# Patient Record
Sex: FEMALE | Race: WHITE | Employment: OTHER | URBAN - NONMETROPOLITAN AREA
[De-identification: names, ages, dates, MRNs, and addresses within clinical notes are randomized per-mention and may not be internally consistent; named-entity substitution may affect disease eponyms.]

---

## 2017-05-13 LAB
ALBUMIN SERPL-MCNC: NORMAL G/DL
ALP BLD-CCNC: NORMAL U/L
ALT SERPL-CCNC: NORMAL U/L
AST SERPL-CCNC: NORMAL U/L
BILIRUB SERPL-MCNC: NORMAL MG/DL (ref 0.1–1.4)
BUN BLDV-MCNC: NORMAL MG/DL
CALCIUM SERPL-MCNC: NORMAL MG/DL
CHLORIDE BLD-SCNC: NORMAL MMOL/L
CHOLESTEROL, TOTAL: 148 MG/DL
CHOLESTEROL/HDL RATIO: 2.14
CO2: NORMAL MMOL/L
CREAT SERPL-MCNC: NORMAL MG/DL
GFR CALCULATED: NORMAL
GLUCOSE BLD-MCNC: 68 MG/DL
HDLC SERPL-MCNC: 69 MG/DL (ref 35–70)
LDL CHOLESTEROL CALCULATED: 61 MG/DL (ref 0–160)
POTASSIUM SERPL-SCNC: NORMAL MMOL/L
SODIUM BLD-SCNC: NORMAL MMOL/L
TOTAL PROTEIN: NORMAL
TRIGL SERPL-MCNC: 88 MG/DL
VLDLC SERPL CALC-MCNC: 18 MG/DL

## 2017-07-13 ENCOUNTER — TELEPHONE (OUTPATIENT)
Dept: FAMILY MEDICINE CLINIC | Age: 62
End: 2017-07-13

## 2017-07-13 DIAGNOSIS — E03.9 ACQUIRED HYPOTHYROIDISM: ICD-10-CM

## 2017-07-13 RX ORDER — LEVOTHYROXINE SODIUM 0.2 MG/1
TABLET ORAL
Qty: 100 TABLET | Refills: 3 | Status: SHIPPED | OUTPATIENT
Start: 2017-07-13 | End: 2018-07-14 | Stop reason: SDUPTHER

## 2017-07-21 DIAGNOSIS — F33.42 RECURRENT MAJOR DEPRESSIVE DISORDER, IN FULL REMISSION (HCC): ICD-10-CM

## 2017-07-21 DIAGNOSIS — E78.00 PURE HYPERCHOLESTEROLEMIA: ICD-10-CM

## 2017-07-21 RX ORDER — PAROXETINE HYDROCHLORIDE 40 MG/1
40 TABLET, FILM COATED ORAL DAILY
Qty: 90 TABLET | Refills: 3 | Status: SHIPPED | OUTPATIENT
Start: 2017-07-21 | End: 2017-10-11 | Stop reason: SDUPTHER

## 2017-07-21 RX ORDER — SIMVASTATIN 80 MG
80 TABLET ORAL NIGHTLY
Qty: 90 TABLET | Refills: 3 | Status: SHIPPED | OUTPATIENT
Start: 2017-07-21 | End: 2018-07-17 | Stop reason: SDUPTHER

## 2017-08-01 ENCOUNTER — OFFICE VISIT (OUTPATIENT)
Dept: FAMILY MEDICINE CLINIC | Age: 62
End: 2017-08-01
Payer: COMMERCIAL

## 2017-08-01 VITALS
DIASTOLIC BLOOD PRESSURE: 80 MMHG | SYSTOLIC BLOOD PRESSURE: 130 MMHG | TEMPERATURE: 98 F | BODY MASS INDEX: 44.09 KG/M2 | WEIGHT: 224.6 LBS | OXYGEN SATURATION: 98 % | RESPIRATION RATE: 15 BRPM | HEART RATE: 87 BPM

## 2017-08-01 DIAGNOSIS — E03.9 ACQUIRED HYPOTHYROIDISM: ICD-10-CM

## 2017-08-01 DIAGNOSIS — F33.42 RECURRENT MAJOR DEPRESSIVE DISORDER, IN FULL REMISSION (HCC): ICD-10-CM

## 2017-08-01 DIAGNOSIS — I10 ESSENTIAL HYPERTENSION: ICD-10-CM

## 2017-08-01 DIAGNOSIS — E78.00 PURE HYPERCHOLESTEROLEMIA: ICD-10-CM

## 2017-08-01 PROCEDURE — 99214 OFFICE O/P EST MOD 30 MIN: CPT | Performed by: FAMILY MEDICINE

## 2017-08-01 PROCEDURE — 3017F COLORECTAL CA SCREEN DOC REV: CPT | Performed by: FAMILY MEDICINE

## 2017-08-01 PROCEDURE — 1036F TOBACCO NON-USER: CPT | Performed by: FAMILY MEDICINE

## 2017-08-01 PROCEDURE — 3014F SCREEN MAMMO DOC REV: CPT | Performed by: FAMILY MEDICINE

## 2017-08-01 PROCEDURE — G8427 DOCREV CUR MEDS BY ELIG CLIN: HCPCS | Performed by: FAMILY MEDICINE

## 2017-08-01 PROCEDURE — G8417 CALC BMI ABV UP PARAM F/U: HCPCS | Performed by: FAMILY MEDICINE

## 2017-08-01 RX ORDER — LOSARTAN POTASSIUM AND HYDROCHLOROTHIAZIDE 25; 100 MG/1; MG/1
1 TABLET ORAL DAILY
Qty: 30 TABLET | Refills: 11 | Status: SHIPPED | OUTPATIENT
Start: 2017-08-01 | End: 2018-08-22 | Stop reason: SDUPTHER

## 2017-08-01 RX ORDER — PAROXETINE HYDROCHLORIDE 40 MG/1
40 TABLET, FILM COATED ORAL DAILY
Qty: 90 TABLET | Refills: 3 | Status: CANCELLED | OUTPATIENT
Start: 2017-08-01

## 2017-08-01 RX ORDER — LEVOTHYROXINE SODIUM 0.2 MG/1
TABLET ORAL
Qty: 100 TABLET | Refills: 3 | Status: CANCELLED | OUTPATIENT
Start: 2017-08-01

## 2017-08-01 RX ORDER — SIMVASTATIN 80 MG
80 TABLET ORAL NIGHTLY
Qty: 90 TABLET | Refills: 3 | Status: CANCELLED | OUTPATIENT
Start: 2017-08-01

## 2017-08-01 RX ORDER — INDAPAMIDE 2.5 MG/1
2.5 TABLET, FILM COATED ORAL EVERY MORNING
Qty: 30 TABLET | Refills: 11 | Status: SHIPPED | OUTPATIENT
Start: 2017-08-01 | End: 2018-07-23 | Stop reason: SDUPTHER

## 2017-08-01 ASSESSMENT — PATIENT HEALTH QUESTIONNAIRE - PHQ9
2. FEELING DOWN, DEPRESSED OR HOPELESS: 1
SUM OF ALL RESPONSES TO PHQ QUESTIONS 1-9: 2
1. LITTLE INTEREST OR PLEASURE IN DOING THINGS: 1
SUM OF ALL RESPONSES TO PHQ9 QUESTIONS 1 & 2: 2

## 2017-10-09 LAB
CHOLESTEROL, TOTAL: 164 MG/DL
CHOLESTEROL/HDL RATIO: 1
HDLC SERPL-MCNC: 72 MG/DL (ref 35–70)
LDL CHOLESTEROL CALCULATED: 73 MG/DL (ref 0–160)
TRIGL SERPL-MCNC: 97 MG/DL
VLDLC SERPL CALC-MCNC: 19 MG/DL

## 2017-10-11 DIAGNOSIS — F33.42 RECURRENT MAJOR DEPRESSIVE DISORDER, IN FULL REMISSION (HCC): ICD-10-CM

## 2017-10-11 RX ORDER — ALPRAZOLAM 0.25 MG/1
0.25 TABLET ORAL 3 TIMES DAILY PRN
Qty: 30 TABLET | Refills: 1 | Status: SHIPPED | OUTPATIENT
Start: 2017-10-11 | End: 2017-11-10

## 2017-10-11 RX ORDER — PAROXETINE HYDROCHLORIDE 40 MG/1
40 TABLET, FILM COATED ORAL DAILY
Qty: 90 TABLET | Refills: 3 | Status: SHIPPED | OUTPATIENT
Start: 2017-10-11 | End: 2018-10-12 | Stop reason: SDUPTHER

## 2017-10-11 NOTE — TELEPHONE ENCOUNTER
Patients mother is passing away- Is needing something to help with this. Daughter Jai Ramsay called.

## 2017-10-16 ENCOUNTER — NURSE ONLY (OUTPATIENT)
Dept: FAMILY MEDICINE CLINIC | Age: 62
End: 2017-10-16
Payer: COMMERCIAL

## 2017-10-16 VITALS
TEMPERATURE: 97.6 F | RESPIRATION RATE: 14 BRPM | HEART RATE: 82 BPM | DIASTOLIC BLOOD PRESSURE: 64 MMHG | SYSTOLIC BLOOD PRESSURE: 124 MMHG

## 2017-10-16 DIAGNOSIS — L01.00 IMPETIGO: Primary | ICD-10-CM

## 2017-10-16 PROCEDURE — 99213 OFFICE O/P EST LOW 20 MIN: CPT | Performed by: FAMILY MEDICINE

## 2017-10-30 ENCOUNTER — TELEPHONE (OUTPATIENT)
Dept: FAMILY MEDICINE CLINIC | Age: 62
End: 2017-10-30

## 2017-10-30 DIAGNOSIS — D69.6 THROMBOCYTOPENIA (HCC): Primary | ICD-10-CM

## 2017-10-30 NOTE — TELEPHONE ENCOUNTER
lAbs look good except the platelets are low  Recheck cbc in 1 month  No changes to med    Call patient

## 2018-01-05 DIAGNOSIS — L30.9 ECZEMA, UNSPECIFIED TYPE: Primary | ICD-10-CM

## 2018-01-05 RX ORDER — CLOTRIMAZOLE AND BETAMETHASONE DIPROPIONATE 10; .64 MG/G; MG/G
CREAM TOPICAL
Qty: 45 G | Refills: 1 | Status: SHIPPED | OUTPATIENT
Start: 2018-01-05 | End: 2019-01-07 | Stop reason: SDUPTHER

## 2018-01-05 RX ORDER — PREDNISONE 1 MG/1
TABLET ORAL
Qty: 45 TABLET | Refills: 0 | Status: SHIPPED | OUTPATIENT
Start: 2018-01-05 | End: 2018-01-15

## 2018-05-12 LAB
ALBUMIN SERPL-MCNC: 3.8 G/DL
ALP BLD-CCNC: 93 U/L
ALT SERPL-CCNC: 16 U/L
ANION GAP SERPL CALCULATED.3IONS-SCNC: 1.2 MMOL/L
AST SERPL-CCNC: 18 U/L
BASOPHILS ABSOLUTE: 0.1 /ΜL
BASOPHILS RELATIVE PERCENT: 0.7 %
BILIRUB SERPL-MCNC: 0.5 MG/DL (ref 0.1–1.4)
BILIRUBIN DIRECT: 0.2 MG/DL
BUN BLDV-MCNC: 22 MG/DL
CALCIUM SERPL-MCNC: 9.1 MG/DL
CHLORIDE BLD-SCNC: 102 MMOL/L
CHOLESTEROL, TOTAL: 146 MG/DL
CHOLESTEROL/HDL RATIO: NORMAL
CO2: 30 MMOL/L
CREAT SERPL-MCNC: 0.7 MG/DL
EOSINOPHILS ABSOLUTE: 0.2 /ΜL
EOSINOPHILS RELATIVE PERCENT: 2.2 %
GFR CALCULATED: 84
GLUCOSE BLD-MCNC: 62 MG/DL
HCT VFR BLD CALC: 42.9 % (ref 36–46)
HDLC SERPL-MCNC: 64 MG/DL (ref 35–70)
HEMOGLOBIN: 14.4 G/DL (ref 12–16)
LDL CHOLESTEROL CALCULATED: 62 MG/DL (ref 0–160)
LYMPHOCYTES ABSOLUTE: 2.5 /ΜL
LYMPHOCYTES RELATIVE PERCENT: 32.5 %
MCH RBC QN AUTO: 32.2 PG
MCHC RBC AUTO-ENTMCNC: 33.5 G/DL
MCV RBC AUTO: 96.2 FL
MONOCYTES ABSOLUTE: 0.5 /ΜL
MONOCYTES RELATIVE PERCENT: 7 %
NEUTROPHILS ABSOLUTE: 4.5 /ΜL
NEUTROPHILS RELATIVE PERCENT: 57.6 %
PDW BLD-RTO: NORMAL %
PHOSPHORUS: 3.5 MG/DL
PLATELET # BLD: 122 K/ΜL
PMV BLD AUTO: 10.4 FL
POTASSIUM SERPL-SCNC: 3.4 MMOL/L
RBC # BLD: 4.46 10^6/ΜL
SODIUM BLD-SCNC: 141 MMOL/L
TOTAL PROTEIN: 6.9
TRIGL SERPL-MCNC: 98 MG/DL
TSH SERPL DL<=0.05 MIU/L-ACNC: 0.33 UIU/ML
VLDLC SERPL CALC-MCNC: NORMAL MG/DL
WBC # BLD: 7.8 10^3/ML

## 2018-07-17 DIAGNOSIS — E78.00 PURE HYPERCHOLESTEROLEMIA: ICD-10-CM

## 2018-07-17 RX ORDER — SIMVASTATIN 80 MG
80 TABLET ORAL NIGHTLY
Qty: 90 TABLET | Refills: 3 | Status: SHIPPED | OUTPATIENT
Start: 2018-07-17 | End: 2019-07-22 | Stop reason: SDUPTHER

## 2018-07-23 ENCOUNTER — OFFICE VISIT (OUTPATIENT)
Dept: FAMILY MEDICINE CLINIC | Age: 63
End: 2018-07-23
Payer: MEDICARE

## 2018-07-23 VITALS
WEIGHT: 232 LBS | BODY MASS INDEX: 45.55 KG/M2 | HEIGHT: 60 IN | DIASTOLIC BLOOD PRESSURE: 80 MMHG | RESPIRATION RATE: 16 BRPM | SYSTOLIC BLOOD PRESSURE: 132 MMHG | HEART RATE: 88 BPM | TEMPERATURE: 97.2 F

## 2018-07-23 DIAGNOSIS — E78.00 PURE HYPERCHOLESTEROLEMIA: ICD-10-CM

## 2018-07-23 DIAGNOSIS — E03.9 ACQUIRED HYPOTHYROIDISM: ICD-10-CM

## 2018-07-23 DIAGNOSIS — Z00.00 ROUTINE GENERAL MEDICAL EXAMINATION AT A HEALTH CARE FACILITY: ICD-10-CM

## 2018-07-23 DIAGNOSIS — F33.42 RECURRENT MAJOR DEPRESSIVE DISORDER, IN FULL REMISSION (HCC): ICD-10-CM

## 2018-07-23 DIAGNOSIS — I10 ESSENTIAL HYPERTENSION: Primary | ICD-10-CM

## 2018-07-23 DIAGNOSIS — I10 ESSENTIAL HYPERTENSION: ICD-10-CM

## 2018-07-23 PROCEDURE — G8417 CALC BMI ABV UP PARAM F/U: HCPCS | Performed by: FAMILY MEDICINE

## 2018-07-23 PROCEDURE — G8427 DOCREV CUR MEDS BY ELIG CLIN: HCPCS | Performed by: FAMILY MEDICINE

## 2018-07-23 PROCEDURE — 1090F PRES/ABSN URINE INCON ASSESS: CPT | Performed by: FAMILY MEDICINE

## 2018-07-23 PROCEDURE — 4040F PNEUMOC VAC/ADMIN/RCVD: CPT | Performed by: FAMILY MEDICINE

## 2018-07-23 PROCEDURE — 1036F TOBACCO NON-USER: CPT | Performed by: FAMILY MEDICINE

## 2018-07-23 PROCEDURE — 1123F ACP DISCUSS/DSCN MKR DOCD: CPT | Performed by: FAMILY MEDICINE

## 2018-07-23 PROCEDURE — G8400 PT W/DXA NO RESULTS DOC: HCPCS | Performed by: FAMILY MEDICINE

## 2018-07-23 PROCEDURE — 99214 OFFICE O/P EST MOD 30 MIN: CPT | Performed by: FAMILY MEDICINE

## 2018-07-23 PROCEDURE — 1101F PT FALLS ASSESS-DOCD LE1/YR: CPT | Performed by: FAMILY MEDICINE

## 2018-07-23 PROCEDURE — 3017F COLORECTAL CA SCREEN DOC REV: CPT | Performed by: FAMILY MEDICINE

## 2018-07-23 RX ORDER — ALPRAZOLAM 0.25 MG/1
0.25 TABLET ORAL 3 TIMES DAILY PRN
Qty: 30 TABLET | Refills: 0 | Status: SHIPPED | OUTPATIENT
Start: 2018-07-23 | End: 2019-04-10 | Stop reason: SDUPTHER

## 2018-07-23 RX ORDER — INDAPAMIDE 2.5 MG/1
TABLET, FILM COATED ORAL
Qty: 30 TABLET | Refills: 0 | Status: SHIPPED | OUTPATIENT
Start: 2018-07-23 | End: 2019-09-21 | Stop reason: SDUPTHER

## 2018-07-23 RX ORDER — BUPROPION HYDROCHLORIDE 150 MG/1
150 TABLET ORAL EVERY MORNING
Qty: 30 TABLET | Refills: 3 | Status: SHIPPED | OUTPATIENT
Start: 2018-07-23 | End: 2018-11-13 | Stop reason: SDUPTHER

## 2018-07-24 NOTE — PROGRESS NOTES
[facility] PROFESSIONAL SERVS  [facility]  [address] [facility]. [facility]  [address] [place] [address]  Dept: [phone]  Dept Fax: [phone]  Loc: Ambreen Phan is a 70 y.o. female who presents today for:  Chief Complaint   Patient presents with    Check-Up    Discuss Labs           HPI:     HPI    Hypertension: Patient here for follow-up of elevated blood pressure. She is not exercising and is adherent to low salt diet. Blood pressure is well controlled at home. Cardiac symptoms none. Patient denies chest pain, dyspnea and palpitations. Cardiovascular risk factors: dyslipidemia, hypertension, obesity (BMI >= 30 kg/m2) and sedentary lifestyle. Use of agents associated with hypertension: none. History of target organ damage: none. Hyperlipidemia: Patient presents with hyperlipidemia. She was tested because hypertension. Her last labs show   Lab Results   Component Value Date    CHOL 146 05/12/2018    CHOL 164 10/09/2017    CHOL 148 05/13/2017     Lab Results   Component Value Date    TRIG 98 05/12/2018    TRIG 97 10/09/2017    TRIG 88 05/13/2017     Lab Results   Component Value Date    HDL 64 05/12/2018    HDL 72 (A) 10/09/2017    HDL 69 05/13/2017     Lab Results   Component Value Date    LDLCALC 62 05/12/2018    LDLCALC 73 10/09/2017    LDLCALC 61 05/13/2017     Lab Results   Component Value Date    VLDL 19 10/09/2017    VLDL 18 05/13/2017    VLDL 15 10/15/2016    VLDL 15 10/15/2016     Lab Results   Component Value Date    CHOLHDLRATIO 1 10/09/2017    CHOLHDLRATIO 2.14 05/13/2017    CHOLHDLRATIO 1.0 10/15/2016    CHOLHDLRATIO 1.0 10/15/2016     There is not a family history of hyperlipidemia. There is not a family history of early ischemia heart disease. Hypothyroidism: Patient presents for evaluation of thyroid function. Symptoms consist of fatigue, arthralgias. Symptoms have present for several years. The symptoms are fatigue. The problem has been gradually worsening.   Previous are equal.   Cardiovascular: Normal rate, regular rhythm, S1 normal, S2 normal and normal heart sounds. Exam reveals no gallop. No murmur heard. Pulmonary/Chest: Effort normal and breath sounds normal. No respiratory distress. She has no wheezes. She has no rhonchi. She has no rales. Abdominal: Soft. Normal appearance and bowel sounds are normal. She exhibits no distension and no mass. There is no hepatosplenomegaly. No tenderness. She has no rigidity, no rebound and no guarding. No hernia. Musculoskeletal:        Right lower leg: She exhibits no edema. Left lower leg: She exhibits no edema. Neurological: She is alert. Assessment/Plan   Yael Kay was seen today for check-up and discuss labs. Diagnoses and all orders for this visit:    Essential hypertension    Recurrent major depressive disorder, in full remission ([facility] [address]  -     ALPRAZolam (XANAX) 0.25 MG tablet; Take 1 tablet by mouth 3 times daily as needed for Anxiety for up to 180 days. .  -     buPROPion (WELLBUTRIN XL) 150 MG extended release tablet; Take 1 tablet by mouth every morning    Pure hypercholesterolemia  -     Comprehensive Metabolic Panel; Future  -     Lipid Panel; Future    Acquired hypothyroidism  -     TSH With Reflex Ft4; Future    Routine general medical examination at a health care facility  -     CBC; Future  -     Comprehensive Metabolic Panel; Future  -     Lipid Panel; Future  -     TSH With Reflex Ft4; Future      Continue healthy diet and exercise  Counseling through journaling or formal counseling    Discussed use, benefit, and side effects of prescribed medications. All patient questions answered. Pt voiced understanding. Reviewed health maintenance. Instructed to continue current medications, diet and exercise. Patient agreed with treatment plan. Follow up as directed.      Electronically signed by Samanta Lcokhart MD

## 2018-10-12 DIAGNOSIS — F33.42 RECURRENT MAJOR DEPRESSIVE DISORDER, IN FULL REMISSION (HCC): ICD-10-CM

## 2018-10-12 RX ORDER — PAROXETINE HYDROCHLORIDE 40 MG/1
TABLET, FILM COATED ORAL
Qty: 90 TABLET | Refills: 0 | Status: SHIPPED | OUTPATIENT
Start: 2018-10-12 | End: 2019-01-15 | Stop reason: SDUPTHER

## 2018-10-13 LAB
ALBUMIN SERPL-MCNC: 3.7 G/DL
ALP BLD-CCNC: 101 U/L
ALT SERPL-CCNC: 18 U/L
ANION GAP SERPL CALCULATED.3IONS-SCNC: 1.2 MMOL/L
AST SERPL-CCNC: 20 U/L
BASOPHILS ABSOLUTE: 0.1 /ΜL
BASOPHILS RELATIVE PERCENT: 0.8 %
BILIRUB SERPL-MCNC: 0.6 MG/DL (ref 0.1–1.4)
BILIRUBIN DIRECT: 0.1 MG/DL
BUN BLDV-MCNC: 17 MG/DL
CALCIUM SERPL-MCNC: 9.5 MG/DL
CHLORIDE BLD-SCNC: 103 MMOL/L
CHOLESTEROL, TOTAL: 156 MG/DL
CHOLESTEROL/HDL RATIO: 0.9
CO2: 27 MMOL/L
CREAT SERPL-MCNC: 0.9 MG/DL
EOSINOPHILS ABSOLUTE: 0.3 /ΜL
EOSINOPHILS RELATIVE PERCENT: 3.6 %
GFR CALCULATED: 63
GLUCOSE BLD-MCNC: 69 MG/DL
HCT VFR BLD CALC: 42.7 % (ref 36–46)
HDLC SERPL-MCNC: 74 MG/DL (ref 35–70)
HEMOGLOBIN: 14.1 G/DL (ref 12–16)
LDL CHOLESTEROL CALCULATED: 65 MG/DL (ref 0–160)
LYMPHOCYTES ABSOLUTE: 2.2 /ΜL
LYMPHOCYTES RELATIVE PERCENT: 29.4 %
MCH RBC QN AUTO: 32.3 PG
MCHC RBC AUTO-ENTMCNC: 32.9 G/DL
MCV RBC AUTO: 98.2 FL
MONOCYTES ABSOLUTE: 0.6 /ΜL
MONOCYTES RELATIVE PERCENT: 7.5 %
NEUTROPHILS ABSOLUTE: 4.4 /ΜL
NEUTROPHILS RELATIVE PERCENT: 58.7 %
PDW BLD-RTO: 13.1 %
PHOSPHORUS: 2.9 MG/DL
PLATELET # BLD: 102 K/ΜL
PMV BLD AUTO: 10.9 FL
POTASSIUM SERPL-SCNC: 3.5 MMOL/L
RBC # BLD: 4.35 10^6/ΜL
SODIUM BLD-SCNC: 141 MMOL/L
T3 FREE: 0.81
TOTAL PROTEIN: 6.9
TRIGL SERPL-MCNC: 83 MG/DL
VLDLC SERPL CALC-MCNC: 17 MG/DL
WBC # BLD: 7.5 10^3/ML

## 2019-01-07 RX ORDER — CLOTRIMAZOLE AND BETAMETHASONE DIPROPIONATE 10; .64 MG/G; MG/G
CREAM TOPICAL
Qty: 45 G | Refills: 0 | Status: ON HOLD | OUTPATIENT
Start: 2019-01-07 | End: 2019-07-01 | Stop reason: ALTCHOICE

## 2019-04-03 ENCOUNTER — OFFICE VISIT (OUTPATIENT)
Dept: FAMILY MEDICINE CLINIC | Age: 64
End: 2019-04-03
Payer: MEDICARE

## 2019-04-03 ENCOUNTER — TELEPHONE (OUTPATIENT)
Dept: FAMILY MEDICINE CLINIC | Age: 64
End: 2019-04-03

## 2019-04-03 VITALS
SYSTOLIC BLOOD PRESSURE: 126 MMHG | RESPIRATION RATE: 16 BRPM | HEART RATE: 76 BPM | BODY MASS INDEX: 44.45 KG/M2 | DIASTOLIC BLOOD PRESSURE: 86 MMHG | TEMPERATURE: 98.7 F | HEIGHT: 60 IN | WEIGHT: 226.4 LBS | OXYGEN SATURATION: 97 %

## 2019-04-03 DIAGNOSIS — R26.89 BALANCE PROBLEM: ICD-10-CM

## 2019-04-03 DIAGNOSIS — G44.52 NEW DAILY PERSISTENT HEADACHE: ICD-10-CM

## 2019-04-03 DIAGNOSIS — R94.31 EKG ABNORMALITIES: ICD-10-CM

## 2019-04-03 DIAGNOSIS — Z91.81 STATUS POST FALL: ICD-10-CM

## 2019-04-03 DIAGNOSIS — H53.451 DECREASED PERIPHERAL VISION OF RIGHT EYE: ICD-10-CM

## 2019-04-03 DIAGNOSIS — R42 DIZZINESS: Primary | ICD-10-CM

## 2019-04-03 LAB
ALBUMIN SERPL-MCNC: 3.8 G/DL (ref 3.5–5.1)
ALP BLD-CCNC: 111 U/L (ref 38–126)
ALT SERPL-CCNC: 18 U/L (ref 11–66)
ANION GAP SERPL CALCULATED.3IONS-SCNC: 11 MEQ/L (ref 8–16)
AST SERPL-CCNC: 23 U/L (ref 5–40)
BILIRUB SERPL-MCNC: 0.5 MG/DL (ref 0.3–1.2)
BUN BLDV-MCNC: 17 MG/DL (ref 7–22)
C-REACTIVE PROTEIN: 0.34 MG/DL (ref 0–1)
CALCIUM SERPL-MCNC: 9.6 MG/DL (ref 8.5–10.5)
CHLORIDE BLD-SCNC: 102 MEQ/L (ref 98–111)
CO2: 27 MEQ/L (ref 23–33)
CREAT SERPL-MCNC: 0.7 MG/DL (ref 0.4–1.2)
ERYTHROCYTE [DISTWIDTH] IN BLOOD BY AUTOMATED COUNT: 12.4 % (ref 11.5–14.5)
ERYTHROCYTE [DISTWIDTH] IN BLOOD BY AUTOMATED COUNT: 45.5 FL (ref 35–45)
GFR SERPL CREATININE-BSD FRML MDRD: 84 ML/MIN/1.73M2
GLUCOSE BLD-MCNC: 92 MG/DL (ref 70–108)
HCT VFR BLD CALC: 46.2 % (ref 37–47)
HEMOGLOBIN: 14.5 GM/DL (ref 12–16)
MCH RBC QN AUTO: 31.5 PG (ref 26–33)
MCHC RBC AUTO-ENTMCNC: 31.4 GM/DL (ref 32.2–35.5)
MCV RBC AUTO: 100.2 FL (ref 81–99)
PLATELET # BLD: 103 THOU/MM3 (ref 130–400)
PMV BLD AUTO: 12.3 FL (ref 9.4–12.4)
POTASSIUM SERPL-SCNC: 3.9 MEQ/L (ref 3.5–5.2)
PRO-BNP: 252.1 PG/ML (ref 0–900)
PROLACTIN: 20.4 NG/ML
RBC # BLD: 4.61 MILL/MM3 (ref 4.2–5.4)
SODIUM BLD-SCNC: 140 MEQ/L (ref 135–145)
T4 FREE: 1.74 NG/DL (ref 0.93–1.76)
TOTAL PROTEIN: 7.3 G/DL (ref 6.1–8)
TROPONIN T: < 0.01 NG/ML
TSH SERPL DL<=0.05 MIU/L-ACNC: 0.15 UIU/ML (ref 0.4–4.2)
WBC # BLD: 6.8 THOU/MM3 (ref 4.8–10.8)

## 2019-04-03 PROCEDURE — G8417 CALC BMI ABV UP PARAM F/U: HCPCS | Performed by: FAMILY MEDICINE

## 2019-04-03 PROCEDURE — 4040F PNEUMOC VAC/ADMIN/RCVD: CPT | Performed by: FAMILY MEDICINE

## 2019-04-03 PROCEDURE — 1123F ACP DISCUSS/DSCN MKR DOCD: CPT | Performed by: FAMILY MEDICINE

## 2019-04-03 PROCEDURE — G8427 DOCREV CUR MEDS BY ELIG CLIN: HCPCS | Performed by: FAMILY MEDICINE

## 2019-04-03 PROCEDURE — 3017F COLORECTAL CA SCREEN DOC REV: CPT | Performed by: FAMILY MEDICINE

## 2019-04-03 PROCEDURE — 93000 ELECTROCARDIOGRAM COMPLETE: CPT | Performed by: FAMILY MEDICINE

## 2019-04-03 PROCEDURE — 1036F TOBACCO NON-USER: CPT | Performed by: FAMILY MEDICINE

## 2019-04-03 PROCEDURE — 99214 OFFICE O/P EST MOD 30 MIN: CPT | Performed by: FAMILY MEDICINE

## 2019-04-03 PROCEDURE — 1090F PRES/ABSN URINE INCON ASSESS: CPT | Performed by: FAMILY MEDICINE

## 2019-04-03 PROCEDURE — G8400 PT W/DXA NO RESULTS DOC: HCPCS | Performed by: FAMILY MEDICINE

## 2019-04-03 PROCEDURE — 36415 COLL VENOUS BLD VENIPUNCTURE: CPT | Performed by: FAMILY MEDICINE

## 2019-04-05 ENCOUNTER — TELEPHONE (OUTPATIENT)
Dept: FAMILY MEDICINE CLINIC | Age: 64
End: 2019-04-05

## 2019-04-05 DIAGNOSIS — D69.6 THROMBOCYTOPENIA (HCC): Primary | ICD-10-CM

## 2019-04-05 DIAGNOSIS — E03.9 ACQUIRED HYPOTHYROIDISM: ICD-10-CM

## 2019-04-05 DIAGNOSIS — R22.1 NECK MASS: ICD-10-CM

## 2019-04-05 RX ORDER — LEVOTHYROXINE SODIUM 0.2 MG/1
TABLET ORAL
Qty: 32 TABLET | Refills: 5 | Status: SHIPPED | OUTPATIENT
Start: 2019-04-05 | End: 2019-10-17 | Stop reason: SDUPTHER

## 2019-04-05 NOTE — PROGRESS NOTES
Respiratory: Negative for cough, chest tightness, shortness of breath and wheezing. Cardiovascular: Negative for chest pain and leg swelling. Gastrointestinal: Negative for nausea, vomiting, abdominal pain, diarrhea and constipation. Genitourinary: Negative for dysuria, urgency and frequency. Neurological: Negative for weakness, light-headedness and headaches. Psychiatric/Behavioral: Negative for sleep disturbance. Objective:     Vitals:    04/03/19 1011   BP: 126/86   Site: Left Upper Arm   Position: Sitting   Cuff Size: Large Adult   Pulse: 76   Resp: 16   Temp: 98.7 °F (37.1 °C)   TempSrc: Temporal   SpO2: 97%   Weight: 226 lb 6.4 oz (102.7 kg)   Height: 5' (1.524 m)     Wt Readings from Last 3 Encounters:   04/03/19 226 lb 6.4 oz (102.7 kg)   07/23/18 232 lb (105.2 kg)   08/01/17 224 lb 9.6 oz (101.9 kg)       Physical Exam   Constitutional: She is oriented to person, place, and time. Neurological: She is alert and oriented to person, place, and time. She has normal strength. A cranial nerve deficit (she is unable to see out of the right field of vision. when testing the peripheral vision, she can not see my finger until it reaches the middle of the right eye and closer to the nose when the left eye picks it up) and sensory deficit is present. Coordination and gait abnormal. GCS eye subscore is 4. GCS verbal subscore is 5. GCS motor subscore is 6. Physical Exam   Constitutional: Vital signs are normal. She appears well-developed and well-nourished. She is active. HENT:   Head: Normocephalic and atraumatic. Right Ear: Tympanic membrane, external ear and ear canal normal. No drainage or tenderness. Left Ear: Tympanic membrane, external ear and ear canal normal. No drainage or tenderness. Nose: Nose normal. No mucosal edema or rhinorrhea. Mouth/Throat: Uvula is midline, oropharynx is clear and moist and mucous membranes are normal. Mucous membranes are not pale. Normal dentition. No posterior oropharyngeal edema or posterior oropharyngeal erythema. Eyes: Lids are normal. Right eye exhibits no chemosis and no discharge. Left eye exhibits no chemosis and no drainage. Right conjunctiva has no hemorrhage. Left conjunctiva has no hemorrhage. Right eye exhibits normal extraocular motion. Left eye exhibits normal extraocular motion. Right pupil is round and reactive. Left pupil is round and reactive. Pupils are equal.   Cardiovascular: Normal rate, regular rhythm, S1 normal, S2 normal and normal heart sounds. Exam reveals no gallop. No murmur heard. Pulmonary/Chest: Effort normal and breath sounds normal. No respiratory distress. She has no wheezes. She has no rhonchi. She has no rales. Abdominal: Soft. Normal appearance and bowel sounds are normal. She exhibits no distension and no mass. There is no hepatosplenomegaly. No tenderness. She has no rigidity, no rebound and no guarding. No hernia. Musculoskeletal:        Right lower leg: She exhibits no edema. Left lower leg: She exhibits no edema. Neurological: She is alert. EKG shows changes including showing atrial enlargement      Assessment/Plan   Bárbara Venkatesh was seen today for dizziness. Diagnoses and all orders for this visit:    Dizziness  -     EKG 12 Lead  -     MRI Brain W WO Contrast; Future  -     Echocardiogram complete; Future  -     CBC; Future  -     Comprehensive Metabolic Panel; Future  -     TSH With Reflex Ft4; Future  -     Troponin; Future  -     Brain Natriuretic Peptide; Future  -     C-Reactive Protein; Future  -     Prolactin; Future  -     VL DUP CAROTID BILATERAL; Future  -     Stress test, lexiscan; Future  -     CBC  -     Comprehensive Metabolic Panel  -     TSH With Reflex Ft4  -     Troponin  -     Brain Natriuretic Peptide  -     C-Reactive Protein  -     Prolactin    Decreased peripheral vision of right eye  -     MRI Brain W WO Contrast; Future  -     Echocardiogram complete;  Future  -     CBC; Future  -     Comprehensive Metabolic Panel; Future  -     TSH With Reflex Ft4; Future  -     Troponin; Future  -     Brain Natriuretic Peptide; Future  -     C-Reactive Protein; Future  -     Prolactin; Future  -     VL DUP CAROTID BILATERAL; Future  -     Stress test, lexiscan; Future  -     CBC  -     Comprehensive Metabolic Panel  -     TSH With Reflex Ft4  -     Troponin  -     Brain Natriuretic Peptide  -     C-Reactive Protein  -     Prolactin    Balance problem  -     MRI Brain W WO Contrast; Future  -     Echocardiogram complete; Future  -     CBC; Future  -     Comprehensive Metabolic Panel; Future  -     TSH With Reflex Ft4; Future  -     Troponin; Future  -     Brain Natriuretic Peptide; Future  -     C-Reactive Protein; Future  -     Prolactin; Future  -     VL DUP CAROTID BILATERAL; Future  -     Stress test, lexiscan; Future  -     CBC  -     Comprehensive Metabolic Panel  -     TSH With Reflex Ft4  -     Troponin  -     Brain Natriuretic Peptide  -     C-Reactive Protein  -     Prolactin    New daily persistent headache  -     MRI Brain W WO Contrast; Future  -     Echocardiogram complete; Future  -     CBC; Future  -     Comprehensive Metabolic Panel; Future  -     TSH With Reflex Ft4; Future  -     Troponin; Future  -     Brain Natriuretic Peptide; Future  -     C-Reactive Protein; Future  -     Prolactin; Future  -     VL DUP CAROTID BILATERAL; Future  -     Stress test, lexiscan; Future  -     CBC  -     Comprehensive Metabolic Panel  -     TSH With Reflex Ft4  -     Troponin  -     Brain Natriuretic Peptide  -     C-Reactive Protein  -     Prolactin    Status post fall  -     MRI Brain W WO Contrast; Future  -     Echocardiogram complete; Future  -     CBC; Future  -     Comprehensive Metabolic Panel; Future  -     TSH With Reflex Ft4; Future  -     Troponin; Future  -     Brain Natriuretic Peptide; Future  -     C-Reactive Protein; Future  -     Prolactin;  Future  -     VL DUP CAROTID

## 2019-04-05 NOTE — TELEPHONE ENCOUNTER
Thyroid high  Decrease the synthroid  To only taking the extra half dose every other Monday and recheck in 6 weeks  Platelets slightly low, recheck with thyroid in 6 weeks  Call patient

## 2019-04-08 ENCOUNTER — TELEPHONE (OUTPATIENT)
Dept: FAMILY MEDICINE CLINIC | Age: 64
End: 2019-04-08

## 2019-04-08 ENCOUNTER — HOSPITAL ENCOUNTER (OUTPATIENT)
Dept: MRI IMAGING | Age: 64
Discharge: HOME | End: 2019-04-08
Payer: MEDICARE

## 2019-04-08 DIAGNOSIS — R42 DIZZINESS: ICD-10-CM

## 2019-04-08 DIAGNOSIS — H53.451 DECREASED PERIPHERAL VISION OF RIGHT EYE: ICD-10-CM

## 2019-04-08 DIAGNOSIS — R26.89 BALANCE PROBLEM: ICD-10-CM

## 2019-04-08 DIAGNOSIS — I69.30 CEREBRAL MULTI-INFARCT STATE: Primary | ICD-10-CM

## 2019-04-08 DIAGNOSIS — Z91.81 STATUS POST FALL: ICD-10-CM

## 2019-04-08 DIAGNOSIS — G44.52 NEW DAILY PERSISTENT HEADACHE: ICD-10-CM

## 2019-04-08 PROCEDURE — 70553 MRI BRAIN STEM W/O & W/DYE: CPT

## 2019-04-08 PROCEDURE — A9579 GAD-BASE MR CONTRAST NOS,1ML: HCPCS | Performed by: FAMILY MEDICINE

## 2019-04-08 PROCEDURE — 6360000004 HC RX CONTRAST MEDICATION: Performed by: FAMILY MEDICINE

## 2019-04-08 RX ADMIN — GADOTERIDOL 20 ML: 279.3 INJECTION, SOLUTION INTRAVENOUS at 13:47

## 2019-04-08 NOTE — TELEPHONE ENCOUNTER
Many old strokes on the MRI  Some old and some are within the past 3 months  Refer to neurology asap  Take aspirin 81 mg daily until seen  Call patient

## 2019-04-09 ENCOUNTER — TELEPHONE (OUTPATIENT)
Dept: FAMILY MEDICINE CLINIC | Age: 64
End: 2019-04-09

## 2019-04-09 DIAGNOSIS — F33.42 RECURRENT MAJOR DEPRESSIVE DISORDER, IN FULL REMISSION (HCC): ICD-10-CM

## 2019-04-09 RX ORDER — ALPRAZOLAM 0.25 MG/1
0.25 TABLET ORAL 3 TIMES DAILY PRN
Qty: 30 TABLET | Refills: 0 | Status: CANCELLED | OUTPATIENT
Start: 2019-04-09 | End: 2019-10-06

## 2019-04-09 NOTE — TELEPHONE ENCOUNTER
Patients  left message asking for Dr. Alison Garduno to call him when ever she had a chance to discuss patient. No other info left on message, attempted to contact at [phone] with no answer. Please advise.

## 2019-04-10 ENCOUNTER — TELEPHONE (OUTPATIENT)
Dept: FAMILY MEDICINE CLINIC | Age: 64
End: 2019-04-10

## 2019-04-10 ENCOUNTER — HOSPITAL ENCOUNTER (OUTPATIENT)
Dept: CT IMAGING | Age: 64
Discharge: HOME | End: 2019-04-10
Payer: MEDICARE

## 2019-04-10 DIAGNOSIS — F33.42 RECURRENT MAJOR DEPRESSIVE DISORDER, IN FULL REMISSION (HCC): ICD-10-CM

## 2019-04-10 DIAGNOSIS — R22.1 NECK MASS: ICD-10-CM

## 2019-04-10 PROCEDURE — 70491 CT SOFT TISSUE NECK W/DYE: CPT

## 2019-04-10 PROCEDURE — 6360000004 HC RX CONTRAST MEDICATION: Performed by: FAMILY MEDICINE

## 2019-04-10 RX ORDER — ALPRAZOLAM 0.25 MG/1
0.25 TABLET ORAL 3 TIMES DAILY PRN
Qty: 30 TABLET | Refills: 0 | Status: SHIPPED | OUTPATIENT
Start: 2019-04-10 | End: 2019-05-24 | Stop reason: SDUPTHER

## 2019-04-10 RX ADMIN — IOPAMIDOL 75 ML: 755 INJECTION, SOLUTION INTRAVENOUS at 12:10

## 2019-04-22 ENCOUNTER — HOSPITAL ENCOUNTER (OUTPATIENT)
Dept: NON INVASIVE DIAGNOSTICS | Age: 64
Discharge: HOME | End: 2019-04-22
Payer: MEDICARE

## 2019-04-22 ENCOUNTER — TELEPHONE (OUTPATIENT)
Dept: CARDIOLOGY CLINIC | Age: 64
End: 2019-04-22

## 2019-04-22 ENCOUNTER — HOSPITAL ENCOUNTER (OUTPATIENT)
Dept: INTERVENTIONAL RADIOLOGY/VASCULAR | Age: 64
Discharge: HOME | End: 2019-04-22
Payer: MEDICARE

## 2019-04-22 DIAGNOSIS — R42 DIZZINESS: ICD-10-CM

## 2019-04-22 DIAGNOSIS — H53.451 DECREASED PERIPHERAL VISION OF RIGHT EYE: ICD-10-CM

## 2019-04-22 DIAGNOSIS — Z91.81 STATUS POST FALL: ICD-10-CM

## 2019-04-22 DIAGNOSIS — G44.52 NEW DAILY PERSISTENT HEADACHE: ICD-10-CM

## 2019-04-22 DIAGNOSIS — R26.89 BALANCE PROBLEM: ICD-10-CM

## 2019-04-22 DIAGNOSIS — R94.31 EKG ABNORMALITIES: ICD-10-CM

## 2019-04-22 LAB
LV EF: 48 %
LVEF MODALITY: NORMAL

## 2019-04-22 PROCEDURE — 2709999900 HC NON-CHARGEABLE SUPPLY

## 2019-04-22 PROCEDURE — 78452 HT MUSCLE IMAGE SPECT MULT: CPT

## 2019-04-22 PROCEDURE — 93017 CV STRESS TEST TRACING ONLY: CPT | Performed by: NUCLEAR MEDICINE

## 2019-04-22 PROCEDURE — A9500 TC99M SESTAMIBI: HCPCS | Performed by: NUCLEAR MEDICINE

## 2019-04-22 PROCEDURE — 6360000002 HC RX W HCPCS

## 2019-04-22 PROCEDURE — 93306 TTE W/DOPPLER COMPLETE: CPT

## 2019-04-22 PROCEDURE — 3430000000 HC RX DIAGNOSTIC RADIOPHARMACEUTICAL: Performed by: NUCLEAR MEDICINE

## 2019-04-22 PROCEDURE — 93880 EXTRACRANIAL BILAT STUDY: CPT

## 2019-04-22 RX ADMIN — Medication 10 MILLICURIE: at 08:53

## 2019-04-22 RX ADMIN — Medication 34.4 MILLICURIE: at 09:53

## 2019-04-23 ENCOUNTER — INITIAL CONSULT (OUTPATIENT)
Dept: NEUROLOGY | Age: 64
End: 2019-04-23
Payer: MEDICARE

## 2019-04-23 ENCOUNTER — NURSE ONLY (OUTPATIENT)
Dept: LAB | Age: 64
End: 2019-04-23

## 2019-04-23 VITALS
BODY MASS INDEX: 46.37 KG/M2 | SYSTOLIC BLOOD PRESSURE: 128 MMHG | HEIGHT: 59 IN | HEART RATE: 92 BPM | DIASTOLIC BLOOD PRESSURE: 82 MMHG | WEIGHT: 230 LBS

## 2019-04-23 DIAGNOSIS — R93.89 ABNORMAL FINDINGS ON DIAGNOSTIC IMAGING OF OTHER SPECIFIED BODY STRUCTURES: ICD-10-CM

## 2019-04-23 DIAGNOSIS — R42 DIZZINESS AND GIDDINESS: ICD-10-CM

## 2019-04-23 DIAGNOSIS — R26.89 BALANCE PROBLEM: ICD-10-CM

## 2019-04-23 DIAGNOSIS — I63.9 OCCIPITAL STROKE (HCC): Primary | ICD-10-CM

## 2019-04-23 DIAGNOSIS — I63.9 OCCIPITAL STROKE (HCC): ICD-10-CM

## 2019-04-23 DIAGNOSIS — H53.47 HEMIANOPSIA: ICD-10-CM

## 2019-04-23 LAB
CHOLESTEROL, TOTAL: 158 MG/DL (ref 100–199)
HDLC SERPL-MCNC: 76 MG/DL
LDL CHOLESTEROL CALCULATED: 68 MG/DL
TRIGL SERPL-MCNC: 72 MG/DL (ref 0–199)

## 2019-04-23 PROCEDURE — G8427 DOCREV CUR MEDS BY ELIG CLIN: HCPCS | Performed by: PSYCHIATRY & NEUROLOGY

## 2019-04-23 PROCEDURE — 99204 OFFICE O/P NEW MOD 45 MIN: CPT | Performed by: PSYCHIATRY & NEUROLOGY

## 2019-04-23 PROCEDURE — 1036F TOBACCO NON-USER: CPT | Performed by: PSYCHIATRY & NEUROLOGY

## 2019-04-23 PROCEDURE — 4040F PNEUMOC VAC/ADMIN/RCVD: CPT | Performed by: PSYCHIATRY & NEUROLOGY

## 2019-04-23 PROCEDURE — 3017F COLORECTAL CA SCREEN DOC REV: CPT | Performed by: PSYCHIATRY & NEUROLOGY

## 2019-04-23 PROCEDURE — 1123F ACP DISCUSS/DSCN MKR DOCD: CPT | Performed by: PSYCHIATRY & NEUROLOGY

## 2019-04-23 PROCEDURE — G8400 PT W/DXA NO RESULTS DOC: HCPCS | Performed by: PSYCHIATRY & NEUROLOGY

## 2019-04-23 PROCEDURE — G8417 CALC BMI ABV UP PARAM F/U: HCPCS | Performed by: PSYCHIATRY & NEUROLOGY

## 2019-04-23 PROCEDURE — G8598 ASA/ANTIPLAT THER USED: HCPCS | Performed by: PSYCHIATRY & NEUROLOGY

## 2019-04-23 PROCEDURE — 1090F PRES/ABSN URINE INCON ASSESS: CPT | Performed by: PSYCHIATRY & NEUROLOGY

## 2019-04-23 NOTE — LETTER
[address] [facility]  [address] [facility] [address]  Dept: [phone]  Dept Fax: [phone]  Loc: Pérez Bedolla MD        4/24/2019      Patient:  Hetal Rao  MRN:  [id number]  YOB: 1955  Date of Visit:  4/23/2019    Dear Dr. Bryan Laguerre,    Thank you for referring Aziza Carpio to me for consultation. Please see attached visit summary with my findings. If you have any questions, please do not hesitate to call me.       Sincerely,         Alyssa Mike MD

## 2019-04-23 NOTE — PATIENT INSTRUCTIONS
1. Continue with antiplatelets  2. CTA head and neck  3. Homocystine level  4. Lipid panel  5. YOEL  6. 30 day cardiac monitor  7. Modify risk factors for stroke (blood pressure, cholesterol, diabetes, smoking cessation etc.. Control)  8. Hold off on driving for now  9. Report any new symptoms  10. Call with any new symptoms or concerns. 11. Follow up in 1-2 weeks.

## 2019-04-24 ENCOUNTER — TELEPHONE (OUTPATIENT)
Dept: NEUROLOGY | Age: 64
End: 2019-04-24

## 2019-04-24 LAB — HOMOCYSTEINE, TOTAL: 15 UMOL/L

## 2019-04-24 RX ORDER — FOLIC ACID 1 MG/1
1 TABLET ORAL DAILY
Qty: 90 TABLET | Refills: 3 | Status: SHIPPED | OUTPATIENT
Start: 2019-04-24 | End: 2020-04-23 | Stop reason: SDUPTHER

## 2019-05-01 ENCOUNTER — OFFICE VISIT (OUTPATIENT)
Dept: CARDIOLOGY CLINIC | Age: 64
End: 2019-05-01
Payer: MEDICARE

## 2019-05-01 VITALS
HEART RATE: 68 BPM | SYSTOLIC BLOOD PRESSURE: 136 MMHG | WEIGHT: 226 LBS | DIASTOLIC BLOOD PRESSURE: 82 MMHG | BODY MASS INDEX: 44.37 KG/M2 | HEIGHT: 60 IN

## 2019-05-01 DIAGNOSIS — E78.01 FAMILIAL HYPERCHOLESTEROLEMIA: Primary | ICD-10-CM

## 2019-05-01 DIAGNOSIS — R94.39 ABNORMAL STRESS TEST: ICD-10-CM

## 2019-05-01 DIAGNOSIS — I10 ESSENTIAL HYPERTENSION: ICD-10-CM

## 2019-05-01 PROCEDURE — 1090F PRES/ABSN URINE INCON ASSESS: CPT | Performed by: NUCLEAR MEDICINE

## 2019-05-01 PROCEDURE — 1123F ACP DISCUSS/DSCN MKR DOCD: CPT | Performed by: NUCLEAR MEDICINE

## 2019-05-01 PROCEDURE — 3017F COLORECTAL CA SCREEN DOC REV: CPT | Performed by: NUCLEAR MEDICINE

## 2019-05-01 PROCEDURE — 4040F PNEUMOC VAC/ADMIN/RCVD: CPT | Performed by: NUCLEAR MEDICINE

## 2019-05-01 PROCEDURE — G8417 CALC BMI ABV UP PARAM F/U: HCPCS | Performed by: NUCLEAR MEDICINE

## 2019-05-01 PROCEDURE — 1036F TOBACCO NON-USER: CPT | Performed by: NUCLEAR MEDICINE

## 2019-05-01 PROCEDURE — G8598 ASA/ANTIPLAT THER USED: HCPCS | Performed by: NUCLEAR MEDICINE

## 2019-05-01 PROCEDURE — 99204 OFFICE O/P NEW MOD 45 MIN: CPT | Performed by: NUCLEAR MEDICINE

## 2019-05-01 PROCEDURE — G8427 DOCREV CUR MEDS BY ELIG CLIN: HCPCS | Performed by: NUCLEAR MEDICINE

## 2019-05-01 PROCEDURE — G8400 PT W/DXA NO RESULTS DOC: HCPCS | Performed by: NUCLEAR MEDICINE

## 2019-05-01 ASSESSMENT — ENCOUNTER SYMPTOMS
BACK PAIN: 0
CHEST TIGHTNESS: 0
ANAL BLEEDING: 0
RECTAL PAIN: 0
VOMITING: 0
FACIAL SWELLING: 0
COLOR CHANGE: 0
SHORTNESS OF BREATH: 1
CONSTIPATION: 0
DIARRHEA: 0
ABDOMINAL PAIN: 0
PHOTOPHOBIA: 0
NAUSEA: 0
ABDOMINAL DISTENTION: 0
BLOOD IN STOOL: 0

## 2019-05-01 NOTE — PROGRESS NOTES
[facility]  [address]  [address] [address]  Dept: [phone]  Dept Fax: [phone]  Loc: [phone]    Visit Date: 5/1/2019    Waqar Norris is a 70 y.o. female who presents todayfor:  Chief Complaint   Patient presents with    New Patient    Results    Establish Cardiologist    Hypertension    Hyperlipidemia     Here for the first time  Started with some falling, vertigo and so on   Ended up seeing neurology for evaluation   Still no answers  Some previous TIA, work up in progress  Had a stress test that was abnormal   Didn't have much chest pain   No obvious angina  Did have some dyspnea on exertion   No known CAD before  No syncope  Does have HTN and hyperlipidemia  On meds for a while   No known A fib   Supposed to have a monitor   No smoking   Family history of CAD     HPI:  HPI  Past Medical History:   Diagnosis Date    Anxiety     Chicken pox     Hyperlipidemia     Hypertension     Hypothyroidism     Measles     Mumps       History reviewed. No pertinent surgical history. Family History   Problem Relation Age of Onset    Arthritis Mother     Other Other         daughter with SVT and PE     Social History     Tobacco Use    Smoking status: Never Smoker    Smokeless tobacco: Never Used   Substance Use Topics    Alcohol use: Yes      Current Outpatient Medications   Medication Sig Dispense Refill    folic acid (FOLVITE) 1 MG tablet Take 1 tablet by mouth daily 90 tablet 3    ALPRAZolam (XANAX) 0.25 MG tablet Take 1 tablet by mouth 3 times daily as needed for Anxiety for up to 180 days. 30 tablet 0    levothyroxine (SYNTHROID) 200 MCG tablet TAKE 1 TABLET ONCE DAILY AND TAKE 1-1/2 TABLETS ON Monday every other week, BY MOUTH. 32 tablet 5    PARoxetine (PAXIL) 40 MG tablet TAKE ONE TABLET DAILY, BY MOUTH.  90 tablet 3    clotrimazole-betamethasone (LOTRISONE) 1-0.05 % cream APPLY TO AFFECTED AREA, TWO TIMES A DAY, AS DIRECTED. 45 g 0    buPROPion (WELLBUTRIN XL) 150 MG extended release tablet TAKE ONE TABLET DAILY IN THE MORNING, BY MOUTH. 30 tablet 5    losartan-hydrochlorothiazide (HYZAAR) 100-25 MG per tablet TAKE ONE TABLET DAILY, BY MOUTH. 30 tablet 11    indapamide (LOZOL) 2.5 MG tablet TAKE ONE TABLET DAILY IN THE MORNING, BY MOUTH. 30 tablet 0    simvastatin (ZOCOR) 80 MG tablet Take 1 tablet by mouth nightly 90 tablet 3    Glucosamine-Chondroit-Vit C-Mn (GLUCOSAMINE 1500 COMPLEX) CAPS Take 1 tablet by mouth daily 90 capsule 3    Multiple Vitamins-Minerals (MULTIVITAMIN & MINERAL PO) Take  by mouth. No current facility-administered medications for this visit. No Known Allergies  Health Maintenance   Topic Date Due    Hepatitis C screen  1955    HIV screen  05/06/1968    Shingles Vaccine (1 of 2) 05/06/2003    Colon cancer screen colonoscopy  05/06/2003    Cervical cancer screen  05/29/2015    DEXA (modify frequency per FRAX score)  05/06/2018    Pneumococcal 65+ years Vaccine (1 of 2 - PCV13) 05/06/2018    Breast cancer screen  01/19/2019    DTaP/Tdap/Td vaccine (1 - Tdap) 08/01/2019 (Originally 5/6/1972)    Flu vaccine (Season Ended) 09/01/2019    TSH testing  04/03/2020    Potassium monitoring  04/03/2020    Creatinine monitoring  04/03/2020    Lipid screen  04/23/2024       Subjective:  Review of Systems   Constitutional: Positive for fatigue. Negative for chills and diaphoresis. HENT: Negative for facial swelling and postnasal drip. Eyes: Negative for photophobia. Respiratory: Positive for shortness of breath. Negative for chest tightness. Cardiovascular: Negative for chest pain, palpitations and leg swelling. Gastrointestinal: Negative for abdominal distention, abdominal pain, anal bleeding, blood in stool, constipation, diarrhea, nausea, rectal pain and vomiting. Endocrine: Negative for polyphagia. Genitourinary: Negative for dysuria, hematuria and urgency. Musculoskeletal: Negative for arthralgias, back pain, gait problem, joint swelling, myalgias, neck pain and neck stiffness. Skin: Negative for color change, pallor, rash and wound. Allergic/Immunologic: Negative for food allergies. Neurological: Negative for dizziness, syncope and light-headedness. Hematological: Negative for adenopathy. Psychiatric/Behavioral: Negative for behavioral problems, confusion, decreased concentration, dysphoric mood and hallucinations. Objective:  Physical Exam   Constitutional: She appears well-developed and well-nourished. HENT:   Head: Normocephalic. Right Ear: External ear normal.   Left Ear: External ear normal.   Nose: Nose normal.   Mouth/Throat: Oropharynx is clear and moist. No oropharyngeal exudate. Eyes: Pupils are equal, round, and reactive to light. Conjunctivae and EOM are normal. Right eye exhibits no discharge. Left eye exhibits no discharge. Neck: Normal range of motion. Neck supple. No JVD present. No tracheal deviation present. No thyromegaly present. Cardiovascular: Normal rate and regular rhythm. Exam reveals no gallop and no friction rub. Murmur heard. Pulmonary/Chest: No stridor. No respiratory distress. She has no wheezes. She has no rales. She exhibits no tenderness. Abdominal: She exhibits no distension and no mass. There is no tenderness. There is no rebound and no guarding. No hernia. Musculoskeletal: She exhibits no edema or deformity. Lymphadenopathy:     She has no cervical adenopathy. Neurological: She displays normal reflexes. No cranial nerve deficit. She exhibits normal muscle tone. Coordination normal.   Skin: She is not diaphoretic. No erythema. Psychiatric: She has a normal mood and affect. Her behavior is normal. Thought content normal.     /82   Pulse 68   Ht 5' (1.524 m)   Wt 226 lb (102.5 kg)   BMI 44.14 kg/m²     Assessment:      Diagnosis Orders   1. Familial hypercholesterolemia     2. Essential hypertension     3. Abnormal stress test     TIA and stroke work up   Pending YOEL and monitor   ECG with PVcs  No known A fib   Risk for CAD  No clear angina  ?/ breast attenuation   ?/ CAD   No active symptoms     Plan:  No follow-ups on file. Discussed  Medical RX for now   No clear indication for cath clinically   Continue risk factor modification and medical management  Obtain a YOEL to further evaluate. Risks and benefits were discussed with the patient at length. She was agreeable after understanding the procedure details and risks. Event monitor   Continue risk factor modification and medical management  Thank you for allowing me to participate in the care of your patient. Please don't hesitate to contact me regarding any further issues related to the patient care    Orders Placed:  No orders of the defined types were placed in this encounter. Medications Prescribed:  No orders of the defined types were placed in this encounter. Discussed use, benefit, and side effects of prescribed medications. All patient questions answered. Pt voicedunderstanding. Instructed to continue current medications, diet and exercise. Continue risk factor modification and medical management. Patient agreed with treatment plan. Follow up as directed.     Electronically signedby Elias Ochoa MD on 5/1/2019 at 8:17 AM

## 2019-05-06 NOTE — PROGRESS NOTES
Chief Complaint   Patient presents with    Consultation   ? ? Multi-infarcts   ? Wil Parker is a 70 y.o. female who presents today for evaluation of dizziness and off balance feeling since February 2019. She also complains of peripheral vision loss in the right eye for the past 1 month. She has been having increasing headaches since the first of the year. She feels her left leg is weak and can give out on her. No falls. She is not diabetic. She has high blood pressure and high cholesterol, well controlled with medications. She had a stress test done yesterday and was told it was abnormal, she is scheduled to see cardiology. She denies any heart palpitation. Her sleep is poor and interrupted, she wakes up feeling tired from sleep. She does not typically take daytime nap. She snores. No sleep study done. She has carotid US done 4/22/19 showed No significant stenosis involving the bilateral extracranial carotid arteries. MRI brain done 4/8/19 showed No evidence of acute intracranial abnormality. Moderate-sized area of developing laminar necrosis at the left parieto-occipital region as evidence for subacute infarct. Multiple old lacunar infarcts . Mild scattered focal areas of T2/FLAIR hyperintensity in the white matter as evidence for chronic microvascular angiopathy She denies chest pain. No shortness of breath, no neck pain. No dysphagia. No fever. No rash. No weight loss. History provided by patient accompanied by her  and daughter. Past Medical History:   Diagnosis Date    Anxiety ?  Chicken pox ?  Hyperlipidemia ?  Hypertension ?  Hypothyroidism ?  Measles ?  Mumps ? ? Patient Active Problem List   Diagnosis    Depression    Hypothyroid    Hyperlipidemia    Hypertension   ? No Known Allergies  ? Current Outpatient Medications   Medication Sig Dispense Refill    ALPRAZolam (XANAX) 0.25 MG tablet Take 1 tablet by mouth 3 times daily as needed for Anxiety for up to 180 days. 30 tablet 0    levothyroxine (SYNTHROID) 200 MCG tablet TAKE 1 TABLET ONCE DAILY AND TAKE 1-1/2 TABLETS ON Monday every other week, BY MOUTH. 32 tablet 5    PARoxetine (PAXIL) 40 MG tablet TAKE ONE TABLET DAILY, BY MOUTH. 90 tablet 3    clotrimazole-betamethasone (LOTRISONE) 1-0.05 % cream APPLY TO AFFECTED AREA, TWO TIMES A DAY, AS DIRECTED. 45 g 0    buPROPion (WELLBUTRIN XL) 150 MG extended release tablet TAKE ONE TABLET DAILY IN THE MORNING, BY MOUTH. 30 tablet 5    losartan-hydrochlorothiazide (HYZAAR) 100-25 MG per tablet TAKE ONE TABLET DAILY, BY MOUTH. 30 tablet 11    indapamide (LOZOL) 2.5 MG tablet TAKE ONE TABLET DAILY IN THE MORNING, BY MOUTH. 30 tablet 0    simvastatin (ZOCOR) 80 MG tablet Take 1 tablet by mouth nightly 90 tablet 3    Glucosamine-Chondroit-Vit C-Mn (GLUCOSAMINE 1500 COMPLEX) CAPS Take 1 tablet by mouth daily 90 capsule 3    Multiple Vitamins-Minerals (MULTIVITAMIN & MINERAL PO) Take by mouth. ? ?     No current facility-administered medications for this visit. ? Social History     Socioeconomic History    Marital status:    ? ? Spouse name: None    Number of children: None    Years of education: None    Highest education level: None   Occupational History    None   Social Needs    Financial resource strain: None    Food insecurity:   ? ? Worry: None   ? ? Inability: None    Transportation needs:   ? ? Medical: None   ? ? Non-medical: None   Tobacco Use    Smoking status: Never Smoker    Smokeless tobacco: Never Used   Substance and Sexual Activity    Alcohol use: Yes    Drug use: No    Sexual activity: None   Lifestyle    Physical activity:   ? ? Days per week: None   ? ? Minutes per session: None    Stress: None   Relationships    Social connections:   ? ? Talks on phone: None   ? ? Gets together: None   ? ? Attends [employer] service: None   ? ? Active member of club or organization: None   ? ? Attends meetings of clubs or organizations: None   ?  ? Relationship status: None    Intimate partner violence:   ? ? Fear of current or ex partner: None   ? ? Emotionally abused: None   ? ? Physically abused: None   ? ? Forced sexual activity: None   Other Topics Concern    None   Social History Narrative    None   ? Family History   Problem Relation Age of Onset    Arthritis Mother ?  Other Other ?   ? daughter with SVT and PE   ? Review of Systems   Complete review of systems is negative other than what is mentioned in HPI  ? ? Vitals:   ? 04/23/19 0937   BP: 128/82   Site: Left Upper Arm   Position: Sitting   Cuff Size: Large Adult   Pulse: 92   Weight: 230 lb (104.3 kg)   Height: 4' 11.06\" (1.5 m)   ? Physical Examination:  General appearance - alert, well appearing, and in no distress, oriented to person, place, and time and over weight  Mental status- Level of Alertness: awake  Orientation: person, place, time  Memory: normal  Fund of Knowledge: normal  Attention/Concentration: normal  Language: normal. Mood is normal.   Neck - supple, no significant adenopathy, carotids upstroke normal bilaterally,   There is no carotid bruit. No neck lymphadenopathy. No thyroid enlargement   Neurological -   Cranial [id number]-HRC:.   Cranial nerve II: She has non-congruent right hemianopsia  Cranial nerve III: Pupils: equal, round, reactive to light  Cranial nerves III, IV, VI: Extraocular Movements: intact   Cranial nerve V: Facial sensation: intact   Cranial nerve VII:Facial strength: intact   Cranial nerve VIII: Hearing: intact   Cranial nerve IX: Palate Elevation intact bilaterally  Cranial nerve XI: Shoulder shrug intact bilaterally  Cranial nerve XII: Tongue midline   neck supple without rigidity, there is no limitation of range of motion of the neck. DTR's are decreased distal and symmetric  Babinski sign negative  motor exam is 5/5 in the upper and lower extremities. Normal muscle tone. There is no muscle atrophy.   Sensory is intact for light touch Coordination: finger to nose, intact  Gait and station guarded  Abnormal movement none, vibration normal, proprioception normal  Skin - normal coloration, no rashes, no suspicious skin lesions  Superficial temporal artery pulses are normal.   There is no limitation of range of motion of the neck. There is no resting tremor, no pin rolling, no bradykinesia, no Hypohonia, normal blink rate. Musculoskeletal: Has no hand arthritis, no limitation of ROM in any of the four extremities, except. There is no leg edema. The Heart was regular in rate and rhythm. No heart murmur  Chest Clear  Abdomen was soft. ?     I reviewed the MRI  brain and agree with interpretation. Results for orders placed during the hospital encounter of 04/08/19   MRI Brain W WO Contrast   ? Narrative PROCEDURE: MRI BRAIN W [address]  ? INDICATION:Dizziness, Decreased peripheral vision of right eye, Balance problem, New daily persistent headache, Status post fall. Dizziness with peripheral vision loss in the right eye for 7 weeks. Mir Climes around Cora time. ?  COMPARISON: No prior study. ?  TECHNIQUE: Multiplanar and multiple spin echo T1 and T2-weighted images were obtained through the brain before and after the administration of intravenous contrast. 20 mL ProHance was injected in the right AC. ? FINDINGS:  There is curvilinear hyperintense DWI signal in the cortex of the left parietal-occipital junction. There is no associated low signal on the ADC map. There is associated hyperintense T2/FLAIR signal and intrinsic hyperintense T1 signal. There is mild ex   vacuo dilatation of the subcortical horn of the left lateral ventricle. There is a small moderate-sized focal area of encephalomalacia in the right cerebral hemisphere. There are small focal areas of encephalomalacia in the bilateral cerebellar   hemispheres. There are small foci of encephalomalacia in the bilateral corona radiata.  There are mild scattered focal areas of T2/FLAIR prolongation in the subcortical deep white matter and in the zach. No intra or extra-axial mass is identified. No   focal areas of restricted diffusion are present. There is a partially empty sella. ?  Following contrast administration, there are no focal areas of abnormal parenchymal or meningeal enhancement identified. ? The major vascular flow voids appear patent. Orbits are unremarkable. Paranasal sinuses and mastoid air cells are clear. ?   ? Impression 1. No evidence of acute intracranial abnormality. 2. Moderate-sized area of developing laminar necrosis at the left parieto-occipital region as evidence for subacute infarct. 3. Multiple old lacunar infarcts. 4. Mild scattered focal areas of T2/FLAIR hyperintensity in the white matter as evidence for chronic microvascular angiopathy. ?  ?  ?  ?  **This report has been created using voice recognition software. It may contain minor errors which are inherent in voice recognition technology. **  ? ? Final report electronically signed by Dr. Bebeto Bennett MD on 4/8/2019 2:07 PM   ?  Cardiac Echo done 4/23/19:  Summary  Ejection fraction is visually estimated at 45-50%. There was mild global hypokinesis of the left ventricle. ?  Cardiac stress test done 4/23/19:  Conclusions  Summary  This Nuclear Medicine study was abnormal .  mild anterior ischemia  normal EF  We reviewed the patient records from referring provider and available information in the EHR   ?  ?  ASSESSMENT:   ?  ? Diagnosis Orders   1. Occipital stroke [facility])  CTA HEAD W WO CONTRAST   ? left CTA NECK W WO CONTRAST   ? Homocysteine, Serum   ? Lipid Panel   ? ECHO Transesophageal   ? Cardiac event monitor   ? 2. Balance problem  CTA HEAD W WO CONTRAST   ? CTA NECK W WO CONTRAST   ? Homocysteine, Serum   ? Lipid Panel   ? ECHO Transesophageal   ? Cardiac event monitor   3. Abnormal findings on diagnostic imaging of other specified body structures  ECHO Transesophageal   4. Dizziness and giddiness  Cardiac event monitor   5. Non congruent Right Hemianopsia  ? This is a 68-year-old female who presents with symptoms of stroke at least 2 weeks duration. She complains of off-balance feeling, right peripheral vision loss, and headache. MRI brain reviewed showed acute left occipital infarct as well as old lacunar infarcts. She will need to undergo work up for acute stroke including CTA head and neck. Transesophagel echo, lipid panel, etc. We asked the patient to hold off on driving for now due to her right non-congruent right hemianopsia. After detailed discussion with the patient, her , and her daughter we agreed on the following plan. Plan   1. Continue with antiplatelets  2. CTA head and neck  3. Homocystine level  4. Lipid panel  5. YOEL  6. 30 day cardiac monitor  7. Modify risk factors for stroke (blood pressure, cholesterol, diabetes, smoking cessation etc.. Control)  8. Hold off on driving for now  9. Report any new symptoms  10. Call with any new symptoms or concerns. Follow up in 1-2 weeks.        Milton Greenberg MD

## 2019-05-09 ENCOUNTER — PREP FOR PROCEDURE (OUTPATIENT)
Dept: CARDIOLOGY | Age: 64
End: 2019-05-09

## 2019-05-09 ENCOUNTER — HOSPITAL ENCOUNTER (OUTPATIENT)
Dept: CT IMAGING | Age: 64
Discharge: HOME | End: 2019-05-09
Payer: MEDICARE

## 2019-05-09 ENCOUNTER — HOSPITAL ENCOUNTER (OUTPATIENT)
Dept: NON INVASIVE DIAGNOSTICS | Age: 64
Discharge: HOME | End: 2019-05-09
Payer: MEDICARE

## 2019-05-09 DIAGNOSIS — R26.89 BALANCE PROBLEM: ICD-10-CM

## 2019-05-09 DIAGNOSIS — I63.9 OCCIPITAL STROKE (HCC): ICD-10-CM

## 2019-05-09 DIAGNOSIS — R42 DIZZINESS AND GIDDINESS: ICD-10-CM

## 2019-05-09 LAB — POC CREATININE WHOLE BLOOD: 1 MG/DL (ref 0.5–1.2)

## 2019-05-09 PROCEDURE — 6360000004 HC RX CONTRAST MEDICATION: Performed by: NURSE PRACTITIONER

## 2019-05-09 PROCEDURE — 93270 REMOTE 30 DAY ECG REV/REPORT: CPT

## 2019-05-09 PROCEDURE — 82565 ASSAY OF CREATININE: CPT

## 2019-05-09 PROCEDURE — 70496 CT ANGIOGRAPHY HEAD: CPT

## 2019-05-09 PROCEDURE — 70498 CT ANGIOGRAPHY NECK: CPT

## 2019-05-09 RX ORDER — SODIUM CHLORIDE 9 MG/ML
INJECTION, SOLUTION INTRAVENOUS CONTINUOUS
Status: CANCELLED | OUTPATIENT
Start: 2019-05-09

## 2019-05-09 RX ORDER — SODIUM CHLORIDE 0.9 % (FLUSH) 0.9 %
10 SYRINGE (ML) INJECTION EVERY 12 HOURS SCHEDULED
Status: CANCELLED | OUTPATIENT
Start: 2019-05-09

## 2019-05-09 RX ORDER — SODIUM CHLORIDE 0.9 % (FLUSH) 0.9 %
10 SYRINGE (ML) INJECTION PRN
Status: CANCELLED | OUTPATIENT
Start: 2019-05-09

## 2019-05-09 RX ADMIN — IOPAMIDOL 85 ML: 755 INJECTION, SOLUTION INTRAVENOUS at 08:05

## 2019-05-09 NOTE — PROCEDURES
Cardiac Event Monitor applied. [id number]. Detailed instructions given and patient verbalized understanding.

## 2019-05-10 ENCOUNTER — HOSPITAL ENCOUNTER (OUTPATIENT)
Dept: NURSING | Age: 64
Discharge: HOME | End: 2019-05-10
Payer: MEDICARE

## 2019-05-10 VITALS
HEART RATE: 80 BPM | WEIGHT: 227.2 LBS | DIASTOLIC BLOOD PRESSURE: 84 MMHG | TEMPERATURE: 97.3 F | RESPIRATION RATE: 16 BRPM | BODY MASS INDEX: 44.37 KG/M2 | OXYGEN SATURATION: 94 % | SYSTOLIC BLOOD PRESSURE: 153 MMHG

## 2019-05-10 DIAGNOSIS — I63.9 OCCIPITAL STROKE (HCC): ICD-10-CM

## 2019-05-10 DIAGNOSIS — R26.89 BALANCE PROBLEM: ICD-10-CM

## 2019-05-10 DIAGNOSIS — R93.89 ABNORMAL FINDINGS ON DIAGNOSTIC IMAGING OF OTHER SPECIFIED BODY STRUCTURES: ICD-10-CM

## 2019-05-10 LAB
ERYTHROCYTE [DISTWIDTH] IN BLOOD BY AUTOMATED COUNT: 12.1 % (ref 11.5–14.5)
ERYTHROCYTE [DISTWIDTH] IN BLOOD BY AUTOMATED COUNT: 41.9 FL (ref 35–45)
HCT VFR BLD CALC: 42.3 % (ref 37–47)
HEMOGLOBIN: 14.2 GM/DL (ref 12–16)
INR BLD: 0.91 (ref 0.85–1.13)
LV EF: 50 %
LVEF MODALITY: NORMAL
MCH RBC QN AUTO: 32 PG (ref 26–33)
MCHC RBC AUTO-ENTMCNC: 33.6 GM/DL (ref 32.2–35.5)
MCV RBC AUTO: 95.3 FL (ref 81–99)
PLATELET # BLD: 129 THOU/MM3 (ref 130–400)
PMV BLD AUTO: 11.3 FL (ref 9.4–12.4)
RBC # BLD: 4.44 MILL/MM3 (ref 4.2–5.4)
WBC # BLD: 7.1 THOU/MM3 (ref 4.8–10.8)

## 2019-05-10 PROCEDURE — 93325 DOPPLER ECHO COLOR FLOW MAPG: CPT

## 2019-05-10 PROCEDURE — 99152 MOD SED SAME PHYS/QHP 5/>YRS: CPT

## 2019-05-10 PROCEDURE — 6360000002 HC RX W HCPCS: Performed by: NUCLEAR MEDICINE

## 2019-05-10 PROCEDURE — 2580000003 HC RX 258: Performed by: PHYSICIAN ASSISTANT

## 2019-05-10 PROCEDURE — 85610 PROTHROMBIN TIME: CPT

## 2019-05-10 PROCEDURE — 93306 TTE W/DOPPLER COMPLETE: CPT | Performed by: NUCLEAR MEDICINE

## 2019-05-10 PROCEDURE — 36415 COLL VENOUS BLD VENIPUNCTURE: CPT

## 2019-05-10 PROCEDURE — 93312 ECHO TRANSESOPHAGEAL: CPT

## 2019-05-10 PROCEDURE — 93320 DOPPLER ECHO COMPLETE: CPT

## 2019-05-10 PROCEDURE — 2709999900 HC NON-CHARGEABLE SUPPLY

## 2019-05-10 PROCEDURE — 6370000000 HC RX 637 (ALT 250 FOR IP)

## 2019-05-10 PROCEDURE — 85027 COMPLETE CBC AUTOMATED: CPT

## 2019-05-10 RX ORDER — SODIUM CHLORIDE 0.9 % (FLUSH) 0.9 %
10 SYRINGE (ML) INJECTION EVERY 12 HOURS SCHEDULED
Status: DISCONTINUED | OUTPATIENT
Start: 2019-05-10 | End: 2019-05-11 | Stop reason: HOSPADM

## 2019-05-10 RX ORDER — FLUMAZENIL 0.1 MG/ML
0.5 INJECTION, SOLUTION INTRAVENOUS ONCE
Status: DISCONTINUED | OUTPATIENT
Start: 2019-05-10 | End: 2019-05-11 | Stop reason: HOSPADM

## 2019-05-10 RX ORDER — NALOXONE HYDROCHLORIDE 0.4 MG/ML
0.4 INJECTION, SOLUTION INTRAMUSCULAR; INTRAVENOUS; SUBCUTANEOUS PRN
Status: DISCONTINUED | OUTPATIENT
Start: 2019-05-10 | End: 2019-05-11 | Stop reason: HOSPADM

## 2019-05-10 RX ORDER — MIDAZOLAM HYDROCHLORIDE 1 MG/ML
1 INJECTION INTRAMUSCULAR; INTRAVENOUS EVERY 5 MIN PRN
Status: DISCONTINUED | OUTPATIENT
Start: 2019-05-10 | End: 2019-05-11 | Stop reason: HOSPADM

## 2019-05-10 RX ORDER — SODIUM CHLORIDE 9 MG/ML
INJECTION, SOLUTION INTRAVENOUS CONTINUOUS
Status: DISCONTINUED | OUTPATIENT
Start: 2019-05-10 | End: 2019-05-11 | Stop reason: HOSPADM

## 2019-05-10 RX ORDER — SODIUM CHLORIDE 0.9 % (FLUSH) 0.9 %
10 SYRINGE (ML) INJECTION PRN
Status: DISCONTINUED | OUTPATIENT
Start: 2019-05-10 | End: 2019-05-11 | Stop reason: HOSPADM

## 2019-05-10 RX ORDER — FENTANYL CITRATE 50 UG/ML
25 INJECTION, SOLUTION INTRAMUSCULAR; INTRAVENOUS EVERY 5 MIN PRN
Status: DISCONTINUED | OUTPATIENT
Start: 2019-05-10 | End: 2019-05-11 | Stop reason: HOSPADM

## 2019-05-10 RX ADMIN — MIDAZOLAM 2 MG: 1 INJECTION INTRAMUSCULAR; INTRAVENOUS at 14:46

## 2019-05-10 RX ADMIN — SODIUM CHLORIDE: 9 INJECTION, SOLUTION INTRAVENOUS at 13:59

## 2019-05-10 RX ADMIN — FENTANYL CITRATE 25 MCG: 50 INJECTION, SOLUTION INTRAMUSCULAR; INTRAVENOUS at 14:48

## 2019-05-10 RX ADMIN — MIDAZOLAM 1 MG: 1 INJECTION INTRAMUSCULAR; INTRAVENOUS at 14:48

## 2019-05-10 RX ADMIN — Medication 10 ML: at 14:00

## 2019-05-10 RX ADMIN — FENTANYL CITRATE 25 MCG: 50 INJECTION, SOLUTION INTRAMUSCULAR; INTRAVENOUS at 14:51

## 2019-05-10 RX ADMIN — FENTANYL CITRATE 25 MCG: 50 INJECTION, SOLUTION INTRAMUSCULAR; INTRAVENOUS at 14:46

## 2019-05-10 RX ADMIN — MIDAZOLAM 1 MG: 1 INJECTION INTRAMUSCULAR; INTRAVENOUS at 14:51

## 2019-05-10 ASSESSMENT — PAIN - FUNCTIONAL ASSESSMENT: PAIN_FUNCTIONAL_ASSESSMENT: 0-10

## 2019-05-10 ASSESSMENT — PAIN SCALES - GENERAL: PAINLEVEL_OUTOF10: 0

## 2019-05-10 NOTE — PROGRESS NOTES
1315 PT TO OPN PER AMBULATION FOR A FLOYD . PT RIGHTS AND RESPONSIBILITIES OFFERED TO PT.FAMILY AT BEDSIDE. PT WEARING A HOLTER MONITOR AT PRESENT. PT STATES SHE HAS BEEN HAVING 'MINISTROKES' NPO LAST NIGHT EXCEPT FOR MED.   1400 RESTING QUIETLY   14.35 ECHO AT BEDSIDE. AWAITING DOCTOR    1446 PROCEDURE STARTED   1450 BUBBLES GIVEN. [address]. PT UCHE WELL. FAMILY UPDATED .  AT BEDSIDE. Luige Floyd 10 PT ALERT. AND AWAKE. Pt up in room gait steady. 1600 discharge instructions given to patient and . Both verbalize understanding. resp even and easy . Pt denies sob, throat pain. No difficulty swallowing. 1620 pt discharged per wheelchair to home with  and family.                _M___ Safety:       (Environmental)   Allen to environment   Ensure ID band is correct and in place/ allergy band as needed   Assess for fall risk   Initiate fall precautions as applicable (fall band, side rails, etc.)   Call light within reach   Bed in low position/ wheels locked    ___M_ Pain:        Assess pain level and characteristics   Administer analgesics as ordered   Assess effectiveness of pain management and report to MD as needed    M____ Knowledge Deficit:   Assess baseline knowledge   Provide teaching at level of understanding   Provide teaching via preferred learning method   Evaluate teaching effectiveness    ___M_ Hemodynamic/Respiratory Status:       (Pre and Post Procedure Monitoring)   Assess/Monitor vital signs and LOC   Assess Baseline SpO2 prior to any sedation   Obtain weight/height   Assess vital signs/ LOC until patient meets discharge criteria   Monitor procedure site and notify MD of any issues

## 2019-05-10 NOTE — H&P
[facility]  Sedation/Analgesia History & Physical    Pt Name: Diane Shay  Account number: [de-identified]  MRN: [id number]  YOB: 1955  Provider Performing Procedure: Kay Lazar MD [facility]  Primary Care Physician: Nick Dhaliwal MD  Date: 5/10/2019    PRE-PROCEDURE    Code Status: FULL CODE  Brief History/Pre-Procedure Diagnosis:   TIA      Consent: : I have discussed with the patient risks, benefits, and alternatives (and relevant risks, benefits, and side effects related to alternatives or not receiving care), and likelihood of the success. The patient and/or representative appear to understand and agree to proceed. The discussion encompasses risks, benefits, and side effects related to the alternatives and the risks related to not receiving the proposed care, treatment, and services. MEDICAL HISTORY  []ASHD/ANGINA/MI/CHF   [x]Hypertension  []Diabetes  []Hyperlipidemia  []Smoking  [x]Family Hx of ASHD  []Additional information:       has a past medical history of Anxiety, Chicken pox, Hyperlipidemia, Hypertension, Hypothyroidism, Measles, and Mumps. SURGICAL HISTORY   has no past surgical history on file. Additional information:       ALLERGIES   Allergies as of 05/10/2019    (No Known Allergies)     Additional information:       MEDICATIONS   Aspirin  [x] 81 mg  [] 325 mg  [] None  Antiplatelet drug therapy use last 5 days  []No []Yes  Coumadin Use Last 5 Days []No []Yes  Other anticoagulant use last 5 days  []No []Yes    Current Outpatient Medications:     folic acid (FOLVITE) 1 MG tablet, Take 1 tablet by mouth daily, Disp: 90 tablet, Rfl: 3    ALPRAZolam (XANAX) 0.25 MG tablet, Take 1 tablet by mouth 3 times daily as needed for Anxiety for up to 180 days. , Disp: 30 tablet, Rfl: 0    levothyroxine (SYNTHROID) 200 MCG tablet, TAKE 1 TABLET ONCE DAILY AND TAKE 1-1/2 TABLETS ON Monday every other week, BY MOUTH., Disp: 32 tablet, Rfl: 5    PARoxetine (PAXIL) 40 MG tablet, TAKE ONE TABLET DAILY, BY MOUTH., Disp: 90 tablet, Rfl: 3    clotrimazole-betamethasone (LOTRISONE) 1-0.05 % cream, APPLY TO AFFECTED AREA, TWO TIMES A DAY, AS DIRECTED., Disp: 45 g, Rfl: 0    buPROPion (WELLBUTRIN XL) 150 MG extended release tablet, TAKE ONE TABLET DAILY IN THE MORNING, BY MOUTH., Disp: 30 tablet, Rfl: 5    losartan-hydrochlorothiazide (HYZAAR) 100-25 MG per tablet, TAKE ONE TABLET DAILY, BY MOUTH., Disp: 30 tablet, Rfl: 11    indapamide (LOZOL) 2.5 MG tablet, TAKE ONE TABLET DAILY IN THE MORNING, BY MOUTH., Disp: 30 tablet, Rfl: 0    simvastatin (ZOCOR) 80 MG tablet, Take 1 tablet by mouth nightly, Disp: 90 tablet, Rfl: 3    Glucosamine-Chondroit-Vit C-Mn (GLUCOSAMINE 1500 COMPLEX) CAPS, Take 1 tablet by mouth daily, Disp: 90 capsule, Rfl: 3    Multiple Vitamins-Minerals (MULTIVITAMIN & MINERAL PO), Take  by mouth.  , Disp: , Rfl:     Current Facility-Administered Medications:     midazolam (VERSED) injection 1 mg, 1 mg, Intravenous, Q5 Min PRN, Jaiden Spring MD, 1 mg at 05/10/19 1451    fentaNYL (SUBLIMAZE) injection 25 mcg, 25 mcg, Intravenous, Q5 Min PRN, Jaiden Spring MD, 25 mcg at 05/10/19 1451    naloxone (NARCAN) injection 0.4 mg, 0.4 mg, Intravenous, PRN, Nela Rees MD    flumazenil (ROMAZICON) injection 0.5 mg, 0.5 mg, Intravenous, Once, Nela Rees MD    0.9 % sodium chloride infusion, , Intravenous, Continuous, Abby Garces PA-C, Last Rate: 75 mL/hr at 05/10/19 1359    sodium chloride flush 0.9 % injection 10 mL, 10 mL, Intravenous, 2 times per day, Abby Garces PA-C    sodium chloride flush 0.9 % injection 10 mL, 10 mL, Intravenous, PRN, Anthony Rosas PA-C, 10 mL at 05/10/19 1400  Prior to Admission medications    Medication Sig Start Date End Date Taking?  Authorizing Provider   folic acid (FOLVITE) 1 MG tablet Take 1 tablet by mouth daily 4/24/19   VANE Sykes - CNP   ALPRAZolam Sirisha Giles) 0.25 MG tablet Take 1 tablet by mouth 3 times daily as needed for Anxiety for up to 180 days. 4/10/19 10/7/19  Annabel Knutson MD   levothyroxine (SYNTHROID) 200 MCG tablet TAKE 1 TABLET ONCE DAILY AND TAKE 1-1/2 TABLETS ON Monday every other week, BY MOUTH. 4/5/19   Annabel Knutson MD   PARoxetine (PAXIL) 40 MG tablet TAKE ONE TABLET DAILY, BY MOUTH. 1/16/19   Annabel Knutson MD   clotrimazole-betamethasone (LOTRISONE) 1-0.05 % cream APPLY TO AFFECTED AREA, TWO TIMES A DAY, AS DIRECTED. 1/7/19   Annabel Knutson MD   buPROPion (WELLBUTRIN XL) 150 MG extended release tablet TAKE ONE TABLET DAILY IN THE MORNING, BY MOUTH. 11/13/18   Annabel Knutson MD   losartan-hydrochlorothiazide (HYZAAR) 100-25 MG per tablet TAKE ONE TABLET DAILY, BY MOUTH. 8/22/18   Annabel Knutson MD   indapamide (LOZOL) 2.5 MG tablet TAKE ONE TABLET DAILY IN THE MORNING, BY MOUTH. 7/23/18   Annabel Knutson MD   simvastatin (ZOCOR) 80 MG tablet Take 1 tablet by mouth nightly 7/17/18   Annabel Knutson MD   Glucosamine-Chondroit-Vit C-Mn (GLUCOSAMINE 1500 COMPLEX) CAPS Take 1 tablet by mouth daily 10/27/15   Annabel Knutson MD   Multiple Vitamins-Minerals (MULTIVITAMIN & MINERAL PO) Take  by mouth.       Historical Provider, MD     Additional information:       VITAL SIGNS   Vitals:    05/10/19 1459   BP: (!) 151/83   Pulse: 84   Resp: 16   Temp:    SpO2: 94%       PHYSICAL:   General: No acute distress  HEENT:  Unremarkable for age  Neck: without increased JVD, carotid pulses 2+ bilaterally without bruits  Heart: RRR, S1 & S2 WNL, S4 gallop, without murmurs or rubs    Lungs: Clear to auscultation    Abdomen: BS present, without HSM, masses, or tenderness    Extremities: without C,C,E.  Pulses 2+ bilaterally  Mental Status: Alert & Oriented        PLANNED PROCEDURE   []Cath  []PCI                []Pacemaker/AICD  [x]YOEL             []Cardioversion []Peripheral angiography/PTA  []Other:      SEDATION  Planned agent: [x]Midazolam []Meperidine [x]Sublimaze []Morphine  []Diazepam  []Other:     ASA Classification:  []1 [x]2 []3 []4 []5  Class 1: A normal healthy patient  Class 2: Pt with mild to moderate systemic disease  Class 3: Severe systemic disease or disturbance  Class 4: Severe systemic disorders that are already life threatening. Class 5: Moribund pt with little chances of survival, for more than 24 hours. Mallampati I Airway Classification:   []1 [x]2 []3 []4    [x]Pre-procedure diagnostic studies complete and results available. Comment:    [x]Previous sedation/anesthesia experiences assessed. Comment:    [x]The patient is an appropriate candidate to undergo the planned procedure sedation and anesthesia. (Refer to nursing sedation/analgesia documentation record)  [x]Formulation and discussion of sedation/procedure plan, risks, and expectations with patient and/or responsible adult completed. [x]Patient examined immediately prior to the procedure.  (Refer to nursing sedation/analgesia documentation record)    Sofya Esquivel MD [facility]  Electronically signed 5/10/2019 at 3:15 PM

## 2019-05-13 ENCOUNTER — OFFICE VISIT (OUTPATIENT)
Dept: NEUROLOGY | Age: 64
End: 2019-05-13
Payer: MEDICARE

## 2019-05-13 ENCOUNTER — TELEPHONE (OUTPATIENT)
Dept: CARDIOLOGY CLINIC | Age: 64
End: 2019-05-13

## 2019-05-13 ENCOUNTER — PREP FOR PROCEDURE (OUTPATIENT)
Dept: CARDIOLOGY | Age: 64
End: 2019-05-13

## 2019-05-13 VITALS
DIASTOLIC BLOOD PRESSURE: 76 MMHG | BODY MASS INDEX: 46.97 KG/M2 | WEIGHT: 233 LBS | HEIGHT: 59 IN | SYSTOLIC BLOOD PRESSURE: 124 MMHG | HEART RATE: 76 BPM

## 2019-05-13 DIAGNOSIS — H53.47 HEMIANOPSIA: ICD-10-CM

## 2019-05-13 DIAGNOSIS — R26.89 BALANCE PROBLEM: ICD-10-CM

## 2019-05-13 DIAGNOSIS — I63.9 OCCIPITAL STROKE (HCC): Primary | Chronic | ICD-10-CM

## 2019-05-13 PROCEDURE — 1090F PRES/ABSN URINE INCON ASSESS: CPT | Performed by: PSYCHIATRY & NEUROLOGY

## 2019-05-13 PROCEDURE — G8598 ASA/ANTIPLAT THER USED: HCPCS | Performed by: PSYCHIATRY & NEUROLOGY

## 2019-05-13 PROCEDURE — G8427 DOCREV CUR MEDS BY ELIG CLIN: HCPCS | Performed by: PSYCHIATRY & NEUROLOGY

## 2019-05-13 PROCEDURE — 3017F COLORECTAL CA SCREEN DOC REV: CPT | Performed by: PSYCHIATRY & NEUROLOGY

## 2019-05-13 PROCEDURE — 1036F TOBACCO NON-USER: CPT | Performed by: PSYCHIATRY & NEUROLOGY

## 2019-05-13 PROCEDURE — 1123F ACP DISCUSS/DSCN MKR DOCD: CPT | Performed by: PSYCHIATRY & NEUROLOGY

## 2019-05-13 PROCEDURE — G8400 PT W/DXA NO RESULTS DOC: HCPCS | Performed by: PSYCHIATRY & NEUROLOGY

## 2019-05-13 PROCEDURE — 99214 OFFICE O/P EST MOD 30 MIN: CPT | Performed by: PSYCHIATRY & NEUROLOGY

## 2019-05-13 PROCEDURE — G8417 CALC BMI ABV UP PARAM F/U: HCPCS | Performed by: PSYCHIATRY & NEUROLOGY

## 2019-05-13 PROCEDURE — 4040F PNEUMOC VAC/ADMIN/RCVD: CPT | Performed by: PSYCHIATRY & NEUROLOGY

## 2019-05-13 NOTE — TELEPHONE ENCOUNTER
Patient saw Dr. Ernst Garcia today and he advised her to see Dr. Aleena Donohue regarding YOEL results. She is scheduled on 9/3/19 for a 4 month follow up but Dr. Ernst Garcia requested that it be moved up. Please advise on a appointment.

## 2019-05-24 DIAGNOSIS — F33.42 RECURRENT MAJOR DEPRESSIVE DISORDER, IN FULL REMISSION (HCC): ICD-10-CM

## 2019-05-24 RX ORDER — ALPRAZOLAM 0.25 MG/1
0.25 TABLET ORAL 3 TIMES DAILY PRN
Qty: 30 TABLET | Refills: 0 | Status: SHIPPED | OUTPATIENT
Start: 2019-05-24 | End: 2019-09-11 | Stop reason: SDUPTHER

## 2019-05-26 NOTE — PROGRESS NOTES
Weight: 233 lb (105.7 kg)   Height: 4' 11.06\" (1.5 m)   General Appearance: awake, alert, oriented, in no acute distress  Gen: NAD, Language is Intact. Head: no rash, no icterus  Neck: There is no carotid bruits. The Neck is supple. Neuro: CN 2-12: She has non-congruent right hemianopsia. Power 5/5 Throughout symmetric, Reflexes are decreased and symmetric. Long tracts are intact. Cerebellar exam is Intact. Sensory exam is intact to light touch. Gait is intact. Musculoskeletal: Has no hand arthritis, no limitation of ROM in any of the four extremities. Lower extremities no edema  ? ? DATA:  Results for orders placed or performed during the hospital encounter of 05/10/19   CBC   Result Value Ref Range   ? WBC 7.1 4.8 - 10.8 thou/mm3   ? RBC 4.44 4.20 - 5.40 mill/mm3   ? Hemoglobin 14.2 12.0 - 16.0 gm/dl   ? Hematocrit 42.3 37.0 - 47.0 %   ? MCV 95.3 81.0 - 99.0 fL   ? MCH 32.0 26.0 - 33.0 pg   ? MCHC 33.6 32.2 - 35.5 gm/dl   ? RDW-CV 12.1 11.5 - 14.5 %   ? RDW-SD 41.9 35.0 - 45.0 fL   ? Platelets 095 (L) 311 - 400 thou/mm3   ? MPV 11.3 9.4 - 12.4 fL   Protime-INR   Result Value Ref Range   ? INR 0.91 0.85 - 1.13   ? Results for orders placed during the hospital encounter of 05/09/19    [address]   ? Narrative PROCEDURE: CTA HEAD W WO CONTRAST, CTA NECK W WO CONTRAST  ? CLINICAL INFORMATION: Occipital stroke ([address] Balance problem. Off balance. Right visual cut. Acute stroke 3 weeks ago. ?  COMPARISON: Brain MRI 4/8/2019.  ?  TECHNIQUE: 1 mm axial images were obtained through the head and neck after the fast bolus administration of contrast. A noncontrast localizer was obtained. 3-D reconstructions were performed on a dedicated 3-D workstation. These include multiplanar MPR   images and multiplanar MIP images. Centerline reconstructions were obtained of the carotid systems. Isovue intravenous contrast was given. ?   All CT scans at this facility use dose modulation, iterative reconstruction, using voice recognition software. It may contain minor errors which are inherent in voice recognition technology. **  ? Final report electronically signed by Dr. Arvin Guerra on 5/9/2019 2:04 PM     Results for orders placed during the hospital encounter of 05/09/19   CTA [address]   ? Narrative PROCEDURE: CTA HEAD W WO CONTRAST, CTA NECK W WO CONTRAST  ? CLINICAL INFORMATION: Occipital stroke ([address] Balance problem. Off balance. Right visual cut. Acute stroke 3 weeks ago. ?  COMPARISON: Brain MRI 4/8/2019.  ?  TECHNIQUE: 1 mm axial images were obtained through the head and neck after the fast bolus administration of contrast. A noncontrast localizer was obtained. 3-D reconstructions were performed on a dedicated 3-D workstation. These include multiplanar MPR   images and multiplanar MIP images. Centerline reconstructions were obtained of the carotid systems. Isovue intravenous contrast was given. ? All CT scans at this facility use dose modulation, iterative reconstruction, and/or weight-based dosing when appropriate to reduce radiation dose to as low as reasonably achievable. ?  FINDINGS:  ?  ?  CTA NECK:  ?  Aortic arch and branches: There is minimal atherosclerosis of the aortic arch. There is no stenosis of the origin innominate artery, left common carotid artery or either subclavian artery. ?  Right common carotid artery/ICA: The right common carotid artery is normal. The right carotid bulb and right internal carotid artery are normal. There is no stenosis. ? Left common carotid artery/ICA: The left common carotid artery, carotid bulb and left internal carotid artery are normal. There is no stenosis. There is some tortuosity and redundancy of the left internal carotid artery. ? Vertebral arteries: The vertebral arteries are codominant and normal.  ?  ?  ?  CTA HEAD:  ?  ?   Internal carotid arteries: Normal. The ophthalmic artery origins are also normal.  ?  Middle cerebral arteries: Normal. The proximal branches are also normal.  ? Anterior cerebral arteries: Normal. The proximal branches are normal. There is a normal small anterior communicating artery. ? Vertebral arteries: The vertebral arteries are normal.  ? Basilar artery: Normal.  ?  Superior cerebellar arteries: Normal.  ? Posterior cerebral arteries: Normal. The proximal branches are also normal.  ?  ? ? No aneurysms, stenoses or occlusions are noted. ? The superior sagittal sinus, vein of Mario, internal cerebral veins, straight sinus, transverse sinuses and sigmoid sinuses are patent. ?  ? Axial source data: There are coronary artery calcifications. There are no suspicious findings in the lung apices. There is no cervical adenopathy. There are no gross abnormalities in the brain parenchyma. The patient's known left occipital lobe infarct   is not well seen on the source images. ?   ? Impression 1. Normal CTA of the head and neck. 2. Coronary artery disease. ?  ?  ?  ?  **This report has been created using voice recognition software. It may contain minor errors which are inherent in voice recognition technology. **  ? Final report electronically signed by Dr. Jessica Rubin on 5/9/2019 2:04 PM     Reviewed . Results for orders placed during the hospital encounter of 04/08/19   MRI Brain W WO Contrast   ? Narrative PROCEDURE: MRI BRAIN W [address]  ? INDICATION:Dizziness, Decreased peripheral vision of right eye, Balance problem, New daily persistent headache, Status post fall. Dizziness with peripheral vision loss in the right eye for 7 weeks. William Punt around Cora time. ?  COMPARISON: No prior study. ?  TECHNIQUE: Multiplanar and multiple spin echo T1 and T2-weighted images were obtained through the brain before and after the administration of intravenous contrast. 20 mL ProHance was injected in the right AC. ? FINDINGS:  There is curvilinear hyperintense DWI signal in the cortex of the left parietal-occipital junction. There is no associated low signal on the ADC map. There is associated hyperintense T2/FLAIR signal and intrinsic hyperintense T1 signal. There is mild ex   vacuo dilatation of the subcortical horn of the left lateral ventricle. There is a small moderate-sized focal area of encephalomalacia in the right cerebral hemisphere. There are small focal areas of encephalomalacia in the bilateral cerebellar   hemispheres. There are small foci of encephalomalacia in the bilateral corona radiata. There are mild scattered focal areas of T2/FLAIR prolongation in the subcortical deep white matter and in the zach. No intra or extra-axial mass is identified. No   focal areas of restricted diffusion are present. There is a partially empty sella. ?  Following contrast administration, there are no focal areas of abnormal parenchymal or meningeal enhancement identified. ? The major vascular flow voids appear patent. Orbits are unremarkable. Paranasal sinuses and mastoid air cells are clear. ?   ? Impression 1. No evidence of acute intracranial abnormality. 2. Moderate-sized area of developing laminar necrosis at the left parieto-occipital region as evidence for subacute infarct. 3. Multiple old lacunar infarcts. 4. Mild scattered focal areas of T2/FLAIR hyperintensity in the white matter as evidence for chronic microvascular angiopathy. ?  ?  ?  ?  **This report has been created using voice recognition software. It may contain minor errors which are inherent in voice recognition technology. **  ? ? Final report electronically signed by Dr. Ernie Quarles MD on 4/8/2019 2:07 PM   YOEL done 5/10/19:  Conclusions    Summary  Ejection fraction is visually estimated at 50%. Hypokinetic motion of the mid anteroseptal wall noted in the left  ventricle. Moderately to severe dilated left atrium. no PFO  Moderate mitral regurgitation is present. ? Assessment:  ? Diagnosis Orders   1. Left Occipital stroke ([address]  ? 2.  Balance problem  ? 3. Non congruent right Hemianopsia  ? ? She is here for follow up for balance problem, non congruent right hemianopsia, left occipital stroke. She is doing the same since last evaluation. She denies any new symptoms. We shared with the patient that her homocysteine level was elevated=15. She is taking folic acid 1 mg daily . CTA head and neck done showed no concerning findings. She underwent YOEL that showed hypokinetic motion of the mid anteroseptal wall noted in the left ventricle. Moderately to severe dilated left atrium, No PFO. She is wearing 30 day event monitor. Patient's next appointment with cardiology is not until September. We instructed the patient to contact his office regarding a sooner appointment to address the hypokinetic left ventricle. After detailed discussion with patient we agreed on the following plan. ?  ?  Plan:  1. Follow up with cardiology regarding YOEL results showing hypokinetic motion of the mid anteroseptal wall noted in the left ventricle. Moderately to severe dilated left atrium  2. Continue with antiplatelets  3. Continue with cardiac event monitor, we will follow up on the results  4. Continue with folic acid 1 mg daily  5. Report any new symptoms or concerns. 6. Follow up in 2 months or sooner if needed. 7. Call if any questions or concerns. ?  Time spent evaluating patient, reviewing records/imaging, counseling, arranging for care was more than 25 min. All patient's questions were answered. Please call if any questions.       Angelito Gunderson MD

## 2019-06-10 ENCOUNTER — TELEPHONE (OUTPATIENT)
Dept: NEUROLOGY | Age: 64
End: 2019-06-10

## 2019-06-10 NOTE — TELEPHONE ENCOUNTER
----- Message from VANE Krishnamurthy CNP sent at 6/10/2019 11:26 AM EDT -----  Please let patient know her 30 day cardiac event monitor showed short episodes of nonspecific atrial ectopy and few beats of narrow complex tachycardia. She needs to be evaluated by cardiology. If she does not have cardiologist, please make referral to cardiologist of her choice.   Diane Alaniz CNP

## 2019-06-12 ENCOUNTER — HOSPITAL ENCOUNTER (EMERGENCY)
Age: 64
Discharge: HOME | End: 2019-06-12
Attending: FAMILY MEDICINE
Payer: MEDICARE

## 2019-06-12 ENCOUNTER — APPOINTMENT (OUTPATIENT)
Dept: GENERAL RADIOLOGY | Age: 64
End: 2019-06-12
Payer: MEDICARE

## 2019-06-12 VITALS
SYSTOLIC BLOOD PRESSURE: 152 MMHG | OXYGEN SATURATION: 96 % | DIASTOLIC BLOOD PRESSURE: 81 MMHG | BODY MASS INDEX: 43.98 KG/M2 | HEART RATE: 75 BPM | RESPIRATION RATE: 37 BRPM | TEMPERATURE: 98 F | WEIGHT: 224 LBS | HEIGHT: 60 IN

## 2019-06-12 DIAGNOSIS — R07.89 ATYPICAL CHEST PAIN: Primary | ICD-10-CM

## 2019-06-12 DIAGNOSIS — I10 ESSENTIAL HYPERTENSION: ICD-10-CM

## 2019-06-12 LAB
ANION GAP: 10 MEQ/L (ref 8–16)
BASOPHILS # BLD: 0.5 % (ref 0–3)
BUN BLDV-MCNC: 19 MG/DL (ref 7–18)
CHLORIDE BLD-SCNC: 104 MEQ/L (ref 98–107)
CO2: 30 MEQ/L (ref 21–32)
CREAT SERPL-MCNC: 0.9 MG/DL (ref 0.6–1.3)
EKG ATRIAL RATE: 77 BPM
EKG P AXIS: 34 DEGREES
EKG P-R INTERVAL: 136 MS
EKG Q-T INTERVAL: 376 MS
EKG QRS DURATION: 88 MS
EKG QTC CALCULATION (BAZETT): 425 MS
EKG R AXIS: 21 DEGREES
EKG T AXIS: 49 DEGREES
EKG VENTRICULAR RATE: 77 BPM
EOSINOPHILS RELATIVE PERCENT: 4.4 % (ref 0–4)
GFR, ESTIMATED: 67 ML/MIN/1.73M2
GLUCOSE BLD-MCNC: 86 MG/DL (ref 74–106)
HCT VFR BLD CALC: 40.6 % (ref 37–47)
HEMOGLOBIN: 13.6 GM/DL (ref 12–16)
LYMPHOCYTES # BLD: 36.6 % (ref 15–47)
MCH RBC QN AUTO: 32.4 PG (ref 27–31)
MCHC RBC AUTO-ENTMCNC: 33.4 GM/DL (ref 33–37)
MCV RBC AUTO: 97 FL (ref 81–99)
MONOCYTES: 7.7 % (ref 0–12)
PDW BLD-RTO: 12.2 % (ref 11.5–14.5)
PLATELET # BLD: 101 THOU/MM3 (ref 130–400)
PMV BLD AUTO: 9.2 FL (ref 7.4–10.4)
POC CALCIUM: 9.4 MG/DL (ref 8.5–10.1)
POTASSIUM SERPL-SCNC: 3.4 MEQ/L (ref 3.5–5.1)
RBC # BLD: 4.19 MILL/MM3 (ref 4.2–5.4)
SEGS: 50.8 % (ref 43–75)
SODIUM BLD-SCNC: 144 MEQ/L (ref 136–145)
TROPONIN I: < 0.017 NG/ML (ref 0.02–0.05)
TROPONIN I: < 0.017 NG/ML (ref 0.02–0.05)
WBC # BLD: 6.6 THOU/MM3 (ref 4.8–10.8)

## 2019-06-12 PROCEDURE — 2709999900 HC NON-CHARGEABLE SUPPLY

## 2019-06-12 PROCEDURE — 71046 X-RAY EXAM CHEST 2 VIEWS: CPT

## 2019-06-12 PROCEDURE — 80048 BASIC METABOLIC PNL TOTAL CA: CPT

## 2019-06-12 PROCEDURE — 85025 COMPLETE CBC W/AUTO DIFF WBC: CPT

## 2019-06-12 PROCEDURE — 93010 ELECTROCARDIOGRAM REPORT: CPT | Performed by: NUCLEAR MEDICINE

## 2019-06-12 PROCEDURE — 84484 ASSAY OF TROPONIN QUANT: CPT

## 2019-06-12 PROCEDURE — 6370000000 HC RX 637 (ALT 250 FOR IP): Performed by: FAMILY MEDICINE

## 2019-06-12 PROCEDURE — 93005 ELECTROCARDIOGRAM TRACING: CPT | Performed by: FAMILY MEDICINE

## 2019-06-12 PROCEDURE — 36415 COLL VENOUS BLD VENIPUNCTURE: CPT

## 2019-06-12 PROCEDURE — 99285 EMERGENCY DEPT VISIT HI MDM: CPT

## 2019-06-12 RX ORDER — ASPIRIN 81 MG/1
324 TABLET, CHEWABLE ORAL ONCE
Status: COMPLETED | OUTPATIENT
Start: 2019-06-12 | End: 2019-06-12

## 2019-06-12 RX ADMIN — ASPIRIN 81 MG 324 MG: 81 TABLET ORAL at 13:39

## 2019-06-12 ASSESSMENT — PAIN SCALES - GENERAL
PAINLEVEL_OUTOF10: 0
PAINLEVEL_OUTOF10: 1
PAINLEVEL_OUTOF10: 0
PAINLEVEL_OUTOF10: 4

## 2019-06-12 ASSESSMENT — ENCOUNTER SYMPTOMS
NAUSEA: 0
VOMITING: 0
SHORTNESS OF BREATH: 0
COUGH: 0
ABDOMINAL PAIN: 0

## 2019-06-12 ASSESSMENT — PAIN DESCRIPTION - DIRECTION: RADIATING_TOWARDS: EPIGASTRIC

## 2019-06-12 ASSESSMENT — PAIN DESCRIPTION - PAIN TYPE: TYPE: ACUTE PAIN

## 2019-06-12 ASSESSMENT — HEART SCORE: ECG: 0

## 2019-06-12 NOTE — ED PROVIDER NOTES
[address] [facility]  eMERGENCY dEPARTMENT eNCOUnter          [address] [facility]       Chief Complaint   Patient presents with    Chest Pain       Nurses Notes reviewed and I agree except as noted in the HPI. HISTORY OF PRESENT ILLNESS    Brandon Baird is a 75 y.o. female who presents for evaluation of chest pain. Patient states that she developed chest pain today approximately around 11 AM.  She states that at the time pain was rated a 4/10 in severity but she states that is now extremely mild. She had no associated nausea, vomiting, shortness of breath. She has not expressed pain like this in the past.  Patient takes aspirin 81 mg daily. Patient has an appointment with her cardiologist tomorrow after having worn a Holter monitor for 30 days. REVIEW OF SYSTEMS     Review of Systems   Constitutional: Negative for chills and fever. Respiratory: Negative for cough and shortness of breath. Cardiovascular: Positive for chest pain. Negative for palpitations. Gastrointestinal: Negative for abdominal pain, nausea and vomiting. Neurological: Negative for dizziness and headaches. PAST MEDICAL HISTORY    has a past medical history of Acute arterial ischemic stroke, multifocal, anterior circulation ([address] Anxiety, Chicken pox, Hyperlipidemia, Hypertension, Hypothyroidism, Measles, and Mumps. SURGICAL HISTORY      has no past surgical history on file. CURRENT MEDICATIONS       Previous Medications    ALPRAZOLAM (XANAX) 0.25 MG TABLET    Take 1 tablet by mouth 3 times daily as needed for Anxiety for up to 180 days. BUPROPION (WELLBUTRIN XL) 150 MG EXTENDED RELEASE TABLET    TAKE ONE TABLET DAILY, IN THE MORNING, BY MOUTH. CLOTRIMAZOLE-BETAMETHASONE (LOTRISONE) 1-0.05 % CREAM    APPLY TO AFFECTED AREA, TWO TIMES A DAY, AS DIRECTED.     FOLIC ACID (FOLVITE) 1 MG TABLET    Take 1 tablet by mouth daily    GLUCOSAMINE-CHONDROIT-VIT C-MN (GLUCOSAMINE 1500 COMPLEX) CAPS    Take 1 tablet by mouth daily    INDAPAMIDE (LOZOL) 2.5 MG TABLET    TAKE ONE TABLET DAILY IN THE MORNING, BY MOUTH. LEVOTHYROXINE (SYNTHROID) 200 MCG TABLET    TAKE 1 TABLET ONCE DAILY AND TAKE 1-1/2 TABLETS ON Monday every other week, BY MOUTH.    LOSARTAN-HYDROCHLOROTHIAZIDE (HYZAAR) 100-25 MG PER TABLET    TAKE ONE TABLET DAILY, BY MOUTH. MULTIPLE VITAMINS-MINERALS (MULTIVITAMIN & MINERAL PO)    Take  by mouth. PAROXETINE (PAXIL) 40 MG TABLET    TAKE ONE TABLET DAILY, BY MOUTH.    SIMVASTATIN (ZOCOR) 80 MG TABLET    Take 1 tablet by mouth nightly       ALLERGIES     has No Known Allergies. FAMILY HISTORY     indicated that the status of her mother is unknown. She indicated that the status of her other is unknown.   family history includes Arthritis in her mother; Other in an other family member. SOCIAL HISTORY      reports that she has never smoked. She has never used smokeless tobacco. She reports that she drinks alcohol. She reports that she does not use drugs. PHYSICAL EXAM     INITIAL VITALS:  height is 5' (1.524 m) and weight is 224 lb (101.6 kg). Her temporal temperature is 98 °F (36.7 °C). Her blood pressure is 141/88 (abnormal) and her pulse is 77. Her respiration is 23 and oxygen saturation is 95%. Physical Exam   Constitutional: She appears well-developed and well-nourished. Cardiovascular: Normal rate, regular rhythm and normal heart sounds. Pulmonary/Chest: Effort normal and breath sounds normal.   Abdominal: Soft. Bowel sounds are normal. She exhibits no distension. There is no tenderness. Skin: Skin is warm and dry. Nursing note and vitals reviewed. DIFFERENTIAL DIAGNOSIS:   ACS, atypical chest pain, GERD    DIAGNOSTIC RESULTS     EKG: All EKG's are interpreted by the Emergency Department Physician whoeither signs or Co-signs this chart in the absence of a cardiologist.  Normal sinus rhythm with heart rate 77. VT interval 136. QRS duration 88. QTc 425.   Axis 21.    RADIOLOGY: non-plain filmimages(s) such as CT, Ultrasound and MRI are read by the radiologist.  XR CHEST STANDARD (2 VW)   Final Result   1. Negative exam for active pathology   2. Previous hiatal hernia not well-visualized. **This report has been created using voice recognition software. It may contain minor errors which are inherent in voice recognition technology. **      Final report electronically signed by Dr. Lencho Arrieta on 6/12/2019 1:37 PM            LABS:   Labs Reviewed   CBC WITH AUTO DIFFERENTIAL - Abnormal; Notable for the following components:       Result Value    RBC 4.19 (*)     MCH 32.4 (*)     Platelets 092 (*)     Eosinophils % 4.4 (*)     All other components within normal limits   BASIC METABOLIC PANEL - Abnormal; Notable for the following components:    Potassium 3.4 (*)     BUN 19 (*)     All other components within normal limits   GLOMERULAR FILTRATION RATE, ESTIMATED - Abnormal; Notable for the following components:    GFR, Estimated 67 (*)     All other components within normal limits   TROPONIN   ANION GAP   TROPONIN       EMERGENCY DEPARTMENT COURSE:   Vitals:    Vitals:    06/12/19 1255 06/12/19 1342 06/12/19 1415 06/12/19 1430   BP: (!) 158/93 (!) 104/92  (!) 141/88   Pulse: 79 81  77   Resp: 20 18  23   Temp: 98 °F (36.7 °C)      TempSrc: Temporal      SpO2: 95% 95% 95%    Weight: 224 lb (101.6 kg)      Height: 5' (1.524 m)        MDM  Patient seen and evaluated in the department for chest pain. Appropriate labs were ordered and reviewed. CXR imaging showed no acute findings. Patient was treated in the department with Aspirin. At time of evaluation, 2:09 PM, she felt much better. Repeat troponin was negative. I considered discharge to be an appropriate decision based on the patient's condition. Patient was discharged home in stable condition with recommended follow up with her cardiologist as scheduled tomorrow.        CRITICAL CARE: None    CONSULTS:  None    PROCEDURES:  None    FINAL IMPRESSION      1. Atypical chest pain    2.  Essential hypertension          DISPOSITION/PLAN   Discharge    PATIENT REFERRED TO:  Kendall Levi MD  [facility]  [address]  [facility] [address]  [phone]    Go in 1 day        DISCHARGEMEDICATIONS:  New Prescriptions    No medications on file       (Please note that portions of this note were completedwith a voice recognition program.  Efforts were made to edit the dictations but occasionally words are mis-transcribed.)    MD Marina Chow MD  06/12/19 1892

## 2019-06-12 NOTE — ED TRIAGE NOTES
Pt presents in wheelchair with complaints of not feeling right and having a tight feeling in her epigastric area that radiates under her left breast. Pt denies chest pain at this time. Pt denies SOB. Pt recently had a 30 day event monitor on for a racing heart beat but has not had her f/u for her results. Pt states she has a history of anxiety but felt like this was different. Pt has a history of moderate strokes as seen on her recent CT scan. Pt alert and oriented.

## 2019-06-12 NOTE — ED NOTES
AVS rev'd with pt. And copy given. Pain 0/10. Pulse regular. Extremities warm. Respirations regular and quiet. Mucous membranes pink & moist. Alert and oriented times 3. No nausea or vomiting. Range of motion within patient's limits. Skin pink, warm and dry. Calm and cooperative. Pt. Declines any questions or concerns. INT discontinued from [facility], M[facility]IE applied, no bleeding.   Vonn Goltz, RN  06/12/19 [address] Kirit Marshall RN  06/12/19 1640

## 2019-06-13 ENCOUNTER — OFFICE VISIT (OUTPATIENT)
Dept: CARDIOLOGY CLINIC | Age: 64
End: 2019-06-13
Payer: MEDICARE

## 2019-06-13 ENCOUNTER — TELEPHONE (OUTPATIENT)
Dept: FAMILY MEDICINE CLINIC | Age: 64
End: 2019-06-13

## 2019-06-13 VITALS
SYSTOLIC BLOOD PRESSURE: 146 MMHG | DIASTOLIC BLOOD PRESSURE: 82 MMHG | HEART RATE: 88 BPM | HEIGHT: 60 IN | WEIGHT: 224 LBS | BODY MASS INDEX: 43.98 KG/M2

## 2019-06-13 DIAGNOSIS — E78.01 FAMILIAL HYPERCHOLESTEROLEMIA: ICD-10-CM

## 2019-06-13 DIAGNOSIS — R07.2 PRECORDIAL PAIN: Primary | ICD-10-CM

## 2019-06-13 DIAGNOSIS — R06.09 DYSPNEA ON EXERTION: ICD-10-CM

## 2019-06-13 DIAGNOSIS — I10 ESSENTIAL HYPERTENSION: ICD-10-CM

## 2019-06-13 PROCEDURE — 1090F PRES/ABSN URINE INCON ASSESS: CPT | Performed by: NUCLEAR MEDICINE

## 2019-06-13 PROCEDURE — G8417 CALC BMI ABV UP PARAM F/U: HCPCS | Performed by: NUCLEAR MEDICINE

## 2019-06-13 PROCEDURE — 1036F TOBACCO NON-USER: CPT | Performed by: NUCLEAR MEDICINE

## 2019-06-13 PROCEDURE — 3017F COLORECTAL CA SCREEN DOC REV: CPT | Performed by: NUCLEAR MEDICINE

## 2019-06-13 PROCEDURE — 1123F ACP DISCUSS/DSCN MKR DOCD: CPT | Performed by: NUCLEAR MEDICINE

## 2019-06-13 PROCEDURE — G8400 PT W/DXA NO RESULTS DOC: HCPCS | Performed by: NUCLEAR MEDICINE

## 2019-06-13 PROCEDURE — 4040F PNEUMOC VAC/ADMIN/RCVD: CPT | Performed by: NUCLEAR MEDICINE

## 2019-06-13 PROCEDURE — G8427 DOCREV CUR MEDS BY ELIG CLIN: HCPCS | Performed by: NUCLEAR MEDICINE

## 2019-06-13 PROCEDURE — G8598 ASA/ANTIPLAT THER USED: HCPCS | Performed by: NUCLEAR MEDICINE

## 2019-06-13 PROCEDURE — 99214 OFFICE O/P EST MOD 30 MIN: CPT | Performed by: NUCLEAR MEDICINE

## 2019-06-13 RX ORDER — METOPROLOL SUCCINATE 25 MG/1
25 TABLET, EXTENDED RELEASE ORAL DAILY
Qty: 30 TABLET | Refills: 6 | Status: SHIPPED | OUTPATIENT
Start: 2019-06-13 | End: 2019-12-13 | Stop reason: SDUPTHER

## 2019-06-13 NOTE — PROGRESS NOTES
[address] [facility] CARDIOLOGY  [facility]  [address] [place] [address] Road [address]  Dept: [phone]  Dept Fax: [phone]  Loc: [phone]    Visit Date: 6/13/2019    Luzma Serna is a 75 y.o. female who presents todayfor:  Chief Complaint   Patient presents with    Follow-up     YOEL    Check-Up    Follow-Up from Hospital     ER CP    Chest Pain    Hyperlipidemia   was in the ER for chest pain   Work up was okay   Stress test before with mild ischemia  No cath   Did have work up for TIA   YOEL was okay   Moderate MR   Mild wall motion abnormality   No obvious A FIB   Here to follow up on the chest pain   Does have epigastric discomfort  >> angina ? ? Does have some dyspnea on exertion   On statins for hyperlipidemia  Does have HTn as well  Severe obesity       HPI:  HPI  Past Medical History:   Diagnosis Date    Acute arterial ischemic stroke, multifocal, anterior circulation (HCC)     Anxiety     Chicken pox     Hyperlipidemia     Hypertension     Hypothyroidism     Measles     Mumps       History reviewed. No pertinent surgical history. Family History   Problem Relation Age of Onset    Arthritis Mother     Other Other         daughter with SVT and PE     Social History     Tobacco Use    Smoking status: Never Smoker    Smokeless tobacco: Never Used   Substance Use Topics    Alcohol use: Yes     Comment: socially      Current Outpatient Medications   Medication Sig Dispense Refill    ALPRAZolam (XANAX) 0.25 MG tablet Take 1 tablet by mouth 3 times daily as needed for Anxiety for up to 180 days.  30 tablet 0    buPROPion (WELLBUTRIN XL) 150 MG extended release tablet TAKE ONE TABLET DAILY, IN THE MORNING, BY MOUTH. 30 tablet 11    folic acid (FOLVITE) 1 MG tablet Take 1 tablet by mouth daily 90 tablet 3    levothyroxine (SYNTHROID) 200 MCG tablet TAKE 1 TABLET ONCE DAILY AND TAKE 1-1/2 TABLETS ON Monday every other week, BY MOUTH. 32 tablet 5    PARoxetine (PAXIL) 40 MG tablet TAKE ONE TABLET DAILY, BY MOUTH. 90 tablet 3    clotrimazole-betamethasone (LOTRISONE) 1-0.05 % cream APPLY TO AFFECTED AREA, TWO TIMES A DAY, AS DIRECTED. 45 g 0    losartan-hydrochlorothiazide (HYZAAR) 100-25 MG per tablet TAKE ONE TABLET DAILY, BY MOUTH. 30 tablet 11    indapamide (LOZOL) 2.5 MG tablet TAKE ONE TABLET DAILY IN THE MORNING, BY MOUTH. 30 tablet 0    simvastatin (ZOCOR) 80 MG tablet Take 1 tablet by mouth nightly 90 tablet 3    Glucosamine-Chondroit-Vit C-Mn (GLUCOSAMINE 1500 COMPLEX) CAPS Take 1 tablet by mouth daily 90 capsule 3    Multiple Vitamins-Minerals (MULTIVITAMIN & MINERAL PO) Take  by mouth. No current facility-administered medications for this visit. No Known Allergies  Health Maintenance   Topic Date Due    Hepatitis C screen  1955    Shingles Vaccine (1 of 2) 05/06/2003    Colon cancer screen colonoscopy  05/06/2003    DEXA (modify frequency per FRAX score)  05/06/2018    Pneumococcal 65+ years Vaccine (1 of 2 - PCV13) 05/06/2018    Breast cancer screen  01/19/2019    DTaP/Tdap/Td vaccine (1 - Tdap) 08/01/2019 (Originally 5/6/1972)    Flu vaccine (Season Ended) 09/01/2019    TSH testing  04/03/2020    Potassium monitoring  04/03/2020    Creatinine monitoring  04/03/2020    Lipid screen  04/23/2024       Subjective:  Review of Systems  General:   No fever, no chills, some fatigue or weight loss  Pulmonary:    some dyspnea, no wheezing  Cardiac:    Denies recent chest pain,   GI:     No nausea or vomiting, no abdominal pain  Neuro:     No dizziness or light headedness,   Musculoskeletal:  No recent active issues  Extremities:   No edema, good peripheral pulses      Objective:  Physical Exam  BP (!) 154/92   Pulse 88   Ht 5' (1.524 m)   Wt 224 lb (101.6 kg)   BMI 43.75 kg/m²   General:   Well developed, well nourished  Lungs:    Clear to auscultation  Heart:    Normal S1 S2, Slight murmur.  no rubs, no gallops  Abdomen:   Soft, non tender, no organomegalies, positive bowel sounds  Extremities:   No edema, no cyanosis, good peripheral pulses  Neurological:   Awake, alert, oriented. No obvious focal deficits  Musculoskelatal:  No obvious deformities    Assessment:      Diagnosis Orders   1. Precordial pain     2. Essential hypertension     3. Familial hypercholesterolemia     4. Dyspnea on exertion     ? ? Angina  Vs anxiety   Risk for CAD  Moderate MR  No findings as far the stroke is concerned  ?/ CAD      Plan:  No follow-ups on file. Discussed at length  Need to follow the MR   Consider a cath  Add beta blockers  Continue risk factor modification and medical management  Thank you for allowing me to participate in the care of your patient. Please don't hesitate to contact me regarding any further issues related to the patient care    Orders Placed:  No orders of the defined types were placed in this encounter. Medications Prescribed:  No orders of the defined types were placed in this encounter. Discussed use, benefit, and side effects of prescribed medications. All patient questions answered. Pt voicedunderstanding. Instructed to continue current medications, diet and exercise. Continue risk factor modification and medical management. Patient agreed with treatment plan. Follow up as directed.     Electronically signedby Genaro Cantu MD on 6/13/2019 at 12:44 PM

## 2019-06-24 RX ORDER — ASPIRIN 81 MG/1
81 TABLET ORAL NIGHTLY
COMMUNITY

## 2019-06-24 NOTE — PROGRESS NOTES
NPO after midnight  [employer] and drivers license  Wear comfortable clean clothing  Do not bring jewelry  Shower night before and morning of surgery with a liquid antibacterial soap  Bring medications  Follow all instructions given by your physician   needed at discharge  Call [phone] for any questions

## 2019-07-01 ENCOUNTER — HOSPITAL ENCOUNTER (OUTPATIENT)
Dept: INPATIENT UNIT | Age: 64
Discharge: HOME | End: 2019-07-01
Attending: NUCLEAR MEDICINE | Admitting: NUCLEAR MEDICINE
Payer: MEDICARE

## 2019-07-01 VITALS
HEIGHT: 60 IN | DIASTOLIC BLOOD PRESSURE: 69 MMHG | RESPIRATION RATE: 17 BRPM | SYSTOLIC BLOOD PRESSURE: 118 MMHG | OXYGEN SATURATION: 92 % | TEMPERATURE: 97.8 F | BODY MASS INDEX: 44.76 KG/M2 | WEIGHT: 228 LBS | HEART RATE: 76 BPM

## 2019-07-01 LAB
ABO: NORMAL
ANION GAP SERPL CALCULATED.3IONS-SCNC: 12 MEQ/L (ref 8–16)
ANTIBODY SCREEN: NORMAL
BUN BLDV-MCNC: 18 MG/DL (ref 7–22)
CALCIUM SERPL-MCNC: 9.5 MG/DL (ref 8.5–10.5)
CHLORIDE BLD-SCNC: 105 MEQ/L (ref 98–111)
CO2: 27 MEQ/L (ref 23–33)
CREAT SERPL-MCNC: 0.9 MG/DL (ref 0.4–1.2)
EKG ATRIAL RATE: 56 BPM
EKG P AXIS: 33 DEGREES
EKG P-R INTERVAL: 122 MS
EKG Q-T INTERVAL: 418 MS
EKG QRS DURATION: 88 MS
EKG QTC CALCULATION (BAZETT): 403 MS
EKG R AXIS: 41 DEGREES
EKG T AXIS: 50 DEGREES
EKG VENTRICULAR RATE: 56 BPM
ERYTHROCYTE [DISTWIDTH] IN BLOOD BY AUTOMATED COUNT: 12.7 % (ref 11.5–14.5)
ERYTHROCYTE [DISTWIDTH] IN BLOOD BY AUTOMATED COUNT: 45.4 FL (ref 35–45)
GFR SERPL CREATININE-BSD FRML MDRD: 63 ML/MIN/1.73M2
GLUCOSE BLD-MCNC: 88 MG/DL (ref 70–108)
HCT VFR BLD CALC: 40.1 % (ref 37–47)
HEMOGLOBIN: 13.3 GM/DL (ref 12–16)
INR BLD: 0.9 (ref 0.85–1.13)
MCH RBC QN AUTO: 32 PG (ref 26–33)
MCHC RBC AUTO-ENTMCNC: 33.2 GM/DL (ref 32.2–35.5)
MCV RBC AUTO: 96.6 FL (ref 81–99)
PLATELET # BLD: 81 THOU/MM3 (ref 130–400)
PMV BLD AUTO: 12.1 FL (ref 9.4–12.4)
POTASSIUM SERPL-SCNC: 4.2 MEQ/L (ref 3.5–5.2)
RBC # BLD: 4.15 MILL/MM3 (ref 4.2–5.4)
RH FACTOR: NORMAL
SCAN OF BLOOD SMEAR: NORMAL
SODIUM BLD-SCNC: 144 MEQ/L (ref 135–145)
WBC # BLD: 7 THOU/MM3 (ref 4.8–10.8)

## 2019-07-01 PROCEDURE — 85610 PROTHROMBIN TIME: CPT

## 2019-07-01 PROCEDURE — 80048 BASIC METABOLIC PNL TOTAL CA: CPT

## 2019-07-01 PROCEDURE — 86850 RBC ANTIBODY SCREEN: CPT

## 2019-07-01 PROCEDURE — 2709999900 HC NON-CHARGEABLE SUPPLY

## 2019-07-01 PROCEDURE — 93005 ELECTROCARDIOGRAM TRACING: CPT | Performed by: NUCLEAR MEDICINE

## 2019-07-01 PROCEDURE — 2580000003 HC RX 258: Performed by: NUCLEAR MEDICINE

## 2019-07-01 PROCEDURE — 86900 BLOOD TYPING SEROLOGIC ABO: CPT

## 2019-07-01 PROCEDURE — 6360000004 HC RX CONTRAST MEDICATION: Performed by: NUCLEAR MEDICINE

## 2019-07-01 PROCEDURE — 2500000003 HC RX 250 WO HCPCS

## 2019-07-01 PROCEDURE — 86901 BLOOD TYPING SEROLOGIC RH(D): CPT

## 2019-07-01 PROCEDURE — 6360000002 HC RX W HCPCS

## 2019-07-01 PROCEDURE — 93010 ELECTROCARDIOGRAM REPORT: CPT | Performed by: INTERNAL MEDICINE

## 2019-07-01 PROCEDURE — 85027 COMPLETE CBC AUTOMATED: CPT

## 2019-07-01 PROCEDURE — C1769 GUIDE WIRE: HCPCS

## 2019-07-01 PROCEDURE — 93458 L HRT ARTERY/VENTRICLE ANGIO: CPT | Performed by: NUCLEAR MEDICINE

## 2019-07-01 PROCEDURE — 36415 COLL VENOUS BLD VENIPUNCTURE: CPT

## 2019-07-01 PROCEDURE — C1894 INTRO/SHEATH, NON-LASER: HCPCS

## 2019-07-01 RX ORDER — ASPIRIN 325 MG
325 TABLET ORAL ONCE
Status: DISCONTINUED | OUTPATIENT
Start: 2019-07-01 | End: 2019-07-01 | Stop reason: HOSPADM

## 2019-07-01 RX ORDER — SODIUM CHLORIDE 0.9 % (FLUSH) 0.9 %
10 SYRINGE (ML) INJECTION PRN
Status: DISCONTINUED | OUTPATIENT
Start: 2019-07-01 | End: 2019-07-01 | Stop reason: HOSPADM

## 2019-07-01 RX ORDER — SODIUM CHLORIDE 0.9 % (FLUSH) 0.9 %
10 SYRINGE (ML) INJECTION EVERY 12 HOURS SCHEDULED
Status: DISCONTINUED | OUTPATIENT
Start: 2019-07-01 | End: 2019-07-01 | Stop reason: HOSPADM

## 2019-07-01 RX ORDER — ACETAMINOPHEN 325 MG/1
650 TABLET ORAL EVERY 4 HOURS PRN
Status: DISCONTINUED | OUTPATIENT
Start: 2019-07-01 | End: 2019-07-01 | Stop reason: HOSPADM

## 2019-07-01 RX ORDER — SODIUM CHLORIDE 9 MG/ML
INJECTION, SOLUTION INTRAVENOUS CONTINUOUS
Status: DISCONTINUED | OUTPATIENT
Start: 2019-07-01 | End: 2019-07-01 | Stop reason: HOSPADM

## 2019-07-01 RX ORDER — ATROPINE SULFATE 0.4 MG/ML
0.5 AMPUL (ML) INJECTION
Status: DISCONTINUED | OUTPATIENT
Start: 2019-07-01 | End: 2019-07-01 | Stop reason: HOSPADM

## 2019-07-01 RX ADMIN — SODIUM CHLORIDE: 9 INJECTION, SOLUTION INTRAVENOUS at 15:02

## 2019-07-01 RX ADMIN — IOPAMIDOL 60 ML: 755 INJECTION, SOLUTION INTRAVENOUS at 16:09

## 2019-07-01 ASSESSMENT — PAIN SCALES - GENERAL: PAINLEVEL_OUTOF10: 0

## 2019-07-01 NOTE — H&P
[facility]  Sedation/Analgesia History & Physical    Pt Name: Johana Licea  Account number: [de-identified]  MRN: [id number]  YOB: 1955  Provider Performing Procedure: Inga Mcallister MD [facility]  Primary Care Physician: Julián Edmondson MD  Date: 7/1/2019    PRE-PROCEDURE    Code Status: FULL CODE  Brief History/Pre-Procedure Diagnosis:   Angina  Dyspnea       Consent: : I have discussed with the patient risks, benefits, and alternatives (and relevant risks, benefits, and side effects related to alternatives or not receiving care), and likelihood of the success. The patient and/or representative appear to understand and agree to proceed. The discussion encompasses risks, benefits, and side effects related to the alternatives and the risks related to not receiving the proposed care, treatment, and services. MEDICAL HISTORY  []ASHD/ANGINA/MI/CHF   [x]Hypertension  []Diabetes  []Hyperlipidemia  []Smoking  []Family Hx of ASHD  []Additional information:       has a past medical history of Acute arterial ischemic stroke, multifocal, anterior circulation ([address] Anxiety, Arthritis, Chicken pox, Hyperlipidemia, Hypertension, Hypothyroidism, Measles, and Mumps. SURGICAL HISTORY   has no past surgical history on file.   Additional information:       ALLERGIES   Allergies as of 07/01/2019    (No Known Allergies)     Additional information:       MEDICATIONS   Aspirin  [x] 81 mg  [] 325 mg  [] None  Antiplatelet drug therapy use last 5 days  []No []Yes  Coumadin Use Last 5 Days []No []Yes  Other anticoagulant use last 5 days  []No []Yes    Current Facility-Administered Medications:     0.9 % sodium chloride infusion, , Intravenous, Continuous, Jaiden Spring MD, Last Rate: 75 mL/hr at 07/01/19 1502    sodium chloride flush 0.9 % injection 10 mL, 10 mL, Intravenous, PRN, Inga Mcallister MD    aspirin tablet 325 mg, 325 mg, Oral, Once, Inga Mcallister MD  Prior to Admission tenderness    Extremities: without C,C,E.  Pulses 2+ bilaterally  Mental Status: Alert & Oriented        PLANNED PROCEDURE   [x]Cath  []PCI                []Pacemaker/AICD  []YOEL             []Cardioversion []Peripheral angiography/PTA  []Other:      SEDATION  Planned agent:[x]Midazolam []Meperidine [x]Sublimaze []Morphine  []Diazepam  []Other:     ASA Classification:  []1 [x]2 []3 []4 []5  Class 1: A normal healthy patient  Class 2: Pt with mild to moderate systemic disease  Class 3: Severe systemic disease or disturbance  Class 4: Severe systemic disorders that are already life threatening. Class 5: Moribund pt with little chances of survival, for more than 24 hours. Mallampati I Airway Classification:   []1 [x]2 []3 []4    [x]Pre-procedure diagnostic studies complete and results available. Comment:    [x]Previous sedation/anesthesia experiences assessed. Comment:    [x]The patient is an appropriate candidate to undergo the planned procedure sedation and anesthesia. (Refer to nursing sedation/analgesia documentation record)  [x]Formulation and discussion of sedation/procedure plan, risks, and expectations with patient and/or responsible adult completed. [x]Patient examined immediately prior to the procedure.  (Refer to nursing sedation/analgesia documentation record)    Aydin Cunningham MD [facility]  Electronically signed 7/1/2019 at 3:12 PM

## 2019-07-01 NOTE — PROGRESS NOTES
All air has been released from right radial vascband. Vascband removed. No bleeding or hematoma. Armboard maintained. Bandaid and 2x2 gauze applied. Will continue to monitor.

## 2019-07-02 ENCOUNTER — TELEPHONE (OUTPATIENT)
Dept: FAMILY MEDICINE CLINIC | Age: 64
End: 2019-07-02

## 2019-07-22 ENCOUNTER — OFFICE VISIT (OUTPATIENT)
Dept: NEUROLOGY | Age: 64
End: 2019-07-22
Payer: MEDICARE

## 2019-07-22 VITALS
BODY MASS INDEX: 45.55 KG/M2 | SYSTOLIC BLOOD PRESSURE: 124 MMHG | DIASTOLIC BLOOD PRESSURE: 72 MMHG | HEART RATE: 66 BPM | WEIGHT: 232 LBS | HEIGHT: 60 IN

## 2019-07-22 DIAGNOSIS — H53.47 HEMIANOPSIA: ICD-10-CM

## 2019-07-22 DIAGNOSIS — R26.89 BALANCE PROBLEM: ICD-10-CM

## 2019-07-22 DIAGNOSIS — I63.9 OCCIPITAL STROKE (HCC): Primary | ICD-10-CM

## 2019-07-22 PROCEDURE — G8427 DOCREV CUR MEDS BY ELIG CLIN: HCPCS | Performed by: PSYCHIATRY & NEUROLOGY

## 2019-07-22 PROCEDURE — 4040F PNEUMOC VAC/ADMIN/RCVD: CPT | Performed by: PSYCHIATRY & NEUROLOGY

## 2019-07-22 PROCEDURE — 99213 OFFICE O/P EST LOW 20 MIN: CPT | Performed by: PSYCHIATRY & NEUROLOGY

## 2019-07-22 PROCEDURE — 1036F TOBACCO NON-USER: CPT | Performed by: PSYCHIATRY & NEUROLOGY

## 2019-07-22 PROCEDURE — G8400 PT W/DXA NO RESULTS DOC: HCPCS | Performed by: PSYCHIATRY & NEUROLOGY

## 2019-07-22 PROCEDURE — 1090F PRES/ABSN URINE INCON ASSESS: CPT | Performed by: PSYCHIATRY & NEUROLOGY

## 2019-07-22 PROCEDURE — G8598 ASA/ANTIPLAT THER USED: HCPCS | Performed by: PSYCHIATRY & NEUROLOGY

## 2019-07-22 PROCEDURE — G8417 CALC BMI ABV UP PARAM F/U: HCPCS | Performed by: PSYCHIATRY & NEUROLOGY

## 2019-07-22 PROCEDURE — 1123F ACP DISCUSS/DSCN MKR DOCD: CPT | Performed by: PSYCHIATRY & NEUROLOGY

## 2019-07-22 PROCEDURE — 3017F COLORECTAL CA SCREEN DOC REV: CPT | Performed by: PSYCHIATRY & NEUROLOGY

## 2019-07-22 RX ORDER — CLOPIDOGREL BISULFATE 75 MG/1
75 TABLET ORAL DAILY
Qty: 30 TABLET | Refills: 6 | Status: SHIPPED | OUTPATIENT
Start: 2019-07-22 | End: 2019-12-13 | Stop reason: SDUPTHER

## 2019-07-29 ENCOUNTER — TELEPHONE (OUTPATIENT)
Dept: CARDIOLOGY CLINIC | Age: 64
End: 2019-07-29

## 2019-07-31 ENCOUNTER — OFFICE VISIT (OUTPATIENT)
Dept: CARDIOLOGY CLINIC | Age: 64
End: 2019-07-31
Payer: MEDICARE

## 2019-07-31 VITALS
HEART RATE: 64 BPM | BODY MASS INDEX: 45.16 KG/M2 | SYSTOLIC BLOOD PRESSURE: 112 MMHG | WEIGHT: 230 LBS | HEIGHT: 60 IN | DIASTOLIC BLOOD PRESSURE: 72 MMHG

## 2019-07-31 DIAGNOSIS — I25.10 CORONARY ARTERY DISEASE INVOLVING NATIVE CORONARY ARTERY OF NATIVE HEART WITHOUT ANGINA PECTORIS: Primary | ICD-10-CM

## 2019-07-31 DIAGNOSIS — Z86.73 HISTORY OF CVA (CEREBROVASCULAR ACCIDENT): ICD-10-CM

## 2019-07-31 DIAGNOSIS — I10 ESSENTIAL HYPERTENSION: ICD-10-CM

## 2019-07-31 PROCEDURE — 4040F PNEUMOC VAC/ADMIN/RCVD: CPT | Performed by: PHYSICIAN ASSISTANT

## 2019-07-31 PROCEDURE — G8427 DOCREV CUR MEDS BY ELIG CLIN: HCPCS | Performed by: PHYSICIAN ASSISTANT

## 2019-07-31 PROCEDURE — 1090F PRES/ABSN URINE INCON ASSESS: CPT | Performed by: PHYSICIAN ASSISTANT

## 2019-07-31 PROCEDURE — G8400 PT W/DXA NO RESULTS DOC: HCPCS | Performed by: PHYSICIAN ASSISTANT

## 2019-07-31 PROCEDURE — 99213 OFFICE O/P EST LOW 20 MIN: CPT | Performed by: PHYSICIAN ASSISTANT

## 2019-07-31 PROCEDURE — G8598 ASA/ANTIPLAT THER USED: HCPCS | Performed by: PHYSICIAN ASSISTANT

## 2019-07-31 PROCEDURE — 1123F ACP DISCUSS/DSCN MKR DOCD: CPT | Performed by: PHYSICIAN ASSISTANT

## 2019-07-31 PROCEDURE — 3017F COLORECTAL CA SCREEN DOC REV: CPT | Performed by: PHYSICIAN ASSISTANT

## 2019-07-31 PROCEDURE — G8417 CALC BMI ABV UP PARAM F/U: HCPCS | Performed by: PHYSICIAN ASSISTANT

## 2019-07-31 PROCEDURE — 1036F TOBACCO NON-USER: CPT | Performed by: PHYSICIAN ASSISTANT

## 2019-07-31 NOTE — PROGRESS NOTES
[facility] PROFESSIONAL SERVICES  [facility]   [address] [place] [address]   [place] [address]   Dept: [phone]   Dept Fax: [phone]   Loc: [phone]      Chief Complaint   Patient presents with   [facility]     Patient presents for follow-up appointment after recent heart catheterization. Patient recently underwent heart catheterization which did not reveal any significant coronary artery disease. She recently had a stroke, and underwent YOEL which did not show any thrombus or PFO. She was noted to have moderate mitral regurgitation. She denies any chest pain, shortness of breath or palpitations. She gets occasional lightheadedness. Cardiologist:  Dr. Uriostegui Huge:   No fever, no chills, No fatigue or weight loss  Pulmonary:    No dyspnea, no wheezing  Cardiac:    Denies recent chest pain   GI:     No nausea or vomiting, no abdominal pain  Neuro:   Occasional light headedness  Musculoskeletal:  No recent active issues  Extremities:   No edema, good peripheral pulses      Past Medical History:   Diagnosis Date    Acute arterial ischemic stroke, multifocal, anterior circulation (HCC)     Anxiety     Arthritis     Chicken pox     Hyperlipidemia     Hypertension     Hypothyroidism     Measles     Mumps        No Known Allergies    Current Outpatient Medications   Medication Sig Dispense Refill    simvastatin (ZOCOR) 80 MG tablet TAKE ONE TABLET DAILY AT BEDTIME, BY MOUTH. 90 tablet 3    clopidogrel (PLAVIX) 75 MG tablet Take 1 tablet by mouth daily 30 tablet 6    aspirin 81 MG EC tablet Take 81 mg by mouth nightly      metoprolol succinate (TOPROL XL) 25 MG extended release tablet Take 1 tablet by mouth daily 30 tablet 6    ALPRAZolam (XANAX) 0.25 MG tablet Take 1 tablet by mouth 3 times daily as needed for Anxiety for up to 180 days. 30 tablet 0    buPROPion (WELLBUTRIN XL) 150 MG extended release tablet TAKE ONE TABLET DAILY, IN THE MORNING, BY MOUTH. mis-transcribed.)      Yeyo Child [facility], [place]

## 2019-08-05 NOTE — PROGRESS NOTES
Coronary artery disease. ?  ?  ?  ?  **This report has been created using voice recognition software. It may contain minor errors which are inherent in voice recognition technology. **  ? Final report electronically signed by Dr. Nevin Franklin on 5/9/2019 2:04 PM   ?  Results for orders placed during the hospital encounter of 04/08/19   MRI Brain W WO Contrast   ? Narrative PROCEDURE: MRI BRAIN W [address]  ? INDICATION:Dizziness, Decreased peripheral vision of right eye, Balance problem, New daily persistent headache, Status post fall. Dizziness with peripheral vision loss in the right eye for 7 weeks. Venia Pavan around [place] time. ?  COMPARISON: No prior study. ?  TECHNIQUE: Multiplanar and multiple spin echo T1 and T2-weighted images were obtained through the brain before and after the administration of intravenous contrast. 20 mL ProHance was injected in the right AC. ? FINDINGS:  There is curvilinear hyperintense DWI signal in the cortex of the left parietal-occipital junction. There is no associated low signal on the ADC map. There is associated hyperintense T2/FLAIR signal and intrinsic hyperintense T1 signal. There is mild ex   vacuo dilatation of the subcortical horn of the left lateral ventricle. There is a small moderate-sized focal area of encephalomalacia in the right cerebral hemisphere. There are small focal areas of encephalomalacia in the bilateral cerebellar   hemispheres. There are small foci of encephalomalacia in the bilateral corona radiata. There are mild scattered focal areas of T2/FLAIR prolongation in the subcortical deep white matter and in the zach. No intra or extra-axial mass is identified. No   focal areas of restricted diffusion are present. There is a partially empty sella. ?  Following contrast administration, there are no focal areas of abnormal parenchymal or meningeal enhancement identified. ? The major vascular flow voids appear patent. Orbits are unremarkable.

## 2019-08-23 DIAGNOSIS — I10 ESSENTIAL HYPERTENSION: ICD-10-CM

## 2019-08-26 RX ORDER — LOSARTAN POTASSIUM AND HYDROCHLOROTHIAZIDE 25; 100 MG/1; MG/1
TABLET ORAL
Qty: 30 TABLET | Refills: 0 | OUTPATIENT
Start: 2019-08-26

## 2019-08-26 RX ORDER — INDAPAMIDE 2.5 MG/1
TABLET, FILM COATED ORAL
Qty: 30 TABLET | Refills: 0 | OUTPATIENT
Start: 2019-08-26

## 2019-09-11 DIAGNOSIS — F33.42 RECURRENT MAJOR DEPRESSIVE DISORDER, IN FULL REMISSION (HCC): ICD-10-CM

## 2019-09-11 RX ORDER — ALPRAZOLAM 0.25 MG/1
0.25 TABLET ORAL 3 TIMES DAILY PRN
Qty: 30 TABLET | Refills: 0 | Status: SHIPPED | OUTPATIENT
Start: 2019-09-11 | End: 2019-09-19 | Stop reason: SDUPTHER

## 2019-09-19 ENCOUNTER — OFFICE VISIT (OUTPATIENT)
Dept: FAMILY MEDICINE CLINIC | Age: 64
End: 2019-09-19
Payer: MEDICARE

## 2019-09-19 VITALS
RESPIRATION RATE: 14 BRPM | WEIGHT: 236.2 LBS | SYSTOLIC BLOOD PRESSURE: 128 MMHG | HEART RATE: 80 BPM | DIASTOLIC BLOOD PRESSURE: 82 MMHG | TEMPERATURE: 97.6 F | HEIGHT: 60 IN | BODY MASS INDEX: 46.37 KG/M2

## 2019-09-19 DIAGNOSIS — G44.52 NEW DAILY PERSISTENT HEADACHE: ICD-10-CM

## 2019-09-19 DIAGNOSIS — F33.42 RECURRENT MAJOR DEPRESSIVE DISORDER, IN FULL REMISSION (HCC): ICD-10-CM

## 2019-09-19 DIAGNOSIS — Z00.00 ROUTINE GENERAL MEDICAL EXAMINATION AT A HEALTH CARE FACILITY: ICD-10-CM

## 2019-09-19 DIAGNOSIS — I69.30 CEREBRAL MULTI-INFARCT STATE: ICD-10-CM

## 2019-09-19 DIAGNOSIS — N95.0 POSTMENOPAUSAL VAGINAL BLEEDING: ICD-10-CM

## 2019-09-19 DIAGNOSIS — D69.6 THROMBOCYTOPENIA (HCC): ICD-10-CM

## 2019-09-19 DIAGNOSIS — Z78.0 ASYMPTOMATIC POSTMENOPAUSAL STATE: ICD-10-CM

## 2019-09-19 DIAGNOSIS — Z12.39 BREAST CANCER SCREENING: ICD-10-CM

## 2019-09-19 DIAGNOSIS — Z12.11 COLON CANCER SCREENING: ICD-10-CM

## 2019-09-19 DIAGNOSIS — Z01.419 ENCOUNTER FOR GYNECOLOGICAL EXAMINATION WITHOUT ABNORMAL FINDING: ICD-10-CM

## 2019-09-19 DIAGNOSIS — I10 ESSENTIAL HYPERTENSION: Primary | ICD-10-CM

## 2019-09-19 DIAGNOSIS — E03.9 ACQUIRED HYPOTHYROIDISM: ICD-10-CM

## 2019-09-19 DIAGNOSIS — E78.00 PURE HYPERCHOLESTEROLEMIA: ICD-10-CM

## 2019-09-19 PROCEDURE — G0438 PPPS, INITIAL VISIT: HCPCS | Performed by: FAMILY MEDICINE

## 2019-09-19 PROCEDURE — 99214 OFFICE O/P EST MOD 30 MIN: CPT | Performed by: FAMILY MEDICINE

## 2019-09-19 PROCEDURE — G8598 ASA/ANTIPLAT THER USED: HCPCS | Performed by: FAMILY MEDICINE

## 2019-09-19 PROCEDURE — 1090F PRES/ABSN URINE INCON ASSESS: CPT | Performed by: FAMILY MEDICINE

## 2019-09-19 PROCEDURE — G8427 DOCREV CUR MEDS BY ELIG CLIN: HCPCS | Performed by: FAMILY MEDICINE

## 2019-09-19 PROCEDURE — 4040F PNEUMOC VAC/ADMIN/RCVD: CPT | Performed by: FAMILY MEDICINE

## 2019-09-19 PROCEDURE — G8417 CALC BMI ABV UP PARAM F/U: HCPCS | Performed by: FAMILY MEDICINE

## 2019-09-19 PROCEDURE — 1123F ACP DISCUSS/DSCN MKR DOCD: CPT | Performed by: FAMILY MEDICINE

## 2019-09-19 PROCEDURE — 1036F TOBACCO NON-USER: CPT | Performed by: FAMILY MEDICINE

## 2019-09-19 PROCEDURE — G8400 PT W/DXA NO RESULTS DOC: HCPCS | Performed by: FAMILY MEDICINE

## 2019-09-19 PROCEDURE — 3017F COLORECTAL CA SCREEN DOC REV: CPT | Performed by: FAMILY MEDICINE

## 2019-09-19 RX ORDER — ALPRAZOLAM 0.25 MG/1
0.25 TABLET ORAL 3 TIMES DAILY PRN
Qty: 30 TABLET | Refills: 0 | Status: SHIPPED | OUTPATIENT
Start: 2019-09-19 | End: 2020-01-22 | Stop reason: SDUPTHER

## 2019-09-19 ASSESSMENT — LIFESTYLE VARIABLES
HOW OFTEN DURING THE LAST YEAR HAVE YOU BEEN UNABLE TO REMEMBER WHAT HAPPENED THE NIGHT BEFORE BECAUSE YOU HAD BEEN DRINKING: 0
HOW OFTEN DURING THE LAST YEAR HAVE YOU NEEDED AN ALCOHOLIC DRINK FIRST THING IN THE MORNING TO GET YOURSELF GOING AFTER A NIGHT OF HEAVY DRINKING: 0
HAVE YOU OR SOMEONE ELSE BEEN INJURED AS A RESULT OF YOUR DRINKING: 0
AUDIT-C TOTAL SCORE: 2
HOW OFTEN DURING THE LAST YEAR HAVE YOU FOUND THAT YOU WERE NOT ABLE TO STOP DRINKING ONCE YOU HAD STARTED: 0
HAS A RELATIVE, FRIEND, DOCTOR, OR ANOTHER HEALTH PROFESSIONAL EXPRESSED CONCERN ABOUT YOUR DRINKING OR SUGGESTED YOU CUT DOWN: 0
AUDIT TOTAL SCORE: 2
HOW OFTEN DO YOU HAVE A DRINK CONTAINING ALCOHOL: 2
HOW OFTEN DURING THE LAST YEAR HAVE YOU FAILED TO DO WHAT WAS NORMALLY EXPECTED FROM YOU BECAUSE OF DRINKING: 0
HOW OFTEN DURING THE LAST YEAR HAVE YOU HAD A FEELING OF GUILT OR REMORSE AFTER DRINKING: 0
HOW OFTEN DO YOU HAVE SIX OR MORE DRINKS ON ONE OCCASION: 0
HOW MANY STANDARD DRINKS CONTAINING ALCOHOL DO YOU HAVE ON A TYPICAL DAY: 0

## 2019-09-19 ASSESSMENT — PATIENT HEALTH QUESTIONNAIRE - PHQ9
SUM OF ALL RESPONSES TO PHQ QUESTIONS 1-9: 1
SUM OF ALL RESPONSES TO PHQ QUESTIONS 1-9: 1

## 2019-09-19 NOTE — PROGRESS NOTES
oropharyngeal edema or posterior oropharyngeal erythema. Eyes: Lids are normal. Right eye exhibits no chemosis and no discharge. Left eye exhibits no chemosis and no drainage. Right conjunctiva has no hemorrhage. Left conjunctiva has no hemorrhage. Right eye exhibits normal extraocular motion. Left eye exhibits normal extraocular motion. Right pupil is round and reactive. Left pupil is round and reactive. Pupils are equal.   Cardiovascular: Normal rate, regular rhythm, S1 normal, S2 normal and normal heart sounds. Exam reveals no gallop. No murmur heard. Pulmonary/Chest: Effort normal and breath sounds normal. No respiratory distress. She has no wheezes. She has no rhonchi. She has no rales. Abdominal: Soft. Normal appearance and bowel sounds are normal. She exhibits no distension and no mass. There is no hepatosplenomegaly. No tenderness. She has no rigidity, no rebound and no guarding. No hernia. Musculoskeletal:        Right lower leg: She exhibits no edema. Left lower leg: She exhibits no edema. Neurological: She is alert. Assessment/Plan   Ruben Remy was seen today for medicare awv. Diagnoses and all orders for this visit:    Essential hypertension    Recurrent major depressive disorder, in full remission ([facility] [address]  -     ALPRAZolam (XANAX) 0.25 MG tablet; Take 1 tablet by mouth 3 times daily as needed for Anxiety for up to 180 days. Pure hypercholesterolemia  -     Comprehensive Metabolic Panel; Future  -     Lipid Panel; Future    Cerebral multi-infarct state    Acquired hypothyroidism  -     TSH With Reflex Ft4; Future    Thrombocytopenia (HCC)  -     CBC; Future    New daily persistent headache    Breast cancer screening  -     H[facility] DIGITAL SCREEN W CAD BILATERAL; Future    Colon cancer screening  -     POCT Fecal Immunochemical Test (FIT);  Future    Asymptomatic postmenopausal state  -     MAMMO DEXA BONE DENSITY SCAN; Future    Encounter for gynecological examination without abnormal finding  -     PAP SMEAR    Postmenopausal vaginal bleeding  -     External Referral To Ob-Gyn    No change to medications   Continue healthy diet and exercise  Aspirin daily    Discussed use, benefit, and side effectsof prescribed medications. All patient questions answered. Pt voiced understanding. Reviewed health maintenance. Instructed to continue current medications, diet and exercise. Patient agreed with treatment plan. Followup as directed. Electronically signed by Berhane Alvarez MD            Medicare Annual Wellness Visit  Name: Lauren London Date: 2019   MRN: [id number] Sex: Female   Age: 75 y.o. Ethnicity: Non-/Non    : 1955 Race: Johnathon Hampton is here for Medicare AWV    Screenings for behavioral, psychosocial and functional/safety risks, and cognitive dysfunction are all negative except as indicated below. These results, as well as other patient data from the [facility] [place] form, are documented in Flowsheets linked to this Encounter. No Known Allergies  Prior to Visit Medications    Medication Sig Taking? Authorizing Provider   ALPRAZolam (XANAX) 0.25 MG tablet Take 1 tablet by mouth 3 times daily as needed for Anxiety for up to 180 days. Yes Berhane Alvarez MD   simvastatin (ZOCOR) 80 MG tablet TAKE ONE TABLET DAILY AT BEDTIME, BY MOUTH. Yes Berhane Alvarez MD   clopidogrel (PLAVIX) 75 MG tablet Take 1 tablet by mouth daily Yes Filemon Perez MD   aspirin 81 MG EC tablet Take 81 mg by mouth nightly Yes Historical Provider, MD   metoprolol succinate (TOPROL XL) 25 MG extended release tablet Take 1 tablet by mouth daily Yes Filemon Perez MD   buPROPion (WELLBUTRIN XL) 150 MG extended release tablet TAKE ONE TABLET DAILY, IN THE MORNING, BY MOUTH.  Yes Berhane Alvarez MD   folic acid (FOLVITE) 1 MG tablet Take 1 tablet by mouth daily Yes Olayinka Boyle APRN - CNP   levothyroxine (SYNTHROID) 200 MCG tablet TAKE 1 TABLET ONCE where indicated follow up appointments were made and/or referrals ordered. Positive Risk Factor Screenings with Interventions:     Fall Risk:  Timed Up and Go Test > 12 seconds? (Complete if either Fall Risk answers are Yes): no  2 or more falls in past year?: (!) yes  Fall with injury in past year?: no  Fall Risk Interventions:    · Home exercises provided to promote strength and balance    General Health:  General  In general, how would you say your health is?: Good  In the past 7 days, have you experienced any of the following? New or Increased Pain, New or Increased Fatigue, Loneliness, Social Isolation, Stress or Anger?: (!) Stress  Do you get the social and emotional support that you need?: Yes  Do you have a Living Will?: (!) No  General Health Risk Interventions:  · No Living Will: additional information provided      Health Habits/Nutrition:  Health Habits/Nutrition  Do you exercise for at least 20 minutes 2-3 times per week?: (!) No  Have you lost any weight without trying in the past 3 months?: No  Do you eat fewer than 2 meals per day?: No  Have you seen a dentist within the past year?: (!) No  Body mass index is 46.13 kg/m².   Health Habits/Nutrition Interventions:  · Inadequate physical activity:  patient agrees to exercise for at least 150 minutes/week  · Dental exam overdue:  patient encouraged to make appointment with his/her dentist    Safety:  Safety  Do you have working smoke detectors?: Yes  Have all throw rugs been removed or fastened?: Yes  Do you have non-slip mats or surfaces in all bathtubs/showers?: (!) No  Do all of your stairways have a railing or banister?: (!) No  Are your doorways, halls and stairs free of clutter?: Yes  Do you always fasten your seatbelt when you are in a car?: Yes  Safety Interventions:  · Patient declines any further evaluation/treatment for this issue    Personalized Preventive Plan   Current Health Maintenance Status    There is no immunization history on file

## 2019-09-25 LAB — CYTOLOGY THIN PREP PAP: NORMAL

## 2019-10-17 DIAGNOSIS — E03.9 ACQUIRED HYPOTHYROIDISM: ICD-10-CM

## 2019-10-17 RX ORDER — LEVOTHYROXINE SODIUM 0.2 MG/1
TABLET ORAL
Qty: 32 TABLET | Refills: 5 | Status: SHIPPED | OUTPATIENT
Start: 2019-10-17 | End: 2020-02-27 | Stop reason: SDUPTHER

## 2019-10-22 DIAGNOSIS — I10 ESSENTIAL HYPERTENSION: ICD-10-CM

## 2019-10-22 RX ORDER — INDAPAMIDE 2.5 MG/1
TABLET, FILM COATED ORAL
Qty: 30 TABLET | Refills: 0 | Status: SHIPPED | OUTPATIENT
Start: 2019-10-22 | End: 2020-02-27 | Stop reason: SDUPTHER

## 2019-12-13 ENCOUNTER — OFFICE VISIT (OUTPATIENT)
Dept: CARDIOLOGY CLINIC | Age: 64
End: 2019-12-13
Payer: MEDICARE

## 2019-12-13 VITALS
WEIGHT: 237 LBS | BODY MASS INDEX: 46.53 KG/M2 | SYSTOLIC BLOOD PRESSURE: 136 MMHG | HEIGHT: 60 IN | HEART RATE: 80 BPM | DIASTOLIC BLOOD PRESSURE: 88 MMHG

## 2019-12-13 DIAGNOSIS — I25.10 CORONARY ARTERY DISEASE INVOLVING NATIVE CORONARY ARTERY OF NATIVE HEART WITHOUT ANGINA PECTORIS: ICD-10-CM

## 2019-12-13 DIAGNOSIS — I10 ESSENTIAL HYPERTENSION: Primary | ICD-10-CM

## 2019-12-13 DIAGNOSIS — E78.01 FAMILIAL HYPERCHOLESTEROLEMIA: ICD-10-CM

## 2019-12-13 PROCEDURE — 1090F PRES/ABSN URINE INCON ASSESS: CPT | Performed by: NUCLEAR MEDICINE

## 2019-12-13 PROCEDURE — G8400 PT W/DXA NO RESULTS DOC: HCPCS | Performed by: NUCLEAR MEDICINE

## 2019-12-13 PROCEDURE — G8428 CUR MEDS NOT DOCUMENT: HCPCS | Performed by: NUCLEAR MEDICINE

## 2019-12-13 PROCEDURE — G8417 CALC BMI ABV UP PARAM F/U: HCPCS | Performed by: NUCLEAR MEDICINE

## 2019-12-13 PROCEDURE — 1036F TOBACCO NON-USER: CPT | Performed by: NUCLEAR MEDICINE

## 2019-12-13 PROCEDURE — 4040F PNEUMOC VAC/ADMIN/RCVD: CPT | Performed by: NUCLEAR MEDICINE

## 2019-12-13 PROCEDURE — 1123F ACP DISCUSS/DSCN MKR DOCD: CPT | Performed by: NUCLEAR MEDICINE

## 2019-12-13 PROCEDURE — 99213 OFFICE O/P EST LOW 20 MIN: CPT | Performed by: NUCLEAR MEDICINE

## 2019-12-13 PROCEDURE — 3017F COLORECTAL CA SCREEN DOC REV: CPT | Performed by: NUCLEAR MEDICINE

## 2019-12-13 PROCEDURE — G8598 ASA/ANTIPLAT THER USED: HCPCS | Performed by: NUCLEAR MEDICINE

## 2019-12-13 PROCEDURE — G8484 FLU IMMUNIZE NO ADMIN: HCPCS | Performed by: NUCLEAR MEDICINE

## 2019-12-13 RX ORDER — METOPROLOL SUCCINATE 25 MG/1
25 TABLET, EXTENDED RELEASE ORAL DAILY
Qty: 90 TABLET | Refills: 3 | Status: SHIPPED | OUTPATIENT
Start: 2019-12-13 | End: 2020-10-05 | Stop reason: SDUPTHER

## 2019-12-13 RX ORDER — CLOPIDOGREL BISULFATE 75 MG/1
75 TABLET ORAL DAILY
Qty: 90 TABLET | Refills: 3 | Status: SHIPPED | OUTPATIENT
Start: 2019-12-13 | End: 2020-02-27 | Stop reason: SDUPTHER

## 2019-12-13 RX ORDER — LOSARTAN POTASSIUM 100 MG/1
100 TABLET ORAL DAILY
COMMUNITY
Start: 2019-11-21 | End: 2020-02-27 | Stop reason: SDUPTHER

## 2019-12-13 RX ORDER — HYDROCHLOROTHIAZIDE 25 MG/1
25 TABLET ORAL DAILY
COMMUNITY
Start: 2019-11-21 | End: 2020-02-27 | Stop reason: SDUPTHER

## 2020-01-10 RX ORDER — FOLIC ACID 1 MG/1
1 TABLET ORAL DAILY
Qty: 90 TABLET | Refills: 3 | OUTPATIENT
Start: 2020-01-10

## 2020-01-10 RX ORDER — METOPROLOL SUCCINATE 25 MG/1
25 TABLET, EXTENDED RELEASE ORAL DAILY
Qty: 90 TABLET | Refills: 3 | OUTPATIENT
Start: 2020-01-10

## 2020-01-10 RX ORDER — PAROXETINE HYDROCHLORIDE 40 MG/1
TABLET, FILM COATED ORAL
Qty: 90 TABLET | Refills: 3 | OUTPATIENT
Start: 2020-01-10

## 2020-01-10 RX ORDER — ALPRAZOLAM 0.25 MG/1
0.25 TABLET ORAL 3 TIMES DAILY PRN
Qty: 30 TABLET | Refills: 0 | OUTPATIENT
Start: 2020-01-10 | End: 2020-07-08

## 2020-01-10 RX ORDER — CLOPIDOGREL BISULFATE 75 MG/1
75 TABLET ORAL DAILY
Qty: 90 TABLET | Refills: 3 | OUTPATIENT
Start: 2020-01-10

## 2020-01-20 RX ORDER — PAROXETINE HYDROCHLORIDE 40 MG/1
TABLET, FILM COATED ORAL
Qty: 90 TABLET | Refills: 0 | OUTPATIENT
Start: 2020-01-20

## 2020-01-21 RX ORDER — METOPROLOL SUCCINATE 25 MG/1
25 TABLET, EXTENDED RELEASE ORAL DAILY
Qty: 90 TABLET | Refills: 3 | OUTPATIENT
Start: 2020-01-21

## 2020-01-21 RX ORDER — BUPROPION HYDROCHLORIDE 150 MG/1
TABLET ORAL
Qty: 30 TABLET | Refills: 11 | OUTPATIENT
Start: 2020-01-21

## 2020-01-21 RX ORDER — ALPRAZOLAM 0.25 MG/1
0.25 TABLET ORAL 3 TIMES DAILY PRN
Qty: 30 TABLET | Refills: 0 | OUTPATIENT
Start: 2020-01-21 | End: 2020-07-19

## 2020-01-21 RX ORDER — PAROXETINE HYDROCHLORIDE 40 MG/1
TABLET, FILM COATED ORAL
Qty: 90 TABLET | Refills: 3 | OUTPATIENT
Start: 2020-01-21

## 2020-01-21 RX ORDER — CLOPIDOGREL BISULFATE 75 MG/1
75 TABLET ORAL DAILY
Qty: 90 TABLET | Refills: 3 | OUTPATIENT
Start: 2020-01-21

## 2020-01-21 RX ORDER — FOLIC ACID 1 MG/1
1 TABLET ORAL DAILY
Qty: 90 TABLET | Refills: 3 | OUTPATIENT
Start: 2020-01-21

## 2020-01-23 ENCOUNTER — TELEPHONE (OUTPATIENT)
Dept: CARDIOLOGY CLINIC | Age: 65
End: 2020-01-23

## 2020-01-23 RX ORDER — ALPRAZOLAM 0.25 MG/1
0.25 TABLET ORAL 3 TIMES DAILY PRN
Qty: 30 TABLET | Refills: 0 | Status: SHIPPED | OUTPATIENT
Start: 2020-01-23 | End: 2020-07-21

## 2020-02-26 ENCOUNTER — NURSE ONLY (OUTPATIENT)
Dept: LAB | Age: 65
End: 2020-02-26

## 2020-02-26 LAB
ALBUMIN SERPL-MCNC: 4.1 G/DL (ref 3.5–5.1)
ALP BLD-CCNC: 115 U/L (ref 38–126)
ALT SERPL-CCNC: 16 U/L (ref 11–66)
ANION GAP SERPL CALCULATED.3IONS-SCNC: 16 MEQ/L (ref 8–16)
AST SERPL-CCNC: 23 U/L (ref 5–40)
BILIRUB SERPL-MCNC: 0.2 MG/DL (ref 0.3–1.2)
BUN BLDV-MCNC: 20 MG/DL (ref 7–22)
CALCIUM SERPL-MCNC: 9.9 MG/DL (ref 8.5–10.5)
CHLORIDE BLD-SCNC: 105 MEQ/L (ref 98–111)
CHOLESTEROL, TOTAL: 142 MG/DL (ref 100–199)
CO2: 26 MEQ/L (ref 23–33)
CREAT SERPL-MCNC: 0.9 MG/DL (ref 0.4–1.2)
ERYTHROCYTE [DISTWIDTH] IN BLOOD BY AUTOMATED COUNT: 12.9 % (ref 11.5–14.5)
ERYTHROCYTE [DISTWIDTH] IN BLOOD BY AUTOMATED COUNT: 49.5 FL (ref 35–45)
GFR SERPL CREATININE-BSD FRML MDRD: 62 ML/MIN/1.73M2
GLUCOSE BLD-MCNC: 96 MG/DL (ref 70–108)
HCT VFR BLD CALC: 39.2 % (ref 37–47)
HDLC SERPL-MCNC: 63 MG/DL
HEMOGLOBIN: 12.5 GM/DL (ref 12–16)
LDL CHOLESTEROL CALCULATED: 58 MG/DL
MCH RBC QN AUTO: 33.2 PG (ref 26–33)
MCHC RBC AUTO-ENTMCNC: 31.9 GM/DL (ref 32.2–35.5)
MCV RBC AUTO: 104 FL (ref 81–99)
PLATELET # BLD: 117 THOU/MM3 (ref 130–400)
PMV BLD AUTO: 11.7 FL (ref 9.4–12.4)
POTASSIUM SERPL-SCNC: 4.1 MEQ/L (ref 3.5–5.2)
RBC # BLD: 3.77 MILL/MM3 (ref 4.2–5.4)
SODIUM BLD-SCNC: 147 MEQ/L (ref 135–145)
T4 FREE: 1.93 NG/DL (ref 0.93–1.76)
TOTAL PROTEIN: 7.4 G/DL (ref 6.1–8)
TRIGL SERPL-MCNC: 107 MG/DL (ref 0–199)
TSH SERPL DL<=0.05 MIU/L-ACNC: 0.09 UIU/ML (ref 0.4–4.2)
WBC # BLD: 7 THOU/MM3 (ref 4.8–10.8)

## 2020-02-27 ENCOUNTER — OFFICE VISIT (OUTPATIENT)
Dept: FAMILY MEDICINE CLINIC | Age: 65
End: 2020-02-27
Payer: MEDICARE

## 2020-02-27 VITALS
BODY MASS INDEX: 46.37 KG/M2 | DIASTOLIC BLOOD PRESSURE: 84 MMHG | WEIGHT: 236.2 LBS | HEART RATE: 76 BPM | RESPIRATION RATE: 24 BRPM | TEMPERATURE: 97.8 F | SYSTOLIC BLOOD PRESSURE: 128 MMHG | HEIGHT: 60 IN

## 2020-02-27 LAB
INFLUENZA VIRUS A RNA: NEGATIVE
INFLUENZA VIRUS B RNA: NEGATIVE

## 2020-02-27 PROCEDURE — 87502 INFLUENZA DNA AMP PROBE: CPT | Performed by: FAMILY MEDICINE

## 2020-02-27 PROCEDURE — G8417 CALC BMI ABV UP PARAM F/U: HCPCS | Performed by: FAMILY MEDICINE

## 2020-02-27 PROCEDURE — G8400 PT W/DXA NO RESULTS DOC: HCPCS | Performed by: FAMILY MEDICINE

## 2020-02-27 PROCEDURE — 99215 OFFICE O/P EST HI 40 MIN: CPT | Performed by: FAMILY MEDICINE

## 2020-02-27 PROCEDURE — G0008 ADMIN INFLUENZA VIRUS VAC: HCPCS | Performed by: FAMILY MEDICINE

## 2020-02-27 PROCEDURE — 1090F PRES/ABSN URINE INCON ASSESS: CPT | Performed by: FAMILY MEDICINE

## 2020-02-27 PROCEDURE — 1123F ACP DISCUSS/DSCN MKR DOCD: CPT | Performed by: FAMILY MEDICINE

## 2020-02-27 PROCEDURE — 1036F TOBACCO NON-USER: CPT | Performed by: FAMILY MEDICINE

## 2020-02-27 PROCEDURE — 4040F PNEUMOC VAC/ADMIN/RCVD: CPT | Performed by: FAMILY MEDICINE

## 2020-02-27 PROCEDURE — G8427 DOCREV CUR MEDS BY ELIG CLIN: HCPCS | Performed by: FAMILY MEDICINE

## 2020-02-27 PROCEDURE — 3017F COLORECTAL CA SCREEN DOC REV: CPT | Performed by: FAMILY MEDICINE

## 2020-02-27 PROCEDURE — G8482 FLU IMMUNIZE ORDER/ADMIN: HCPCS | Performed by: FAMILY MEDICINE

## 2020-02-27 PROCEDURE — 90653 IIV ADJUVANT VACCINE IM: CPT | Performed by: FAMILY MEDICINE

## 2020-02-27 RX ORDER — CLOPIDOGREL BISULFATE 75 MG/1
75 TABLET ORAL DAILY
Qty: 90 TABLET | Refills: 3 | Status: SHIPPED | OUTPATIENT
Start: 2020-02-27 | End: 2020-10-05 | Stop reason: SDUPTHER

## 2020-02-27 RX ORDER — LOSARTAN POTASSIUM 100 MG/1
100 TABLET ORAL DAILY
Qty: 90 TABLET | Refills: 3 | Status: SHIPPED | OUTPATIENT
Start: 2020-02-27 | End: 2020-10-05 | Stop reason: SDUPTHER

## 2020-02-27 RX ORDER — HYDROCHLOROTHIAZIDE 25 MG/1
25 TABLET ORAL DAILY
Qty: 90 TABLET | Refills: 3 | Status: SHIPPED | OUTPATIENT
Start: 2020-02-27 | End: 2020-10-05 | Stop reason: SDUPTHER

## 2020-02-27 RX ORDER — PAROXETINE HYDROCHLORIDE 40 MG/1
TABLET, FILM COATED ORAL
Qty: 90 TABLET | Refills: 3 | Status: SHIPPED | OUTPATIENT
Start: 2020-02-27 | End: 2020-10-05 | Stop reason: SDUPTHER

## 2020-02-27 RX ORDER — LEVOTHYROXINE SODIUM 0.2 MG/1
TABLET ORAL
Qty: 120 TABLET | Refills: 3 | Status: SHIPPED | OUTPATIENT
Start: 2020-02-27 | End: 2020-10-05 | Stop reason: SDUPTHER

## 2020-02-27 RX ORDER — BUPROPION HYDROCHLORIDE 150 MG/1
TABLET ORAL
Qty: 90 TABLET | Refills: 3 | Status: SHIPPED | OUTPATIENT
Start: 2020-02-27 | End: 2020-10-05 | Stop reason: SDUPTHER

## 2020-02-27 RX ORDER — INDAPAMIDE 2.5 MG/1
TABLET, FILM COATED ORAL
Qty: 90 TABLET | Refills: 3 | Status: SHIPPED | OUTPATIENT
Start: 2020-02-27 | End: 2020-10-05 | Stop reason: SDUPTHER

## 2020-02-27 NOTE — PROGRESS NOTES
Vaccine Information Sheet, \"Influenza - Inactivated\"  given to Montse Goncalves, or parent/legal guardian of  Montse Goncalves and verbalized understanding. Patient responses:    Have you ever had a reaction to a flu vaccine? N/A  Do you have an allergy to eggs, neomycin or polymixin? No  Do you have an allergy to Thimerosal, contact lens solution, or Merthiolate? No  Have you ever had Guillian Boling Syndrome? No  Do you have any current illness? No  Do you have a temperature above 100 degrees? No  Are you pregnant? No  If pregnant, permission obtained from physician? N/A  Do you have an active neurological disorder? No      Flu vaccine given per order. Please see immunization tab.

## 2020-02-27 NOTE — PATIENT INSTRUCTIONS
vaccine:  · Has had an allergic reaction after a previous dose of influenza vaccine, or has any severe, life-threatening allergies. · Has ever had Guillain-Barré Syndrome (also called GBS). In some cases, your health care provider may decide to postpone influenza vaccination to a future visit. People with minor illnesses, such as a cold, may be vaccinated. People who are moderately or severely ill should usually wait until they recover before getting influenza vaccine. Your health care provider can give you more information. Risks of a vaccine reaction  · Soreness, redness, and swelling where shot is given, fever, muscle aches, and headache can happen after influenza vaccine. · There may be a very small increased risk of Guillain-Barré Syndrome (GBS) after inactivated influenza vaccine (the flu shot). Miguel Veras children who get the flu shot along with pneumococcal vaccine (PCV13), and/or DTaP vaccine at the same time might be slightly more likely to have a seizure caused by fever. Tell your health care provider if a child who is getting flu vaccine has ever had a seizure. People sometimes faint after medical procedures, including vaccination. Tell your provider if you feel dizzy or have vision changes or ringing in the ears. As with any medicine, there is a very remote chance of a vaccine causing a severe allergic reaction, other serious injury, or death. What if there is a serious problem? An allergic reaction could occur after the vaccinated person leaves the clinic. If you see signs of a severe allergic reaction (hives, swelling of the face and throat, difficulty breathing, a fast heartbeat, dizziness, or weakness), call 9-1-1 and get the person to the nearest hospital.  For other signs that concern you, call your health care provider. Adverse reactions should be reported to the Vaccine Adverse Event Reporting System (VAERS).  Your health care provider will usually file this report, or you can do it

## 2020-02-27 NOTE — PROGRESS NOTES
[address] [place] [address] Jonathan Ng  Dept: [phone]  Dept Fax: [phone]  Loc: [place] Costa is a 75 y.o. female who presents today for:  Chief Complaint   Patient presents with    Check-Up           HPI:     HPI    Hypertension: Patient here for follow-up of elevated blood pressure. She is not exercising and is adherent to low salt diet. Blood pressure is well controlled at home. Cardiac symptoms none. Patient denies chest pain, dyspnea and palpitations. Cardiovascular risk factors: dyslipidemia, hypertension, obesity (BMI >= 30 kg/m2) and sedentary lifestyle. Use of agents associated with hypertension: none. History of target organ damage: none. Hyperlipidemia: Patient presents with hyperlipidemia. She was tested because hypertension. Her last labs show   Lab Results   Component Value Date    CHOL 142 02/26/2020    CHOL 158 04/23/2019    CHOL 156 10/13/2018     Lab Results   Component Value Date    TRIG 107 02/26/2020    TRIG 72 04/23/2019    TRIG 83 10/13/2018     Lab Results   Component Value Date    HDL 63 02/26/2020    HDL 76 04/23/2019    HDL 74 (A) 10/13/2018     Lab Results   Component Value Date    LDLCALC 58 02/26/2020    LDLCALC 68 04/23/2019    LDLCALC 65 10/13/2018     Lab Results   Component Value Date    VLDL 17 10/13/2018    VLDL 19 10/09/2017    VLDL 18 05/13/2017     Lab Results   Component Value Date    CHOLHDLRATIO 0.9 10/13/2018    CHOLHDLRATIO 1 10/09/2017    CHOLHDLRATIO 2.14 05/13/2017     There is not a family history of hyperlipidemia. There is not a family history of early ischemia heart disease. Hypothyroidism: Patient presents for evaluation of thyroid function. Symptoms consist of fatigue, arthralgias. Symptoms have present for several years. The symptoms are fatigue. The problem has been gradually worsening.   Previous thyroid studies include TSH, free thyroxine

## 2020-04-23 ENCOUNTER — TELEPHONE (OUTPATIENT)
Dept: FAMILY MEDICINE CLINIC | Age: 65
End: 2020-04-23

## 2020-04-23 RX ORDER — FOLIC ACID 1 MG/1
1 TABLET ORAL DAILY
Qty: 90 TABLET | Refills: 3 | Status: SHIPPED | OUTPATIENT
Start: 2020-04-23 | End: 2020-10-05 | Stop reason: SDUPTHER

## 2020-04-23 NOTE — TELEPHONE ENCOUNTER
Pt is calling concerning a prescription that she had gotten from Dr. Liban Gomez it is Folic Acid and she is wanting to see if she needs to continue taking it.   Please advise pt at [phone]

## 2020-07-18 RX ORDER — SIMVASTATIN 80 MG
TABLET ORAL
Qty: 90 TABLET | Refills: 0 | Status: SHIPPED | OUTPATIENT
Start: 2020-07-18 | End: 2020-10-05

## 2020-10-05 ENCOUNTER — OFFICE VISIT (OUTPATIENT)
Dept: FAMILY MEDICINE CLINIC | Age: 65
End: 2020-10-05
Payer: MEDICARE

## 2020-10-05 VITALS
TEMPERATURE: 97.2 F | OXYGEN SATURATION: 95 % | DIASTOLIC BLOOD PRESSURE: 70 MMHG | WEIGHT: 230.6 LBS | HEART RATE: 65 BPM | SYSTOLIC BLOOD PRESSURE: 124 MMHG | HEIGHT: 60 IN | BODY MASS INDEX: 45.27 KG/M2 | RESPIRATION RATE: 18 BRPM

## 2020-10-05 PROBLEM — E66.01 MORBIDLY OBESE (HCC): Status: ACTIVE | Noted: 2020-10-05

## 2020-10-05 PROCEDURE — G8417 CALC BMI ABV UP PARAM F/U: HCPCS | Performed by: FAMILY MEDICINE

## 2020-10-05 PROCEDURE — 99215 OFFICE O/P EST HI 40 MIN: CPT | Performed by: FAMILY MEDICINE

## 2020-10-05 PROCEDURE — 1123F ACP DISCUSS/DSCN MKR DOCD: CPT | Performed by: FAMILY MEDICINE

## 2020-10-05 PROCEDURE — 1090F PRES/ABSN URINE INCON ASSESS: CPT | Performed by: FAMILY MEDICINE

## 2020-10-05 PROCEDURE — G0009 ADMIN PNEUMOCOCCAL VACCINE: HCPCS | Performed by: FAMILY MEDICINE

## 2020-10-05 PROCEDURE — 3017F COLORECTAL CA SCREEN DOC REV: CPT | Performed by: FAMILY MEDICINE

## 2020-10-05 PROCEDURE — G8482 FLU IMMUNIZE ORDER/ADMIN: HCPCS | Performed by: FAMILY MEDICINE

## 2020-10-05 PROCEDURE — G0008 ADMIN INFLUENZA VIRUS VAC: HCPCS | Performed by: FAMILY MEDICINE

## 2020-10-05 PROCEDURE — 3288F FALL RISK ASSESSMENT DOCD: CPT | Performed by: FAMILY MEDICINE

## 2020-10-05 PROCEDURE — G8400 PT W/DXA NO RESULTS DOC: HCPCS | Performed by: FAMILY MEDICINE

## 2020-10-05 PROCEDURE — G8427 DOCREV CUR MEDS BY ELIG CLIN: HCPCS | Performed by: FAMILY MEDICINE

## 2020-10-05 PROCEDURE — 4040F PNEUMOC VAC/ADMIN/RCVD: CPT | Performed by: FAMILY MEDICINE

## 2020-10-05 PROCEDURE — 1036F TOBACCO NON-USER: CPT | Performed by: FAMILY MEDICINE

## 2020-10-05 PROCEDURE — 90670 PCV13 VACCINE IM: CPT | Performed by: FAMILY MEDICINE

## 2020-10-05 PROCEDURE — 90653 IIV ADJUVANT VACCINE IM: CPT | Performed by: FAMILY MEDICINE

## 2020-10-05 RX ORDER — HYDROCORTISONE ACETATE 0.5 %
1 CREAM (GRAM) TOPICAL DAILY
Qty: 90 CAPSULE | Refills: 3 | Status: SHIPPED | OUTPATIENT
Start: 2020-10-05

## 2020-10-05 RX ORDER — SIMVASTATIN 80 MG
80 TABLET ORAL NIGHTLY
Qty: 90 TABLET | Refills: 3 | Status: SHIPPED | OUTPATIENT
Start: 2020-10-05 | End: 2021-10-11

## 2020-10-05 RX ORDER — PAROXETINE HYDROCHLORIDE 40 MG/1
TABLET, FILM COATED ORAL
Qty: 90 TABLET | Refills: 3 | Status: SHIPPED | OUTPATIENT
Start: 2020-10-05 | End: 2021-02-22

## 2020-10-05 RX ORDER — CLOPIDOGREL BISULFATE 75 MG/1
75 TABLET ORAL DAILY
Qty: 90 TABLET | Refills: 3 | Status: ON HOLD | OUTPATIENT
Start: 2020-10-05 | End: 2021-03-30 | Stop reason: SDUPTHER

## 2020-10-05 RX ORDER — ALPRAZOLAM 0.25 MG/1
0.25 TABLET ORAL 3 TIMES DAILY PRN
COMMUNITY
Start: 2019-09-19 | End: 2020-10-05 | Stop reason: SDUPTHER

## 2020-10-05 RX ORDER — HYDROCHLOROTHIAZIDE 25 MG/1
25 TABLET ORAL DAILY
Qty: 90 TABLET | Refills: 3 | Status: SHIPPED | OUTPATIENT
Start: 2020-10-05 | End: 2021-03-29

## 2020-10-05 RX ORDER — FOLIC ACID 1 MG/1
1 TABLET ORAL DAILY
Qty: 90 TABLET | Refills: 3 | Status: SHIPPED | OUTPATIENT
Start: 2020-10-05 | End: 2021-05-06

## 2020-10-05 RX ORDER — LOSARTAN POTASSIUM 100 MG/1
100 TABLET ORAL DAILY
Qty: 90 TABLET | Refills: 3 | Status: SHIPPED | OUTPATIENT
Start: 2020-10-05 | End: 2021-03-29

## 2020-10-05 RX ORDER — METOPROLOL SUCCINATE 25 MG/1
25 TABLET, EXTENDED RELEASE ORAL DAILY
Qty: 90 TABLET | Refills: 3 | Status: SHIPPED | OUTPATIENT
Start: 2020-10-05 | End: 2021-10-11

## 2020-10-05 RX ORDER — INDAPAMIDE 2.5 MG/1
TABLET, FILM COATED ORAL
Qty: 90 TABLET | Refills: 3 | Status: SHIPPED | OUTPATIENT
Start: 2020-10-05 | End: 2021-02-22

## 2020-10-05 RX ORDER — SIMVASTATIN 80 MG
TABLET ORAL
Qty: 90 TABLET | Refills: 0 | Status: SHIPPED | OUTPATIENT
Start: 2020-10-05 | End: 2020-10-05 | Stop reason: SDUPTHER

## 2020-10-05 RX ORDER — LEVOTHYROXINE SODIUM 0.2 MG/1
TABLET ORAL
Qty: 120 TABLET | Refills: 3 | Status: SHIPPED | OUTPATIENT
Start: 2020-10-05 | End: 2021-11-04

## 2020-10-05 RX ORDER — DONEPEZIL HYDROCHLORIDE 5 MG/1
5 TABLET, FILM COATED ORAL NIGHTLY
Qty: 90 TABLET | Refills: 3 | Status: ON HOLD
Start: 2020-10-05 | End: 2021-03-30 | Stop reason: HOSPADM

## 2020-10-05 RX ORDER — BUPROPION HYDROCHLORIDE 150 MG/1
TABLET ORAL
Qty: 90 TABLET | Refills: 3 | Status: SHIPPED | OUTPATIENT
Start: 2020-10-05 | End: 2021-02-22

## 2020-10-05 SDOH — ECONOMIC STABILITY: TRANSPORTATION INSECURITY
IN THE PAST 12 MONTHS, HAS THE LACK OF TRANSPORTATION KEPT YOU FROM MEDICAL APPOINTMENTS OR FROM GETTING MEDICATIONS?: NO

## 2020-10-05 SDOH — ECONOMIC STABILITY: FOOD INSECURITY: WITHIN THE PAST 12 MONTHS, THE FOOD YOU BOUGHT JUST DIDN'T LAST AND YOU DIDN'T HAVE MONEY TO GET MORE.: NEVER TRUE

## 2020-10-05 SDOH — ECONOMIC STABILITY: TRANSPORTATION INSECURITY
IN THE PAST 12 MONTHS, HAS LACK OF TRANSPORTATION KEPT YOU FROM MEETINGS, WORK, OR FROM GETTING THINGS NEEDED FOR DAILY LIVING?: NO

## 2020-10-05 SDOH — ECONOMIC STABILITY: INCOME INSECURITY: HOW HARD IS IT FOR YOU TO PAY FOR THE VERY BASICS LIKE FOOD, HOUSING, MEDICAL CARE, AND HEATING?: NOT HARD AT ALL

## 2020-10-05 SDOH — ECONOMIC STABILITY: FOOD INSECURITY: WITHIN THE PAST 12 MONTHS, YOU WORRIED THAT YOUR FOOD WOULD RUN OUT BEFORE YOU GOT MONEY TO BUY MORE.: NEVER TRUE

## 2020-10-05 NOTE — PROGRESS NOTES
[address] [place] [address] Jonathan Ng  Dept: [phone]  Dept Fax: [phone]  Loc: GEOVANNY Costa is a 77 y.o. female who presents today for:  Chief Complaint   Patient presents with    6 Month Follow-Up           HPI:     HPI    Hypertension: Patient here for follow-up of elevated blood pressure. She is not exercising and is adherent to low salt diet. Blood pressure is well controlled at home. Cardiac symptoms none. Patient denies chest pain, dyspnea and palpitations. Cardiovascular risk factors: dyslipidemia, hypertension, obesity (BMI >= 30 kg/m2) and sedentary lifestyle. Use of agents associated with hypertension: none. History of target organ damage: none. Hyperlipidemia: Patient presents with hyperlipidemia. She was tested because hypertension. Her last labs show   Lab Results   Component Value Date    CHOL 142 02/26/2020    CHOL 158 04/23/2019    CHOL 156 10/13/2018     Lab Results   Component Value Date    TRIG 107 02/26/2020    TRIG 72 04/23/2019    TRIG 83 10/13/2018     Lab Results   Component Value Date    HDL 63 02/26/2020    HDL 76 04/23/2019    HDL 74 (A) 10/13/2018     Lab Results   Component Value Date    LDLCALC 58 02/26/2020    LDLCALC 68 04/23/2019    LDLCALC 65 10/13/2018     Lab Results   Component Value Date    VLDL 17 10/13/2018    VLDL 19 10/09/2017    VLDL 18 05/13/2017     Lab Results   Component Value Date    CHOLHDLRATIO 0.9 10/13/2018    CHOLHDLRATIO 1 10/09/2017    CHOLHDLRATIO 2.14 05/13/2017     There is not a family history of hyperlipidemia. There is not a family history of early ischemia heart disease. Hypothyroidism: Patient presents for evaluation of thyroid function. Symptoms consist of fatigue, arthralgias. Symptoms have present for several years. The symptoms are fatigue. The problem has been gradually worsening.   Previous thyroid studies include TSH, free thyroxine index and triiodothyronine total. The hypothyroidism is due to hypothyroidism. Lab Results   Component Value Date    TSH 0.088 (L) 02/26/2020    Z1SKKKP 109 06/17/2013    FT3 0.807 10/13/2018    T4FREE 1.93 (H) 02/26/2020       Depression: Patient complains of depression. She complains of anhedonia, depressed mood, difficulty concentrating and fatigue. Onset was approximately 2011, waxes and wanes since that time. She denies current suicidal and homicidal plan or intent. Family history significant for depression  In her daughter. Possible organic causes contributing are: none. 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 Previous treatment includes Paxil and no therapy. She complains of the following side effects from the treatment: none. Her daughter is complaining that she was crying every day and stressed out all the time. She would like a refill on xanax and is still taking wellbutrin with the paxil and doing much better. Her kids have been noticing more memory loss since her stroke. She is forgetting the short term memory issues like recent conversations and where she put things at home. She is still working through residual slow thinking and delayed reactions after a stroke. She also has daily headaches that are new to her. Better with tylenol. She has just gone through total hysterectomy for uterine cancer in [place]. She is recovering well and oncology does not think she will need chemo or radiation. Follow up scheduled at the [facility] regularly. This is associated with more urinary leakage with coughing and sneezing and when overfull. Also more constipation. This is controlled well on current medications and she thinks may be a little better after hysterectomy.       Reviewed chart forpast medical history , surgical history , allergies, social history , family history and Constitutional: Vital signs are normal. She appears well-developed and well-nourished. She is active. HENT:   Head: Normocephalic and atraumatic. Right Ear: Tympanic membrane, external ear and ear canal normal. No drainage or tenderness. Left Ear: Tympanic membrane, external ear and ear canal normal. No drainage or tenderness. Nose: Nose normal. No mucosal edema or rhinorrhea. Mouth/Throat: Uvula is midline, oropharynx is clear and moist and mucous membranes are normal. Mucous membranes are not pale. Normal dentition. No posterior oropharyngeal edema or posterior oropharyngeal erythema. Eyes: Lids are normal. Right eye exhibits no chemosis and no discharge. Left eye exhibits no chemosis and no drainage. Right conjunctiva has no hemorrhage. Left conjunctiva has no hemorrhage. Right eye exhibits normal extraocular motion. Left eye exhibits normal extraocular motion. Right pupil is round and reactive. Left pupil is round and reactive. Pupils are equal.   Cardiovascular: Normal rate, regular rhythm, S1 normal, S2 normal and normal heart sounds. Exam reveals no gallop. No murmur heard. Pulmonary/Chest: Effort normal and breath sounds normal. No respiratory distress. She has no wheezes. She has no rhonchi. She has no rales. Abdominal: Soft. Normal appearance and bowel sounds are normal. She exhibits no distension and no mass. There is no hepatosplenomegaly. No tenderness. She has no rigidity, no rebound and no guarding. No hernia. Musculoskeletal:        Right lower leg: She exhibits no edema. Left lower leg: She exhibits no edema. Neurological: She is alert. MMSE in media      Assessment/Plan   Ry Wheatley was seen today for 6 month follow-up. Diagnoses and all orders for this visit:    Essential hypertension  -     metoprolol succinate (TOPROL XL) 25 MG extended release tablet; Take 1 tablet by mouth daily  -     losartan (COZAAR) 100 MG tablet;  Take 1 tablet by mouth daily  - 0.5ML (FLUAD)    No change to medications   Continue healthy diet and exercise  Aspirin daily    Counseling through journaling or formal counseling       Discussed use, benefit, and side effectsof prescribed medications. All patient questions answered. Pt voiced understanding. Reviewed health maintenance. Instructed to continue current medications, diet and exercise. Patient agreed with treatment plan. Followup as directed.      Over 50 minutes spent with patient with >50% spent in counseling and coordination of care    Electronically signed by Daryle Krabbe, MD

## 2020-10-05 NOTE — PROGRESS NOTES
Immunizations Administered     Name Date Dose Route    Influenza, Triv, inactivated, subunit, adjuvanted, IM (Fluad 65 yrs and older) 10/5/2020 0.5 mL Intramuscular    Site: Deltoid- Left    Lot: [id number]    NDC: 13802-432-38    Pneumococcal Conjugate 13-valent (Cawmcqa21) 10/5/2020 0.5 mL Intramuscular    Site: Deltoid- Left    Lot: [id number]    NDC: 8248-9043-10          VIS GIVEN. CONSENT SIGNED  PATIENT TOLERATED WELL.

## 2020-10-06 RX ORDER — ALPRAZOLAM 0.25 MG/1
0.25 TABLET ORAL 3 TIMES DAILY PRN
Qty: 30 TABLET | Refills: 0 | Status: SHIPPED | OUTPATIENT
Start: 2020-10-06 | End: 2020-11-06 | Stop reason: SDUPTHER

## 2020-10-23 ENCOUNTER — TELEPHONE (OUTPATIENT)
Dept: FAMILY MEDICINE CLINIC | Age: 65
End: 2020-10-23

## 2020-10-23 NOTE — TELEPHONE ENCOUNTER
Ry Wheatley started taking donepezil, HCL 5mg on 10/5/20. She's supposed to call if there are any issues. States that she wakes up and calls out at night, which isn't normal.  She quit taking the med 10/21. Doesn't think she has had these episodes since. Asking for advice: should she continue to take the med a while longer, is there something else Dr Kade Schaefer will prescribe?   Call her at [phone] with response

## 2020-10-26 RX ORDER — RIVASTIGMINE 4.6 MG/24H
1 PATCH, EXTENDED RELEASE TRANSDERMAL DAILY
Qty: 30 PATCH | Refills: 3 | Status: SHIPPED | OUTPATIENT
Start: 2020-10-26 | End: 2021-02-18

## 2020-10-27 ENCOUNTER — HOSPITAL ENCOUNTER (OUTPATIENT)
Dept: MAMMOGRAPHY | Age: 65
Discharge: HOME | End: 2020-10-27
Payer: MEDICARE

## 2020-10-27 ENCOUNTER — NURSE ONLY (OUTPATIENT)
Dept: LAB | Age: 65
End: 2020-10-27

## 2020-10-27 LAB
ALBUMIN SERPL-MCNC: 3.7 G/DL (ref 3.5–5.1)
ALP BLD-CCNC: 136 U/L (ref 38–126)
ALT SERPL-CCNC: 24 U/L (ref 11–66)
ANION GAP SERPL CALCULATED.3IONS-SCNC: 9 MEQ/L (ref 8–16)
AST SERPL-CCNC: 26 U/L (ref 5–40)
BILIRUB SERPL-MCNC: 0.4 MG/DL (ref 0.3–1.2)
BUN BLDV-MCNC: 14 MG/DL (ref 7–22)
CALCIUM SERPL-MCNC: 9.6 MG/DL (ref 8.5–10.5)
CHLORIDE BLD-SCNC: 103 MEQ/L (ref 98–111)
CO2: 29 MEQ/L (ref 23–33)
CREAT SERPL-MCNC: 0.8 MG/DL (ref 0.4–1.2)
ERYTHROCYTE [DISTWIDTH] IN BLOOD BY AUTOMATED COUNT: 12.9 % (ref 11.5–14.5)
ERYTHROCYTE [DISTWIDTH] IN BLOOD BY AUTOMATED COUNT: 47 FL (ref 35–45)
GFR SERPL CREATININE-BSD FRML MDRD: 71 ML/MIN/1.73M2
GLUCOSE FASTING: 91 MG/DL (ref 70–108)
HCT VFR BLD CALC: 41.3 % (ref 37–47)
HEMOGLOBIN: 13.6 GM/DL (ref 12–16)
MCH RBC QN AUTO: 32.8 PG (ref 26–33)
MCHC RBC AUTO-ENTMCNC: 32.9 GM/DL (ref 32.2–35.5)
MCV RBC AUTO: 99.5 FL (ref 81–99)
PLATELET # BLD: 104 THOU/MM3 (ref 130–400)
PMV BLD AUTO: 12.7 FL (ref 9.4–12.4)
POTASSIUM SERPL-SCNC: 4.1 MEQ/L (ref 3.5–5.2)
RBC # BLD: 4.15 MILL/MM3 (ref 4.2–5.4)
SODIUM BLD-SCNC: 141 MEQ/L (ref 135–145)
T3 TOTAL: 96 NG/DL (ref 72–181)
T4 FREE: 1.48 NG/DL (ref 0.93–1.76)
TOTAL PROTEIN: 7.5 G/DL (ref 6.1–8)
TSH SERPL DL<=0.05 MIU/L-ACNC: 0.05 UIU/ML (ref 0.4–4.2)
WBC # BLD: 6.5 THOU/MM3 (ref 4.8–10.8)

## 2020-10-28 LAB — THYROXINE (T4): 8.8 UG/DL (ref 4.5–10.9)

## 2020-11-05 NOTE — TELEPHONE ENCOUNTER
Belen Romero called requesting a refill on the following medications:  Requested Prescriptions     Pending Prescriptions Disp Refills    ALPRAZolam (XANAX) 0.25 MG tablet 30 tablet 0     Sig: Take 1 tablet by mouth 3 times daily as needed for Sleep or Anxiety for up to 30 days. Pharmacy verified: [address] [facility] [address]  . pv      Date of last visit: 10/5/2020  Date of next visit (if applicable): 3/6/3054

## 2020-11-06 RX ORDER — ALPRAZOLAM 0.25 MG/1
0.25 TABLET ORAL 3 TIMES DAILY PRN
Qty: 30 TABLET | Refills: 0 | Status: SHIPPED | OUTPATIENT
Start: 2020-11-06 | End: 2020-12-06

## 2020-11-06 NOTE — TELEPHONE ENCOUNTER
Xanax sent to the pharmacy  Why is she using so many more?     Last time she refilled was 10/6/20  Prior to that was 6/17/20

## 2020-12-11 ENCOUNTER — OFFICE VISIT (OUTPATIENT)
Dept: CARDIOLOGY CLINIC | Age: 65
End: 2020-12-11
Payer: MEDICARE

## 2020-12-11 VITALS
SYSTOLIC BLOOD PRESSURE: 136 MMHG | WEIGHT: 224 LBS | DIASTOLIC BLOOD PRESSURE: 64 MMHG | BODY MASS INDEX: 43.98 KG/M2 | HEIGHT: 60 IN | HEART RATE: 85 BPM

## 2020-12-11 PROCEDURE — 1090F PRES/ABSN URINE INCON ASSESS: CPT | Performed by: NUCLEAR MEDICINE

## 2020-12-11 PROCEDURE — G8400 PT W/DXA NO RESULTS DOC: HCPCS | Performed by: NUCLEAR MEDICINE

## 2020-12-11 PROCEDURE — G8428 CUR MEDS NOT DOCUMENT: HCPCS | Performed by: NUCLEAR MEDICINE

## 2020-12-11 PROCEDURE — 99213 OFFICE O/P EST LOW 20 MIN: CPT | Performed by: NUCLEAR MEDICINE

## 2020-12-11 PROCEDURE — 1036F TOBACCO NON-USER: CPT | Performed by: NUCLEAR MEDICINE

## 2020-12-11 PROCEDURE — G8482 FLU IMMUNIZE ORDER/ADMIN: HCPCS | Performed by: NUCLEAR MEDICINE

## 2020-12-11 PROCEDURE — 93000 ELECTROCARDIOGRAM COMPLETE: CPT | Performed by: NUCLEAR MEDICINE

## 2020-12-11 PROCEDURE — 3017F COLORECTAL CA SCREEN DOC REV: CPT | Performed by: NUCLEAR MEDICINE

## 2020-12-11 PROCEDURE — 1123F ACP DISCUSS/DSCN MKR DOCD: CPT | Performed by: NUCLEAR MEDICINE

## 2020-12-11 PROCEDURE — G8417 CALC BMI ABV UP PARAM F/U: HCPCS | Performed by: NUCLEAR MEDICINE

## 2020-12-11 PROCEDURE — 4040F PNEUMOC VAC/ADMIN/RCVD: CPT | Performed by: NUCLEAR MEDICINE

## 2020-12-11 RX ORDER — ALPRAZOLAM 0.25 MG/1
0.25 TABLET ORAL NIGHTLY PRN
COMMUNITY
End: 2021-01-14 | Stop reason: SDUPTHER

## 2020-12-11 NOTE — PROGRESS NOTES
[address] [facility],[place] [address] [address] [facility] [address]  [place] [address]  Dept: [phone]  Dept Fax: [phone]  Loc: [phone]    Visit Date: 12/11/2020    Aubrie Roche is a 77 y.o. female who presents todayfor:  Chief Complaint   Patient presents with    Check-Up    Cardiac Valve Problem    Hypertension    Coronary Artery Disease   some memory issues  Does have MR   Moderate   Mild CAD  No chest pain   No changes in breathing   BP is stable  No dizziness  No syncope      HPI:  HPI  Past Medical History:   Diagnosis Date    Acute arterial ischemic stroke, multifocal, anterior circulation (HCC)     Dr Darron Taylor pox     Hyperlipidemia     Hypertension     Dr Lilian Sutton    Hypothyroidism     Measles     Mumps     Uterine cancer ([facility] [address] 01/2020    Endometrioid carcinoma, Марина Macario      Past Surgical History:   Procedure Laterality Date    ENDOMETRIAL BIOPSY  12/11/2019    HYSTERECTOMY, TOTAL ABDOMINAL       Family History   Problem Relation Age of Onset    Arthritis Mother     Other Other         daughter with SVT and PE     Social History     Tobacco Use    Smoking status: Never Smoker    Smokeless tobacco: Never Used   Substance Use Topics    Alcohol use: Yes     Comment: socially      Current Outpatient Medications   Medication Sig Dispense Refill    ALPRAZolam (XANAX) 0.25 MG tablet Take 0.25 mg by mouth nightly as needed for Sleep.  rivastigmine (EXELON) 4.6 MG/24HR Place 1 patch onto the skin daily 30 patch 3    simvastatin (ZOCOR) 80 MG tablet Take 1 tablet by mouth nightly 90 tablet 3    PARoxetine (PAXIL) 40 MG tablet TAKE ONE TABLET DAILY, BY MOUTH.  90 tablet 3    metoprolol succinate (TOPROL XL) 25 MG extended release tablet Take 1 tablet by mouth daily 90 tablet 3    losartan (COZAAR) 100 MG tablet Take 1 tablet by mouth daily 90 tablet 3    levothyroxine (SYNTHROID) 200 MCG tablet TAKE 1 TABLET BY MOUTH ONCE DAILY, TAKE 1&1/2 TAB ON MONDAY EVERY OTHER WEEK. 120 tablet 3    indapamide (LOZOL) 2.5 MG tablet TAKE ONE TABLET DAILY, IN THE MORNING, BY MOUTH. 90 tablet 3    hydroCHLOROthiazide (HYDRODIURIL) 25 MG tablet Take 1 tablet by mouth daily 90 tablet 3    Glucosamine-Chondroit-Vit C-Mn (GLUCOSAMINE 1500 COMPLEX) CAPS Take 1 tablet by mouth daily 90 capsule 3    folic acid (FOLVITE) 1 MG tablet Take 1 tablet by mouth daily 90 tablet 3    clopidogrel (PLAVIX) 75 MG tablet Take 1 tablet by mouth daily 90 tablet 3    buPROPion (WELLBUTRIN XL) 150 MG extended release tablet TAKE ONE TABLET DAILY, IN THE MORNING, BY MOUTH. 90 tablet 3    aspirin 81 MG EC tablet Take 81 mg by mouth nightly      Multiple Vitamins-Minerals (MULTIVITAMIN & MINERAL PO) Take 1 tablet by mouth daily       donepezil (ARICEPT) 5 MG tablet Take 1 tablet by mouth nightly 90 tablet 3     No current facility-administered medications for this visit.       No Known Allergies  Health Maintenance   Topic Date Due    Hepatitis C screen  1955    Colon cancer screen colonoscopy  05/06/2003    DEXA (modify frequency per FRAX score)  05/06/2008    Breast cancer screen  01/19/2019    Annual Wellness Visit (AWV)  05/29/2019    DTaP/Tdap/Td vaccine (1 - Tdap) 10/05/2021 (Originally 5/6/1972)    Shingles Vaccine (1 of 2) 10/05/2021 (Originally 5/6/2003)    Lipid screen  02/26/2021    Pneumococcal 65+ years Vaccine (2 of 2 - PPSV23) 10/05/2021    TSH testing  10/27/2021    Potassium monitoring  10/27/2021    Creatinine monitoring  10/27/2021    Flu vaccine  Completed    Hepatitis A vaccine  Aged Out    Hepatitis B vaccine  Aged Out    Hib vaccine  Aged Out    Meningococcal (ACWY) vaccine  Aged Out       Subjective:  Review of Systems  General:   No fever, no chills, No fatigue or weight loss  Pulmonary:    No dyspnea, no wheezing  Cardiac:    Denies recent chest pain,   GI:     No nausea or vomiting, no abdominal pain  Neuro:    No dizziness or light headedness,   Musculoskeletal:  No recent active issues  Extremities:   No edema, no obvious claudication       Objective:  Physical Exam  BP (!) 142/86   Pulse 85   Ht 5' (1.524 m)   Wt 224 lb (101.6 kg)   BMI 43.75 kg/m²   General:   Well developed, well nourished  Lungs:   Clear to auscultation  Heart:    Normal S1 S2, Slight murmur. no rubs, no gallops  Abdomen:   Soft, non tender, no organomegalies, positive bowel sounds  Extremities:   No edema, no cyanosis, good peripheral pulses  Neurological:   Awake, alert, oriented. No obvious focal deficits  Musculoskelatal:  No obvious deformities    Assessment:      Diagnosis Orders   1. Essential hypertension  EKG 12 lead   2. Familial hypercholesterolemia  EKG 12 lead   3. Coronary artery disease involving native coronary artery of native heart without angina pectoris  EKG 12 lead   4. Nonrheumatic mitral valve regurgitation     as above  Cardiac fair for now   No new issues  ECG in office was done today. I reviewed the ECG. No acute findings      Plan:  No follow-ups on file. As above  Continue risk factor modification and medical management  Thank you for allowing me to participate in the care of your patient. Please don't hesitate to contact me regarding any further issues related to the patient care    Orders Placed:  Orders Placed This Encounter   Procedures    EKG 12 lead     Order Specific Question:   Reason for Exam?     Answer: Other       Medications Prescribed:  No orders of the defined types were placed in this encounter. Discussed use, benefit, and side effects of prescribed medications. All patient questions answered. Pt voicedunderstanding. Instructed to continue current medications, diet and exercise. Continue risk factor modification and medical management. Patient agreed with treatment plan. Follow up as directed.     Electronically signedby Monika Quan MD on 12/11/2020 at 12:57 PM

## 2021-01-14 DIAGNOSIS — F41.9 ANXIETY: Primary | ICD-10-CM

## 2021-01-14 RX ORDER — ALPRAZOLAM 0.25 MG/1
0.25 TABLET ORAL NIGHTLY PRN
Qty: 30 TABLET | Refills: 0 | Status: SHIPPED | OUTPATIENT
Start: 2021-01-14 | End: 2021-12-13 | Stop reason: SDUPTHER

## 2021-01-18 ENCOUNTER — TELEPHONE (OUTPATIENT)
Dept: FAMILY MEDICINE CLINIC | Age: 66
End: 2021-01-18

## 2021-01-18 NOTE — TELEPHONE ENCOUNTER
Pt called stating she saw OB/GYN on 1/15 and her BP was 180/103. She had them check again before she left and was still high (pt does know what it was). Pt does have a bp monitor at home but unsure how to use it. Advised her to bring in to her next appt and we could show her how to use    Pt is wanting to know if her BP meds need adjust     Pt is very stressed out lately, a lot of family things going on. Pt was in tears on the phone    She has also been out of her xanax 0.25 for the past 1-2 weeks.   She has been taking all of her other routine meds as directed

## 2021-01-19 ENCOUNTER — NURSE ONLY (OUTPATIENT)
Dept: FAMILY MEDICINE CLINIC | Age: 66
End: 2021-01-19
Payer: MEDICARE

## 2021-01-19 VITALS — SYSTOLIC BLOOD PRESSURE: 148 MMHG | DIASTOLIC BLOOD PRESSURE: 82 MMHG

## 2021-01-19 DIAGNOSIS — F41.9 ANXIETY: ICD-10-CM

## 2021-01-19 PROCEDURE — 99211 OFF/OP EST MAY X REQ PHY/QHP: CPT | Performed by: FAMILY MEDICINE

## 2021-01-19 RX ORDER — ALPRAZOLAM 0.25 MG/1
0.25 TABLET ORAL NIGHTLY PRN
Qty: 30 TABLET | Refills: 0 | Status: CANCELLED | OUTPATIENT
Start: 2021-01-19 | End: 2021-02-18

## 2021-01-19 NOTE — PROGRESS NOTES
Chief Complaint   Patient presents with    Other     BP check       Vitals:    01/19/21 1008   BP: (!) 148/82         Elevated BP is likely from anxiety at those appointments    Will watch closely    [id number] [address] Ng for second opinion from neurology. Is there someone in particular they would like to see?     I just sent a refill of the xanax on 1/14/21

## 2021-01-19 NOTE — PROGRESS NOTES
Patient aware and voiced understanding. Pt stated she will call back with the exact name of neurologist in [place].

## 2021-02-01 ENCOUNTER — TELEPHONE (OUTPATIENT)
Dept: FAMILY MEDICINE CLINIC | Age: 66
End: 2021-02-01

## 2021-02-01 DIAGNOSIS — R41.3 MEMORY LOSS: ICD-10-CM

## 2021-02-01 DIAGNOSIS — I69.30 CEREBRAL MULTI-INFARCT STATE: Primary | ICD-10-CM

## 2021-02-01 NOTE — TELEPHONE ENCOUNTER
Patient would like a referral to different neurologist. She and her  feel that there hasn't been much follow up with her current one. Can they be referred to someone in [place].         Call pt [phone]

## 2021-02-01 NOTE — TELEPHONE ENCOUNTER
Referral entered  I am not familiar with neurologist in [place]  Also offer [address] [facility] in [facility]  Call patient

## 2021-02-01 NOTE — TELEPHONE ENCOUNTER
Spoke with patient. Patient states she will just stay where she is at for right now.   Patient will call office back if she decides to go elsewhere for neurology

## 2021-02-10 NOTE — TELEPHONE ENCOUNTER
Patient's daughter came into office dropping off new signed HIPAA form. She was asking about contacting her dad ([place]--on HIPAA) directly instead of the patient personally as she has memory issues and doesn't remember about calls and important information. Per daughter, ok to do referral to [place] in [facility]. Switching primary number to  Jeremy Him per daughter's request for above reasons. Please also, put the same number on the referral form so they will get a hold of him.

## 2021-02-11 NOTE — TELEPHONE ENCOUNTER
Patient scheduled on 3/31/21 @ 8 AM.  is going to talk to his daughter and see if they are going to go to [facility] or if they want to go to [address] [place] [address]. Will call office back.

## 2021-02-12 ENCOUNTER — TELEPHONE (OUTPATIENT)
Dept: FAMILY MEDICINE CLINIC | Age: 66
End: 2021-02-12

## 2021-02-12 NOTE — TELEPHONE ENCOUNTER
Spouse calling in regards to Neurology referral. They would like to cancel this referral for the patient. Spouse states that he and his daughter's are afraid that they will just get the run around again and he does not want that for his wife. Spouse states that he and family will take wife to [place] [facility]. If Dr Miri Nolasco would like to speak with him he states that is fine, but he will be going out of town March 11 - 17th.

## 2021-02-15 NOTE — TELEPHONE ENCOUNTER
Called and spoke with Yoandy Duval, stated that pt is currently seeing Dr. Amador Adorno and stated that he would like for pt to be seen in [address] [place] [address]. He would like for office to send information to [address] [place] [address]. Are you ok with this referral being sent?

## 2021-02-18 ENCOUNTER — HOSPITAL ENCOUNTER (OUTPATIENT)
Dept: WOMENS IMAGING | Age: 66
Discharge: HOME | End: 2021-02-18
Payer: MEDICARE

## 2021-02-18 ENCOUNTER — TELEPHONE (OUTPATIENT)
Dept: FAMILY MEDICINE CLINIC | Age: 66
End: 2021-02-18

## 2021-02-18 DIAGNOSIS — Z78.0 ASYMPTOMATIC POSTMENOPAUSAL STATE: ICD-10-CM

## 2021-02-18 DIAGNOSIS — Z12.31 VISIT FOR SCREENING MAMMOGRAM: ICD-10-CM

## 2021-02-18 PROCEDURE — 77080 DXA BONE DENSITY AXIAL: CPT

## 2021-02-18 PROCEDURE — 77063 BREAST TOMOSYNTHESIS BI: CPT

## 2021-02-18 RX ORDER — RIVASTIGMINE 4.6 MG/24H
PATCH, EXTENDED RELEASE TRANSDERMAL
Qty: 30 PATCH | Refills: 0 | Status: SHIPPED | OUTPATIENT
Start: 2021-02-18 | End: 2021-03-18

## 2021-02-19 NOTE — TELEPHONE ENCOUNTER
Patient's  Jet Segura informed and verbalized understanding. He will have the patient start on calcium and the exercises. He will also talk with her reguarding the reclast or fosamax and call us back.

## 2021-02-22 ENCOUNTER — HOSPITAL ENCOUNTER (OUTPATIENT)
Dept: WOMENS IMAGING | Age: 66
Discharge: HOME | End: 2021-02-22
Payer: MEDICARE

## 2021-02-22 DIAGNOSIS — R92.2 BREAST DENSITY: ICD-10-CM

## 2021-02-22 DIAGNOSIS — I10 ESSENTIAL HYPERTENSION: ICD-10-CM

## 2021-02-22 DIAGNOSIS — F33.42 RECURRENT MAJOR DEPRESSIVE DISORDER, IN FULL REMISSION (HCC): ICD-10-CM

## 2021-02-22 PROCEDURE — 76642 ULTRASOUND BREAST LIMITED: CPT

## 2021-02-22 RX ORDER — INDAPAMIDE 2.5 MG/1
TABLET, FILM COATED ORAL
Qty: 90 TABLET | Refills: 0 | Status: SHIPPED | OUTPATIENT
Start: 2021-02-22 | End: 2021-05-13

## 2021-02-22 RX ORDER — LOSARTAN POTASSIUM AND HYDROCHLOROTHIAZIDE 25; 100 MG/1; MG/1
TABLET ORAL
Qty: 90 TABLET | Refills: 0 | Status: SHIPPED | OUTPATIENT
Start: 2021-02-22 | End: 2021-05-13

## 2021-02-22 RX ORDER — PAROXETINE HYDROCHLORIDE 40 MG/1
TABLET, FILM COATED ORAL
Qty: 90 TABLET | Refills: 0 | Status: SHIPPED | OUTPATIENT
Start: 2021-02-22 | End: 2021-05-13

## 2021-02-22 RX ORDER — BUPROPION HYDROCHLORIDE 150 MG/1
TABLET ORAL
Qty: 90 TABLET | Refills: 0 | Status: SHIPPED | OUTPATIENT
Start: 2021-02-22 | End: 2021-05-13

## 2021-02-23 ENCOUNTER — TELEPHONE (OUTPATIENT)
Dept: FAMILY MEDICINE CLINIC | Age: 66
End: 2021-02-23

## 2021-02-23 DIAGNOSIS — M85.80 OSTEOPENIA, UNSPECIFIED LOCATION: Primary | ICD-10-CM

## 2021-02-23 RX ORDER — ZOLEDRONIC ACID 5 MG/100ML
5 INJECTION, SOLUTION INTRAVENOUS ONCE
Qty: 100 ML | Refills: 0 | Status: SHIPPED | OUTPATIENT
Start: 2021-02-23 | End: 2021-03-29

## 2021-02-23 NOTE — TELEPHONE ENCOUNTER
Patients  called asking if the Citracal slow release 1200 is ok for patient to take for the calcium supplement. Also voiced she is wanting to have an order placed for the Reclast. Please advise.

## 2021-02-24 DIAGNOSIS — M85.89 OSTEOPENIA OF MULTIPLE SITES: ICD-10-CM

## 2021-02-24 RX ORDER — ZOLEDRONIC ACID 5 MG/100ML
5 INJECTION, SOLUTION INTRAVENOUS ONCE
Status: CANCELLED | OUTPATIENT
Start: 2021-02-28 | End: 2021-02-28

## 2021-03-02 ENCOUNTER — NURSE ONLY (OUTPATIENT)
Dept: LAB | Age: 66
End: 2021-03-02

## 2021-03-02 DIAGNOSIS — M85.80 OSTEOPENIA, UNSPECIFIED LOCATION: ICD-10-CM

## 2021-03-02 LAB
CALCIUM SERPL-MCNC: 9.4 MG/DL (ref 8.5–10.5)
CREAT SERPL-MCNC: 0.8 MG/DL (ref 0.4–1.2)
GFR SERPL CREATININE-BSD FRML MDRD: 71 ML/MIN/1.73M2

## 2021-03-29 ENCOUNTER — APPOINTMENT (OUTPATIENT)
Dept: GENERAL RADIOLOGY | Age: 66
End: 2021-03-29
Payer: MEDICARE

## 2021-03-29 ENCOUNTER — APPOINTMENT (OUTPATIENT)
Dept: CT IMAGING | Age: 66
End: 2021-03-29
Payer: MEDICARE

## 2021-03-29 ENCOUNTER — HOSPITAL ENCOUNTER (OUTPATIENT)
Age: 66
Setting detail: OBSERVATION
Discharge: HOME | End: 2021-03-30
Attending: INTERNAL MEDICINE | Admitting: INTERNAL MEDICINE
Payer: MEDICARE
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
## 2021-03-29 DIAGNOSIS — I69.30 CEREBRAL MULTI-INFARCT STATE: ICD-10-CM
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
## 2021-03-29 DIAGNOSIS — M79.605 LEFT LEG PAIN: ICD-10-CM

## 2021-03-29 DIAGNOSIS — R93.89 ABNORMAL FINDINGS ON DIAGNOSTIC IMAGING OF OTHER SPECIFIED BODY STRUCTURES: ICD-10-CM

## 2021-03-29 LAB
ANION GAP SERPL CALCULATED.3IONS-SCNC: 10 MEQ/L (ref 8–16)
BUN BLDV-MCNC: 16 MG/DL (ref 7–22)
CALCIUM SERPL-MCNC: 9.5 MG/DL (ref 8.5–10.5)
CHLORIDE BLD-SCNC: 103 MEQ/L (ref 98–111)
CO2: 28 MEQ/L (ref 23–33)
CREAT SERPL-MCNC: 0.8 MG/DL (ref 0.4–1.2)
GFR SERPL CREATININE-BSD FRML MDRD: 71 ML/MIN/1.73M2
GLUCOSE BLD-MCNC: 84 MG/DL (ref 70–108)
OSMOLALITY CALCULATION: 281.6 MOSMOL/KG (ref 275–300)
POTASSIUM REFLEX MAGNESIUM: 3.9 MEQ/L (ref 3.5–5.2)
SODIUM BLD-SCNC: 141 MEQ/L (ref 135–145)

## 2021-03-29 PROCEDURE — 70450 CT HEAD/BRAIN W/O DYE: CPT

## 2021-03-29 PROCEDURE — 36415 COLL VENOUS BLD VENIPUNCTURE: CPT

## 2021-03-29 PROCEDURE — 6370000000 HC RX 637 (ALT 250 FOR IP): Performed by: INTERNAL MEDICINE

## 2021-03-29 PROCEDURE — 73564 X-RAY EXAM KNEE 4 OR MORE: CPT

## 2021-03-29 PROCEDURE — G0378 HOSPITAL OBSERVATION PER HR: HCPCS

## 2021-03-29 PROCEDURE — 6360000002 HC RX W HCPCS: Performed by: INTERNAL MEDICINE

## 2021-03-29 PROCEDURE — 2580000003 HC RX 258: Performed by: REGISTERED NURSE

## 2021-03-29 PROCEDURE — 73552 X-RAY EXAM OF FEMUR 2/>: CPT

## 2021-03-29 PROCEDURE — 6370000000 HC RX 637 (ALT 250 FOR IP): Performed by: REGISTERED NURSE

## 2021-03-29 PROCEDURE — 6370000000 HC RX 637 (ALT 250 FOR IP): Performed by: NURSE PRACTITIONER

## 2021-03-29 PROCEDURE — 80048 BASIC METABOLIC PNL TOTAL CA: CPT

## 2021-03-29 PROCEDURE — 99285 EMERGENCY DEPT VISIT HI MDM: CPT

## 2021-03-29 PROCEDURE — 96372 THER/PROPH/DIAG INJ SC/IM: CPT

## 2021-03-29 RX ORDER — ONDANSETRON 2 MG/ML
4 INJECTION INTRAMUSCULAR; INTRAVENOUS EVERY 6 HOURS PRN
Status: DISCONTINUED | OUTPATIENT
Start: 2021-03-29 | End: 2021-03-30 | Stop reason: HOSPADM

## 2021-03-29 RX ORDER — RIVASTIGMINE 4.6 MG/24H
1 PATCH, EXTENDED RELEASE TRANSDERMAL DAILY
Status: DISCONTINUED | OUTPATIENT
Start: 2021-03-29 | End: 2021-03-30 | Stop reason: HOSPADM

## 2021-03-29 RX ORDER — ACETAMINOPHEN 650 MG/1
650 SUPPOSITORY RECTAL EVERY 6 HOURS PRN
Status: DISCONTINUED | OUTPATIENT
Start: 2021-03-29 | End: 2021-03-30 | Stop reason: HOSPADM

## 2021-03-29 RX ORDER — LOSARTAN POTASSIUM AND HYDROCHLOROTHIAZIDE 25; 100 MG/1; MG/1
1 TABLET ORAL DAILY
Status: DISCONTINUED | OUTPATIENT
Start: 2021-03-30 | End: 2021-03-29 | Stop reason: SDUPTHER

## 2021-03-29 RX ORDER — SODIUM CHLORIDE 0.9 % (FLUSH) 0.9 %
10 SYRINGE (ML) INJECTION EVERY 12 HOURS SCHEDULED
Status: DISCONTINUED | OUTPATIENT
Start: 2021-03-29 | End: 2021-03-30 | Stop reason: HOSPADM

## 2021-03-29 RX ORDER — SENNA PLUS 8.6 MG/1
1 TABLET ORAL NIGHTLY PRN
Status: DISCONTINUED | OUTPATIENT
Start: 2021-03-29 | End: 2021-03-30 | Stop reason: HOSPADM

## 2021-03-29 RX ORDER — LEVOTHYROXINE SODIUM 0.1 MG/1
200 TABLET ORAL DAILY
Status: DISCONTINUED | OUTPATIENT
Start: 2021-03-30 | End: 2021-03-30 | Stop reason: HOSPADM

## 2021-03-29 RX ORDER — METOPROLOL SUCCINATE 25 MG/1
25 TABLET, EXTENDED RELEASE ORAL DAILY
Status: DISCONTINUED | OUTPATIENT
Start: 2021-03-30 | End: 2021-03-30 | Stop reason: HOSPADM

## 2021-03-29 RX ORDER — DONEPEZIL HYDROCHLORIDE 5 MG/1
5 TABLET, FILM COATED ORAL NIGHTLY
Status: DISCONTINUED | OUTPATIENT
Start: 2021-03-29 | End: 2021-03-29

## 2021-03-29 RX ORDER — PROMETHAZINE HYDROCHLORIDE 25 MG/1
12.5 TABLET ORAL EVERY 6 HOURS PRN
Status: DISCONTINUED | OUTPATIENT
Start: 2021-03-29 | End: 2021-03-30 | Stop reason: HOSPADM

## 2021-03-29 RX ORDER — POLYETHYLENE GLYCOL 3350 17 G/17G
17 POWDER, FOR SOLUTION ORAL DAILY
Status: DISCONTINUED | OUTPATIENT
Start: 2021-03-29 | End: 2021-03-30 | Stop reason: HOSPADM

## 2021-03-29 RX ORDER — BUPROPION HYDROCHLORIDE 150 MG/1
150 TABLET ORAL DAILY
Status: DISCONTINUED | OUTPATIENT
Start: 2021-03-30 | End: 2021-03-30 | Stop reason: HOSPADM

## 2021-03-29 RX ORDER — HYDROCORTISONE ACETATE 0.5 %
1 CREAM (GRAM) TOPICAL DAILY
Status: DISCONTINUED | OUTPATIENT
Start: 2021-03-29 | End: 2021-03-29 | Stop reason: RX

## 2021-03-29 RX ORDER — HYDROCODONE BITARTRATE AND ACETAMINOPHEN 5; 325 MG/1; MG/1
1 TABLET ORAL EVERY 6 HOURS PRN
Status: DISCONTINUED | OUTPATIENT
Start: 2021-03-29 | End: 2021-03-30 | Stop reason: HOSPADM

## 2021-03-29 RX ORDER — POLYETHYLENE GLYCOL 3350 17 G/17G
17 POWDER, FOR SOLUTION ORAL DAILY PRN
Status: DISCONTINUED | OUTPATIENT
Start: 2021-03-29 | End: 2021-03-29

## 2021-03-29 RX ORDER — PAROXETINE HYDROCHLORIDE 20 MG/1
40 TABLET, FILM COATED ORAL DAILY
Status: DISCONTINUED | OUTPATIENT
Start: 2021-03-30 | End: 2021-03-30 | Stop reason: HOSPADM

## 2021-03-29 RX ORDER — FOLIC ACID 1 MG/1
1 TABLET ORAL DAILY
Status: DISCONTINUED | OUTPATIENT
Start: 2021-03-30 | End: 2021-03-30 | Stop reason: HOSPADM

## 2021-03-29 RX ORDER — LOSARTAN POTASSIUM 100 MG/1
100 TABLET ORAL DAILY
Status: DISCONTINUED | OUTPATIENT
Start: 2021-03-30 | End: 2021-03-30

## 2021-03-29 RX ORDER — HYDROCODONE BITARTRATE AND ACETAMINOPHEN 5; 325 MG/1; MG/1
1 TABLET ORAL ONCE
Status: COMPLETED | OUTPATIENT
Start: 2021-03-29 | End: 2021-03-29

## 2021-03-29 RX ORDER — SODIUM CHLORIDE 9 MG/ML
25 INJECTION, SOLUTION INTRAVENOUS PRN
Status: DISCONTINUED | OUTPATIENT
Start: 2021-03-29 | End: 2021-03-30 | Stop reason: HOSPADM

## 2021-03-29 RX ORDER — ASPIRIN 81 MG/1
81 TABLET ORAL NIGHTLY
Status: DISCONTINUED | OUTPATIENT
Start: 2021-03-29 | End: 2021-03-30 | Stop reason: HOSPADM

## 2021-03-29 RX ORDER — ACETAMINOPHEN 325 MG/1
650 TABLET ORAL EVERY 6 HOURS PRN
Status: DISCONTINUED | OUTPATIENT
Start: 2021-03-29 | End: 2021-03-30 | Stop reason: HOSPADM

## 2021-03-29 RX ORDER — SIMVASTATIN 20 MG
40 TABLET ORAL NIGHTLY
Status: DISCONTINUED | OUTPATIENT
Start: 2021-03-29 | End: 2021-03-29 | Stop reason: CLARIF

## 2021-03-29 RX ORDER — CYCLOBENZAPRINE HCL 10 MG
10 TABLET ORAL 3 TIMES DAILY PRN
Status: DISCONTINUED | OUTPATIENT
Start: 2021-03-29 | End: 2021-03-30 | Stop reason: HOSPADM

## 2021-03-29 RX ORDER — ATORVASTATIN CALCIUM 40 MG/1
40 TABLET, FILM COATED ORAL NIGHTLY
Status: DISCONTINUED | OUTPATIENT
Start: 2021-03-29 | End: 2021-03-30 | Stop reason: CLARIF

## 2021-03-29 RX ORDER — SODIUM CHLORIDE 0.9 % (FLUSH) 0.9 %
10 SYRINGE (ML) INJECTION PRN
Status: DISCONTINUED | OUTPATIENT
Start: 2021-03-29 | End: 2021-03-30 | Stop reason: HOSPADM

## 2021-03-29 RX ORDER — ATORVASTATIN CALCIUM 20 MG/1
20 TABLET, FILM COATED ORAL NIGHTLY
Status: DISCONTINUED | OUTPATIENT
Start: 2021-03-29 | End: 2021-03-30 | Stop reason: CLARIF

## 2021-03-29 RX ORDER — HYDROCHLOROTHIAZIDE 25 MG/1
25 TABLET ORAL DAILY
Status: DISCONTINUED | OUTPATIENT
Start: 2021-03-30 | End: 2021-03-30

## 2021-03-29 RX ORDER — CLOPIDOGREL BISULFATE 75 MG/1
75 TABLET ORAL DAILY
Status: DISCONTINUED | OUTPATIENT
Start: 2021-03-30 | End: 2021-03-30 | Stop reason: HOSPADM

## 2021-03-29 RX ADMIN — SODIUM CHLORIDE, PRESERVATIVE FREE 10 ML: 5 INJECTION INTRAVENOUS at 22:46

## 2021-03-29 RX ADMIN — ENOXAPARIN SODIUM 40 MG: 40 INJECTION SUBCUTANEOUS at 22:36

## 2021-03-29 RX ADMIN — HYDROCODONE BITARTRATE AND ACETAMINOPHEN 1 TABLET: 5; 325 TABLET ORAL at 15:18

## 2021-03-29 RX ADMIN — ACETAMINOPHEN 650 MG: 325 TABLET ORAL at 22:36

## 2021-03-29 RX ADMIN — ASPIRIN 81 MG: 81 TABLET ORAL at 22:37

## 2021-03-29 ASSESSMENT — ENCOUNTER SYMPTOMS
NAUSEA: 1
COLOR CHANGE: 0
VOMITING: 0

## 2021-03-29 ASSESSMENT — PAIN DESCRIPTION - LOCATION
LOCATION: KNEE;LEG
LOCATION: KNEE;LEG

## 2021-03-29 ASSESSMENT — PAIN DESCRIPTION - DESCRIPTORS
DESCRIPTORS: SHARP
DESCRIPTORS: SHARP

## 2021-03-29 ASSESSMENT — PAIN DESCRIPTION - PAIN TYPE
TYPE: ACUTE PAIN

## 2021-03-29 ASSESSMENT — PAIN DESCRIPTION - ORIENTATION
ORIENTATION: LEFT

## 2021-03-29 ASSESSMENT — PAIN SCALES - GENERAL
PAINLEVEL_OUTOF10: 0
PAINLEVEL_OUTOF10: 4
PAINLEVEL_OUTOF10: 3
PAINLEVEL_OUTOF10: 4
PAINLEVEL_OUTOF10: 0

## 2021-03-29 NOTE — PROGRESS NOTES
Full Consult dictated    A: Left knee Sprain/Contusion  P: Patient is able to do a straight leg raise and has no strength deficit. No quad tendon tear noted. Knee immobilizer on when ambulating. [phone] [address] Ng for pt/ot. May benefit from walker. Follow up in 1 week. Will sign off at this time  Thank you for allowing us to be a part of her care.      Francia Rogers PA-C

## 2021-03-29 NOTE — H&P
Internal Medicine Specialties  H&P  3/29/2021  6:01 PM    Patient:  Sadie Babb  YOB: 1955    MRN: [id number]   Acct:  [de-identified]   18/018A  Primary Care Physician: Swati Bowman MD    Chief Complaint:  Chief Complaint   Patient presents with   Paoli Blakes Fall       History of Present Illness: The patient is a 77 y.o. female with pmhx of hypertension, CVA's, hypothyroidism and hypertension who presents with fall. She states that she was at home walking in her kitchen when she tripped over a rug and fell on the left side of her body hitting her head on a chair and her knee on the floor. She was unable to get up after the fall and EMS was called. She did not lose consciousness but did see some flashing lights when she fell, which she says happens to her from time to time. The pain is above the knee on the left side in the quad muscle. Patient has a slight headache at this time but no other complaints. She is still unable to ambulate and the nurse in the ED was only able to pivot her. In the emergency department X ray of knee and femur were negative, CT head was negative. She reports that she is not in any pain right now unless she tries to stand up. Orthopedics saw patient in the ED and recommended PTOT, a knee immobilizer when ambulating and a walker. Patient admitted for fall and unable to ambulate. Past Medical History:        Diagnosis Date    Acute arterial ischemic stroke, multifocal, anterior circulation (HCC)     Dr Lio Hines pox     Hyperlipidemia     Hypertension     Dr Bola Menard    Hypothyroidism     Measles     Mumps     [facility] 01/2020    Endometrioid carcinoma, Darlene Cage       Past Surgical History:        Procedure Laterality Date    ENDOMETRIAL BIOPSY  12/11/2019    HYSTERECTOMY, TOTAL ABDOMINAL         Home Medications: Not in a hospital admission. Allergies:  Patient has no known allergies.     Social History: reports that she has never smoked. She has never used smokeless tobacco. She reports current alcohol use. She reports that she does not use drugs.     Family History:       Problem Relation Age of Onset    Arthritis Mother     Other Other         daughter with SVT and PE       Review of Systems:    General ROS: negative  Psychological ROS: negative  Hematological and Lymphatic ROS: negative  Endocrine ROS: negative  Vision:negative  ENT ROS: Denies  Respiratory ROS: no cough, shortness of breath, or wheezing  Cardiovascular ROS: no chest pain or dyspnea on exertion  Gastrointestinal ROS: no abdominal pain, change in bowel habits, or black or bloody stools  Genito-Urinary ROS: no dysuria, trouble voiding, or hematuria  Musculoskeletal ROS: pain in left quad  Neurological ROS: headache 3/10 pain  Dermatological ROS: negative  Weight:no change in weight  Appetite:ok      Physical Exam:    Vitals:  Patient Vitals for the past 24 hrs:   BP Temp Temp src Pulse Resp SpO2 Height Weight   03/29/21 1704 132/64   67 18 96 %     03/29/21 1609 (!) 140/57   58 18 99 %     03/29/21 1517 (!) 159/91   63 18 95 %     03/29/21 1424 (!) 153/87   57 16 96 %     03/29/21 1327 (!) 144/63 98.6 °F (37 °C) Oral 58 16 98 % 5' (1.524 m) 228 lb (103.4 kg)     Weight: Weight: 228 lb (103.4 kg)     24 hour intake/output:No intake or output data in the 24 hours ending 03/29/21 1801    General appearance - alert, well appearing, and in no distress  Eyes - pupils equal and reactive, extraocular eye movements intact  Ears - bilateral TM's and external ear canals normal  Nose - normal and patent, no erythema, discharge or polyps  Mouth - mucous membranes moist, pharynx normal without lesions  Neck - supple, no significant adenopathy  Head- Small hematoma on right posterior scalp  Lymphatics - no palpable lymphadenopathy, no hepatosplenomegaly  Chest - clear to auscultation, no wheezes, rales or rhonchi, symmetric air entry  Distant Breath Sounds: No  Heart - normal rate, regular rhythm, normal S1, S2, 2/6 systolic murmur, rubs, clicks or gallops  Abdomen - soft, nontender, nondistended, no masses or organomegaly  Obese: yes; Protuberant:   Neurological - alert, oriented, normal speech, no focal findings or movement disorder noted  Musculoskeletal - decreased range of motion in left leg, no swelling or MCL/LCL tenderness  Extremities - peripheral pulses normal, no pedal edema, no clubbing or cyanosis  Skin - normal coloration and turgor, no rashes, no suspicious skin lesions noted    Review of Labs and Diagnostic Testing:    CBC: No results for input(s): WBC, HGB, HCT, MCV, PLT in the last 72 hours. BMP: No results for input(s): NA, K, CL, CO2, PHOS, BUN, CREATININE, CALCIUM, GLUCOSE in the last 72 hours. PT/INR: No results for input(s): PROTIME, INR in the last 72 hours. APTT: No results for input(s): APTT in the last 72 hours. Lipids: No results for input(s): ALKPHOS, ALT, AST, BILITOT, BILIDIR, LABALBU, AMYLASE, LIPASE in the last 72 hours. Troponin: No results for input(s): TROPONINT in the last 72 hours. Imaging:  Xr Femur Left (min 2 Views)    Result Date: 3/29/2021  PROCEDURE: XR FEMUR LEFT (MIN 2 VIEWS), XR KNEE LEFT (MIN 4 VIEWS) CLINICAL INFORMATION: left distal femur pain. COMPARISON: No prior study. TECHNIQUE: AP and lateral views of the left femur and 4 views of the left knee. FINDINGS: There is normal osseous alignment without visualized fracture. There is mild osteophytosis of the knee joint margins and moderate osteophytosis of the patellofemoral joint margins. No joint effusion is present. There are calcifications at the medial aspect of the knee likely representing vascular calcifications. Moderate degenerative changes of the left knee. No acute abnormality of the left femur or knee. **This report has been created using voice recognition software.  It may contain minor errors which are inherent in voice recognition technology. ** Final report electronically signed by Dr. Humera Ramirez MD on 3/29/2021 2:36 PM    Xr Knee Left (min 4 Views)    Result Date: 3/29/2021  PROCEDURE: XR FEMUR LEFT (MIN 2 VIEWS), XR KNEE LEFT (MIN 4 VIEWS) CLINICAL INFORMATION: left distal femur pain. COMPARISON: No prior study. TECHNIQUE: AP and lateral views of the left femur and 4 views of the left knee. FINDINGS: There is normal osseous alignment without visualized fracture. There is mild osteophytosis of the knee joint margins and moderate osteophytosis of the patellofemoral joint margins. No joint effusion is present. There are calcifications at the medial aspect of the knee likely representing vascular calcifications. Moderate degenerative changes of the left knee. No acute abnormality of the left femur or knee. **This report has been created using voice recognition software. It may contain minor errors which are inherent in voice recognition technology. ** Final report electronically signed by Dr. Humera Ramirez MD on 3/29/2021 2:36 PM    Ct Head Wo Contrast    Result Date: 3/29/2021  PROCEDURE: CT HEAD WO CONTRAST CLINICAL INFORMATION: fall, on plavix, asprin, has headache. COMPARISON: MR brain dated 4/8/2019. TECHNIQUE: Noncontrast 5 mm axial images were obtained through the brain. Sagittal and coronal reconstructions were created. All CT scans at this facility use dose modulation, iterative reconstruction, and/or weight-based dosing when appropriate to reduce radiation dose to as low as reasonably achievable. FINDINGS: There is stable mild volume loss. There are small focal areas of encephalomalacia in the bilateral corona radiata and left thalamus as evidence for old lacunar infarcts, stable compared to prior exam. A small moderate old infarct in the right cerebral hemisphere is also stable. Gray-white matter differentiation otherwise appears grossly preserved.  No intracranial hemorrhage, mass effect or midline shift is identified. The calvarium appears intact. Orbits are unremarkable. Paranasal sinuses and mastoid  air cells are clear. No evidence of acute intracranial abnormality. **This report has been created using voice recognition software. It may contain minor errors which are inherent in voice recognition technology. ** Final report electronically signed by Dr. Brandy Laird MD on 3/29/2021 5:40 PM        Assessment/Plan:    1. Fall  a. Per ortho recommendations, PT/OT and knee immobilizer when walking   b. Possibility of walker at discharge  c. Pain management  2. Hx CVA  a. Cont ASA/Plavix   3. Hypothyroidism  a. Continue Synthroid  b. TSH ordered  4. Hypertension  a. Continue Hyzaar  5. DVT prophylaxis  a. Lovenox    Assessment and plan of care discussed with supervising physician. Electronically signed by VANE Craft - CNP on 3/29/2021 at 6:01 PM    Copy: Primary Care Physician: Amena Ochoa MD     Pt sen and examined by me independently and d/w Antwon Edwards.   Agree with above with following recommendations  - Mechanical fall  Cont PT OT   Pain control   Muscle relaxants  Check orthostatic BP     -Thrombocytopenia  Check  CBC and if platelets above [id number] then resume lovenox    Electronically signed by Michell King MD on 3/29/2021 at 6:40 PM

## 2021-03-29 NOTE — ED NOTES
Pt resting in bed, watching. Respirations even and unlabored.       Luis Fernando Radford RN  03/29/21 6159

## 2021-03-29 NOTE — ED NOTES
Pt was able to pivot between be to wheel chair but could not ambulate.       Jamey Bolanos RN  03/29/21 8732

## 2021-03-29 NOTE — ED PROVIDER NOTES
[facility] [place] Emergency Department    CHIEF COMPLAINT       Chief Complaint   Patient presents with    Fall       Nurses Notes reviewed and I agree except as noted in the HPI. HISTORY OF PRESENT ILLNESS    Tunde Man james 77 y.o. female who presents to the ED for evaluation of left leg pain post fall. Patient notes that she was in her kitchen today, and she tripped over a rug, she fell onto her left side, she now notes that she has pain to her left thigh. She notes she was unable to get up due to the pain. She notes she is unable to bend her knee. She notes pain does not radiate up into her hip. She denies any numbness or tingling down her leg. She denies any history of any surgeries to this leg before in the past.  She notes that she did hit her head on the counter, she is taking aspirin and Plavix for history of stroke. She denies any loss of consciousness, she had some nausea with the fall but denies any vomiting. She denies any altered mental status, denies any neck pain. Denies any injury to any other extremity. HPI was provided by the patient. REVIEW OF SYSTEMS     Review of Systems   Constitutional: Negative for activity change, chills and fever. Cardiovascular: Negative for chest pain. Gastrointestinal: Positive for nausea. Negative for vomiting. Genitourinary: Negative for difficulty urinating and dysuria. Musculoskeletal: Positive for gait problem and myalgias. Negative for arthralgias, joint swelling, neck pain and neck stiffness. Skin: Negative for color change, rash and wound. Allergic/Immunologic: Negative for immunocompromised state. Neurological: Negative for dizziness, syncope, weakness, light-headedness, numbness and headaches. Hematological: Does not bruise/bleed easily. Psychiatric/Behavioral: Negative for agitation, behavioral problems and confusion.         PAST MEDICAL HISTORY     Past Medical History:   Diagnosis Date    Acute arterial ischemic stroke, multifocal, anterior circulation (HCC)     Dr Glenny Ashford pox     Hyperlipidemia     Hypertension     Dr Tonny Baker    Hypothyroidism     Measles     Mumps     [facility] ([facility] 01/2020    Endometrioid carcinoma, [facility]ers      has a past surgical history that includes Endometrial biopsy (12/11/2019) and Hysterectomy, total abdominal.    CURRENT MEDICATIONS       Previous Medications    ASPIRIN 81 MG EC TABLET    Take 81 mg by mouth nightly    BUPROPION (WELLBUTRIN XL) 150 MG EXTENDED RELEASE TABLET    TAKE ONE TABLET DAILY IN THE MORNING, BY MOUTH. CLOPIDOGREL (PLAVIX) 75 MG TABLET    Take 1 tablet by mouth daily    DONEPEZIL (ARICEPT) 5 MG TABLET    Take 1 tablet by mouth nightly    FOLIC ACID (FOLVITE) 1 MG TABLET    Take 1 tablet by mouth daily    GLUCOSAMINE-CHONDROIT-VIT C-MN (GLUCOSAMINE 1500 COMPLEX) CAPS    Take 1 tablet by mouth daily    HYDROCHLOROTHIAZIDE (HYDRODIURIL) 25 MG TABLET    Take 1 tablet by mouth daily    INDAPAMIDE (LOZOL) 2.5 MG TABLET    TAKE ONE TABLET DAILY IN THE MORNING, BY MOUTH. LEVOTHYROXINE (SYNTHROID) 200 MCG TABLET    TAKE 1 TABLET BY MOUTH ONCE DAILY, TAKE 1&1/2 TAB ON MONDAY EVERY OTHER WEEK. LOSARTAN (COZAAR) 100 MG TABLET    Take 1 tablet by mouth daily    LOSARTAN-HYDROCHLOROTHIAZIDE (HYZAAR) 100-25 MG PER TABLET    TAKE ONE TABLET DAILY, BY MOUTH. METOPROLOL SUCCINATE (TOPROL XL) 25 MG EXTENDED RELEASE TABLET    Take 1 tablet by mouth daily    MULTIPLE VITAMINS-MINERALS (MULTIVITAMIN & MINERAL PO)    Take 1 tablet by mouth daily     PAROXETINE (PAXIL) 40 MG TABLET    TAKE ONE TABLET DAILY, BY MOUTH. RIVASTIGMINE (EXELON) 4.6 MG/24HR    APPLY ONE PATCH, DAILY AS DIRECTED. SIMVASTATIN (ZOCOR) 80 MG TABLET    Take 1 tablet by mouth nightly    ZOLEDRONIC ACID (RECLAST) 5 MG/100ML SOLN    Infuse 100 mLs intravenously once for 1 dose       ALLERGIES     has No Known Allergies.     FAMILY HISTORY     She indicated that her mother is . She indicated that her father is . She indicated that the status of her other is unknown.   family history includes Arthritis in her mother; Other in an other family member. SOCIAL HISTORY       Social History     Socioeconomic History    Marital status:      Spouse name: Imtiaz Carvajal Number of children: 3    Years of education: Not on file    Highest education level: Not on file   Occupational History    Occupation: retired   Social Needs    Financial resource strain: Not hard at all   [employer] insecurity     Worry: Never true     Inability: Never true   [employer] needs     Medical: No     Non-medical: No   Tobacco Use    Smoking status: Never Smoker    Smokeless tobacco: Never Used   Substance and Sexual Activity    Alcohol use: Yes     Comment: socially    Drug use: No    Sexual activity: Not Currently     Partners: Male   Lifestyle    Physical activity     Days per week: Not on file     Minutes per session: Not on file    Stress: Not on file   Relationships    Social connections     Talks on phone: Not on file     Gets together: Not on file     Attends Religion service: Not on file     Active member of club or organization: Not on file     Attends meetings of clubs or organizations: Not on file     Relationship status: Not on file    Intimate partner violence     Fear of current or ex partner: Not on file     Emotionally abused: Not on file     Physically abused: Not on file     Forced sexual activity: Not on file   Other Topics Concern    Not on file   Social History Narrative    Not on file       PHYSICAL EXAM     INITIAL VITALS:  height is 5' (1.524 m) and weight is 228 lb (103.4 kg). Her oral temperature is 98.6 °F (37 °C). Her blood pressure is 132/64 and her pulse is 67. Her respiration is 18 and oxygen saturation is 96%. Physical Exam  Vitals signs and nursing note reviewed.    Constitutional:       Appearance: **This report has been created using voice recognition software. It may contain minor errors which are inherent in voice recognition technology. **      Final report electronically signed by Dr. Franchesca Richards MD on 3/29/2021 5:40 PM      XR KNEE LEFT (MIN 4 VIEWS)   Final Result    Moderate degenerative changes of the left knee. No acute abnormality of the left femur or knee. **This report has been created using voice recognition software. It may contain minor errors which are inherent in voice recognition technology. **      Final report electronically signed by Dr. Franchesca Richards MD on 3/29/2021 2:36 PM      XR FEMUR LEFT (MIN 2 VIEWS)   Final Result    Moderate degenerative changes of the left knee. No acute abnormality of the left femur or knee. **This report has been created using voice recognition software. It may contain minor errors which are inherent in voice recognition technology. **      Final report electronically signed by Dr. Franchesca Richards MD on 3/29/2021 2:36 PM            LABS:   Labs Reviewed - No data to display    EMERGENCY DEPARTMENT COURSE:   Vitals:    Vitals:    03/29/21 1424 03/29/21 1517 03/29/21 1609 03/29/21 1704   BP: (!) 153/87 (!) 159/91 (!) 140/57 132/64   Pulse: 57 63 58 67   Resp: 16 18 18 18   Temp:       TempSrc:       SpO2: 96% 95% 99% 96%   Weight:       Height:         MDM  Patient was seen and evaluated in the emergency department, patient appeared to be in no acute distress, vital signs were reviewed, no significant findings noted. Physical exam was completed, there was tenderness to the anterior lower femur, there is decreased flexion and extension of the knee due to pain, there is some swelling to the lower femur.   There was negative valgus and varus stress test, she was neurovascular intact distally this injury, cranial nerves are all grossly intact, she had no cervical spine tenderness decreased range of motion of the neck.  Imaging of the left leg was obtained no significant findings noted. Nursing staff attempted to ambulate patient, she is able to bear weight on the leg. Discussed the problem with the patient, she is willing to stay in the hospital for observation, PT evaluation, Ortho evaluation. Discussed the case with orthopedics, Melvi Rios PA-C, he would prefer medicine to admit based on patient's history. They will consult. Discussed case with Benjamín Lange CNP, they agree to admit the patient, but would like CT of the head before admission. CT the head was completed, negative, patient admitted to internal medicine service. Medications   HYDROcodone-acetaminophen (NORCO) 5-325 MG per tablet 1 tablet (1 tablet Oral Given 3/29/21 3149)       Patient was seenindependently by myself. The patient's final impression and disposition and plan was determined by myself. CRITICAL CARE:   None    CONSULTS:  Kiana Clarke CNP internal medicine  Melvi Rios PA-C orthopedics    PROCEDURES:  None    FINAL IMPRESSION     1. Fall on same level from slipping, tripping or stumbling, initial encounter    2. Left leg pain          DISPOSITION/PLAN   Patient admitted to internal medicine service. PATIENT REFERREDTO:  No follow-up provider specified. DISCHARGE MEDICATIONS:  New Prescriptions    No medications on file       (Please note that portions of this note were completed with a voice recognition program.  Efforts were made to edit the dictations but occasionally words are mis-transcribed.)      Provider:  I personally performed the services described in the documentation,reviewed and edited the documentation which was dictated to the scribe in my presence, and it accurately records my words and actions.     Homer Stauffer CNP 03/29/21 5:42 PM    VANE Hernandez - SUSAN        [employer], VANE - CNP  03/29/21 9319

## 2021-03-29 NOTE — PROGRESS NOTES
Pharmacy Medication History Note      List of current medications patient is taking is complete. Source of information: patient    Changes made to medication list:  Medications removed (include reason, ex. therapy complete or physician discontinued):  losartan  HCTZ  Reclast    Medications added/doses adjusted: Other notes (ex. Recent course of antibiotics, Coumadin dosing):    Denies use of other OTC or herbal medications.       Allergies reviewed      Electronically signed by Abdon Sow, [address] [place] [address] on 3/29/2021 at 5:57 PM

## 2021-03-29 NOTE — ED NOTES
Bed: 018A  Expected date: 3/29/21  Expected time: 1:07 PM  Means of arrival: [id number] [facility]  Comments:     Irena Negro RN  03/29/21 6497

## 2021-03-29 NOTE — ED NOTES
Pt resting in bed. Respirations even and unlabored. Knee immobilizer and ice placed on LT knee.       Luis Thomas RN  03/29/21 3061

## 2021-03-29 NOTE — ED TRIAGE NOTES
Pt presents to the ED via EMS with c/o mechanical fall. Pt states she was in her kitchen, when she tripped and fell. Pt reports hitting her Rt side of her head on the cabinets. Pt states pain to LT outer thigh. Pt states pain is 0/10 when not moving, but pain worsens with movement. Pt reports she was unable to get up after the fall due to her leg pain. Pt takes Aspirin and Plavix. Pt denies loss of consciousness, but was seeing flashes. Hematoma noted to RT posterior scalp.

## 2021-03-30 VITALS
RESPIRATION RATE: 18 BRPM | BODY MASS INDEX: 44.76 KG/M2 | HEIGHT: 60 IN | OXYGEN SATURATION: 97 % | WEIGHT: 228 LBS | TEMPERATURE: 97.6 F | SYSTOLIC BLOOD PRESSURE: 140 MMHG | DIASTOLIC BLOOD PRESSURE: 77 MMHG | HEART RATE: 67 BPM

## 2021-03-30 LAB
BASOPHILS # BLD: 0.9 %
BASOPHILS ABSOLUTE: 0.1 THOU/MM3 (ref 0–0.1)
EOSINOPHIL # BLD: 3.6 %
EOSINOPHILS ABSOLUTE: 0.2 THOU/MM3 (ref 0–0.4)
ERYTHROCYTE [DISTWIDTH] IN BLOOD BY AUTOMATED COUNT: 12.7 % (ref 11.5–14.5)
ERYTHROCYTE [DISTWIDTH] IN BLOOD BY AUTOMATED COUNT: 12.8 % (ref 11.5–14.5)
ERYTHROCYTE [DISTWIDTH] IN BLOOD BY AUTOMATED COUNT: 47.7 FL (ref 35–45)
ERYTHROCYTE [DISTWIDTH] IN BLOOD BY AUTOMATED COUNT: 48.4 FL (ref 35–45)
HCT VFR BLD CALC: 40.7 % (ref 37–47)
HCT VFR BLD CALC: 44.7 % (ref 37–47)
HEMOGLOBIN: 12.9 GM/DL (ref 12–16)
HEMOGLOBIN: 14.2 GM/DL (ref 12–16)
IMMATURE GRANS (ABS): 0.02 THOU/MM3 (ref 0–0.07)
IMMATURE GRANULOCYTES: 0.3 %
LYMPHOCYTES # BLD: 26.8 %
LYMPHOCYTES ABSOLUTE: 1.8 THOU/MM3 (ref 1–4.8)
MCH RBC QN AUTO: 32.3 PG (ref 26–33)
MCH RBC QN AUTO: 32.5 PG (ref 26–33)
MCHC RBC AUTO-ENTMCNC: 31.7 GM/DL (ref 32.2–35.5)
MCHC RBC AUTO-ENTMCNC: 31.8 GM/DL (ref 32.2–35.5)
MCV RBC AUTO: 101.8 FL (ref 81–99)
MCV RBC AUTO: 102.3 FL (ref 81–99)
MONOCYTES # BLD: 11.4 %
MONOCYTES ABSOLUTE: 0.8 THOU/MM3 (ref 0.4–1.3)
NUCLEATED RED BLOOD CELLS: 0 /100 WBC
PLATELET # BLD: 64 THOU/MM3 (ref 130–400)
PLATELET # BLD: 81 THOU/MM3 (ref 130–400)
PMV BLD AUTO: 12.8 FL (ref 9.4–12.4)
PMV BLD AUTO: 13 FL (ref 9.4–12.4)
RBC # BLD: 4 MILL/MM3 (ref 4.2–5.4)
RBC # BLD: 4.37 MILL/MM3 (ref 4.2–5.4)
SCAN OF BLOOD SMEAR: NORMAL
SEG NEUTROPHILS: 57 %
SEGMENTED NEUTROPHILS ABSOLUTE COUNT: 3.9 THOU/MM3 (ref 1.8–7.7)
TSH SERPL DL<=0.05 MIU/L-ACNC: 0.41 UIU/ML (ref 0.4–4.2)
WBC # BLD: 6.9 THOU/MM3 (ref 4.8–10.8)
WBC # BLD: 8.8 THOU/MM3 (ref 4.8–10.8)

## 2021-03-30 PROCEDURE — 84443 ASSAY THYROID STIM HORMONE: CPT

## 2021-03-30 PROCEDURE — G0378 HOSPITAL OBSERVATION PER HR: HCPCS

## 2021-03-30 PROCEDURE — 97166 OT EVAL MOD COMPLEX 45 MIN: CPT

## 2021-03-30 PROCEDURE — 2580000003 HC RX 258: Performed by: REGISTERED NURSE

## 2021-03-30 PROCEDURE — 85027 COMPLETE CBC AUTOMATED: CPT

## 2021-03-30 PROCEDURE — 97535 SELF CARE MNGMENT TRAINING: CPT

## 2021-03-30 PROCEDURE — 97163 PT EVAL HIGH COMPLEX 45 MIN: CPT

## 2021-03-30 PROCEDURE — 85025 COMPLETE CBC W/AUTO DIFF WBC: CPT

## 2021-03-30 PROCEDURE — 6370000000 HC RX 637 (ALT 250 FOR IP): Performed by: REGISTERED NURSE

## 2021-03-30 PROCEDURE — 97116 GAIT TRAINING THERAPY: CPT

## 2021-03-30 PROCEDURE — 36415 COLL VENOUS BLD VENIPUNCTURE: CPT

## 2021-03-30 RX ORDER — HYDROCODONE BITARTRATE AND ACETAMINOPHEN 5; 325 MG/1; MG/1
1 TABLET ORAL EVERY 8 HOURS PRN
Qty: 10 TABLET | Refills: 0 | Status: SHIPPED | OUTPATIENT
Start: 2021-03-30 | End: 2021-04-02

## 2021-03-30 RX ORDER — SIMVASTATIN 20 MG
80 TABLET ORAL NIGHTLY
Status: DISCONTINUED | OUTPATIENT
Start: 2021-03-30 | End: 2021-03-30 | Stop reason: HOSPADM

## 2021-03-30 RX ORDER — SENNA PLUS 8.6 MG/1
1 TABLET ORAL NIGHTLY PRN
COMMUNITY
Start: 2021-03-30 | End: 2021-04-29

## 2021-03-30 RX ORDER — CYCLOBENZAPRINE HCL 10 MG
5 TABLET ORAL 3 TIMES DAILY PRN
Qty: 30 TABLET | Refills: 0 | Status: SHIPPED | OUTPATIENT
Start: 2021-03-30 | End: 2021-04-09

## 2021-03-30 RX ORDER — POLYETHYLENE GLYCOL 3350 17 G/17G
17 POWDER, FOR SOLUTION ORAL DAILY
Qty: 527 G | Refills: 1 | COMMUNITY
Start: 2021-03-31 | End: 2021-04-30

## 2021-03-30 RX ORDER — LOSARTAN POTASSIUM AND HYDROCHLOROTHIAZIDE 25; 100 MG/1; MG/1
1 TABLET ORAL DAILY
Status: DISCONTINUED | OUTPATIENT
Start: 2021-03-30 | End: 2021-03-30 | Stop reason: HOSPADM

## 2021-03-30 RX ORDER — CLOPIDOGREL BISULFATE 75 MG/1
TABLET ORAL
Qty: 90 TABLET | Refills: 3
Start: 2021-03-30

## 2021-03-30 RX ADMIN — SODIUM CHLORIDE, PRESERVATIVE FREE 10 ML: 5 INJECTION INTRAVENOUS at 09:41

## 2021-03-30 RX ADMIN — LOSARTAN POTASSIUM AND HYDROCHLOROTHIAZIDE 1 TABLET: 25; 100 TABLET ORAL at 09:39

## 2021-03-30 RX ADMIN — ACETAMINOPHEN 650 MG: 325 TABLET ORAL at 07:15

## 2021-03-30 RX ADMIN — PAROXETINE HYDROCHLORIDE 40 MG: 20 TABLET, FILM COATED ORAL at 09:41

## 2021-03-30 RX ADMIN — BUPROPION HYDROCHLORIDE 150 MG: 150 TABLET ORAL at 09:33

## 2021-03-30 RX ADMIN — LEVOTHYROXINE SODIUM 200 MCG: 0.1 TABLET ORAL at 07:15

## 2021-03-30 RX ADMIN — FOLIC ACID 1 MG: 1 TABLET ORAL at 09:38

## 2021-03-30 RX ADMIN — METOPROLOL SUCCINATE 25 MG: 25 TABLET, EXTENDED RELEASE ORAL at 09:40

## 2021-03-30 ASSESSMENT — PAIN SCALES - GENERAL
PAINLEVEL_OUTOF10: 0

## 2021-03-30 NOTE — CONSULTS
[address] [place] [address]                                  CONSULTATION    PATIENT NAME: Christopher Perez                     :        1955  MED REC NO:   [id number]                           ROOM:       08  ACCOUNT NO:   [de-identified]                           ADMIT DATE: 2021  PROVIDER:     Jessica Marin. JACEY Silverio CONSULT DATE:  2021    REASON FOR CONSULTATION:  Fall, left knee pain. REVIEW OF SYSTEMS:  Complains of left knee pain and headaches. Denies  fever, chills, or sweats. PAST MEDICAL HISTORY:  Anxiety, arthritis, hyperlipidemia, hypertension,  hypothyroidism, uterine cancer. PAST SURGICAL HISTORY:  Includes an endometrial biopsy, hysterectomy and  a total abdomen. CURRENT MEDICATIONS:  Aspirin, Plavix, Wellbutrin, folic acid, Aricept,  glucosamine/chondroitin, Lozol, levothyroxine,  losartan/hydrochlorothiazide, metoprolol succinate, multivitamin, Paxil,  Exelon, Zocor, and Reclast.    ALLERGIES:  No known drug allergies. FAMILY HISTORY:  Mother is . Father is . SOCIAL HISTORY:  . Three children. HISTORY OF PRESENT ILLNESS:  The patient is a pleasant 57-year-old  female who fell today, came to the emergency room complaining of pain  and discomfort in her left knee. She is also complaining of some  headaches. She is on aspirin and Plavix and she is getting workup for  that with a CT scan. She was having difficulties weightbearing, so were  consulted for this left knee. She denies any pain in her groin. She  denies any pain that radiates down her leg. She denies any numbness or  tingling or bowel or bladder incontinence. We were consulted for  further workup. PHYSICAL EXAMINATION:  GENERAL:  Shows an obese 57-year-old female, lying on a cart, in no  apparent distress. Alert and oriented x3. HEENT:  Normocephalic. HEART:  Regular rate and rhythm.   LUNGS: Clear.  ABDOMEN:  Soft. MUSCULOSKELETAL:  Left hip shows no open skin lesions or open wounds. Full internal and external rotation. No discomfort in the groin. 5/5  strength. Left knee:  She is able to do a full straight leg raise. She  is able to maintain that. She is able to flex to 95 degrees, limited  due to the bed and her body habitus. No open skin lesions or open  wounds. She does have a small bruise on the medial aspect of her  patella. No instability with varus or valgus stress. NEURO AND VASCULAR:  Intact distally. No calf tenderness. She does  have dorsal pedal push and pull. DIAGNOSTIC DATA:  X-rays were reviewed of the left knee, show severe  arthritis in the medial compartment as well as patellofemoral  compartment, but no signs of any acute fracture or bony abnormality. X-rays of the left femur were reviewed, show no signs of any acute  fracture or bony abnormality as well. ASSESSMENT:  Left knee sprain/contusion. PLAN:  At this time, in collaboration with Dr. Fernando Morales, we are going  to go ahead and put her in a knee immobilizer. Continue with walker. She can do weightbearing as tolerated with the knee immobilizer on. Continue ice and elevation. She can have the brace off when she is  sitting around on the cart. We will follow up with her in one week with  Dr. Fernando Morales. If they have any questions, any concerns, or any issues,  to give us a call. Otherwise, we will go ahead and sign off at this  time. Thank you for allowing us to be part of her care. JACEY Parker     D: 03/29/2021 17:52:36       T: 03/29/2021 19:16:52     [email or web address]  Job#: [id number]     Doc#: [id number]    CC:

## 2021-03-30 NOTE — PROGRESS NOTES
Alert and oriented x4. Heart sounds regular S1, S2. Lung sounds clear bilaterally. Hand grasp and pedal push and pull are strong and equal bilaterally. Radial pulses are +2, pedal pulses are +1. Active range of motion in arms and legs. No pitting edema on ankles bilaterally. Bowel sounds heard every 5-15 seconds, no pain or tenderness with palpation. Capillary refill is less than 3 seconds. Skin is pink, warm, dry and intact. Patient is comfortable in bed with call light and belongings within reach.  Enzo AMOR/SN

## 2021-03-30 NOTE — PROGRESS NOTES
Motion:  Right Lower Extremity: WFL  Left Lower Extremity: hip and ankle WFL, knee immobilizer donned during session    Strength:  Right Lower Extremity: WFL  Left Lower Extremity: hip ABD/ADD WFL, hip flexion 3- to 3/5, knee in immobilizer, ankle WFL    Balance:  Static Sitting Balance:  Modified Independent  Static Standing Balance: Stand By Assistance, with RW    Bed Mobility:  Rolling to Left: Modified Independent   Rolling to Right: Modified Independent   Supine to Sit: Stand By Assistance  Sit to Supine: Minimal Assistance, at LLE only   Scooting: Modified Independent  HOB up 10 degrees  Transfers:  Sit to Stand: Stand By Assistance  Stand to Sit:Supervision    Ambulation:  Stand By Assistance  Distance: 50'x2  Surface: Level Tile  Device:Rolling Walker  Gait Deviations:  Slow Kelly and knee immobilizer donned on LLE, min antalgic gait, tolerated well, no LOB    Negotiated 6\"step with RW and CGA to SBA, cues for sequencing, PT demonstrate use of doorway or grab bar to negotiate first step to enter home, discussed use of gait belt and spouse CGA at steps to enter home-pt verbalized understanding    Car transfer with PT demonstration for correct technique and pt verbalized understanding    Functional Outcome Measures: Completed  -PAC Inpatient Mobility Raw Score : 19  AM-PAC Inpatient T-Scale Score : 45.44    ASSESSMENT:  Activity Tolerance:  Patient tolerance of  treatment: good. Treatment Initiated: Treatment and education initiated within context of evaluation. Evaluation time included review of current medical information, gathering information related to past medical, social and functional history, completion of standardized testing, formal and informal observation of tasks, assessment of data and development of plan of care and goals.   Treatment time included skilled education and facilitation of tasks to increase safety and independence with functional mobility for improved independence and quality of life. Assessment: Body structures, Functions, Activity limitations: Decreased functional mobility , Increased pain, Decreased ROM, Decreased strength  Assessment: pt with pain in left knee, knee immobilizer left leg, dec balance, use of walker and inc assist for safe mobility, recommend cont PT to inc pt I with functional mobility  Prognosis: Excellent    REQUIRES PT FOLLOW UP: Yes    Discharge Recommendations:  Discharge Recommendations: Continue to assess pending progress, Home with assist PRN, Outpatient PT, Patient would benefit from continued therapy after discharge    Patient Education:  PT Education: Goals, PT Role, Plan of Care, Functional Mobility Training    Equipment Recommendations:  Equipment Needed: Yes  Mobility Devices: Taffy Maid: Rolling    Plan:  Times per week: 3-5X O  Times per day: Daily  Specific instructions for Next Treatment: therex and mobility    Goals:  Patient goals : return home,  Short term goals  Time Frame for Short term goals: by discharge  Short term goal 1: bed mobility with MOD I to get in/out of bed  Short term goal 2: transfer with MOD I to get in/out of chairs  Short term goal 3: amb >150'x1 with LRAD and S to walk safely in home  Short term goal 4: negotiate 2 steps without HR(may have spouse put up grab bars) and CGA from spouse to get in/out home  Long term goals  Time Frame for Long term goals : no LTGs set secondary to short ELOS    Following session, patient left in safe position with all fall risk precautions in place.

## 2021-03-30 NOTE — CARE COORDINATION
Mohamud Valenzuela requires the assistance of a rolling walker to successfully complete daily living tasks such as: bathing, toileting, dressing and grooming. A rolling walker is necessary due to the patient's impaired ambulation and mobility restrictions, and can ambulate only by using a walker instead of a lesser assistive device, such as a cane or crutch.       Electronically signed by Corine Machuca RN on 3/30/2021 at 10:17 AM

## 2021-03-30 NOTE — CARE COORDINATION
3/30/21, 9:58 AM EDT  DISCHARGE PLANNING EVALUATION:    Toby Courser       Admitted: 3/29/2021/ [facility] day: 0   Location: -07/007-A Reason for admit: Fall at home, initial encounter [W19. Venice Guerrero, U98.904]   PMH:  has a past medical history of Acute arterial ischemic stroke, multifocal, anterior circulation ([facility] [address] Anxiety, Arthritis, Chicken pox, Hyperlipidemia, Hypertension, Hypothyroidism, Measles, Mumps, and Uterine cancer ([facility] [address] Procedure: no  Barriers to Discharge:  Admitted after falling at home. CT of head Ok. Left knee injury - no fracture. PT/OT. Left knee in immobilizer. WBAT with walker. N/V checks. Pain management. PCP: Lux Box MD   %    Patient Goals/Plan/Treatment Preferences: I spoke with [facility]. Planning home today with  after lunch. Requested rolling walker from [facility]. No other discharge needs voiced.  is retired and other family also in area to assist as needed. Transportation/Food Security/Housekeeping Addressed:  No issues identified. 3/30/21, 10:16 AM EDT    Patient goals/plan/ treatment preferences discussed by  and . Patient goals/plan/ treatment preferences reviewed with patient/ family. Patient/ family verbalize understanding of discharge plan and are in agreement with goal/plan/treatment preferences. Understanding was demonstrated using the teach back method. AVS provided by RN at time of discharge, which includes all necessary medical information pertaining to the patients current course of illness, treatment, post-discharge goals of care, and treatment preferences.

## 2021-03-30 NOTE — PROGRESS NOTES
IM Progress Note  Dr. Lily Jauregui  3/30/2021 9:41 AM      Patient name Jono Jean  [id number]  PCP: Keegan White MD  Admit Date: 3/29/2021  Acct No. [de-identified]    Subjective: Interval History:   Pt feels lot better  Was able to walk to bathroom few times with knee immobilizer on      Diet: DIET GENERAL;    No intake/output data recorded. No intake/output data recorded. Admission weight: 228 lb (103.4 kg) as of 3/29/2021  1:17 PM  Wt Readings from Last 3 Encounters:   03/29/21 228 lb (103.4 kg)   12/11/20 224 lb (101.6 kg)   10/05/20 230 lb 9.6 oz (104.6 kg)     Body mass index is 44.53 kg/m². ROS   CVS;  no cp or palpitation  Resp: no SOB or cough  Neuro:  No numbness or weakness or dizziness  Abd: no nausea or vomiting or abd pain      Medications:   Scheduled Meds:   losartan-hydroCHLOROthiazide  1 tablet Oral Daily    simvastatin  80 mg Oral Nightly    aspirin  81 mg Oral Nightly    buPROPion  150 mg Oral Daily    clopidogrel  75 mg Oral Daily    folic acid  1 mg Oral Daily    levothyroxine  200 mcg Oral Daily    metoprolol succinate  25 mg Oral Daily    PARoxetine  40 mg Oral Daily    sodium chloride flush  10 mL Intravenous 2 times per day    enoxaparin  40 mg Subcutaneous Q24H    polyethylene glycol  17 g Oral Daily    rivastigmine  1 patch Transdermal Daily     Continuous Infusions:   sodium chloride         Labs :     CBC:   Recent Labs     03/30/21  0524   WBC 6.9   HGB 12.9   PLT 64*     BMP:    Recent Labs     03/29/21  1838      K 3.9      CO2 28   BUN 16   CREATININE 0.8   GLUCOSE 84     Hepatic: No results for input(s): AST, ALT, ALB, BILITOT, ALKPHOS in the last 72 hours. Troponin: No results for input(s): TROPONINI in the last 72 hours. BNP: No results for input(s): BNP in the last 72 hours. Lipids: No results for input(s): CHOL, HDL in the last 72 hours. Invalid input(s): LDLCALCU  INR: No results for input(s): INR in the last 72 hours. Radiology    Objective:   Vitals: /75   Pulse 69   Temp 98.5 °F (36.9 °C) (Oral)   Resp 16   Ht 5' (1.524 m)   Wt 228 lb (103.4 kg)   SpO2 95%   BMI 44.53 kg/m²   HEENT: Head:pupils react  Neck: supple  Lungs: clear to auscultation  Heart: regular rate and rhythm   Abdomen: soft BS heard   Extremities: warm no  Edema brace noted Lt knee  Neurologic:  Alert, oriented X3    Impression:   :   Inability to ambulate sec to Lt knee pain from fall , no fracture noted  Obesity  Thrombocytopenia  Hypothyroidism  H/o CVA  HTN        Plan:    Check with PT if pt will benefit from outpt PT vs home PT  Recheck platelets  Stop lovenox and if stable discharge home today and Advised to f/u with hematology for her chronic  low platelts as outpt      Monico Butr MD

## 2021-03-30 NOTE — PROGRESS NOTES
[address] 58  INPATIENT OCCUPATIONAL THERAPY  [facility] ORTHOPEDICS 7K  EVALUATION    Time:    Time In:   Time Out: 83  Timed Code Treatment Minutes: 32 Minutes  Minutes: 40          Date: 3/30/2021  Patient Name: Tunde Man,   Gender: female      MRN: [id number]  : 1955  (77 y.o.)  Referring Practitioner: Bonita CIFUENTES CNP  Diagnosis: fall  Additional Pertinent Hx: Per ER note on 3/29/2021:65 y.o. female who presents to the ED for evaluation of left leg pain post fall. Patient notes that she was in her kitchen today, and she tripped over a rug, she fell onto her left side, she now notes that she has pain to her left thigh. She notes she was unable to get up due to the pain. She notes she is unable to bend her knee. x-ray of knee were negative.     Restrictions/Precautions:  Restrictions/Precautions: Weight Bearing, Fall Risk  Left Lower Extremity Weight Bearing: Weight Bearing As Tolerated  Required Braces or Orthoses  Left Lower Extremity Brace: Knee Immobilizer    Subjective  Chart Reviewed: Yes, Orders, Progress Notes, History and Physical  Patient assessed for rehabilitation services?: Yes  Family / Caregiver Present: No    Subjective: cooperative, talkative    Pain:  Pain Assessment  Patient Currently in Pain: Yes(0 at rest in knee; mild headache)    Vitals: Vitals not assessed per clinical judgement, see nursing flowsheet    Social/Functional History:  Lives With: Spouse  Type of Home: House  Home Layout: Two level, Able to Live on Main level with bedroom/bathroom(full bath on 1st level; bedroom was on 2nd level-moved downstairs earlier this year)  Home Access: Stairs to enter without rails(2+2)   Bathroom Shower/Tub: Walk-in shower  Bathroom Toilet: Standard  Bathroom Equipment: Grab bars in shower       ADL Assistance: Independent  Homemaking Assistance: Independent(sharies duties with )  Ambulation Assistance: Independent  Transfer Assistance: Independent testing, formal and informal observation of tasks, assessment of data and development of plan of care and goals. Treatment time included skilled education and facilitation of tasks to increase safety and independence with ADL's for improved functional independence and quality of life. Discharge Recommendations:  Home with assist PRN    Patient Education:  OT Education: OT Role, Plan of Care, ADL Adaptive Strategies, Transfer Training  Barriers to Learning: decreased STM    Equipment Recommendations:  Equipment Needed: Yes  Other: RW, shower chair    Plan:  Times per week: 6x  Current Treatment Recommendations: Balance Training, Functional Mobility Training, Safety Education & Training, Self-Care / ADL. See long-term goal time frame for expected duration of plan of care. If no long-term goals established, a short length of stay is anticipated. Goals:  Patient goals : go home  Short term goals  Time Frame for Short term goals: until discharge  Short term goal 1: Complete various sit-stand t/fs including toilet with mod I & 0 vcs for UE placement  Short term goal 2: Complete LE dressing with min A & adaptative strategies prn  Short term goal 3: Complete mobility to/from bathroom + with ADL item retrieval with mod I & good walker safety  Short term goal 4: Complete BADL routine with min A & adaptative strategies prn  Long term goals  Time Frame for Long term goals : No LTG set d/t short ELOS         Following session, patient left in safe position with all fall risk precautions in place.

## 2021-03-31 ENCOUNTER — CARE COORDINATION (OUTPATIENT)
Dept: CASE MANAGEMENT | Age: 66
End: 2021-03-31
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
## 2021-03-31 PROCEDURE — 1111F DSCHRG MED/CURRENT MED MERGE: CPT | Performed by: INTERNAL MEDICINE

## 2021-03-31 NOTE — CARE COORDINATION
Was this an external facility discharge? No Discharge Facility: [facility]    Challenges to be reviewed by the provider   Additional needs identified to be addressed with provider No  none             Method of communication with provider : none    Was this a readmission? No  Patient stated reason for admission: fall  Patients top risk factors for readmission: medical condition    Care Transition Nurse (CTN) contacted the patient by telephone to perform post hospital discharge assessment. Verified name and  with patient as identifiers. Provided introduction to self, and explanation of the CTN role. CTN reviewed discharge instructions, medical action plan and red flags with patient who verbalized understanding. Patient given an opportunity to ask questions and does not have any further questions or concerns at this time. Were discharge instructions available to patient? Yes. Reviewed appropriate site of care based on symptoms and resources available to patient including: PCP and Specialist. The patient agrees to contact the PCP office for questions related to their healthcare. Medication reconciliation was performed with patient, who verbalizes understanding of administration of home medications. Advised obtaining a 90-day supply of all daily and as-needed medications. Covid Risk Education    Patient has following risk factors of: no known risk factors. Education provided regarding infection prevention, and signs and symptoms of COVID-19 and when to seek medical attention with patient who verbalized understanding. Discussed exposure protocols and quarantine From CDC: Are you at higher risk for severe illness?   and given an opportunity for questions and concerns. The patient agrees to contact the COVID-19 hotline [phone] or PCP office for questions related to COVID-19.      For more information on steps you can take to protect yourself, see CDC's How to Protect Yourself     Discussed follow-up appointments. If no appointment was previously scheduled, appointment scheduling offered: Pt has f/u scheduled. Is follow up appointment scheduled within 7 days of discharge? Yes      Plan for follow-up call in 5-7 days based on severity of symptoms and risk factors. Plan for next call: symptom management-pain left knee, back pain  CTN provided contact information for future needs. CTN spoke to [place] for initial transitions call. Pt stated she fell at home when she tripped over rug. Pt hit her head on kitchen cabinet, twisted knee. Stated she has knee immobilizer on, is elevating and icing area. Stated she is taking Tylenol and Flexeril, has not filled Norco d/t fear of taking narcotic and assoc. constipation. Advised she may take along with stool softener and laxative, stay hydrated, take prn. Pt has PT scheduled today,  will take her. Pt stated her back is hurting, has chronic lower back pain, worsened with fall. Advised rest, continue with Tylenol, ask pharmacist or Dr if OK to add ibuprofen. Pt takes Plavix daily. Reviewed medications, pt has all other meds at home and taking as directed. Pt has PCP visit scheduled. Agreeable to f/u calls.     Loy Muñoz RN BSN   Care Transitions Nurse  [phone]

## 2021-04-05 ENCOUNTER — OFFICE VISIT (OUTPATIENT)
Dept: FAMILY MEDICINE CLINIC | Age: 66
End: 2021-04-05
Payer: MEDICARE

## 2021-04-05 ENCOUNTER — NURSE ONLY (OUTPATIENT)
Dept: LAB | Age: 66
End: 2021-04-05

## 2021-04-05 VITALS
HEIGHT: 60 IN | WEIGHT: 220.2 LBS | SYSTOLIC BLOOD PRESSURE: 142 MMHG | OXYGEN SATURATION: 97 % | TEMPERATURE: 97 F | BODY MASS INDEX: 43.23 KG/M2 | RESPIRATION RATE: 18 BRPM | HEART RATE: 78 BPM | DIASTOLIC BLOOD PRESSURE: 84 MMHG

## 2021-04-05 DIAGNOSIS — D69.6 THROMBOCYTOPENIA (HCC): ICD-10-CM

## 2021-04-05 DIAGNOSIS — F41.9 ANXIETY: ICD-10-CM

## 2021-04-05 DIAGNOSIS — I69.30 CEREBRAL MULTI-INFARCT STATE: ICD-10-CM

## 2021-04-05 DIAGNOSIS — E78.00 PURE HYPERCHOLESTEROLEMIA: ICD-10-CM

## 2021-04-05 DIAGNOSIS — F33.42 RECURRENT MAJOR DEPRESSIVE DISORDER, IN FULL REMISSION (HCC): ICD-10-CM

## 2021-04-05 DIAGNOSIS — E03.9 ACQUIRED HYPOTHYROIDISM: ICD-10-CM
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
## 2021-04-05 DIAGNOSIS — M85.80 OSTEOPENIA, UNSPECIFIED LOCATION: ICD-10-CM
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
## 2021-04-05 DIAGNOSIS — C54.9 MALIGNANT NEOPLASM OF BODY OF UTERUS, UNSPECIFIED SITE (HCC): ICD-10-CM

## 2021-04-05 DIAGNOSIS — S89.92XS KNEE INJURY, LEFT, SEQUELA: ICD-10-CM

## 2021-04-05 DIAGNOSIS — E66.01 MORBIDLY OBESE (HCC): ICD-10-CM

## 2021-04-05 DIAGNOSIS — I10 ESSENTIAL HYPERTENSION: Primary | ICD-10-CM

## 2021-04-05 DIAGNOSIS — R41.3 MEMORY LOSS: ICD-10-CM

## 2021-04-05 DIAGNOSIS — E87.0 HYPERNATREMIA: ICD-10-CM

## 2021-04-05 LAB
ERYTHROCYTE [DISTWIDTH] IN BLOOD BY AUTOMATED COUNT: 12.6 % (ref 11.5–14.5)
ERYTHROCYTE [DISTWIDTH] IN BLOOD BY AUTOMATED COUNT: 47.7 FL (ref 35–45)
HCT VFR BLD CALC: 43.4 % (ref 37–47)
HEMOGLOBIN: 14 GM/DL (ref 12–16)
MCH RBC QN AUTO: 33.1 PG (ref 26–33)
MCHC RBC AUTO-ENTMCNC: 32.3 GM/DL (ref 32.2–35.5)
MCV RBC AUTO: 102.6 FL (ref 81–99)
PLATELET # BLD: 108 THOU/MM3 (ref 130–400)
PMV BLD AUTO: 12.8 FL (ref 9.4–12.4)
RBC # BLD: 4.23 MILL/MM3 (ref 4.2–5.4)
WBC # BLD: 7.7 THOU/MM3 (ref 4.8–10.8)

## 2021-04-05 PROCEDURE — 99215 OFFICE O/P EST HI 40 MIN: CPT | Performed by: FAMILY MEDICINE

## 2021-04-05 PROCEDURE — 3017F COLORECTAL CA SCREEN DOC REV: CPT | Performed by: FAMILY MEDICINE

## 2021-04-05 PROCEDURE — 1036F TOBACCO NON-USER: CPT | Performed by: FAMILY MEDICINE

## 2021-04-05 PROCEDURE — G8417 CALC BMI ABV UP PARAM F/U: HCPCS | Performed by: FAMILY MEDICINE

## 2021-04-05 PROCEDURE — G8427 DOCREV CUR MEDS BY ELIG CLIN: HCPCS | Performed by: FAMILY MEDICINE

## 2021-04-05 PROCEDURE — G8399 PT W/DXA RESULTS DOCUMENT: HCPCS | Performed by: FAMILY MEDICINE

## 2021-04-05 PROCEDURE — 1123F ACP DISCUSS/DSCN MKR DOCD: CPT | Performed by: FAMILY MEDICINE

## 2021-04-05 PROCEDURE — 4040F PNEUMOC VAC/ADMIN/RCVD: CPT | Performed by: FAMILY MEDICINE

## 2021-04-05 PROCEDURE — 1090F PRES/ABSN URINE INCON ASSESS: CPT | Performed by: FAMILY MEDICINE

## 2021-04-05 NOTE — PROGRESS NOTES
[address] [place] [address] Jonathan Ng  Dept: [phone]  Dept Fax: [phone]  Loc: GEOVANNY Costa is a 77 y.o. female who presents today for:  Chief Complaint   Patient presents with    6 Month Follow-Up    Fall           HPI:     HPI  Here for regular checkup and also a recheck after a fall when she got tripped by a rug at home. Hit her head on the countertop and twisted her leg. No LOC. Headaches are slowly getting better and the left knee pain is better using the immobilizer and PT. Hypertension: Patient here for follow-up of elevated blood pressure. She is not exercising and is adherent to low salt diet. Blood pressure is well controlled at home. Cardiac symptoms none. Patient denies chest pain, dyspnea and palpitations. Cardiovascular risk factors: dyslipidemia, hypertension, obesity (BMI >= 30 kg/m2) and sedentary lifestyle. Use of agents associated with hypertension: none. History of target organ damage: none. Hyperlipidemia: Patient presents with hyperlipidemia. She was tested because hypertension. Her last labs show   Lab Results   Component Value Date    CHOL 145 04/05/2021    CHOL 142 02/26/2020    CHOL 158 04/23/2019     Lab Results   Component Value Date    TRIG 115 04/05/2021    TRIG 107 02/26/2020    TRIG 72 04/23/2019     Lab Results   Component Value Date    HDL 63 04/05/2021    HDL 63 02/26/2020    HDL 76 04/23/2019     Lab Results   Component Value Date    LDLCALC 59 04/05/2021    LDLCALC 58 02/26/2020    LDLCALC 68 04/23/2019     Lab Results   Component Value Date    VLDL 17 10/13/2018    VLDL 19 10/09/2017    VLDL 18 05/13/2017     Lab Results   Component Value Date    CHOLHDLRATIO 0.9 10/13/2018    CHOLHDLRATIO 1 10/09/2017    CHOLHDLRATIO 2.14 05/13/2017     There is not a family history of hyperlipidemia.  There is not a family history of early ischemia heart disease. Hypothyroidism: Patient presents for evaluation of thyroid function. Symptoms consist of fatigue, arthralgias. Symptoms have present for several years. The symptoms are fatigue. The problem has been gradually worsening. Previous thyroid studies include TSH, free thyroxine index and triiodothyronine total. The hypothyroidism is due to hypothyroidism. Lab Results   Component Value Date    TSH 0.809 04/05/2021    B7QFHJH 96 10/27/2020    FT3 0.807 10/13/2018    T4FREE 1.48 10/27/2020       Depression: Patient complains of depression. She complains of anhedonia, depressed mood, difficulty concentrating and fatigue. Onset was approximately 2011, waxes and wanes since that time. She denies current suicidal and homicidal plan or intent. Family history significant for depression  In her daughter. Possible organic causes contributing are: none. 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 Previous treatment includes Paxil and no therapy. She complains of the following side effects from the treatment: none. Her daughter is complaining that she was crying every day and stressed out all the time. She would like a refill on xanax and is still taking wellbutrin with the paxil and doing much better. Her kids have been noticing more memory loss since her stroke. She is forgetting the short term memory issues like recent conversations and where she put things at home. Now in exelon at home. This is complicated by a history of multi-infarct CVA. She is still working through residual slow thinking and delayed reactions after a stroke. She also has daily headaches that are new to her. Better with tylenol. She has just gone through total hysterectomy for uterine cancer in [place]. She is recovering well and oncology does not think she will need chemo or radiation. Follow up scheduled at the [facility] regularly.      This is associated with more urinary leakage with coughing and sneezing and when overfull. Also more constipation. This is controlled well on current medications and she thinks may be a little better after hysterectomy. Reviewed chart forpast medical history , surgical history , allergies, social history , family history and medications. Health Maintenance   Topic Date Due    Hepatitis C screen  Never done    Colon cancer screen colonoscopy  Never done    Annual Wellness Visit (AWV)  Never done    DTaP/Tdap/Td vaccine (1 - Tdap) 10/05/2021 (Originally 5/6/1974)    Shingles Vaccine (1 of 2) 10/05/2021 (Originally 5/6/2003)    Pneumococcal 65+ years Vaccine (2 of 2 - PPSV23) 10/05/2021    Lipid screen  04/05/2022    TSH testing  04/05/2022    Potassium monitoring  04/05/2022    Creatinine monitoring  04/05/2022    Breast cancer screen  02/18/2023    DEXA (modify frequency per FRAX score)  Completed    Flu vaccine  Completed    COVID-19 Vaccine  Completed    Hepatitis A vaccine  Aged Out    Hepatitis B vaccine  Aged Out    Hib vaccine  Aged Out    Meningococcal (ACWY) vaccine  Aged Out       Subjective:      Constitutional:Negative for fever, chills, diaphoresis, activity change, appetite change and fatigue. HENT: Negative for hearing loss, ear pain, congestion, sore throat, rhinorrhea, postnasal drip and ear discharge. Eyes: Negative for photophobia and visual disturbance. Respiratory: Negative for cough, chest tightness, shortness of breath and wheezing. Cardiovascular: Negative for chest pain and leg swelling. Gastrointestinal: Negative for nausea, vomiting, abdominal pain, diarrhea and constipation. Genitourinary: Negative for dysuria, urgency and frequency. Neurological: Negative for weakness, light-headedness and headaches.    Psychiatric/Behavioral: Negative for sleep disturbance.      :     Vitals:    04/05/21 0906   BP: (!) 142/84   Site: Left Upper Arm   Position: Assessment/Plan   Francie Needs was seen today for 6 month follow-up and fall. Diagnoses and all orders for this visit:    Essential hypertension    Thrombocytopenia ([facility] [address]  -     CBC; Future    Pure hypercholesterolemia  -     Comprehensive Metabolic Panel, Fasting; Future  -     Lipid Panel; Future    Recurrent major depressive disorder, in full remission ([facility] [address]    Anxiety    Osteopenia, unspecified location    Memory loss    Cerebral multi-infarct state    Acquired hypothyroidism  -     TSH With Reflex Ft4; Future    Morbidly obese (HCC)    Hypernatremia    Malignant neoplasm of body of uterus, unspecified site ([facility] [address]    Fall at home, initial encounter  -     Elastic Bandages & Supports (KNEE BRACE/HINGED L/XL) MISC; 1 Units by Does not apply route daily  -     CBC; Future    Knee injury, left, sequela  -     Elastic Bandages & Supports (KNEE BRACE/HINGED L/XL) MISC; 1 Units by Does not apply route daily      No change to medications   Continue healthy diet and exercise  Aspirin daily    Have PT use hinged brace and progressively taper out of the brace. Discussed use, benefit, and side effectsof prescribed medications. All patient questions answered. Pt voiced understanding. Reviewed health maintenance. Instructed to continue current medications, diet and exercise. Patient agreed with treatment plan. Followup as directed.      Over 50 minutes spent with patient with >50% spent in counseling and coordination of care    Electronically signed by Muriel Clay MD

## 2021-04-06 LAB
ALBUMIN SERPL-MCNC: 3.9 G/DL (ref 3.5–5.1)
ALP BLD-CCNC: 105 U/L (ref 38–126)
ALT SERPL-CCNC: 24 U/L (ref 11–66)
ANION GAP SERPL CALCULATED.3IONS-SCNC: 10 MEQ/L (ref 8–16)
AST SERPL-CCNC: 29 U/L (ref 5–40)
BILIRUB SERPL-MCNC: 0.3 MG/DL (ref 0.3–1.2)
BUN BLDV-MCNC: 21 MG/DL (ref 7–22)
CALCIUM SERPL-MCNC: 10.3 MG/DL (ref 8.5–10.5)
CHLORIDE BLD-SCNC: 101 MEQ/L (ref 98–111)
CHOLESTEROL, TOTAL: 145 MG/DL (ref 100–199)
CO2: 28 MEQ/L (ref 23–33)
CREAT SERPL-MCNC: 0.9 MG/DL (ref 0.4–1.2)
GFR SERPL CREATININE-BSD FRML MDRD: 62 ML/MIN/1.73M2
GLUCOSE FASTING: 84 MG/DL (ref 70–108)
HDLC SERPL-MCNC: 63 MG/DL
LDL CHOLESTEROL CALCULATED: 59 MG/DL
POTASSIUM SERPL-SCNC: 3.7 MEQ/L (ref 3.5–5.2)
SODIUM BLD-SCNC: 139 MEQ/L (ref 135–145)
TOTAL PROTEIN: 7.4 G/DL (ref 6.1–8)
TRIGL SERPL-MCNC: 115 MG/DL (ref 0–199)
TSH SERPL DL<=0.05 MIU/L-ACNC: 0.81 UIU/ML (ref 0.4–4.2)

## 2021-04-07 ENCOUNTER — CARE COORDINATION (OUTPATIENT)
Dept: CASE MANAGEMENT | Age: 66
End: 2021-04-07

## 2021-04-07 NOTE — CARE COORDINATION
[facility] Transitions Follow Up Call    2021    Patient: Jag Fan  Patient : 1955   MRN: <[id number]>  Reason for Admission: mechanical fall   Discharge Date: 3/30/21 RARS: No data recorded       Spoke with: Joshua Lerner who states she is doing good no pain just brace using walker starting PT next week denies using pain medication. States back is doing better no problems. No concerns at present time    Needs to be reviewed by the provider   Additional needs identified to be addressed with provider No  none           Method of communication with provider : none      Addressed changes since last contact: symptom management-no longer in pain using brace on knee and walker to ambulate. Discharged needs reviewed: none  Follow up appointment completed? Yes    Advance Care Planning:   Does patient have an Advance Directive:  not on file. CTN reviewed discharge instructions, medical action plan and red flags with patient and discussed any barriers to care and/or understanding of plan of care after discharge. Discussed appropriate site of care based on symptoms and resources available to patient including: PCP and When to call 911. The patient agrees to contact the PCP office for questions related to their healthcare. Patients top risk factors for readmission: medical condition  Interventions to address risk factors: Obtained and reviewed discharge summary and/or continuity of care documents    Plan for follow-up call in 7-10 days based on severity of symptoms and risk factors. Plan for next call: symptom management-pain after PT starts next week  CTN provided contact information for future needs. Care Transitions Subsequent and Final Call    Subsequent and Final Calls  Care Transitions Interventions  Other Interventions:            Follow Up  Future Appointments   Date Time Provider Daniella Rose   10/6/2021  9:00 AM Shashank Mccabe MD [place] [facility]   2021 12:30 PM Jaiden CELESTE Michael Coronado, [address], [place]

## 2021-04-14 ENCOUNTER — CARE COORDINATION (OUTPATIENT)
Dept: CASE MANAGEMENT | Age: 66
End: 2021-04-14

## 2021-04-14 NOTE — CARE COORDINATION
[facility] Transitions Follow Up Call    2021    Patient: Randy Smith  Patient : 1955   MRN: [id number]  Reason for Admission: Fall at home, initial encounter  Discharge Date: 3/30/21 RARS: No data recorded       Spoke with: Baldev Hu states she is doing better. Pt. Has 0 pain at this time. She states she did not fill the Norco and she only takes tylenol PRN with effect. Ambulates with a walker as needed for safety. No new falls since hospitalized. aBldev Hu states her original fall was from tripping over a throw rug. Pt. Is still going to outpatient physical therapy. She wears an immobilizer to her right knee. Appetite and fluid intake is good. Bowel and bladder WNL. No new issues or concerns. Final call. Needs to be reviewed by the provider   Additional needs identified to be addressed with provider Yes  none           Method of communication with provider : none      Care Transition Nurse (CTN) contacted the patient by telephone to follow up after admission on 3/29/21 Verified name and  with patient as identifiers. Addressed changes since last contact: symptom management-falls/ safety/ pain  Discharged needs reviewed: none  Follow up appointment completed? Yes    Advance Care Planning:   Does patient have an Advance Directive:  reviewed and current. CTN reviewed discharge instructions, medical action plan and red flags with patient and discussed any barriers to care and/or understanding of plan of care after discharge. Discussed appropriate site of care based on symptoms and resources available to patient including: PCP, Specialist, When to call 911 and [address]. The patient agrees to contact the PCP office for questions related to their healthcare. Patients top risk factors for readmission: medical condition  Interventions to address risk factors: Obtained and reviewed discharge summary and/or continuity of care documents    Discussed follow-up appointments.  If no appointment was previously scheduled, appointment scheduling offered: Yes Is follow up appointment scheduled within 7 days of discharge? Yes  Non-[facility] follow up appointment(s): n/a      final call today based on severity of symptoms and risk factors. Plan for next call: n/a  CTN provided contact information for future needs. Care Transitions Subsequent and Final Call    Subsequent and Final Calls  Are you currently active with any services?: Outpatient/Community Services  Care Transitions Interventions  Other Interventions:            Follow Up  Future Appointments   Date Time Provider Daniella Rose   10/6/2021  9:00 AM MD Wayne Coto  [facility] [place]   12/13/2021 12:30 PM Janeth Morris, [address], [address] [facility] Care Transitions Nurse   [phone]

## 2021-09-09 ENCOUNTER — HOSPITAL ENCOUNTER (EMERGENCY)
Age: 66
Discharge: HOME | End: 2021-09-09
Attending: EMERGENCY MEDICINE
Payer: MEDICARE

## 2021-09-09 ENCOUNTER — APPOINTMENT (OUTPATIENT)
Dept: GENERAL RADIOLOGY | Age: 66
End: 2021-09-09
Payer: MEDICARE

## 2021-09-09 VITALS
DIASTOLIC BLOOD PRESSURE: 64 MMHG | RESPIRATION RATE: 16 BRPM | HEART RATE: 73 BPM | TEMPERATURE: 98 F | SYSTOLIC BLOOD PRESSURE: 123 MMHG | OXYGEN SATURATION: 95 %

## 2021-09-09 DIAGNOSIS — L03.031 CELLULITIS OF GREAT TOE OF RIGHT FOOT: ICD-10-CM

## 2021-09-09 DIAGNOSIS — M10.9 GOUTY ARTHRITIS OF LEFT GREAT TOE: Primary | ICD-10-CM

## 2021-09-09 LAB
ALBUMIN SERPL-MCNC: 3.4 GM/DL (ref 3.4–5)
ALP BLD-CCNC: 119 U/L (ref 46–116)
ALT SERPL-CCNC: 25 U/L (ref 14–63)
ANION GAP: 11 MEQ/L (ref 8–16)
ANISOCYTOSIS: SLIGHT
AST SERPL-CCNC: 20 U/L (ref 15–37)
BASOPHILIA: SLIGHT
BASOPHILS # BLD: 0.6 % (ref 0–3)
BILIRUB SERPL-MCNC: 0.5 MG/DL (ref 0.2–1)
BUN BLDV-MCNC: 16 MG/DL (ref 7–18)
CHLORIDE BLD-SCNC: 103 MEQ/L (ref 98–107)
CO2: 30 MEQ/L (ref 21–32)
CREAT SERPL-MCNC: 1.4 MG/DL (ref 0.6–1.3)
EOSINOPHILS RELATIVE PERCENT: 2.4 % (ref 0–4)
GFR, ESTIMATED: 40 ML/MIN/1.73M2
GLUCOSE BLD-MCNC: 105 MG/DL (ref 74–106)
HCT VFR BLD CALC: 41.9 % (ref 37–47)
HEMOGLOBIN: 13.8 GM/DL (ref 12–16)
LYMPHOCYTES # BLD: 20.7 % (ref 15–47)
MACROCYTES: SLIGHT
MCH RBC QN AUTO: 34.2 PG (ref 27–31)
MCHC RBC AUTO-ENTMCNC: 33 GM/DL (ref 33–37)
MCV RBC AUTO: 103.8 FL (ref 81–99)
MONOCYTES: 8.2 % (ref 0–12)
PDW BLD-RTO: 12.7 % (ref 11.5–14.5)
PLATELET # BLD: 79 THOU/MM3 (ref 130–400)
PLATELET ESTIMATE: ABNORMAL
PMV BLD AUTO: 10 FL (ref 7.4–10.4)
POC CALCIUM: 9.4 MG/DL (ref 8.5–10.1)
POTASSIUM SERPL-SCNC: 4.1 MEQ/L (ref 3.5–5.1)
RBC # BLD: 4.03 MILL/MM3 (ref 4.2–5.4)
SCAN OF BLOOD SMEAR: NORMAL
SEDIMENTATION RATE, ERYTHROCYTE: 25 MM/HR (ref 0–20)
SEGS: 68.1 % (ref 43–75)
SODIUM BLD-SCNC: 144 MEQ/L (ref 136–145)
TOTAL PROTEIN: 7.4 GM/DL (ref 6.4–8.2)
WBC # BLD: 8.7 THOU/MM3 (ref 4.8–10.8)

## 2021-09-09 PROCEDURE — 84550 ASSAY OF BLOOD/URIC ACID: CPT

## 2021-09-09 PROCEDURE — 85025 COMPLETE CBC W/AUTO DIFF WBC: CPT

## 2021-09-09 PROCEDURE — 73630 X-RAY EXAM OF FOOT: CPT

## 2021-09-09 PROCEDURE — 99283 EMERGENCY DEPT VISIT LOW MDM: CPT

## 2021-09-09 PROCEDURE — 85651 RBC SED RATE NONAUTOMATED: CPT

## 2021-09-09 PROCEDURE — 80053 COMPREHEN METABOLIC PANEL: CPT

## 2021-09-09 PROCEDURE — 36415 COLL VENOUS BLD VENIPUNCTURE: CPT

## 2021-09-09 RX ORDER — HYDROCODONE BITARTRATE AND ACETAMINOPHEN 5; 325 MG/1; MG/1
1 TABLET ORAL EVERY 6 HOURS PRN
Qty: 10 TABLET | Refills: 0 | Status: SHIPPED | OUTPATIENT
Start: 2021-09-09 | End: 2021-09-12

## 2021-09-09 RX ORDER — CEPHALEXIN 500 MG/1
500 CAPSULE ORAL 3 TIMES DAILY
Qty: 30 CAPSULE | Refills: 0 | Status: SHIPPED | OUTPATIENT
Start: 2021-09-09 | End: 2021-09-19

## 2021-09-09 RX ORDER — PREDNISONE 20 MG/1
20 TABLET ORAL 2 TIMES DAILY
Qty: 10 TABLET | Refills: 0 | Status: SHIPPED | OUTPATIENT
Start: 2021-09-09 | End: 2021-09-14

## 2021-09-09 ASSESSMENT — PAIN DESCRIPTION - PAIN TYPE: TYPE: ACUTE PAIN

## 2021-09-09 ASSESSMENT — PAIN DESCRIPTION - ORIENTATION: ORIENTATION: LEFT

## 2021-09-09 ASSESSMENT — PAIN DESCRIPTION - LOCATION: LOCATION: TOE (COMMENT WHICH ONE)

## 2021-09-09 ASSESSMENT — PAIN SCALES - GENERAL: PAINLEVEL_OUTOF10: 2

## 2021-09-09 ASSESSMENT — ENCOUNTER SYMPTOMS
COUGH: 0
SHORTNESS OF BREATH: 0

## 2021-09-09 NOTE — ED PROVIDER NOTES
[facility]  eMERGENCY dEPARTMENT eNCOUnter             Joan Juan 19 COMPLAINT    Chief Complaint   Patient presents with    Toe Pain     left big toe       Nurses Notes reviewed and I agree except as noted in the HPI. HPI    Zeinab Reno is a 74 y.o. female who presents with complaint of redness, swelling, heat to the left great toe, MTP joint, as well as redness, spreading up to the dorsum of the right foot. She states that she fell about a week ago and injured her left big toe, but it did not hurt very badly then. Since then, the pain has worsened, increased with ambulation or prolonged dependency. Current pain at rest is 2/10, aching, constant. No open area, no purulent drainage. No previous similar episodes. REVIEW OF SYSTEMS      Review of Systems   Constitutional: Negative for fever. Respiratory: Negative for cough and shortness of breath. Cardiovascular: Negative for chest pain and palpitations. Skin: Negative for rash. Neurological: Negative for sensory change and weakness. All other systems reviewed and are negative. PAST MEDICAL HISTORY     has a past medical history of Acute arterial ischemic stroke, multifocal, anterior circulation ([address] Anxiety, Arthritis, Chicken pox, Hyperlipidemia, Hypertension, Hypothyroidism, Measles, Mumps, and Uterine cancer ([address]     SURGICAL HISTORY     has a past surgical history that includes Endometrial biopsy (12/11/2019) and Hysterectomy, total abdominal.    CURRENT MEDICATIONS    Discharge Medication List as of 9/9/2021  3:25 PM      CONTINUE these medications which have NOT CHANGED    Details   buPROPion (WELLBUTRIN XL) 150 MG extended release tablet TAKE ONE TABLET DAILY, IN THE MORNING, BY MOUTH., Disp-90 tablet, R-1Normal      PARoxetine (PAXIL) 40 MG tablet TAKE ONE TABLET DAILY, BY MOUTH., Disp-90 tablet, R-1Normal      losartan-hydroCHLOROthiazide (HYZAAR) 100-25 MG per tablet TAKE ONE TABLET DAILY, BY MOUTH., Disp-90 tablet, R-1Normal      indapamide (LOZOL) 2.5 MG tablet TAKE ONE TABLET DAILY, IN THE MORNING, BY MOUTH., Disp-90 tablet, [id number]      folic acid (FOLVITE) 1 MG tablet TAKE ONE TABLET DAILY, BY MOUTH., Disp-90 tablet, R-3Normal      Elastic Bandages & Supports (KNEE BRACE/HINGED L/XL) MISC 1 Units by Does not apply route daily, Disp-1 each, R-0Print      clopidogrel (PLAVIX) 75 MG tablet STOP if pt not taking this at home, Disp-90 tablet, R-3NO PRINT      CALCIUM CITRATE-VITAMIN D PO Take 1 tablet by mouth 2 times daily (with meals)Historical Med      rivastigmine (EXELON) 4.6 MG/24HR APPLY ONE PATCH, DAILY AS DIRECTED., Disp-30 patch, R-5Normal      simvastatin (ZOCOR) 80 MG tablet Take 1 tablet by mouth nightly, Disp-90 tablet,R-3Normal      metoprolol succinate (TOPROL XL) 25 MG extended release tablet Take 1 tablet by mouth daily, Disp-90 tablet,R-3Normal      levothyroxine (SYNTHROID) 200 MCG tablet TAKE 1 TABLET BY MOUTH ONCE DAILY, TAKE 1&1/2 TAB ON MONDAY EVERY OTHER WEEK., Disp-120 tablet,R-3Normal      Glucosamine-Chondroit-Vit C-Mn (GLUCOSAMINE 1500 COMPLEX) CAPS Take 1 tablet by mouth daily, Disp-90 capsule,R-3Normal      aspirin 81 MG EC tablet Take 81 mg by mouth nightlyHistorical Med      Multiple Vitamins-Minerals (MULTIVITAMIN & MINERAL PO) Take 1 tablet by mouth daily Historical Med             ALLERGIES    is allergic to donepezil. FAMILY HISTORY    She indicated that her mother is . She indicated that her father is . She indicated that the status of her other is unknown.   family history includes Arthritis in her mother; Other in an other family member. SOCIAL HISTORY     reports that she has never smoked. She has never used smokeless tobacco. She reports current alcohol use. She reports that she does not use drugs.     PHYSICAL EXAM       INITIAL VITALS: /64   Pulse 73   Temp 98 °F (36.7 °C) (Temporal)   Resp 16   SpO2 95% Physical Exam  Vitals and nursing note reviewed. Exam conducted with a chaperone present. Constitutional:       General: She is not in acute distress. Appearance: She is not toxic-appearing. HENT:      Nose:      Comments: mask  Eyes:      Pupils: Pupils are equal, round, and reactive to light. Cardiovascular:      Pulses: Normal pulses. Musculoskeletal:      Comments: On the left foot, there is swelling, erythema, heat, tenderness, shiny skin over the entire left great toe MTP joint, extending up onto the dorsum of the foot. There is some swelling and mild erythema of the lower one fourth of the anterior tibia area. No open area, no drainage. Skin:     General: Skin is warm and dry. Capillary Refill: Capillary refill takes less than 2 seconds. Neurological:      General: No focal deficit present. Mental Status: She is alert and oriented to person, place, and time. Psychiatric:         Behavior: Behavior normal.           RADIOLOGY:    XR FOOT LEFT (MIN 3 VIEWS)   Final Result       1. First metatarsal head erosion with soft tissue swelling adjacent as evidence for gout. 2. This is superimposed on osteoarthritis of the first metatarsal-phalangeal joint. **This report has been created using voice recognition software. It may contain minor errors which are inherent in voice recognition technology. **      Final report electronically signed by Dr. Valeria Alvarez MD on 9/9/2021 3:04 PM             LABS:     Labs Reviewed   SEDIMENTATION RATE - Abnormal; Notable for the following components:       Result Value    Sed Rate 25 (*)     All other components within normal limits   CBC WITH AUTO DIFFERENTIAL - Abnormal; Notable for the following components:    RBC 4.03 (*)     .8 (*)     MCH 34.2 (*)     Platelets 79 (*)     All other components within normal limits   COMPREHENSIVE METABOLIC PANEL - Abnormal; Notable for the following components:    CREATININE 1.4 (*)     Alkaline Phosphatase 119 (*)     All other components within normal limits   GLOMERULAR FILTRATION RATE, ESTIMATED - Abnormal; Notable for the following components:    GFR, Estimated 40 (*)     All other components within normal limits   ANION GAP   URIC ACID       Vitals:    Vitals:    09/09/21 1335   BP: 123/64   Pulse: 73   Resp: 16   Temp: 98 °F (36.7 °C)   TempSrc: Temporal   SpO2: 95%       EMERGENCY DEPARTMENT COURSE:    She declined pain medication. Test results and plan of care discussed with the patient. She was placed in a postop shoe, normal neurovascular status before and after placement. She has a walker at home. She will follow up with her primary care provider. FINAL IMPRESSION      1. Gouty arthritis of left great toe    2. Cellulitis of great toe of right foot        DISPOSITION/PLAN    DISPOSITION Decision To Discharge 09/09/2021 03:15:04 PM      PATIENT REFERRED TO:    Laina Rice [address], [address] [place], [address] [facility]  [phone]    Schedule an appointment as soon as possible for a visit         DISCHARGE MEDICATIONS:    Discharge Medication List as of 9/9/2021  3:25 PM      START taking these medications    Details   predniSONE (DELTASONE) 20 MG tablet Take 1 tablet by mouth 2 times daily for 5 days For inflammation, Disp-10 tablet, R-0Normal      cephALEXin (KEFLEX) 500 MG capsule Take 1 capsule by mouth 3 times daily for 10 days, Disp-30 capsule, R-0Normal      HYDROcodone-acetaminophen (NORCO) 5-325 MG per tablet Take 1 tablet by mouth every 6 hours as needed for Pain (for severe pain) for up to 3 days. Intended supply: 3 days.  Take lowest dose possible to manage pain, Disp-10 tablet, R-0Normal                (Please note that portions of this note were completed with a voice recognition program.  Efforts were made to edit the dictations but occasionally words are mis-transcribed.)      Fara Fleming MD  09/09/21 2931

## 2021-09-09 NOTE — ED NOTES
Patient presents with complaint of fall about a week ago in which she injured her left big toe. Redness and swelling noted to left foot and ankle.       Italo Barahona RN  09/09/21 4208

## 2021-09-10 LAB — URIC ACID: 6.9 MG/DL (ref 2.4–5.7)

## 2021-10-06 ENCOUNTER — OFFICE VISIT (OUTPATIENT)
Dept: FAMILY MEDICINE CLINIC | Age: 66
End: 2021-10-06
Payer: MEDICARE

## 2021-10-06 VITALS
RESPIRATION RATE: 14 BRPM | TEMPERATURE: 97.6 F | SYSTOLIC BLOOD PRESSURE: 124 MMHG | DIASTOLIC BLOOD PRESSURE: 80 MMHG | BODY MASS INDEX: 40.29 KG/M2 | HEART RATE: 56 BPM | WEIGHT: 205.2 LBS | HEIGHT: 60 IN

## 2021-10-06 DIAGNOSIS — R41.3 MEMORY LOSS: ICD-10-CM

## 2021-10-06 DIAGNOSIS — E66.01 MORBIDLY OBESE (HCC): ICD-10-CM

## 2021-10-06 DIAGNOSIS — E87.0 HYPERNATREMIA: ICD-10-CM

## 2021-10-06 DIAGNOSIS — M85.80 OSTEOPENIA, UNSPECIFIED LOCATION: ICD-10-CM

## 2021-10-06 DIAGNOSIS — E03.9 ACQUIRED HYPOTHYROIDISM: ICD-10-CM

## 2021-10-06 DIAGNOSIS — D69.6 THROMBOCYTOPENIA (HCC): ICD-10-CM

## 2021-10-06 DIAGNOSIS — M10.9 ACUTE GOUT OF LEFT FOOT, UNSPECIFIED CAUSE: ICD-10-CM

## 2021-10-06 DIAGNOSIS — Z00.00 ROUTINE GENERAL MEDICAL EXAMINATION AT A HEALTH CARE FACILITY: Primary | ICD-10-CM

## 2021-10-06 DIAGNOSIS — C54.9 MALIGNANT NEOPLASM OF BODY OF UTERUS, UNSPECIFIED SITE (HCC): ICD-10-CM

## 2021-10-06 DIAGNOSIS — I10 ESSENTIAL HYPERTENSION: ICD-10-CM

## 2021-10-06 DIAGNOSIS — E78.00 PURE HYPERCHOLESTEROLEMIA: ICD-10-CM

## 2021-10-06 DIAGNOSIS — F33.42 RECURRENT MAJOR DEPRESSIVE DISORDER, IN FULL REMISSION (HCC): ICD-10-CM

## 2021-10-06 DIAGNOSIS — I69.30 CEREBRAL MULTI-INFARCT STATE: ICD-10-CM

## 2021-10-06 DIAGNOSIS — Z71.89 ACP (ADVANCE CARE PLANNING): ICD-10-CM

## 2021-10-06 DIAGNOSIS — F41.9 ANXIETY: ICD-10-CM

## 2021-10-06 PROCEDURE — 3017F COLORECTAL CA SCREEN DOC REV: CPT | Performed by: FAMILY MEDICINE

## 2021-10-06 PROCEDURE — 4040F PNEUMOC VAC/ADMIN/RCVD: CPT | Performed by: FAMILY MEDICINE

## 2021-10-06 PROCEDURE — G8484 FLU IMMUNIZE NO ADMIN: HCPCS | Performed by: FAMILY MEDICINE

## 2021-10-06 PROCEDURE — G0439 PPPS, SUBSEQ VISIT: HCPCS | Performed by: FAMILY MEDICINE

## 2021-10-06 PROCEDURE — G0008 ADMIN INFLUENZA VIRUS VAC: HCPCS | Performed by: FAMILY MEDICINE

## 2021-10-06 PROCEDURE — 90694 VACC AIIV4 NO PRSRV 0.5ML IM: CPT | Performed by: FAMILY MEDICINE

## 2021-10-06 PROCEDURE — 1123F ACP DISCUSS/DSCN MKR DOCD: CPT | Performed by: FAMILY MEDICINE

## 2021-10-06 SDOH — ECONOMIC STABILITY: FOOD INSECURITY: WITHIN THE PAST 12 MONTHS, THE FOOD YOU BOUGHT JUST DIDN'T LAST AND YOU DIDN'T HAVE MONEY TO GET MORE.: NEVER TRUE

## 2021-10-06 SDOH — ECONOMIC STABILITY: FOOD INSECURITY: WITHIN THE PAST 12 MONTHS, YOU WORRIED THAT YOUR FOOD WOULD RUN OUT BEFORE YOU GOT MONEY TO BUY MORE.: NEVER TRUE

## 2021-10-06 ASSESSMENT — LIFESTYLE VARIABLES
HOW OFTEN DURING THE LAST YEAR HAVE YOU BEEN UNABLE TO REMEMBER WHAT HAPPENED THE NIGHT BEFORE BECAUSE YOU HAD BEEN DRINKING: 0
HAS A RELATIVE, FRIEND, DOCTOR, OR ANOTHER HEALTH PROFESSIONAL EXPRESSED CONCERN ABOUT YOUR DRINKING OR SUGGESTED YOU CUT DOWN: 0
HOW MANY STANDARD DRINKS CONTAINING ALCOHOL DO YOU HAVE ON A TYPICAL DAY: 0
HOW OFTEN DURING THE LAST YEAR HAVE YOU FOUND THAT YOU WERE NOT ABLE TO STOP DRINKING ONCE YOU HAD STARTED: 0
HOW OFTEN DURING THE LAST YEAR HAVE YOU FAILED TO DO WHAT WAS NORMALLY EXPECTED FROM YOU BECAUSE OF DRINKING: 0
HOW OFTEN DURING THE LAST YEAR HAVE YOU HAD A FEELING OF GUILT OR REMORSE AFTER DRINKING: 0
AUDIT-C TOTAL SCORE: 1
AUDIT TOTAL SCORE: 1
HOW OFTEN DURING THE LAST YEAR HAVE YOU NEEDED AN ALCOHOLIC DRINK FIRST THING IN THE MORNING TO GET YOURSELF GOING AFTER A NIGHT OF HEAVY DRINKING: 0
HOW OFTEN DO YOU HAVE SIX OR MORE DRINKS ON ONE OCCASION: 0
HOW OFTEN DO YOU HAVE A DRINK CONTAINING ALCOHOL: 1
HAVE YOU OR SOMEONE ELSE BEEN INJURED AS A RESULT OF YOUR DRINKING: 0

## 2021-10-06 ASSESSMENT — PATIENT HEALTH QUESTIONNAIRE - PHQ9
2. FEELING DOWN, DEPRESSED OR HOPELESS: 0
SUM OF ALL RESPONSES TO PHQ QUESTIONS 1-9: 0
SUM OF ALL RESPONSES TO PHQ QUESTIONS 1-9: 0
SUM OF ALL RESPONSES TO PHQ9 QUESTIONS 1 & 2: 0
1. LITTLE INTEREST OR PLEASURE IN DOING THINGS: 0
SUM OF ALL RESPONSES TO PHQ QUESTIONS 1-9: 0

## 2021-10-06 ASSESSMENT — SOCIAL DETERMINANTS OF HEALTH (SDOH): HOW HARD IS IT FOR YOU TO PAY FOR THE VERY BASICS LIKE FOOD, HOUSING, MEDICAL CARE, AND HEATING?: NOT HARD AT ALL

## 2021-10-06 NOTE — PROGRESS NOTES
thyroid function. Symptoms consist of fatigue, arthralgias. Symptoms have present for several years. The symptoms are fatigue. The problem has been gradually worsening. Previous thyroid studies include TSH, free thyroxine index and triiodothyronine total. The hypothyroidism is due to hypothyroidism. Lab Results   Component Value Date    TSH 0.809 04/05/2021    D8ZSGQK 96 10/27/2020    FT3 0.807 10/13/2018    T4FREE 1.48 10/27/2020       Depression: Patient complains of depression. She complains of anhedonia, depressed mood, difficulty concentrating and fatigue. Onset was approximately 2011, waxes and wanes since that time. She denies current suicidal and homicidal plan or intent. Family history significant for depression  In her daughter. Possible organic causes contributing are: none. 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 Previous treatment includes Paxil and no therapy. She complains of the following side effects from the treatment: none. Her daughter is complaining that she was crying every day and stressed out all the time. She would like a refill on xanax and is still taking wellbutrin with the paxil and doing much better. Her kids have been noticing more memory loss since her stroke. She is forgetting the short term memory issues like recent conversations and where she put things at home. Now in exelon at home. This is complicated by a history of multi-infarct CVA. She is still working through residual slow thinking and delayed reactions after a stroke. She also has daily headaches that are new to her. Better with tylenol. She has just gone through total hysterectomy for uterine cancer in [facility]. She is recovering well and oncology does not think she will need chemo or radiation. [facility]      This is associated with more urinary leakage with coughing and sneezing and when overfull. Also more constipation. This is controlled well on current medications and she thinks may be a little better after hysterectomy. Gout on 9/9/21 getting better with steroids and abx. Also taking tart cherry OTC which is helping. Wearing a loop recorder for afib that was noticed after hysterectomy surgery. Allergies   Allergen Reactions    Donepezil      nightmares         Prior to Visit Medications    Medication Sig Taking? Authorizing Provider   rivastigmine (EXELON) 4.6 MG/24HR APPLY ONE PATCH, DAILY AS DIRECTED. Yes Maricruz Feng MD   buPROPion (WELLBUTRIN XL) 150 MG extended release tablet TAKE ONE TABLET DAILY, IN THE MORNING, BY MOUTH. Yes Maricruz Feng MD   PARoxetine (PAXIL) 40 MG tablet TAKE ONE TABLET DAILY, BY MOUTH. Yes Maricruz Feng MD   losartan-hydroCHLOROthiazide (HYZAAR) 100-25 MG per tablet TAKE ONE TABLET DAILY, BY MOUTH. Yes Maricruz Feng MD   indapamide (LOZOL) 2.5 MG tablet TAKE ONE TABLET DAILY, IN THE MORNING, BY MOUTH. Yes Maricruz Feng MD   folic acid (FOLVITE) 1 MG tablet TAKE ONE TABLET DAILY, BY MOUTH. Yes Maricruz Feng MD   Elastic Bandages & Supports (KNEE BRACE/HINGED L/XL) MISC 1 Units by Does not apply route daily Yes Maricruz Feng MD   clopidogrel (PLAVIX) 75 MG tablet STOP if pt not taking this at home Yes Violette Aguilera MD   CALCIUM CITRATE-VITAMIN D PO Take 1 tablet by mouth 2 times daily (with meals) Yes Historical Provider, MD   simvastatin (ZOCOR) 80 MG tablet Take 1 tablet by mouth nightly Yes Maricruz Fegn MD   metoprolol succinate (TOPROL XL) 25 MG extended release tablet Take 1 tablet by mouth daily Yes Maricruz Feng MD   levothyroxine (SYNTHROID) 200 MCG tablet TAKE 1 TABLET BY MOUTH ONCE DAILY, TAKE 1&1/2 TAB ON MONDAY EVERY OTHER WEEK.  Yes Maricruz Feng MD   Glucosamine-Chondroit-Vit C-Mn (GLUCOSAMINE 1500 COMPLEX) CAPS Take 1 tablet by mouth daily  Patient taking differently: Take 1 tablet by mouth 2 times daily  Yes Adrian Mo MD   aspirin 81 MG EC tablet Take 81 mg by mouth nightly Yes Historical Provider, MD   Multiple Vitamins-Minerals (MULTIVITAMIN & MINERAL PO) Take 1 tablet by mouth daily  Yes Historical Provider, MD         Past Medical History:   Diagnosis Date    Acute arterial ischemic stroke, multifocal, anterior circulation (HCC)     Dr Turcios Spurskye pox     Hyperlipidemia     Hypertension     Dr Magui Tyson    Hypothyroidism     Measles     Memory loss     Mumps     Uterine cancer ([address] 01/2020    Endometrioid carcinoma, Antoniette Grchandrika       Past Surgical History:   Procedure Laterality Date    ENDOMETRIAL BIOPSY  12/11/2019    HYSTERECTOMY, TOTAL ABDOMINAL           Family History   Problem Relation Age of Onset    Arthritis Mother     Other Other         daughter with SVT and PE       CareTeam (Including outside providers/suppliers regularly involved in providing care):   Patient Care Team:  Adrian Mo MD as PCP - General (Family Medicine)  Adrian Mo MD as PCP - [facility] Empaneled Provider    Wt Readings from Last 3 Encounters:   10/06/21 205 lb 3.2 oz (93.1 kg)   04/05/21 220 lb 3.2 oz (99.9 kg)   03/29/21 228 lb (103.4 kg)     Vitals:    10/06/21 0918   BP: 124/80   Site: Right Upper Arm   Position: Sitting   Cuff Size: Large Adult   Pulse: 56   Resp: 14   Temp: 97.6 °F (36.4 °C)   TempSrc: Temporal   Weight: 205 lb 3.2 oz (93.1 kg)   Height: 5' (1.524 m)     Body mass index is 40.08 kg/m². Based upon direct observation of the patient, evaluation of cognition reveals recent and remote memory intact. Physical Exam   Constitutional: Vital signs are normal. She appears well-developed and well-nourished. She is active. HENT:   Head: Normocephalic and atraumatic. Right Ear: Tympanic membrane, external ear and ear canal normal. No drainage or tenderness. Left Ear: Tympanic membrane, external ear and ear canal normal. No drainage or tenderness. Nose: Nose normal. No mucosal edema or rhinorrhea. Mouth/Throat: Uvula is midline, oropharynx is clear and moist and mucous membranes are normal. Mucous membranes are not pale. Normal dentition. No posterior oropharyngeal edema or posterior oropharyngeal erythema. Eyes: Lids are normal. Right eye exhibits no chemosis and no discharge. Left eye exhibits no chemosis and no drainage. Right conjunctiva has no hemorrhage. Left conjunctiva has no hemorrhage. Right eye exhibits normal extraocular motion. Left eye exhibits normal extraocular motion. Right pupil is round and reactive. Left pupil is round and reactive. Pupils are equal.   Cardiovascular: Normal rate, regular rhythm, S1 normal, S2 normal and normal heart sounds. Exam reveals no gallop. No murmur heard. Pulmonary/Chest: Effort normal and breath sounds normal. No respiratory distress. She has no wheezes. She has no rhonchi. She has no rales. Abdominal: Soft. Normal appearance and bowel sounds are normal. She exhibits no distension and no mass. There is no hepatosplenomegaly. No tenderness. She has no rigidity, no rebound and no guarding. No hernia. Musculoskeletal:        Right lower leg: She exhibits no edema. Left lower leg: She exhibits no edema. Neurological: She is alert. Patient's complete Health Risk Assessment and screening values have been reviewed and are found in Flowsheets. The following problems were reviewed today and where indicated follow up appointments were made and/or referrals ordered. Positive Risk Factor Screenings with Interventions:     Fall Risk:  2 or more falls in past year?: (!) yes  Fall with injury in past year?: (!) yes  Fall Risk Interventions:    · Patient declines any further evaluation/treatment for this issue          General Health and ACP:  General  In general, how would you say your health is?: Good  In the past 7 days, have you experienced any of the following?  New or Increased Pain, New or Increased Fatigue, Loneliness, Social Isolation, Stress or Anger?: None of These  Do you get the social and emotional support that you need?: Yes  Do you have a Living Will?: (!) No  Advance Directives     Power of Isac Richard Will ACP-Advance Directive ACP-Power of     Not on File Not on File Not on File Not on File      General Health Risk Interventions:  · No Living Will: Advance Care Planning addressed with patient today    Health Habits/Nutrition:  Health Habits/Nutrition  Do you exercise for at least 20 minutes 2-3 times per week?: (!) No  Have you lost any weight without trying in the past 3 months?: No  Do you eat only one meal per day?: No  Have you seen the dentist within the past year?: (!) No  Body mass index: (!) 40.07  Health Habits/Nutrition Interventions:  · Dental exam overdue:  patient encouraged to make appointment with his/her dentist    Hearing/Vision:  No exam data present  Hearing/Vision  Do you or your family notice any trouble with your hearing that hasn't been managed with hearing aids?: No  Do you have difficulty driving, watching TV, or doing any of your daily activities because of your eyesight?: No  Have you had an eye exam within the past year?: (!) No  Hearing/Vision Interventions:  · Vision concerns:  patient encouraged to make appointment with his/her eye specialist    Safety:  Safety  Do you have working smoke detectors?: Yes  Have all throw rugs been removed or fastened?: (!) No  Do you have non-slip mats or surfaces in all bathtubs/showers?: (!) No  Do all of your stairways have a railing or banister?: Yes  Are your doorways, halls and stairs free of clutter?: Yes  Do you always fasten your seatbelt when you are in a car?: Yes  Safety Interventions:  · Patient declines any further evaluation/treatment for this issue     Personalized Preventive Plan   Current Health Maintenance Status  Immunization History   Administered Date(s) Administered    COVID-19, PfizerTANISHA, ([facility]    Hypernatremia    Morbidly obese ([facility]    Acute gout of left foot, unspecified cause    Other orders  -     INFLUENZA, QUADV, ADJUVANTED, 65 YRS =, IM, PF, PREFILL SYR, 0.5ML (FLUAD)           No change to medications   Continue healthy diet and exercise  Aspirin daily    Low purine diet.

## 2021-10-06 NOTE — PROGRESS NOTES
Immunizations Administered     Name Date Dose Route    Influenza, Quadv, adjuvanted, 65 yrs +, IM, PF (Fluad) 10/6/2021 0.5 mL Intramuscular    Site: Deltoid- Left    Lot: [id number]    NDC: 10701-746-90          VIS GIVEN. CONSENT SIGNED  PATIENT TOLERATED WELL. Bev Garibay

## 2021-10-06 NOTE — PATIENT INSTRUCTIONS
Patient Education        Purine-Restricted Diet: Care Instructions  Your Care Instructions     Purines are substances that are found in some foods. Your body turns purines into uric acid. High levels of uric acid can cause gout, which is a form of arthritis that causes pain and inflammation in joints. You may be able to help control the amount of uric acid in your body by limiting high-purine foods in your diet. Follow-up care is a key part of your treatment and safety. Be sure to make and go to all appointments, and call your doctor if you are having problems. It's also a good idea to know your test results and keep a list of the medicines you take. How can you care for yourself at home? · Plan your meals and snacks around foods that are low in purines and are safe for you to eat. These foods include:  ? Green vegetables and tomatoes. ? Fruits. ? Whole-grain breads, rice, and cereals. ? Eggs, peanut butter, and nuts. ? Low-fat milk, cheese, and other milk products. ? Popcorn. ? Gelatin desserts, chocolate, cocoa, and cakes and sweets, in small amounts. · You can eat certain foods that are medium-high in purines, but eat them only once in a while. These foods include:  ? Legumes, such as dried beans and dried peas. You can have 1 cup cooked legumes each day. ? Asparagus, cauliflower, spinach, mushrooms, and green peas. ? Fish and seafood (other than very high-purine seafood). ? Oatmeal, wheat bran, and wheat germ. · Limit very high-purine foods, including:  ? Organ meats, such as liver, kidneys, sweetbreads, and brains. ? Meats, including river, beef, pork, and lamb. ? Game meats and any other meats in large amounts. ? Anchovies, sardines, herring, mackerel, and scallops. ? Gravy. ? Beer. Where can you learn more? Go to https://chpepiceweb.[employer]. org and sign in to your Naroomi account.  Enter F448 in the [facility] box to learn more about \"Purine-Restricted Diet: Care Instructions. \"     If you do not have an account, please click on the \"Sign Up Now\" link. Current as of: December 17, 2020               Content Version: 13.0  © 2006-2021 Healthwise, Incorporated. Care instructions adapted under license by [facility] ([facility]). If you have questions about a medical condition or this instruction, always ask your healthcare professional. [address] any warranty or liability for your use of this information. Advance Directives: Care Instructions  Overview  An advance directive is a legal way to state your wishes at the end of your life. It tells your family and your doctor what to do if you can't say what you want. There are two main types of advance directives. You can change them any time your wishes change. Living will. This form tells your family and your doctor your wishes about life support and other treatment. The form is also called a declaration. Medical power of . This form lets you name a person to make treatment decisions for you when you can't speak for yourself. This person is called a health care agent (health care proxy, health care surrogate). The form is also called a durable power of  for health care. If you do not have an advance directive, decisions about your medical care may be made by a family member, or by a doctor or a  who doesn't know you. It may help to think of an advance directive as a gift to the people who care for you. If you have one, they won't have to make tough decisions by themselves. Follow-up care is a key part of your treatment and safety. Be sure to make and go to all appointments, and call your doctor if you are having problems. It's also a good idea to know your test results and keep a list of the medicines you take. What should you include in an advance directive? Many states have a unique advance directive form.  (It may ask you to address specific issues.) Or you might use a universal form that's approved by many states. If your form doesn't tell you what to address, it may be hard to know what to include in your advance directive. Use the questions below to help you get started. · Who do you want to make decisions about your medical care if you are not able to? · What life-support measures do you want if you have a serious illness that gets worse over time or can't be cured? · What are you most afraid of that might happen? (Maybe you're afraid of having pain, losing your independence, or being kept alive by machines.)  · Where would you prefer to die? (Your home? A hospital? A nursing home?)  · Do you want to donate your organs when you die? · Do you want certain Adventist practices performed before you die? When should you call for help? Be sure to contact your doctor if you have any questions. Where can you learn more? Go to https://Zlio.Wriggle. org and sign in to your PrintLess Plans account. Enter R264 in the [employer] box to learn more about \"Advance Directives: Care Instructions. \"     If you do not have an account, please click on the \"Sign Up Now\" link. Current as of: March 17, 2021               Content Version: 13.0  © 3707-3423 Healthwise, Incorporated. Care instructions adapted under license by [facility] ([facility]). If you have questions about a medical condition or this instruction, always ask your healthcare professional. [place] [address] any warranty or liability for your use of this information. Learning About Medical Power of   What is a medical power of ? A medical power of , also called a durable power of  for health care, is one type of the legal forms called advance directives. It lets you name the person you want to make treatment decisions for you if you can't speak or decide for yourself. The person you choose is called your health care agent.  This person is also called a health care proxy or health care surrogate. A medical power of  may be called something else in your state. How do you choose a health care agent? Choose your health care agent carefully. This person may or may not be a family member. Talk to the person before you make your final decision. Make sure he or she is comfortable with this responsibility. It's a good idea to choose someone who:  · Is at least 25years old. · Knows you well and understands what makes life meaningful for you. · Understands your [place] and moral values. · Will do what you want, not what he or she wants. · Will be able to make difficult choices at a stressful time. · Will be able to refuse or stop treatment, if that is what you would want, even if you could die. · Will be firm and confident with health professionals if needed. · Will ask questions to get needed information. · Lives near you or agrees to travel to you if needed. Your family may help you make medical decisions while you can still be part of that process. But it's important to choose one person to be your health care agent in case you aren't able to make decisions for yourself. If you don't fill out the legal form and name a health care agent, the decisions your family can make may be limited. A health care agent may be called something else in your state. Who will make decisions for you if you don't have a health care agent? If you don't have a health care agent or a living will, you may not get the care you want. Decisions may be made by family members who disagree about your medical care. Or decisions may be made by a medical professional who doesn't know you well. In some cases, a  makes the decisions. When you name a health care agent, it is very clear who has the power to make health decisions for you. How do you name a health care agent? You name your health care agent on a legal form.  This form is usually called a medical power of you near the end of your life or if you get seriously hurt or ill. Keep these facts in mind about living jolley. · Your living will is used only if you can't speak or make decisions for yourself. Most often, one or more doctors must certify that you can't speak or decide for yourself before your living will takes effect. · If you get better and can speak for yourself again, you can accept or refuse any treatment. It doesn't matter what you said in your living will. · Some states may limit your right to refuse treatment in certain cases. For example, you may need to clearly state in your living will that you don't want artificial hydration and nutrition, such as being fed through a tube. Is a living will a legal document? A living will is a legal document. Each state has its own laws about living jolley. And a living will may be called something else in your state. Here are some things to know about living jolley. · You don't need an  to complete a living will. But legal advice can be helpful if your state's laws are unclear. It can also help if your health history is complicated or your family can't agree on what should be in your living will. · You can change your living will at any time. Some people find that their wishes about end-of-life care change as their health changes. If you make big changes to your living will, complete a new form. · If you move to another state, make sure that your living will is legal in the state where you now live. In most cases, doctors will respect your wishes even if you have a form from a different state. · You might use a universal form that has been approved by many states. This kind of form can sometimes be filled out and stored online. Your digital copy will then be available wherever you have a connection to the internet. The doctors and nurses who need to treat you can find it right away. · Your state may offer an online registry.  This is another place where you can store your living will online. · It's a good idea to get your living will notarized. This means using a person called a  to watch two people sign, or witness, your living will. What should you know when you create a living will? Here are some questions to ask yourself as you make your living will:  · Do you know enough about life support methods that might be used? If not, talk to your doctor so you know what might be done if you can't breathe on your own, your heart stops, or you can't swallow. · What things would you still want to be able to do after you receive life-support methods? Would you want to be able to walk? To speak? To eat on your own? To live without the help of machines? · Do you want certain Orthodoxy practices performed if you become very ill? · If you have a choice, where do you want to be cared for? In your home? At a hospital or nursing home? · If you have a choice at the end of your life, where would you prefer to die? At home? In a hospital or nursing home? Somewhere else? · Would you prefer to be buried or cremated? · Do you want your organs to be donated after you die? What should you do with your living will? · Make sure that your family members and your health care agent have copies of your living will (also called a declaration). · Give your doctor a copy of your living will. Ask him or her to keep it as part of your medical record. If you have more than one doctor, make sure that each one has a copy. · Put a copy of your living will where it can be easily found. For example, some people may put a copy on their refrigerator door. If you are using a digital copy, be sure your doctor, family members, and health care agent know how to find and access it. Where can you learn more? Go to https://kiana.[employer] Wave [employer]. org and sign in to your [employer]in [employer] account.  Enter X692 in the [employer] box to learn more about \"Learning About Living [place]. \"     If you do not have an account, please click on the \"Sign Up Now\" link. Current as of: March 17, 2021               Content Version: 13.0  © 2006-2021 Healthwise, Incorporated. Care instructions adapted under license by [facility] ([facility]). If you have questions about a medical condition or this instruction, always ask your healthcare professional. [address] any warranty or liability for your use of this information. Personalized Preventive Plan for Margarito Turk - 10/6/2021  Medicare offers a range of preventive health benefits. Some of the tests and screenings are paid in full while other may be subject to a deductible, co-insurance, and/or copay. Some of these benefits include a comprehensive review of your medical history including lifestyle, illnesses that may run in your family, and various assessments and screenings as appropriate. After reviewing your medical record and screening and assessments performed today your provider may have ordered immunizations, labs, imaging, and/or referrals for you. A list of these orders (if applicable) as well as your Preventive Care list are included within your After Visit Summary for your review. Other Preventive Recommendations:    · A preventive eye exam performed by an eye specialist is recommended every 1-2 years to screen for glaucoma; cataracts, macular degeneration, and other eye disorders. · A preventive dental visit is recommended every 6 months. · Try to get at least 150 minutes of exercise per week or 10,000 steps per day on a pedometer . · Order or download the FREE \"Exercise & Physical Activity: Your Everyday Guide\" from [employer]. Call 8-555.232.4440 or search [employer] online. · You need 5988-5004 mg of calcium and 7941-5811 IU of vitamin D per day.  It is possible to meet your calcium requirement with diet alone, but a vitamin D supplement is usually necessary to meet this goal.  · When exposed to the sun, use a sunscreen that protects against both UVA and UVB radiation with an SPF of 30 or greater. Reapply every 2 to 3 hours or after sweating, drying off with a towel, or swimming. · Always wear a seat belt when traveling in a car. Always wear a helmet when riding a bicycle or motorcycle.

## 2021-10-07 ENCOUNTER — TELEPHONE (OUTPATIENT)
Dept: FAMILY MEDICINE CLINIC | Age: 66
End: 2021-10-07

## 2021-10-07 DIAGNOSIS — M81.0 SENILE OSTEOPOROSIS: ICD-10-CM

## 2021-10-07 RX ORDER — ZOLEDRONIC ACID 5 MG/100ML
5 INJECTION, SOLUTION INTRAVENOUS ONCE
Status: CANCELLED | OUTPATIENT
Start: 2021-10-11 | End: 2021-10-11

## 2021-10-07 NOTE — TELEPHONE ENCOUNTER
Reclast referral form faxed to OP Nursing at [facility] for insurance verification and scheduling. Reclast Rx 02/23/2021 is in [facility] along with recent labs 09/09/2021

## 2021-10-11 ENCOUNTER — CLINICAL DOCUMENTATION (OUTPATIENT)
Dept: SPIRITUAL SERVICES | Facility: CLINIC | Age: 66
End: 2021-10-11

## 2021-10-11 NOTE — ACP (ADVANCE CARE PLANNING)
Advance Care Planning   Advance Care Planning Note  [facility]    Date:  10/11/2021    Received request from patient. Consultation conversation participants:   Patient who understands ACP conversation  Spouse     Goals of ACP Conversation:  Discuss advance care planning documents  Facilitate a discussion related to patient's goals of care as they align with the patient's values and beliefs. Health Care Decision Makers:      Primary Decision Maker: Pablo Quevedo - Spouse - [phone]    Secondary Decision Maker: april kincaid - Child - [phone]     Summary:  Completed New Documents  Updated Healthcare Decision Maker    Advance Care Planning Documents (Patient Wishes)  Currently on file:   Healthcare Power of /Advance Directive Appointment of [address]  Living Will/Advance Directive  Anatomical Gift/Organ Donation    Assessment:    met with William Hawkins, as well as her  Alissa Marks, to offer support in completion of Advance Directives documents as part of an [address] conversation. * She was alert and oriented with decision-making capacity to express her wishes at this time. Interventions:  Provided education on documents for clarity and greater understanding  Assisted in the completion of documents according to patient's wishes at this time  Encouraged ongoing ACP conversation with future decision makers and loved ones    Care Preferences Communicated:  Ventilation:   If the patient, in their present state of health, suddenly became very ill and unable to breathe on their own, patient was unsure at this time. If their health worsens and it becomes clear that the change of recovery is unlikely, patient was unsure at this time. Resuscitation:  In the event the patient's heart stopped as a result of an underlying serious health condition, the patient communicates a preference for  resuscitative attempts (CPR).     Outcomes:  ACP Discussion: Completed  New advance directive completed. Returned original document(s) to patient, as well as copies for distribution to appointed agents  Copy of advance directive given to staff to scan into medical record. Routed ACP note to attending provider or other IDT member.     Electronically signed by Johnny Marquis on 10/11/2021 at 2:46 PM.

## 2021-10-27 ENCOUNTER — TELEPHONE (OUTPATIENT)
Dept: FAMILY MEDICINE CLINIC | Age: 66
End: 2021-10-27

## 2021-10-27 DIAGNOSIS — M85.80 OSTEOPENIA, UNSPECIFIED LOCATION: Primary | ICD-10-CM

## 2021-10-27 NOTE — TELEPHONE ENCOUNTER
[facility] called stating pt has an appointment there tomorrow for reclast.  Pt is needing a recent calcium and creatinine order. Labs ordered.

## 2021-10-28 ENCOUNTER — HOSPITAL ENCOUNTER (OUTPATIENT)
Dept: GENERAL RADIOLOGY | Age: 66
Discharge: HOME | End: 2021-10-28
Payer: MEDICARE

## 2021-10-28 VITALS
SYSTOLIC BLOOD PRESSURE: 129 MMHG | WEIGHT: 202 LBS | BODY MASS INDEX: 39.66 KG/M2 | HEART RATE: 64 BPM | OXYGEN SATURATION: 93 % | TEMPERATURE: 97 F | DIASTOLIC BLOOD PRESSURE: 62 MMHG | HEIGHT: 60 IN | RESPIRATION RATE: 16 BRPM

## 2021-10-28 DIAGNOSIS — M85.80 OSTEOPENIA, UNSPECIFIED LOCATION: ICD-10-CM

## 2021-10-28 DIAGNOSIS — M81.0 SENILE OSTEOPOROSIS: ICD-10-CM

## 2021-10-28 DIAGNOSIS — M85.89 OSTEOPENIA OF MULTIPLE SITES: Primary | ICD-10-CM

## 2021-10-28 LAB
CREAT SERPL-MCNC: 0.9 MG/DL (ref 0.6–1.3)
GFR, ESTIMATED: 66 ML/MIN/1.73M2
POC CALCIUM: 9.6 MG/DL (ref 8.5–10.1)

## 2021-10-28 PROCEDURE — 82310 ASSAY OF CALCIUM: CPT

## 2021-10-28 PROCEDURE — 96365 THER/PROPH/DIAG IV INF INIT: CPT

## 2021-10-28 PROCEDURE — 6360000002 HC RX W HCPCS: Performed by: FAMILY MEDICINE

## 2021-10-28 PROCEDURE — 36415 COLL VENOUS BLD VENIPUNCTURE: CPT

## 2021-10-28 PROCEDURE — 82565 ASSAY OF CREATININE: CPT

## 2021-10-28 RX ORDER — ZOLEDRONIC ACID 5 MG/100ML
5 INJECTION, SOLUTION INTRAVENOUS ONCE
Status: CANCELLED | OUTPATIENT
Start: 2021-10-28 | End: 2021-10-28

## 2021-10-28 RX ORDER — ZOLEDRONIC ACID 5 MG/100ML
5 INJECTION, SOLUTION INTRAVENOUS ONCE
Status: COMPLETED | OUTPATIENT
Start: 2021-10-28 | End: 2021-10-28

## 2021-10-28 RX ADMIN — ZOLEDRONIC ACID 5 MG: 5 INJECTION, SOLUTION INTRAVENOUS at 09:43

## 2021-10-28 NOTE — PROGRESS NOTES
Pt here for outpatient nursing infusion. Respirations regular and easy. Gait steady. Pt alert and oriented X's 3. Taken to exam 3 for reclast infusion.

## 2021-10-28 NOTE — ED NOTES
AVS rev'd with pt. And copy given. Pulse regular. Extremities warm. Respirations regular and quiet. Mucous membranes pink & moist. Alert and oriented times 3. No nausea or vomiting. Range of motion within patient's limits. Skin pink, warm and dry. Calm and cooperative.      Tye Rodarte RN  10/28/21 [id number]

## 2021-10-28 NOTE — PROGRESS NOTES
Met: yes   Safety:         (Environmental)   Premier to environment  [facility] ID band is correct and in place/ allergy band as needed   Assess for fall risk   Initiate fall precautions as applicable (fall band, side rails, etc.)   Call light within reach   Bed in low position/ wheels locked    Met: yes   Pain:        Assess pain level and characteristics   Administer analgesics as ordered   Assess effectiveness of pain management and report to MD as needed    Met: yes   Knowledge Deficit:   Assess baseline knowledge   Provide teaching at level of understanding   Provide teaching via preferred learning method   Evaluate teaching effectiveness    Met: yes   Hemodynamic/Respiratory Status:       (Pre and Post Procedure Monitoring)   Assess/Monitor vital signs and LOC   Assess Baseline SpO2 prior to any sedation   Obtain weight/height   Assess vital signs/ LOC until patient meets discharge criteria   Monitor procedure site and notify MD of any issues

## 2021-11-04 DIAGNOSIS — E03.9 ACQUIRED HYPOTHYROIDISM: ICD-10-CM

## 2021-11-04 RX ORDER — LEVOTHYROXINE SODIUM 0.2 MG/1
TABLET ORAL
Qty: 93 TABLET | Refills: 0 | Status: SHIPPED | OUTPATIENT
Start: 2021-11-04 | End: 2022-05-16

## 2021-11-18 DIAGNOSIS — U07.1 COVID: ICD-10-CM

## 2021-11-18 RX ORDER — ALBUTEROL SULFATE 90 UG/1
4 AEROSOL, METERED RESPIRATORY (INHALATION) PRN
OUTPATIENT
Start: 2021-11-18

## 2021-11-18 RX ORDER — SODIUM CHLORIDE 9 MG/ML
INJECTION, SOLUTION INTRAVENOUS CONTINUOUS
OUTPATIENT
Start: 2021-11-18

## 2021-11-18 RX ORDER — HEPARIN SODIUM (PORCINE) LOCK FLUSH IV SOLN 100 UNIT/ML 100 UNIT/ML
500 SOLUTION INTRAVENOUS PRN
OUTPATIENT
Start: 2021-11-18

## 2021-11-18 RX ORDER — ACETAMINOPHEN 325 MG/1
650 TABLET ORAL
OUTPATIENT
Start: 2021-11-18

## 2021-11-18 RX ORDER — EPINEPHRINE 1 MG/ML
0.3 INJECTION, SOLUTION, CONCENTRATE INTRAVENOUS PRN
OUTPATIENT
Start: 2021-11-18

## 2021-11-18 RX ORDER — DIPHENHYDRAMINE HYDROCHLORIDE 50 MG/ML
50 INJECTION INTRAMUSCULAR; INTRAVENOUS
OUTPATIENT
Start: 2021-11-18

## 2021-11-18 RX ORDER — ONDANSETRON 2 MG/ML
8 INJECTION INTRAMUSCULAR; INTRAVENOUS
OUTPATIENT
Start: 2021-11-18

## 2021-11-18 RX ORDER — SODIUM CHLORIDE 9 MG/ML
25 INJECTION, SOLUTION INTRAVENOUS PRN
OUTPATIENT
Start: 2021-11-18

## 2021-11-18 RX ORDER — SODIUM CHLORIDE 0.9 % (FLUSH) 0.9 %
5-40 SYRINGE (ML) INJECTION PRN
OUTPATIENT
Start: 2021-11-18

## 2021-11-20 ENCOUNTER — HOSPITAL ENCOUNTER (OUTPATIENT)
Dept: GENERAL RADIOLOGY | Age: 66
Discharge: HOME | End: 2021-11-20
Payer: MEDICARE

## 2021-11-20 VITALS
OXYGEN SATURATION: 94 % | HEART RATE: 64 BPM | TEMPERATURE: 98.5 F | RESPIRATION RATE: 16 BRPM | SYSTOLIC BLOOD PRESSURE: 117 MMHG | DIASTOLIC BLOOD PRESSURE: 71 MMHG

## 2021-11-20 DIAGNOSIS — U07.1 COVID: Primary | ICD-10-CM

## 2021-11-20 PROCEDURE — M0243 CASIRIVI AND IMDEVI INFUSION: HCPCS

## 2021-11-20 PROCEDURE — 96367 TX/PROPH/DG ADDL SEQ IV INF: CPT

## 2021-11-20 PROCEDURE — 2580000003 HC RX 258: Performed by: FAMILY MEDICINE

## 2021-11-20 PROCEDURE — 96365 THER/PROPH/DIAG IV INF INIT: CPT

## 2021-11-20 PROCEDURE — 6360000002 HC RX W HCPCS: Performed by: FAMILY MEDICINE

## 2021-11-20 RX ORDER — HEPARIN SODIUM (PORCINE) LOCK FLUSH IV SOLN 100 UNIT/ML 100 UNIT/ML
500 SOLUTION INTRAVENOUS PRN
OUTPATIENT
Start: 2021-11-20

## 2021-11-20 RX ORDER — ALBUTEROL SULFATE 90 UG/1
4 AEROSOL, METERED RESPIRATORY (INHALATION) PRN
OUTPATIENT
Start: 2021-11-20

## 2021-11-20 RX ORDER — SODIUM CHLORIDE 0.9 % (FLUSH) 0.9 %
5-40 SYRINGE (ML) INJECTION PRN
OUTPATIENT
Start: 2021-11-20

## 2021-11-20 RX ORDER — ONDANSETRON 2 MG/ML
8 INJECTION INTRAMUSCULAR; INTRAVENOUS
OUTPATIENT
Start: 2021-11-20

## 2021-11-20 RX ORDER — DIPHENHYDRAMINE HYDROCHLORIDE 50 MG/ML
50 INJECTION INTRAMUSCULAR; INTRAVENOUS
OUTPATIENT
Start: 2021-11-20

## 2021-11-20 RX ORDER — SODIUM CHLORIDE 9 MG/ML
INJECTION, SOLUTION INTRAVENOUS CONTINUOUS
OUTPATIENT
Start: 2021-11-20

## 2021-11-20 RX ORDER — SODIUM CHLORIDE 9 MG/ML
25 INJECTION, SOLUTION INTRAVENOUS PRN
OUTPATIENT
Start: 2021-11-20

## 2021-11-20 RX ORDER — ACETAMINOPHEN 325 MG/1
650 TABLET ORAL
OUTPATIENT
Start: 2021-11-20

## 2021-11-20 RX ADMIN — CASIRIVIMAB AND IMDEVIMAB: 600; 600 INJECTION, SOLUTION, CONCENTRATE INTRAVENOUS at 14:25

## 2021-11-26 DIAGNOSIS — I10 ESSENTIAL HYPERTENSION: ICD-10-CM

## 2021-11-29 RX ORDER — INDAPAMIDE 2.5 MG/1
TABLET, FILM COATED ORAL
Qty: 90 TABLET | Refills: 0 | Status: SHIPPED | OUTPATIENT
Start: 2021-11-29 | End: 2022-06-01

## 2021-12-13 ENCOUNTER — OFFICE VISIT (OUTPATIENT)
Dept: CARDIOLOGY CLINIC | Age: 66
End: 2021-12-13
Payer: MEDICARE

## 2021-12-13 VITALS
HEIGHT: 60 IN | HEART RATE: 78 BPM | BODY MASS INDEX: 39.34 KG/M2 | WEIGHT: 200.4 LBS | SYSTOLIC BLOOD PRESSURE: 134 MMHG | DIASTOLIC BLOOD PRESSURE: 88 MMHG

## 2021-12-13 DIAGNOSIS — I25.10 CORONARY ARTERY DISEASE INVOLVING NATIVE CORONARY ARTERY OF NATIVE HEART WITHOUT ANGINA PECTORIS: ICD-10-CM

## 2021-12-13 DIAGNOSIS — E78.01 FAMILIAL HYPERCHOLESTEROLEMIA: ICD-10-CM

## 2021-12-13 DIAGNOSIS — I10 ESSENTIAL HYPERTENSION: Primary | ICD-10-CM

## 2021-12-13 DIAGNOSIS — I34.0 NONRHEUMATIC MITRAL VALVE REGURGITATION: ICD-10-CM

## 2021-12-13 DIAGNOSIS — F41.9 ANXIETY: ICD-10-CM

## 2021-12-13 PROCEDURE — G8417 CALC BMI ABV UP PARAM F/U: HCPCS | Performed by: NUCLEAR MEDICINE

## 2021-12-13 PROCEDURE — 4040F PNEUMOC VAC/ADMIN/RCVD: CPT | Performed by: NUCLEAR MEDICINE

## 2021-12-13 PROCEDURE — 1123F ACP DISCUSS/DSCN MKR DOCD: CPT | Performed by: NUCLEAR MEDICINE

## 2021-12-13 PROCEDURE — G8399 PT W/DXA RESULTS DOCUMENT: HCPCS | Performed by: NUCLEAR MEDICINE

## 2021-12-13 PROCEDURE — 3017F COLORECTAL CA SCREEN DOC REV: CPT | Performed by: NUCLEAR MEDICINE

## 2021-12-13 PROCEDURE — 1036F TOBACCO NON-USER: CPT | Performed by: NUCLEAR MEDICINE

## 2021-12-13 PROCEDURE — 99214 OFFICE O/P EST MOD 30 MIN: CPT | Performed by: NUCLEAR MEDICINE

## 2021-12-13 PROCEDURE — G8427 DOCREV CUR MEDS BY ELIG CLIN: HCPCS | Performed by: NUCLEAR MEDICINE

## 2021-12-13 PROCEDURE — G8484 FLU IMMUNIZE NO ADMIN: HCPCS | Performed by: NUCLEAR MEDICINE

## 2021-12-13 PROCEDURE — 1090F PRES/ABSN URINE INCON ASSESS: CPT | Performed by: NUCLEAR MEDICINE

## 2021-12-13 PROCEDURE — 93000 ELECTROCARDIOGRAM COMPLETE: CPT | Performed by: NUCLEAR MEDICINE

## 2021-12-13 RX ORDER — ALPRAZOLAM 0.25 MG/1
0.25 TABLET ORAL NIGHTLY PRN
Qty: 30 TABLET | Refills: 0 | Status: SHIPPED | OUTPATIENT
Start: 2021-12-13 | End: 2022-01-12

## 2021-12-13 NOTE — TELEPHONE ENCOUNTER
Susan sent refill request on pts Xanax . 25 mg   30 tab    10/6/2021  4/6/2022        Refill pending to pharmacy

## 2021-12-13 NOTE — PROGRESS NOTES
[address] [address] Gloria [address]  [place] [address]  Dept: [phone]  Dept Fax: [phone]  Loc: [phone]    Visit Date: 12/13/2021    Martha Bernstein is a 74 y.o. female who presents todayfor:  Chief Complaint   Patient presents with    Check-Up    Cardiac Valve Problem    Hypertension   had what look like previous strokes  Seen [facility] neurology   Ended up with a linq  No other diagnosis  Known MR   Moderate MR in nature  Some baseline dyspnea  BP is stable  No dizziness  No syncope  Cath 2019   Moderate CAD  Treated medically   On statins for hyperlipidemia  No side effects       HPI:  HPI  Past Medical History:   Diagnosis Date    Acute arterial ischemic stroke, multifocal, anterior circulation (HCC)     Dr Isela Metz pox     Hyperlipidemia     Hypertension     Dr Consuelo Alcaraz    Hypothyroidism     Measles     Memory loss     Mumps     Uterine cancer ([address] 01/2020    Endometrioid carcinoma, [facility]      Past Surgical History:   Procedure Laterality Date    ENDOMETRIAL BIOPSY  12/11/2019    HYSTERECTOMY, TOTAL ABDOMINAL       Family History   Problem Relation Age of Onset    Arthritis Mother     Other Other         daughter with SVT and PE     Social History     Tobacco Use    Smoking status: Never Smoker    Smokeless tobacco: Never Used   Substance Use Topics    Alcohol use: Yes     Comment: socially      Current Outpatient Medications   Medication Sig Dispense Refill    buPROPion (WELLBUTRIN XL) 150 MG extended release tablet TAKE ONE TABLET DAILY IN THE MORNING, BY MOUTH. 90 tablet 3    indapamide (LOZOL) 2.5 MG tablet TAKE ONE TABLET DAILY IN THE MORNING, BY MOUTH. 90 tablet 0    losartan-hydroCHLOROthiazide (HYZAAR) 100-25 MG per tablet TAKE ONE TABLET DAILY, BY MOUTH. 90 tablet 3    PARoxetine (PAXIL) 40 MG tablet TAKE ONE TABLET DAILY, BY MOUTH.  90 tablet 3    levothyroxine (SYNTHROID) 200 MCG tablet TAKE 1 TABLET BY MOUTH ONCE DAILY, TAKE 1&1/2 TAB ON MONDAY EVERY OTHER WEEK. 93 tablet 0    metoprolol succinate (TOPROL XL) 25 MG extended release tablet TAKE ONE TABLET DAILY, BY MOUTH. 90 tablet 1    simvastatin (ZOCOR) 80 MG tablet TAKE ONE TABLET DAILY AT BEDTIME, BY MOUTH. 90 tablet 1    rivastigmine (EXELON) 4.6 MG/24HR APPLY ONE PATCH, DAILY AS DIRECTED. 30 patch 5    folic acid (FOLVITE) 1 MG tablet TAKE ONE TABLET DAILY, BY MOUTH. 90 tablet 3    Elastic Bandages & Supports (KNEE BRACE/HINGED L/XL) MISC 1 Units by Does not apply route daily 1 each 0    clopidogrel (PLAVIX) 75 MG tablet STOP if pt not taking this at home 90 tablet 3    CALCIUM CITRATE-VITAMIN D PO Take 1 tablet by mouth 2 times daily (with meals)      Glucosamine-Chondroit-Vit C-Mn (GLUCOSAMINE 1500 COMPLEX) CAPS Take 1 tablet by mouth daily (Patient taking differently: Take 1 tablet by mouth 2 times daily ) 90 capsule 3    aspirin 81 MG EC tablet Take 81 mg by mouth nightly      Multiple Vitamins-Minerals (MULTIVITAMIN & MINERAL PO) Take 1 tablet by mouth daily        No current facility-administered medications for this visit.      Allergies   Allergen Reactions    Donepezil      nightmares     Health Maintenance   Topic Date Due    Hepatitis C screen  Never done    DTaP/Tdap/Td vaccine (1 - Tdap) Never done    Colon cancer screen colonoscopy  Never done    Shingles Vaccine (1 of 2) Never done    COVID-19 Vaccine (3 - Booster for Pfizer series) 09/27/2021    Pneumococcal 65+ years Vaccine (2 of 2 - PPSV23) 10/05/2021    Lipid screen  04/05/2022    TSH testing  04/05/2022    Potassium monitoring  09/09/2022    Annual Wellness Visit (AWV)  10/07/2022    Creatinine monitoring  10/28/2022    Breast cancer screen  02/18/2023    Diabetes screen  04/05/2024    DEXA (modify frequency per FRAX score)  Completed    Flu vaccine  Completed    Hepatitis A vaccine  Aged Out    Hepatitis B vaccine Aged Out    Hib vaccine  Aged Out    Meningococcal (ACWY) vaccine  Aged Out       Subjective:  Review of Systems  General:   No fever, no chills, No fatigue or weight loss  Pulmonary:    some dyspnea, no wheezing  Cardiac:    Denies recent chest pain,   GI:     No nausea or vomiting, no abdominal pain  Neuro:    No dizziness or light headedness,   Musculoskeletal:  No recent active issues  Extremities:   No edema, no obvious claudication       Objective:  Physical Exam  /88   Pulse 78   Ht 5' (1.524 m)   Wt 200 lb 6.4 oz (90.9 kg)   BMI 39.14 kg/m²   General:   Well developed, well nourished  Lungs:   Clear to auscultation  Heart:    Normal S1 S2, Slight murmur. no rubs, no gallops  Abdomen:   Soft, non tender, no organomegalies, positive bowel sounds  Extremities:   No edema, no cyanosis, good peripheral pulses  Neurological:   Awake, alert, oriented. No obvious focal deficits  Musculoskelatal:  No obvious deformities    Assessment:      Diagnosis Orders   1. Essential hypertension  EKG 12 lead   2. Familial hypercholesterolemia  EKG 12 lead   3. Coronary artery disease involving native coronary artery of native heart without angina pectoris  EKG 12 lead   4. Nonrheumatic mitral valve regurgitation  EKG 12 lead   as above  Multiple chronic medical issues  Seems to be stable       Plan:  No follow-ups on file. As above  Cardiac fair for now   Continue risk factor modification and medical management  Thank you for allowing me to participate in the care of your patient. Please don't hesitate to contact me regarding any further issues related to the patient care    Orders Placed:  Orders Placed This Encounter   Procedures    EKG 12 lead     Order Specific Question:   Reason for Exam?     Answer: Other       Medications Prescribed:  No orders of the defined types were placed in this encounter. Discussed use, benefit, and side effects of prescribed medications. All patient questions answered.  Pt voicedunderstanding. Instructed to continue current medications, diet and exercise. Continue risk factor modification and medical management. Patient agreed with treatment plan. Follow up as directed.     Electronically signedby Nasim Park MD on 12/13/2021 at 12:26 PM

## 2022-04-04 PROBLEM — C54.9 MALIGNANT NEOPLASM OF BODY OF UTERUS, UNSPECIFIED SITE (HCC): Status: ACTIVE | Noted: 2022-04-04

## 2022-04-04 PROBLEM — D69.6 THROMBOCYTOPENIA (HCC): Status: ACTIVE | Noted: 2022-04-04

## 2022-04-04 PROBLEM — F33.42 RECURRENT MAJOR DEPRESSIVE DISORDER, IN FULL REMISSION (HCC): Status: ACTIVE | Noted: 2022-04-04

## 2022-04-05 NOTE — PROGRESS NOTES
[address] [place] [address] Jonathan Ng  Dept: [phone]  Dept Fax: [phone]  Loc: GEOVANNY Costa is a 74 y.o. female who presents today for:  Chief Complaint   Patient presents with    6 Month Follow-Up           HPI:     HPI    Hypertension: Patient here for follow-up of elevated blood pressure. She is not exercising and is adherent to low salt diet. Blood pressure is well controlled at home. Cardiac symptoms none. Patient denies chest pain, dyspnea and palpitations. Cardiovascular risk factors: dyslipidemia, hypertension, obesity (BMI >= 30 kg/m2) and sedentary lifestyle. Use of agents associated with hypertension: none. History of target organ damage: none. Hyperlipidemia: Patient presents with hyperlipidemia. She was tested because hypertension. Her last labs show   Lab Results   Component Value Date    CHOL 145 04/05/2021    CHOL 142 02/26/2020    CHOL 158 04/23/2019     Lab Results   Component Value Date    TRIG 115 04/05/2021    TRIG 107 02/26/2020    TRIG 72 04/23/2019     Lab Results   Component Value Date    HDL 63 04/05/2021    HDL 63 02/26/2020    HDL 76 04/23/2019     Lab Results   Component Value Date    LDLCALC 59 04/05/2021    LDLCALC 58 02/26/2020    LDLCALC 68 04/23/2019     Lab Results   Component Value Date    VLDL 17 10/13/2018    VLDL 19 10/09/2017    VLDL 18 05/13/2017     Lab Results   Component Value Date    CHOLHDLRATIO 0.9 10/13/2018    CHOLHDLRATIO 1 10/09/2017    CHOLHDLRATIO 2.14 05/13/2017     There is not a family history of hyperlipidemia. There is not a family history of early ischemia heart disease. Hypothyroidism: Patient presents for evaluation of thyroid function. Symptoms consist of fatigue, arthralgias. Symptoms have present for several years. The symptoms are fatigue. The problem has been gradually worsening.   Previous thyroid studies include TSH, free thyroxine index and triiodothyronine total. The hypothyroidism is due to hypothyroidism. Lab Results   Component Value Date    TSH 0.809 04/05/2021    K0UNTIG 96 10/27/2020    FT3 0.807 10/13/2018    T4FREE 1.48 10/27/2020       Depression: Patient complains of depression. She complains of anhedonia, depressed mood, difficulty concentrating and fatigue. Onset was approximately 2011, waxes and wanes since that time. She denies current suicidal and homicidal plan or intent. Family history significant for depression  In her daughter. Possible organic causes contributing are: none. 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 Previous treatment includes Paxil and no therapy. She complains of the following side effects from the treatment: none. Her daughter is complaining that she was crying every day and stressed out all the time. She would like a refill on xanax and is still taking wellbutrin with the paxil and doing much better. Her kids have been noticing more memory loss since her stroke. She is forgetting the short term memory issues like recent conversations and where she put things at home. Now in exelon at home. This is complicated by a history of multi-infarct CVA. Kids requesting increase. She is still working through residual slow thinking and delayed reactions after a stroke. She also has daily headaches that are new to her. Better with tylenol. She has just gone through total hysterectomy for uterine cancer in [place]. She is recovering well and oncology does not think she will need chemo or radiation. Follow [facility] scheduled [facility]. This is associated with more urinary leakage with coughing and sneezing and when overfull. Also more constipation. This is controlled well on current medications and she thinks may be a little better after hysterectomy.      Gout on 9/9/21 getting better with steroids and abx. Also taking tart cherry OTC which is helping prn. Wearing a loop recorder for afib that was noticed after hysterectomy surgery. Following with cardiology in [facility]. Bruising on the head for 2-3 days. More unsteady after CVA but she does not remember a fall. Reviewed chart forpast medical history , surgical history , allergies, social history , family history and medications. Health Maintenance   Topic Date Due    Hepatitis C screen  Never done    DTaP/Tdap/Td vaccine (1 - Tdap) Never done    Colorectal Cancer Screen  Never done    Shingles Vaccine (1 of 2) Never done    COVID-19 Vaccine (3 - Booster for Pfizer series) 08/27/2021    TSH testing  04/05/2022    Lipid screen  04/05/2022    Potassium monitoring  09/09/2022    Depression Monitoring  10/06/2022    Annual Wellness Visit (AWV)  10/07/2022    Creatinine monitoring  10/28/2022    Breast cancer screen  02/18/2023    Diabetes screen  04/05/2024    DEXA (modify frequency per FRAX score)  Completed    Flu vaccine  Completed    Pneumococcal 65+ years Vaccine  Completed    Hepatitis A vaccine  Aged Out    Hepatitis B vaccine  Aged Out    Hib vaccine  Aged Out    Meningococcal (ACWY) vaccine  Aged Out       Subjective:      Constitutional:Negative for fever, chills, diaphoresis, activity change, appetite change and fatigue. HENT: Negative for hearing loss, ear pain, congestion, sore throat, rhinorrhea, postnasal drip and ear discharge. Eyes: Negative for photophobia and visual disturbance. Respiratory: Negative for cough, chest tightness, shortness of breath and wheezing. Cardiovascular: Negative for chest pain and leg swelling. Gastrointestinal: Negative for nausea, vomiting, abdominal pain, diarrhea and constipation. Genitourinary: Negative for dysuria, urgency and frequency. Neurological: Negative for weakness, light-headedness and headaches.    Psychiatric/Behavioral: Negative for sleep disturbance.      :     Vitals:    04/06/22 0917   BP: 124/82   Site: Left Upper Arm   Position: Sitting   Cuff Size: Large Adult   Pulse: 62   Resp: 16   Temp: 97.4 °F (36.3 °C)   TempSrc: Temporal   SpO2: 95%   Weight: 189 lb (85.7 kg)     Wt Readings from Last 3 Encounters:   04/06/22 189 lb (85.7 kg)   12/13/21 200 lb 6.4 oz (90.9 kg)   10/28/21 202 lb (91.6 kg)       Physical Exam  Constitutional: Vital signs are normal. She appears well-developed and well-nourished. She is active. HENT:   Head: Normocephalic and atraumatic. Right Ear: Tympanic membrane, external ear and ear canal normal. No drainage or tenderness. Left Ear: Tympanic membrane, external ear and ear canal normal. No drainage or tenderness. Nose: Nose normal. No mucosal edema or rhinorrhea. Mouth/Throat: Uvula is midline, oropharynx is clear and moist and mucous membranes are normal. Mucous membranes are not pale. Normal dentition. No posterior oropharyngeal edema or posterior oropharyngeal erythema. Eyes: Lids are normal. Right eye exhibits no chemosis and no discharge. Left eye exhibits no chemosis and no drainage. Right conjunctiva has no hemorrhage. Left conjunctiva has no hemorrhage. Right eye exhibits normal extraocular motion. Left eye exhibits normal extraocular motion. Right pupil is round and reactive. Left pupil is round and reactive. Pupils are equal.   Cardiovascular: Normal rate, regular rhythm, S1 normal, S2 normal and normal heart sounds. Exam reveals no gallop. 3/6 systolic murmur heard. Pulmonary/Chest: Effort normal and breath sounds normal. No respiratory distress. She has no wheezes. She has no rhonchi. She has no rales. Abdominal: Soft. Normal appearance and bowel sounds are normal. She exhibits no distension and no mass. There is no hepatosplenomegaly. No tenderness. She has no rigidity, no rebound and no guarding. No hernia. Musculoskeletal:        Right lower leg: She exhibits no edema. Left lower leg: She exhibits no edema. Neurological: She is alert. Assessment/Plan   Kateryna Peralta was seen today for 6 month follow-up. Diagnoses and all orders for this visit:    Essential hypertension    Recurrent major depressive disorder, in full remission ([facility] [address]  -     buPROPion (WELLBUTRIN XL) 300 MG extended release tablet; 1 PO QD    Thrombocytopenia (HCC)  -     CBC; Future    Malignant neoplasm of body of uterus, unspecified site [facility])    Pure hypercholesterolemia  -     Comprehensive Metabolic Panel, Fasting; Future  -     Lipid Panel; Future    Acquired hypothyroidism  -     TSH with Reflex; Future    Osteopenia, unspecified location    Memory loss  -     rivastigmine (EXELON) 9.5 MG/24HR; Place 1 patch onto the skin daily    Morbidly obese (HCC)    Hypernatremia    Cerebral multi-infarct state    Anxiety    Dyspnea on exertion  -     External Referral To Physical Therapy    Unsteady gait  -     External Referral To Physical Therapy    Falls frequently  -     External Referral To Physical Therapy    Other orders  -     PNEUMOVAX 23 subcutaneous/IM (Pneumococcal polysaccharide vaccine 23-valent >= 1yo)    increase wellbutrin   Continue healthy diet and exercise  Counseling through journaling or formal counseling    Make arrangement for funds and monetary issues as well as medical POA papers. move to assisted living if necessary. do memory exercises and physical exercises to preserve all possible faculties as long as possible. Discussed use, benefit, and side effectsof prescribed medications. All patient questions answered. Pt voiced understanding. Reviewed health maintenance. Instructed to continue current medications, diet and exercise. Patient agreed with treatment plan. Followup as directed.      Over 50 minutes spent with patient with >50% spent in counseling and coordination of care    Electronically signed by Lin Bolanos MD

## 2022-04-06 ENCOUNTER — NURSE ONLY (OUTPATIENT)
Dept: LAB | Age: 67
End: 2022-04-06

## 2022-04-06 ENCOUNTER — OFFICE VISIT (OUTPATIENT)
Dept: FAMILY MEDICINE CLINIC | Age: 67
End: 2022-04-06
Payer: MEDICARE

## 2022-04-06 VITALS
OXYGEN SATURATION: 95 % | HEART RATE: 62 BPM | DIASTOLIC BLOOD PRESSURE: 82 MMHG | SYSTOLIC BLOOD PRESSURE: 124 MMHG | WEIGHT: 189 LBS | BODY MASS INDEX: 36.91 KG/M2 | RESPIRATION RATE: 16 BRPM | TEMPERATURE: 97.4 F

## 2022-04-06 DIAGNOSIS — I69.30 CEREBRAL MULTI-INFARCT STATE: ICD-10-CM

## 2022-04-06 DIAGNOSIS — D69.6 THROMBOCYTOPENIA (HCC): ICD-10-CM

## 2022-04-06 DIAGNOSIS — E03.9 ACQUIRED HYPOTHYROIDISM: ICD-10-CM

## 2022-04-06 DIAGNOSIS — F33.42 RECURRENT MAJOR DEPRESSIVE DISORDER, IN FULL REMISSION (HCC): ICD-10-CM

## 2022-04-06 DIAGNOSIS — R41.3 MEMORY LOSS: ICD-10-CM

## 2022-04-06 DIAGNOSIS — R26.81 UNSTEADY GAIT: ICD-10-CM

## 2022-04-06 DIAGNOSIS — E66.01 MORBIDLY OBESE (HCC): ICD-10-CM

## 2022-04-06 DIAGNOSIS — R29.6 FALLS FREQUENTLY: ICD-10-CM

## 2022-04-06 DIAGNOSIS — I10 ESSENTIAL HYPERTENSION: Primary | ICD-10-CM

## 2022-04-06 DIAGNOSIS — R06.09 DYSPNEA ON EXERTION: ICD-10-CM

## 2022-04-06 DIAGNOSIS — E78.00 PURE HYPERCHOLESTEROLEMIA: ICD-10-CM

## 2022-04-06 DIAGNOSIS — M85.80 OSTEOPENIA, UNSPECIFIED LOCATION: ICD-10-CM

## 2022-04-06 DIAGNOSIS — E87.0 HYPERNATREMIA: ICD-10-CM

## 2022-04-06 DIAGNOSIS — F41.9 ANXIETY: ICD-10-CM

## 2022-04-06 DIAGNOSIS — C54.9 MALIGNANT NEOPLASM OF BODY OF UTERUS, UNSPECIFIED SITE (HCC): ICD-10-CM

## 2022-04-06 PROCEDURE — 1123F ACP DISCUSS/DSCN MKR DOCD: CPT | Performed by: FAMILY MEDICINE

## 2022-04-06 PROCEDURE — 90732 PPSV23 VACC 2 YRS+ SUBQ/IM: CPT | Performed by: FAMILY MEDICINE

## 2022-04-06 PROCEDURE — 99215 OFFICE O/P EST HI 40 MIN: CPT | Performed by: FAMILY MEDICINE

## 2022-04-06 PROCEDURE — 1036F TOBACCO NON-USER: CPT | Performed by: FAMILY MEDICINE

## 2022-04-06 PROCEDURE — G8399 PT W/DXA RESULTS DOCUMENT: HCPCS | Performed by: FAMILY MEDICINE

## 2022-04-06 PROCEDURE — 1090F PRES/ABSN URINE INCON ASSESS: CPT | Performed by: FAMILY MEDICINE

## 2022-04-06 PROCEDURE — G8417 CALC BMI ABV UP PARAM F/U: HCPCS | Performed by: FAMILY MEDICINE

## 2022-04-06 PROCEDURE — G0009 ADMIN PNEUMOCOCCAL VACCINE: HCPCS | Performed by: FAMILY MEDICINE

## 2022-04-06 PROCEDURE — 4040F PNEUMOC VAC/ADMIN/RCVD: CPT | Performed by: FAMILY MEDICINE

## 2022-04-06 PROCEDURE — G8427 DOCREV CUR MEDS BY ELIG CLIN: HCPCS | Performed by: FAMILY MEDICINE

## 2022-04-06 PROCEDURE — 3017F COLORECTAL CA SCREEN DOC REV: CPT | Performed by: FAMILY MEDICINE

## 2022-04-06 RX ORDER — BUPROPION HYDROCHLORIDE 300 MG/1
TABLET ORAL
Qty: 90 TABLET | Refills: 3 | Status: SHIPPED | OUTPATIENT
Start: 2022-04-06

## 2022-04-06 RX ORDER — RIVASTIGMINE 9.5 MG/24H
1 PATCH, EXTENDED RELEASE TRANSDERMAL DAILY
Qty: 90 PATCH | Refills: 3 | Status: SHIPPED | OUTPATIENT
Start: 2022-04-06

## 2022-04-06 NOTE — PROGRESS NOTES
Immunizations Administered     Name Date Dose Route    Pneumococcal Polysaccharide ([id number]) 4/6/2022 0.5 mL Intramuscular    Site: Deltoid- Left    Lot: [id number]    NDC: 5743-1908-25          VIS GIVEN. CONSENT SIGNED  PATIENT TOLERATED WELL. ABN SIGNED.

## 2022-04-07 ENCOUNTER — TELEPHONE (OUTPATIENT)
Dept: FAMILY MEDICINE CLINIC | Age: 67
End: 2022-04-07

## 2022-04-07 DIAGNOSIS — D69.6 THROMBOCYTOPENIA (HCC): Primary | ICD-10-CM

## 2022-04-07 LAB
ALBUMIN SERPL-MCNC: 4.2 G/DL (ref 3.5–5.1)
ALP BLD-CCNC: 97 U/L (ref 38–126)
ALT SERPL-CCNC: 23 U/L (ref 11–66)
ANION GAP SERPL CALCULATED.3IONS-SCNC: 11 MEQ/L (ref 8–16)
AST SERPL-CCNC: 29 U/L (ref 5–40)
BILIRUB SERPL-MCNC: 0.3 MG/DL (ref 0.3–1.2)
BUN BLDV-MCNC: 19 MG/DL (ref 7–22)
CALCIUM SERPL-MCNC: 10.5 MG/DL (ref 8.5–10.5)
CHLORIDE BLD-SCNC: 101 MEQ/L (ref 98–111)
CHOLESTEROL, TOTAL: 152 MG/DL (ref 100–199)
CO2: 30 MEQ/L (ref 23–33)
CREAT SERPL-MCNC: 0.9 MG/DL (ref 0.4–1.2)
ERYTHROCYTE [DISTWIDTH] IN BLOOD BY AUTOMATED COUNT: 11.7 % (ref 11.5–14.5)
ERYTHROCYTE [DISTWIDTH] IN BLOOD BY AUTOMATED COUNT: 43.2 FL (ref 35–45)
GFR SERPL CREATININE-BSD FRML MDRD: 62 ML/MIN/1.73M2
GLUCOSE FASTING: 79 MG/DL (ref 70–108)
HCT VFR BLD CALC: 42.9 % (ref 37–47)
HDLC SERPL-MCNC: 69 MG/DL
HEMOGLOBIN: 14 GM/DL (ref 12–16)
LDL CHOLESTEROL CALCULATED: 60 MG/DL
MCH RBC QN AUTO: 33.1 PG (ref 26–33)
MCHC RBC AUTO-ENTMCNC: 32.6 GM/DL (ref 32.2–35.5)
MCV RBC AUTO: 101.4 FL (ref 81–99)
PATHOLOGIST REVIEW: ABNORMAL
PLATELET # BLD: 43 THOU/MM3 (ref 130–400)
PMV BLD AUTO: 14.1 FL (ref 9.4–12.4)
POTASSIUM SERPL-SCNC: 4.2 MEQ/L (ref 3.5–5.2)
RBC # BLD: 4.23 MILL/MM3 (ref 4.2–5.4)
SCAN OF BLOOD SMEAR: NORMAL
SODIUM BLD-SCNC: 142 MEQ/L (ref 135–145)
TOTAL PROTEIN: 7.6 G/DL (ref 6.1–8)
TRIGL SERPL-MCNC: 115 MG/DL (ref 0–199)
TSH SERPL DL<=0.05 MIU/L-ACNC: 1.73 UIU/ML (ref 0.4–4.2)
WBC # BLD: 5.8 THOU/MM3 (ref 4.8–10.8)

## 2022-04-07 NOTE — TELEPHONE ENCOUNTER
Labs look good except the platelets low  This can be from the brusing  Repeat cbc in 2 weeks  Call patient

## 2022-04-19 ENCOUNTER — NURSE ONLY (OUTPATIENT)
Dept: LAB | Age: 67
End: 2022-04-19

## 2022-04-19 DIAGNOSIS — E78.00 PURE HYPERCHOLESTEROLEMIA: ICD-10-CM

## 2022-04-19 DIAGNOSIS — E03.9 ACQUIRED HYPOTHYROIDISM: ICD-10-CM

## 2022-04-19 DIAGNOSIS — I10 ESSENTIAL HYPERTENSION: ICD-10-CM

## 2022-04-19 LAB
ERYTHROCYTE [DISTWIDTH] IN BLOOD BY AUTOMATED COUNT: 12 % (ref 11.5–14.5)
ERYTHROCYTE [DISTWIDTH] IN BLOOD BY AUTOMATED COUNT: 44.5 FL (ref 35–45)
HCT VFR BLD CALC: 40.5 % (ref 37–47)
HEMOGLOBIN: 13.3 GM/DL (ref 12–16)
MCH RBC QN AUTO: 33.4 PG (ref 26–33)
MCHC RBC AUTO-ENTMCNC: 32.8 GM/DL (ref 32.2–35.5)
MCV RBC AUTO: 101.8 FL (ref 81–99)
PLATELET # BLD: 46 THOU/MM3 (ref 130–400)
PMV BLD AUTO: 12.9 FL (ref 9.4–12.4)
RBC # BLD: 3.98 MILL/MM3 (ref 4.2–5.4)
WBC # BLD: 6.3 THOU/MM3 (ref 4.8–10.8)

## 2022-04-20 ENCOUNTER — TELEPHONE (OUTPATIENT)
Dept: FAMILY MEDICINE CLINIC | Age: 67
End: 2022-04-20

## 2022-04-20 DIAGNOSIS — N18.32 STAGE 3B CHRONIC KIDNEY DISEASE (HCC): Primary | ICD-10-CM

## 2022-04-20 DIAGNOSIS — D69.6 THROMBOCYTOPENIA (HCC): ICD-10-CM

## 2022-04-20 LAB
ALBUMIN SERPL-MCNC: 4.3 G/DL (ref 3.5–5.1)
ALP BLD-CCNC: 86 U/L (ref 38–126)
ALT SERPL-CCNC: 22 U/L (ref 11–66)
ANION GAP SERPL CALCULATED.3IONS-SCNC: 11 MEQ/L (ref 8–16)
AST SERPL-CCNC: 27 U/L (ref 5–40)
BILIRUB SERPL-MCNC: 0.5 MG/DL (ref 0.3–1.2)
BUN BLDV-MCNC: 24 MG/DL (ref 7–22)
CALCIUM SERPL-MCNC: 9.7 MG/DL (ref 8.5–10.5)
CHLORIDE BLD-SCNC: 100 MEQ/L (ref 98–111)
CHOLESTEROL, TOTAL: 167 MG/DL (ref 100–199)
CO2: 31 MEQ/L (ref 23–33)
CREAT SERPL-MCNC: 1.1 MG/DL (ref 0.4–1.2)
GFR SERPL CREATININE-BSD FRML MDRD: 49 ML/MIN/1.73M2
GLUCOSE FASTING: 84 MG/DL (ref 70–108)
HDLC SERPL-MCNC: 74 MG/DL
LDL CHOLESTEROL CALCULATED: 79 MG/DL
POTASSIUM SERPL-SCNC: 4.1 MEQ/L (ref 3.5–5.2)
SODIUM BLD-SCNC: 142 MEQ/L (ref 135–145)
TOTAL PROTEIN: 7 G/DL (ref 6.1–8)
TRIGL SERPL-MCNC: 71 MG/DL (ref 0–199)
TSH SERPL DL<=0.05 MIU/L-ACNC: 1.7 UIU/ML (ref 0.4–4.2)

## 2022-04-20 NOTE — TELEPHONE ENCOUNTER
Referral entered    Fax results to oncology for the low platelet count  Call later to make sure the oncologist saw it

## 2022-04-20 NOTE — TELEPHONE ENCOUNTER
Pt does not see a nephrologist and is okay to see one at [facility].    Pt sees oncologist next month but doesn't know the exact date off hand    states he sees her every 3 months

## 2022-04-20 NOTE — TELEPHONE ENCOUNTER
Labs look good except the kidney function is lower and the platelets are lower    Does she see nephrology? When is she scheduled with oncology next?     .call

## 2022-05-14 DIAGNOSIS — E03.9 ACQUIRED HYPOTHYROIDISM: ICD-10-CM

## 2022-05-16 RX ORDER — LEVOTHYROXINE SODIUM 0.2 MG/1
TABLET ORAL
Qty: 93 TABLET | Refills: 0 | Status: SHIPPED | OUTPATIENT
Start: 2022-05-16

## 2022-05-19 ENCOUNTER — TELEPHONE (OUTPATIENT)
Dept: FAMILY MEDICINE CLINIC | Age: 67
End: 2022-05-19

## 2022-05-19 NOTE — TELEPHONE ENCOUNTER
1900  Report received from Kandis Wells 83 Assessment performed as documented. 2030 Patient resting in bed. 2110 Requesting scheduled pain medication. 2122 Scheduled Oxycontin administered. Patient reports that he has pain in his abdominal area. He states that he hopes it helps the pain and helps him sleep. He also reports poor appetite. He tried to eat a few bites of his dinner that he didn't eat earlier, but then decided he didn't want it. Nurse offered yady crackers, ice cream, pudding or applescauce but he declined. 2220  Patient sleeping in left side. Appears comfortable. Will continue to monitor. 2300  Patient sleeping on back. Neutral facial expression. 2355  Patient sleeping on left side. Facial expression is relaxed. 0050  Patient sleeping on left side. Appears to be comfortable. Will continue to monitor. 0145  Patient sleeping with relaxed facial expression. 0240 Patient sleeping on left side. Appears comfortable. 8661  Patient sleeping. Relaxed facial expression. 7974 Patient requesting pain medication due to pain rated 6-7 on pain scale. Pain is located in abdomen. 0419  PRN Dilaudid 4mg provided. Patient chose his menu items for today. 0425  Patient ambulating to bathroom. Gait is steady. 0430 Patient back to bed.    0515 Patient sleeping with relaxed facial expression. 0252  Patient sleeping quietly on left side. Appears comfortable. 0700 Report given to BHARATH Wells.     NAME OF PATIENT:  Jaci Ramirez    LEVEL OF CARE:  [facility]    REASON FOR GIP:   Pain, despite numerous changes in medications and Stabilizing treatment that cannot take place at home    *PATIENT REMAINS ELIGIBLE FOR GIP LEVEL OF CARE AS EVIDENCED BY: (MUST BE ADDRESSED OF PATIENT GIP)  Patient is requiring frequent nursing assessments to monitor symptoms.       REASON FOR RESPITE:  N/A    O2 SAFETY:  N/A    FALL INTERVENTIONS PROVIDED:   Implemented/recommended use of non-skid footwear, Patient states a few months back she received a letter regarding a heart rate monitor she has at home. Patient was wearing monitor at night. Patient states she thought the letter said to stop using the monitor. Patient states today she received a call from the company asking why they have not been receiving any heart rate readings. Company instructed patient to contact her doctor. Patient is confused as to whether or not she should be wearing this monitor or not still. Asked patient who prescribed the monitor (cardiology/PCP)  and she could not remember. Also asked patient if she had the letter still and patient states she does not believe so. Implemented/recommended environmental changes (remove hazards, lower bed, improve lighting, etc.) and Implemented/recommended increased supervision/assistance    INTERDISPLINARY COMMUNICATION/COLLABORATION:  Physician, MSW, [place] and RN, CNA    NEW MEDICATION INITIATION DOCUMENTATION:  N/A    Reason medication is being initiated:  N/A    MD / Provider name consulted re: change in status / initiation of new medication:  N/A    New Symptom(s):  N/A    New Order(s):  N/A    Name of the person notified of the changes:  N/A    Name of person being taught:  N/A    Instructions given:  N/A    Side Effects taught:  N/A    Response to teaching:  N/A      COMFORTABLE DYING MEASURE:  Is Patient/family satisfied with symptom level?  yes    DISCHARGE PLAN:  Patient will discharge home with his daughter or his sister.

## 2022-05-19 NOTE — TELEPHONE ENCOUNTER
----- Message from Milka Benja sent at 5/19/2022 11:48 AM EDT -----  Subject: Message to Provider    QUESTIONS  Information for Provider? Patient called in stating that she received a   letter about her heart monitor and she thought it meant she should not be   using. But now received a letter that she is not being monitored. She   would like to know if she should still be sleeping with this or not. please call patient   ---------------------------------------------------------------------------  --------------  CALL BACK INFO  What is the best way for the office to contact you? OK to leave message on   voicemail  Preferred Call Back Phone Number? [phone]  ---------------------------------------------------------------------------  --------------  SCRIPT ANSWERS  Relationship to Patient?  Self

## 2022-05-22 DIAGNOSIS — I69.30 CEREBRAL MULTI-INFARCT STATE: ICD-10-CM

## 2022-05-23 RX ORDER — FOLIC ACID 1 MG/1
TABLET ORAL
Qty: 90 TABLET | Refills: 0 | Status: SHIPPED | OUTPATIENT
Start: 2022-05-23

## 2022-05-24 ENCOUNTER — OFFICE VISIT (OUTPATIENT)
Dept: NEPHROLOGY | Age: 67
End: 2022-05-24
Payer: MEDICARE

## 2022-05-24 ENCOUNTER — HOSPITAL ENCOUNTER (OUTPATIENT)
Dept: WOMENS IMAGING | Age: 67
Discharge: HOME | End: 2022-05-24
Payer: MEDICARE

## 2022-05-24 VITALS
DIASTOLIC BLOOD PRESSURE: 82 MMHG | SYSTOLIC BLOOD PRESSURE: 129 MMHG | BODY MASS INDEX: 35.66 KG/M2 | WEIGHT: 182.6 LBS | HEART RATE: 63 BPM | OXYGEN SATURATION: 96 %

## 2022-05-24 DIAGNOSIS — D69.6 THROMBOCYTOPENIA (HCC): ICD-10-CM

## 2022-05-24 DIAGNOSIS — I10 PRIMARY HYPERTENSION: ICD-10-CM

## 2022-05-24 DIAGNOSIS — Z12.31 VISIT FOR SCREENING MAMMOGRAM: ICD-10-CM

## 2022-05-24 DIAGNOSIS — E03.9 ACQUIRED HYPOTHYROIDISM: ICD-10-CM

## 2022-05-24 DIAGNOSIS — N18.31 STAGE 3A CHRONIC KIDNEY DISEASE (HCC): Primary | ICD-10-CM

## 2022-05-24 DIAGNOSIS — E78.5 DYSLIPIDEMIA: ICD-10-CM

## 2022-05-24 PROBLEM — N18.30 CHRONIC RENAL DISEASE, STAGE III (HCC): Status: ACTIVE | Noted: 2022-05-24

## 2022-05-24 PROCEDURE — 3017F COLORECTAL CA SCREEN DOC REV: CPT | Performed by: INTERNAL MEDICINE

## 2022-05-24 PROCEDURE — G8427 DOCREV CUR MEDS BY ELIG CLIN: HCPCS | Performed by: INTERNAL MEDICINE

## 2022-05-24 PROCEDURE — G8417 CALC BMI ABV UP PARAM F/U: HCPCS | Performed by: INTERNAL MEDICINE

## 2022-05-24 PROCEDURE — 1036F TOBACCO NON-USER: CPT | Performed by: INTERNAL MEDICINE

## 2022-05-24 PROCEDURE — G8399 PT W/DXA RESULTS DOCUMENT: HCPCS | Performed by: INTERNAL MEDICINE

## 2022-05-24 PROCEDURE — 1090F PRES/ABSN URINE INCON ASSESS: CPT | Performed by: INTERNAL MEDICINE

## 2022-05-24 PROCEDURE — 99203 OFFICE O/P NEW LOW 30 MIN: CPT | Performed by: INTERNAL MEDICINE

## 2022-05-24 PROCEDURE — 1123F ACP DISCUSS/DSCN MKR DOCD: CPT | Performed by: INTERNAL MEDICINE

## 2022-05-24 PROCEDURE — 77063 BREAST TOMOSYNTHESIS BI: CPT

## 2022-05-24 RX ORDER — FERROUS SULFATE 325(65) MG
325 TABLET ORAL DAILY
COMMUNITY

## 2022-05-24 NOTE — PATIENT INSTRUCTIONS
Drugs to Avoid with Chronic Kidney Disease    Non-steroidal anti-inflammatory drugs (NSAIDS)    Potential complication and side effects of NSAIDS include:   Kidney injury and worsening kidney function    Risk of stomach ulcer and intestinal bleeding   Fluid retention and edema   Increased Blood Pressure    Non-Steroidal Anti-Inflammatory Drugs     Celecoxib (Celebrex)   Choline and magnesium salicylates (CMT, Trisosal, Trilisate)   Choline salicylate (Arthropan)   Diclofenac (Voltaren, Arthrotec, Cataflam, Cambia, Voltaren-XR, Zipsor)   Diflunisal (Dolobid)   Etodolac (Lodine XL, Lodine)   Famotidine + Ibuprofen (Duexis)   Fenoprofen (Nalfon, Nalfon 200)   Furbiprofen (Asaid)   Ibuprofen (Advil, Motrin, Midol, Nuprin, Genpril, Dolgesic,Profen,, Vicoprofen,Combunox, Actiprofen, Addaprin, Caldolor, Haltran, Q-Profen,Ibren, Menadol, Rufen,Saleto-200, Matthew,Ultraprin, Uni-Pro,Wal-Profen)   Indomethacin (Indocin, Indomethegan, Indo-Edwall)    Ketoprofen (Orudis  KT, Oruvail, Actron)   Ketorolac (Toradol, Sprix)   Magnesium Sulfate (Arthritab, Chayito Select, Taz's pills, Angelo, Mobidin, Mobogesic)   Meclofenamate Sodium (Ponstel)   Meloxicam (Mobic)   Naproxen (Aleve, Anaprox, Naprosyn, EC-Naprosyn, Naprelan, All Day Pain Relief, Aflaxen, Anaprox-DS, Midol Extended Relief, Naprelan,Prevacid NapraPac, Naprapac, Vimovo)   Nabumetone (Relafen)   Oxaprozin (Daypro)   Piroxicam (Feldene)   Rofecoxib (Vioxx)   Salsalate (Amigesic, Anaflex 750, Disalcid, Marthritic, Mono-gesic, Salflex, Salsitab)   Sodium salicylate (various generics)   Sulindac (Clinoril)   Tolmetin (Tolectin)   Valdecoxib (Bextra)   Mefenamic Acid (Ponstel)   Famotidine and Ibuprofen (Duexis) but not famotidine by itself   Meclofenamate (Meclomen)    Antibiotics   Bactrim   Gentamycin   Tobramycin   Vancomycin   Tetracycline   Macrobid     Other                  IV contrast dye

## 2022-05-24 NOTE — PROGRESS NOTES
Nephrology Consult Note  Patient's Name: Uday Montenegro  9:01 AM  5/24/2022    Nephrologist: Landen Rubalcava    Reason for Consult: Decreased EGFR. Requesting Physician:  No att. providers found  PCP: Jayson Hartman MD    Chief Complaint: My blood test is bad  Assessment   Diagnosis Orders   1. Stage 3a chronic kidney disease (HCC)  Basic Metabolic Panel    C3 Complement    C4 Complement    Kappa/Lambda Quantitative Free Light Chains, Serum    Protein / creatinine ratio, urine    Vitamin D 25 Hydroxy    PTH, Intact    US RENAL LIMITED   2. Acquired hypothyroidism     3. Primary hypertension     4. Dyslipidemia     5. Thrombocytopenia ([address]           Plan    1. I discussed my thoughts with the patient and spouse. 2. They understood. 3. I addressed their questions  4. She has  stage IIIa chronic kidney disease that seems to be mostly prerenal from volume contraction possibly induced by the hydrochlorothiazide component of the losartan with hydrochlorothiazide. However, with her history of her uterine cancer possibility of other etiologies cannot be ruled out particularly membranous nephropathy which is not uncommon in people with solid tumor. Obstructive uropathy is also in the differential diagnosis. 5. We will check markers of chronicity. 6. Random urine protein creatinine ratio. 7. Kidney ultrasound. 8. No nonsteroidal anti-inflammatory drugs. 9. List provided to the patient. 10. Kappa with lambda free light chain assay ratio. 11. Serologies. 12. We will continue hydrochlorothiazide since the benefit outweighs the slight decline in kidney function. However, may consider changing that if the kidney function worsens in the future. I discussed that with the patient and spouse. 13. Return visit in 4 weeks with labs.       History Obtained From:  Patient and electronic medical record  History of Present Ilness:    Uday Montenegro is a 69 y.o. female with history of hypertension, cerebrovascular accident, dyslipidemia, hypothyroidism among other multiple medical entities who was referred to our office because of reduced GFR. Patient had labs done on 19 April 2022 that revealed a GFR of 49 mL/min. On 6 April 2022 it was 62 mL/min. As a result she was asked to see us. She denies taking any nonsteroidal anti-inflammatory drugs such as ibuprofen. She is however on losartan with hydrochlorothiazide for high blood pressure. No difficulties with urination. No chest pain or shortness of breath. He denies any difficulties with urination. No fever or chills. No headaches. Past Medical History:   Diagnosis Date    Acute arterial ischemic stroke, multifocal, anterior circulation (HCC)     Dr Zhang Xiong Anemia     Dr Gasca Feeling pox     Hyperlipidemia     Hypertension     Dr Yazmin Lopez    Hypothyroidism     Measles     Memory loss     Mumps     [facility] 01/2020    Endometrioid carcinoma, Amira Barry       Past Surgical History:   Procedure Laterality Date    ENDOMETRIAL BIOPSY  12/11/2019    HYSTERECTOMY, TOTAL ABDOMINAL         Family History   Problem Relation Age of Onset    Arthritis Mother     Other Other         daughter with SVT and PE        reports that she has never smoked. She has never used smokeless tobacco. She reports current alcohol use. She reports that she does not use drugs. Allergies:  Donepezil    Current Medications:    Current Outpatient Medications   Medication Sig Dispense Refill    ferrous sulfate (IRON 325) 325 (65 Fe) MG tablet Take 650 mg by mouth three times a week      folic acid (FOLVITE) 1 MG tablet TAKE ONE TABLET DAILY, BY MOUTH. 90 tablet 0    levothyroxine (SYNTHROID) 200 MCG tablet TAKE 1 TABLET BY MOUTH ONCE DAILY, TAKE 1&1/2 TAB ON MONDAY EVERY OTHER WEEK.  93 tablet 0    rivastigmine (EXELON) 9.5 MG/24HR Place 1 patch onto the skin daily 90 patch 3    buPROPion (WELLBUTRIN XL) 300 MG extended release tablet 1 PO QD 90 tablet 3    indapamide (LOZOL) 2.5 MG tablet TAKE ONE TABLET DAILY IN THE MORNING, BY MOUTH. 90 tablet 0    losartan-hydroCHLOROthiazide (HYZAAR) 100-25 MG per tablet TAKE ONE TABLET DAILY, BY MOUTH. 90 tablet 3    PARoxetine (PAXIL) 40 MG tablet TAKE ONE TABLET DAILY, BY MOUTH. 90 tablet 3    metoprolol succinate (TOPROL XL) 25 MG extended release tablet TAKE ONE TABLET DAILY, BY MOUTH. 90 tablet 1    simvastatin (ZOCOR) 80 MG tablet TAKE ONE TABLET DAILY AT BEDTIME, BY MOUTH. 90 tablet 1    rivastigmine (EXELON) 4.6 MG/24HR APPLY ONE PATCH, DAILY AS DIRECTED. 30 patch 5    clopidogrel (PLAVIX) 75 MG tablet STOP if pt not taking this at home 90 tablet 3    CALCIUM CITRATE-VITAMIN D PO Take 1 tablet by mouth 2 times daily (with meals)      Glucosamine-Chondroit-Vit C-Mn (GLUCOSAMINE 1500 COMPLEX) CAPS Take 1 tablet by mouth daily (Patient taking differently: Take 1 tablet by mouth 2 times daily ) 90 capsule 3    aspirin 81 MG EC tablet Take 81 mg by mouth nightly      Multiple Vitamins-Minerals (MULTIVITAMIN & MINERAL PO) Take 1 tablet by mouth daily        No current facility-administered medications for this visit. No current facility-administered medications for this visit. Review of Systems:   Review of Systems   Pertinent positives stated above in HPI. All other systems were reviewed and were negative. ROS: As in HPI    Physical exam:   Physical Exam   Constitutional: Well-developed elderly lady awake and alert in no distress  Vitals:  Vitals:    05/24/22 0842   BP: 129/82   Pulse: 63   SpO2: 96%       Skin: no rash, turgor is normal  Heent: Pupils are reactive to light. Throat is clear.   Oral mucosa is moist  Neck: no bruits or jvd noted   Cardiovascular:  S1, S2 without murmur   Respiratory: Clear with no wheezes,rhonchi or rales   Abdomen: soft,non tender,good bowel sound and no palpable mass  Ext: No lower extremity edema  Psychiatric: mood and affect appropriate  Musculoskeletal: Rom, muscular strength intact   CNS: Very awake and very alert. Well-oriented. Normal speech. Good motor strength. No obvious focal deficit. Data:   Labs:  Lab Results   Component Value Date     04/19/2022     04/06/2022     09/09/2021    K 4.1 04/19/2022    K 4.2 04/06/2022    K 4.1 09/09/2021     04/19/2022    CO2 31 04/19/2022    CO2 30 04/06/2022    CO2 30 09/09/2021    CREATININE 1.1 04/19/2022    CREATININE 0.9 04/06/2022    CREATININE 0.9 10/28/2021    BUN 24 (H) 04/19/2022    BUN 19 04/06/2022    BUN 16 09/09/2021    GLUCOSE 105 09/09/2021    GLUCOSE 84 03/29/2021    GLUCOSE 96 02/26/2020    PHOS 2.9 10/13/2018    PHOS 3.5 05/12/2018    PHOS 3.5 05/14/2016    WBC 6.3 04/19/2022    WBC 5.8 04/06/2022    WBC 8.7 09/09/2021    HGB 13.3 04/19/2022    HGB 14.0 04/06/2022    HGB 13.8 09/09/2021    HCT 40.5 04/19/2022    HCT 42.9 04/06/2022    HCT 41.9 09/09/2021    .8 (H) 04/19/2022    PLT 46 (L) 04/19/2022     {Labs reviewed    Imaging:  CXR results: Thank you Dr. Sorin Ibanez MD for allowing us to participate in care of Etienne Torres   **This report has been created using voice recognition software. It maycontain minor  errors which are inherent in voice recognition technology. **    Electronically signed by Lori Martinez MD on 5/24/2022 at 9:01 AM

## 2022-05-25 ENCOUNTER — TELEPHONE (OUTPATIENT)
Dept: NEPHROLOGY | Age: 67
End: 2022-05-25

## 2022-05-25 ENCOUNTER — HOSPITAL ENCOUNTER (OUTPATIENT)
Dept: ULTRASOUND IMAGING | Age: 67
Discharge: HOME | End: 2022-05-25
Payer: MEDICARE

## 2022-05-25 DIAGNOSIS — N18.31 STAGE 3A CHRONIC KIDNEY DISEASE (HCC): ICD-10-CM

## 2022-05-25 PROCEDURE — 76770 US EXAM ABDO BACK WALL COMP: CPT

## 2022-06-01 DIAGNOSIS — I10 ESSENTIAL HYPERTENSION: ICD-10-CM

## 2022-06-01 RX ORDER — INDAPAMIDE 2.5 MG/1
TABLET, FILM COATED ORAL
Qty: 90 TABLET | Refills: 0 | Status: SHIPPED | OUTPATIENT
Start: 2022-06-01 | End: 2022-06-16 | Stop reason: SDUPTHER

## 2022-06-15 NOTE — PROGRESS NOTES
[address]  [address] [place] [address]  Phone:  [phone]          Name: Opal Hernandez  : 1955    Chief Complaint   Patient presents with    Rash     Arms bilateral x 1 day, itchy       HPI:     Opal Hernandez is a 69 y.o. female who presents today for evaluation of a rash on her arms since yesterday. She hadn't been in her yard for about a week. It's spread some and itches. No new lotions, soaps, detergents, etc.  No new foods. Current Outpatient Medications:     Pramoxine-Calamine (CALADRYL EX), Apply topically, Disp: , Rfl:     indapamide (LOZOL) 2.5 MG tablet, Take 1 tab daily. , Disp: 90 tablet, Rfl: 0    predniSONE (DELTASONE) 20 MG tablet, Take 2 tablets by mouth daily, Disp: 10 tablet, Rfl: 0    ferrous sulfate (IRON 325) 325 (65 Fe) MG tablet, Take 325 mg by mouth daily , Disp: , Rfl:     folic acid (FOLVITE) 1 MG tablet, TAKE ONE TABLET DAILY, BY MOUTH., Disp: 90 tablet, Rfl: 0    levothyroxine (SYNTHROID) 200 MCG tablet, TAKE 1 TABLET BY MOUTH ONCE DAILY, TAKE 1&1/2 TAB ON MONDAY EVERY OTHER WEEK., Disp: 93 tablet, Rfl: 0    rivastigmine (EXELON) 9.5 MG/24HR, Place 1 patch onto the skin daily, Disp: 90 patch, Rfl: 3    buPROPion (WELLBUTRIN XL) 300 MG extended release tablet, 1 PO QD, Disp: 90 tablet, Rfl: 3    losartan-hydroCHLOROthiazide (HYZAAR) 100-25 MG per tablet, TAKE ONE TABLET DAILY, BY MOUTH., Disp: 90 tablet, Rfl: 3    PARoxetine (PAXIL) 40 MG tablet, TAKE ONE TABLET DAILY, BY MOUTH., Disp: 90 tablet, Rfl: 3    metoprolol succinate (TOPROL XL) 25 MG extended release tablet, TAKE ONE TABLET DAILY, BY MOUTH., Disp: 90 tablet, Rfl: 1    simvastatin (ZOCOR) 80 MG tablet, TAKE ONE TABLET DAILY AT BEDTIME, BY MOUTH., Disp: 90 tablet, Rfl: 1    clopidogrel (PLAVIX) 75 MG tablet, STOP if pt not taking this at home, Disp: 90 tablet, Rfl: 3    CALCIUM CITRATE-VITAMIN D PO, Take 1 tablet by mouth 2 times daily (with meals), Disp: , Rfl:    daily. Return if symptoms worsen or fail to improve.     Electronically signed by Urszula Chavarria MD on 6/16/2022 at 12:12 PM

## 2022-06-16 ENCOUNTER — OFFICE VISIT (OUTPATIENT)
Dept: FAMILY MEDICINE CLINIC | Age: 67
End: 2022-06-16
Payer: MEDICARE

## 2022-06-16 VITALS
HEIGHT: 60 IN | HEART RATE: 73 BPM | BODY MASS INDEX: 36.36 KG/M2 | TEMPERATURE: 98 F | SYSTOLIC BLOOD PRESSURE: 120 MMHG | OXYGEN SATURATION: 96 % | DIASTOLIC BLOOD PRESSURE: 72 MMHG | RESPIRATION RATE: 16 BRPM | WEIGHT: 185.2 LBS

## 2022-06-16 DIAGNOSIS — I10 ESSENTIAL HYPERTENSION: ICD-10-CM

## 2022-06-16 DIAGNOSIS — L24.7 IRRITANT CONTACT DERMATITIS DUE TO PLANTS, EXCEPT FOOD: Primary | ICD-10-CM

## 2022-06-16 PROCEDURE — G8417 CALC BMI ABV UP PARAM F/U: HCPCS | Performed by: FAMILY MEDICINE

## 2022-06-16 PROCEDURE — 1036F TOBACCO NON-USER: CPT | Performed by: FAMILY MEDICINE

## 2022-06-16 PROCEDURE — 99213 OFFICE O/P EST LOW 20 MIN: CPT | Performed by: FAMILY MEDICINE

## 2022-06-16 PROCEDURE — 1090F PRES/ABSN URINE INCON ASSESS: CPT | Performed by: FAMILY MEDICINE

## 2022-06-16 PROCEDURE — G8427 DOCREV CUR MEDS BY ELIG CLIN: HCPCS | Performed by: FAMILY MEDICINE

## 2022-06-16 PROCEDURE — 1123F ACP DISCUSS/DSCN MKR DOCD: CPT | Performed by: FAMILY MEDICINE

## 2022-06-16 PROCEDURE — G8399 PT W/DXA RESULTS DOCUMENT: HCPCS | Performed by: FAMILY MEDICINE

## 2022-06-16 PROCEDURE — 3017F COLORECTAL CA SCREEN DOC REV: CPT | Performed by: FAMILY MEDICINE

## 2022-06-16 RX ORDER — INDAPAMIDE 2.5 MG/1
TABLET, FILM COATED ORAL
Qty: 90 TABLET | Refills: 0 | Status: SHIPPED | OUTPATIENT
Start: 2022-06-16

## 2022-06-16 RX ORDER — PREDNISONE 20 MG/1
40 TABLET ORAL DAILY
Qty: 10 TABLET | Refills: 0 | Status: SHIPPED | OUTPATIENT
Start: 2022-06-16 | End: 2022-06-21

## 2022-06-16 ASSESSMENT — PATIENT HEALTH QUESTIONNAIRE - PHQ9
SUM OF ALL RESPONSES TO PHQ QUESTIONS 1-9: 3
2. FEELING DOWN, DEPRESSED OR HOPELESS: 1
6. FEELING BAD ABOUT YOURSELF - OR THAT YOU ARE A FAILURE OR HAVE LET YOURSELF OR YOUR FAMILY DOWN: 0
7. TROUBLE CONCENTRATING ON THINGS, SUCH AS READING THE NEWSPAPER OR WATCHING TELEVISION: 0
SUM OF ALL RESPONSES TO PHQ QUESTIONS 1-9: 3
5. POOR APPETITE OR OVEREATING: 2
1. LITTLE INTEREST OR PLEASURE IN DOING THINGS: 0
9. THOUGHTS THAT YOU WOULD BE BETTER OFF DEAD, OR OF HURTING YOURSELF: 0
10. IF YOU CHECKED OFF ANY PROBLEMS, HOW DIFFICULT HAVE THESE PROBLEMS MADE IT FOR YOU TO DO YOUR WORK, TAKE CARE OF THINGS AT HOME, OR GET ALONG WITH OTHER PEOPLE: 0
SUM OF ALL RESPONSES TO PHQ QUESTIONS 1-9: 3
SUM OF ALL RESPONSES TO PHQ9 QUESTIONS 1 & 2: 1
8. MOVING OR SPEAKING SO SLOWLY THAT OTHER PEOPLE COULD HAVE NOTICED. OR THE OPPOSITE, BEING SO FIGETY OR RESTLESS THAT YOU HAVE BEEN MOVING AROUND A LOT MORE THAN USUAL: 0
SUM OF ALL RESPONSES TO PHQ QUESTIONS 1-9: 3
3. TROUBLE FALLING OR STAYING ASLEEP: 0
4. FEELING TIRED OR HAVING LITTLE ENERGY: 0

## 2022-06-16 ASSESSMENT — ENCOUNTER SYMPTOMS: COLOR CHANGE: 1

## 2022-06-20 ENCOUNTER — TELEPHONE (OUTPATIENT)
Dept: FAMILY MEDICINE CLINIC | Age: 67
End: 2022-06-20

## 2022-06-20 NOTE — TELEPHONE ENCOUNTER
Pt calling for another round of medication for her rash. She is leaving for vacation soon and does not want to deal with it while gone. She finished her medication prescribed and said it got a little better but the rash is still there, never went away, and there is a long way to go for it to be clear. [facility] pharmacy.

## 2022-06-21 RX ORDER — PREDNISONE 10 MG/1
TABLET ORAL
Qty: 32 TABLET | Refills: 0 | Status: SHIPPED | OUTPATIENT
Start: 2022-06-21 | End: 2022-07-27

## 2022-07-14 DIAGNOSIS — E78.00 PURE HYPERCHOLESTEROLEMIA: ICD-10-CM

## 2022-07-15 RX ORDER — SIMVASTATIN 80 MG
TABLET ORAL
Qty: 90 TABLET | Refills: 0 | Status: SHIPPED | OUTPATIENT
Start: 2022-07-15

## 2022-07-25 ENCOUNTER — NURSE ONLY (OUTPATIENT)
Dept: LAB | Age: 67
End: 2022-07-25

## 2022-07-25 DIAGNOSIS — D69.6 THROMBOCYTOPENIA (HCC): ICD-10-CM

## 2022-07-25 DIAGNOSIS — E55.9 VITAMIN D DEFICIENCY: ICD-10-CM

## 2022-07-25 DIAGNOSIS — N18.31 STAGE 3A CHRONIC KIDNEY DISEASE (HCC): Primary | ICD-10-CM

## 2022-07-25 DIAGNOSIS — N18.31 STAGE 3A CHRONIC KIDNEY DISEASE (HCC): ICD-10-CM

## 2022-07-25 LAB
ERYTHROCYTE [DISTWIDTH] IN BLOOD BY AUTOMATED COUNT: 12.8 % (ref 11.5–14.5)
ERYTHROCYTE [DISTWIDTH] IN BLOOD BY AUTOMATED COUNT: 48.3 FL (ref 35–45)
HCT VFR BLD CALC: 41.3 % (ref 37–47)
HEMOGLOBIN: 13.3 GM/DL (ref 12–16)
MCH RBC QN AUTO: 33.1 PG (ref 26–33)
MCHC RBC AUTO-ENTMCNC: 32.2 GM/DL (ref 32.2–35.5)
MCV RBC AUTO: 102.7 FL (ref 81–99)
PLATELET # BLD: 62 THOU/MM3 (ref 130–400)
PMV BLD AUTO: 13.8 FL (ref 9.4–12.4)
RBC # BLD: 4.02 MILL/MM3 (ref 4.2–5.4)
WBC # BLD: 6.3 THOU/MM3 (ref 4.8–10.8)

## 2022-07-26 ENCOUNTER — TELEPHONE (OUTPATIENT)
Dept: FAMILY MEDICINE CLINIC | Age: 67
End: 2022-07-26

## 2022-07-26 DIAGNOSIS — D69.6 THROMBOCYTOPENIA (HCC): Primary | ICD-10-CM

## 2022-07-26 DIAGNOSIS — D69.6 TEMPORARY LOW PLATELET COUNT (HCC): ICD-10-CM

## 2022-07-26 LAB
ANION GAP SERPL CALCULATED.3IONS-SCNC: 14 MEQ/L (ref 8–16)
BUN BLDV-MCNC: 18 MG/DL (ref 7–22)
C3 COMPLEMENT: 129 MG/DL (ref 90–180)
CALCIUM SERPL-MCNC: 9.6 MG/DL (ref 8.5–10.5)
CHLORIDE BLD-SCNC: 104 MEQ/L (ref 98–111)
CO2: 26 MEQ/L (ref 23–33)
COMPLEMENT C4: 22 MG/DL (ref 10–40)
CREAT SERPL-MCNC: 1.1 MG/DL (ref 0.4–1.2)
CREATININE URINE: 81.7 MG/DL
GFR SERPL CREATININE-BSD FRML MDRD: 49 ML/MIN/1.73M2
GLUCOSE BLD-MCNC: 81 MG/DL (ref 70–108)
POTASSIUM SERPL-SCNC: 3.9 MEQ/L (ref 3.5–5.2)
PROT/CREAT RATIO, UR: 0.08
PROTEIN, URINE: 6.7 MG/DL
PTH INTACT: 32.9 PG/ML (ref 15–65)
SODIUM BLD-SCNC: 144 MEQ/L (ref 135–145)
VITAMIN D 25-HYDROXY: 45 NG/ML (ref 30–100)

## 2022-07-26 NOTE — TELEPHONE ENCOUNTER
Cbc ordered per notes.
How Severe Is Your Skin Lesion?: mild
Has Your Skin Lesion Been Treated?: not been treated
Is This A New Presentation, Or A Follow-Up?: Skin Lesion

## 2022-07-27 ENCOUNTER — OFFICE VISIT (OUTPATIENT)
Dept: NEPHROLOGY | Age: 67
End: 2022-07-27
Payer: MEDICARE

## 2022-07-27 VITALS
SYSTOLIC BLOOD PRESSURE: 124 MMHG | DIASTOLIC BLOOD PRESSURE: 68 MMHG | BODY MASS INDEX: 36.91 KG/M2 | WEIGHT: 189 LBS | OXYGEN SATURATION: 95 % | HEART RATE: 66 BPM

## 2022-07-27 DIAGNOSIS — E78.5 DYSLIPIDEMIA: ICD-10-CM

## 2022-07-27 DIAGNOSIS — I10 PRIMARY HYPERTENSION: ICD-10-CM

## 2022-07-27 DIAGNOSIS — E03.9 ACQUIRED HYPOTHYROIDISM: ICD-10-CM

## 2022-07-27 DIAGNOSIS — E55.9 VITAMIN D DEFICIENCY: ICD-10-CM

## 2022-07-27 DIAGNOSIS — N18.31 STAGE 3A CHRONIC KIDNEY DISEASE (HCC): Primary | ICD-10-CM

## 2022-07-27 LAB — KAPPA/LAMBDA FREE LIGHT CHAINS: NORMAL

## 2022-07-27 PROCEDURE — 1123F ACP DISCUSS/DSCN MKR DOCD: CPT | Performed by: INTERNAL MEDICINE

## 2022-07-27 PROCEDURE — G8417 CALC BMI ABV UP PARAM F/U: HCPCS | Performed by: INTERNAL MEDICINE

## 2022-07-27 PROCEDURE — G8427 DOCREV CUR MEDS BY ELIG CLIN: HCPCS | Performed by: INTERNAL MEDICINE

## 2022-07-27 PROCEDURE — 1036F TOBACCO NON-USER: CPT | Performed by: INTERNAL MEDICINE

## 2022-07-27 PROCEDURE — 1090F PRES/ABSN URINE INCON ASSESS: CPT | Performed by: INTERNAL MEDICINE

## 2022-07-27 PROCEDURE — 99213 OFFICE O/P EST LOW 20 MIN: CPT | Performed by: INTERNAL MEDICINE

## 2022-07-27 PROCEDURE — G8399 PT W/DXA RESULTS DOCUMENT: HCPCS | Performed by: INTERNAL MEDICINE

## 2022-07-27 PROCEDURE — 3017F COLORECTAL CA SCREEN DOC REV: CPT | Performed by: INTERNAL MEDICINE

## 2022-07-27 NOTE — PROGRESS NOTES
Renal Progress Note    Assessment and Plan:      Diagnosis Orders   1. Stage 3a chronic kidney disease ([facility]        2. Primary hypertension        3. Acquired hypothyroidism        4. Vitamin D deficiency        5. Dyslipidemia                  PLAN:  Lab results reviewed the patient and spouse. They understood. They had no questions. Serum creatinine is stable at 1.1 mg/dL with a GFR of 49 mL/min  Ultrasound is normal with no hydronephrosis  Complements are normal  Urine protein creatinine ratio is 80 mg  Kappa with lambda free light assay ratio still pending  Medications reviewed  No changes  Continue to avoid nonsteroidal anti-inflammatory drugs  List provided to the patient          . Patient Active Problem List   Diagnosis    Depression    Hypothyroid    Hyperlipidemia    Hypertension    Abnormal findings on diagnostic imaging of other specified body structures    Dyspnea on exertion    Morbidly obese (HCC)    Osteopenia of multiple sites    Fall at home, initial encounter    Senile osteoporosis    COVID    Recurrent major depressive disorder, in full remission ([facility]    Thrombocytopenia ([facility]    Malignant neoplasm of body of uterus, unspecified site [facility])    Chronic renal disease, stage III ([address] [[id number]]           Subjective:   Chief complaint:  Chief Complaint   Patient presents with    Chronic Kidney Disease     Stage IIIa      HPI:This is a follow up visit for Ms. Leobardo Gomez here today for return appointment. I saw her in May 2022 for reduced kidney function. Doing well since then with no complaint. No chest pain. No shortness of breath. Appetite is good. No nausea or vomiting. No difficulties with urination. .    ROS:  Pertinent positives stated above in HPI. All other systems were reviewed and were negative. Medications:     Current Outpatient Medications   Medication Sig Dispense Refill    simvastatin (ZOCOR) 80 MG tablet TAKE ONE TABLET DAILY AT BEDTIME, BY MOUTH.  90 tablet 0 Pramoxine-Calamine (CALADRYL EX) Apply topically      indapamide (LOZOL) 2.5 MG tablet Take 1 tab daily. 90 tablet 0    ferrous sulfate (IRON 325) 325 (65 Fe) MG tablet Take 325 mg by mouth daily       folic acid (FOLVITE) 1 MG tablet TAKE ONE TABLET DAILY, BY MOUTH. 90 tablet 0    levothyroxine (SYNTHROID) 200 MCG tablet TAKE 1 TABLET BY MOUTH ONCE DAILY, TAKE 1&1/2 TAB ON MONDAY EVERY OTHER WEEK. 93 tablet 0    rivastigmine (EXELON) 9.5 MG/24HR Place 1 patch onto the skin daily 90 patch 3    buPROPion (WELLBUTRIN XL) 300 MG extended release tablet 1 PO QD 90 tablet 3    losartan-hydroCHLOROthiazide (HYZAAR) 100-25 MG per tablet TAKE ONE TABLET DAILY, BY MOUTH. 90 tablet 3    PARoxetine (PAXIL) 40 MG tablet TAKE ONE TABLET DAILY, BY MOUTH. 90 tablet 3    metoprolol succinate (TOPROL XL) 25 MG extended release tablet TAKE ONE TABLET DAILY, BY MOUTH. 90 tablet 1    clopidogrel (PLAVIX) 75 MG tablet STOP if pt not taking this at home 90 tablet 3    CALCIUM CITRATE-VITAMIN D PO Take 1 tablet by mouth 2 times daily (with meals)      Glucosamine-Chondroit-Vit C-Mn (GLUCOSAMINE 1500 COMPLEX) CAPS Take 1 tablet by mouth daily (Patient taking differently: Take 1 tablet by mouth 2 times daily) 90 capsule 3    aspirin 81 MG EC tablet Take 81 mg by mouth nightly      Multiple Vitamins-Minerals (MULTIVITAMIN & MINERAL PO) Take 1 tablet by mouth daily        No current facility-administered medications for this visit.        Lab Results:    CBC:   Lab Results   Component Value Date    WBC 6.3 07/25/2022    HGB 13.3 07/25/2022    HCT 41.3 07/25/2022    .7 (H) 07/25/2022    PLT 62 (L) 07/25/2022     BMP:    Lab Results   Component Value Date     07/25/2022     04/19/2022     04/06/2022    K 3.9 07/25/2022    K 4.1 04/19/2022    K 4.2 04/06/2022     07/25/2022     04/19/2022     04/06/2022    CO2 26 07/25/2022    CO2 31 04/19/2022    CO2 30 04/06/2022    BUN 18 07/25/2022    BUN 24 (H) 04/19/2022    BUN 19 04/06/2022    CREATININE 1.1 07/25/2022    CREATININE 1.1 04/19/2022    CREATININE 0.9 04/06/2022    GLUCOSE 81 07/25/2022    GLUCOSE 105 09/09/2021    GLUCOSE 84 03/29/2021      Hepatic:   Lab Results   Component Value Date    AST 27 04/19/2022    AST 29 04/06/2022    AST 20 09/09/2021    ALT 22 04/19/2022    ALT 23 04/06/2022    ALT 25 09/09/2021    BILITOT 0.5 04/19/2022    BILITOT 0.3 04/06/2022    BILITOT 0.5 09/09/2021    ALKPHOS 86 04/19/2022    ALKPHOS 97 04/06/2022    ALKPHOS 119 (H) 09/09/2021     BNP: No results found for: BNP  Lipids:   Lab Results   Component Value Date    CHOL 167 04/19/2022    HDL 74 04/19/2022     INR:   Lab Results   Component Value Date    INR 0.90 07/01/2019    INR 0.91 05/10/2019     URINE:   Lab Results   Component Value Date/Time    PROTUR 6.7 07/25/2022 10:37 AM     No results found for: NITRU, COLORU, PHUR, LABCAST, WBCUA, RBCUA, MUCUS, TRICHOMONAS, YEAST, BACTERIA, CLARITYU, SPECGRAV, LEUKOCYTESUR, UROBILINOGEN, BILIRUBINUR, BLOODU, GLUCOSEU, KETUA, AMORPHOUS   Microalbumen/Creatinine ratio:  No components found for: RUCREAT    Objective:   Vitals: /68 (Site: Right Upper Arm, Position: Sitting, Cuff Size: Large Adult)   Pulse 66   Wt 189 lb (85.7 kg)   SpO2 95%   BMI 36.91 kg/m²      Constitutional:  Alert, awake, no apparent distress  Skin:normal with no rash or any significant lesions  HEENT:Pupils are reactive . Throat is clear. Oral mucosa is moist.  Neck:supple with no thyromegaly, JVD, lymphadenopathy or bruit   Cardiovascular: Regular sinus rhythm without murmur, rubs or gallops   Respiratory:  Clear to auscultation with no wheezes or rales  Abdomen: Good bowel sound, soft, non tender and no bruit  Ext: No LE edema  Musculoskeletal:Intact  Neuro:Alert, awake and oriented with no obvious focal deficit.   Speech is normal.    Electronically signed by Lucie Mckinney MD on 7/27/2022 at 12:51 PM   **This report has been created using voice recognition software. It maycontain minor  errors which are inherent in voice recognition technology. **

## 2022-07-27 NOTE — PATIENT INSTRUCTIONS
Drugs to Avoid with Chronic Kidney Disease    Non-steroidal anti-inflammatory drugs (NSAIDS)    Potential complication and side effects of NSAIDS include:  Kidney injury and worsening kidney function   Risk of stomach ulcer and intestinal bleeding  Fluid retention and edema  Increased Blood Pressure    Non-Steroidal Anti-Inflammatory Drugs    Celecoxib (Celebrex)  Choline and magnesium salicylates (CMT, Trisosal, Trilisate)  Choline salicylate (Arthropan)  Diclofenac (Voltaren, Arthrotec, Cataflam, Cambia, Voltaren-XR, Zipsor)  Diflunisal (Dolobid)  Etodolac (Lodine XL, Lodine)  Famotidine + Ibuprofen (Duexis)  Fenoprofen (Nalfon, Nalfon 200)  Furbiprofen (Asaid)  Ibuprofen (Advil, Motrin, Midol, Nuprin, Genpril, Dolgesic,Profen,, Vicoprofen,Combunox, Actiprofen, Addaprin, Caldolor, Haltran, Q-Profen,Ibren, Menadol, Rufen,Saleto-200, [place],Ultraprin, Uni-Pro,Wal-Profen)  Indomethacin (Indocin, Indomethegan, Indo-Ko)   Ketoprofen (Orudis  KT, Oruvail, Actron)  Ketorolac (Toradol, Sprix)  Magnesium Sulfate (Arthritab, Chayito Select, Taz's pills, Angelo, Mobidin, Mobogesic)  Meclofenamate Sodium (Ponstel)  Meloxicam (Mobic)  Naproxen (Aleve, Anaprox, Naprosyn, EC-Naprosyn, Naprelan, All Day Pain Relief, Aflaxen, Anaprox-DS, Midol Extended Relief, Naprelan,Prevacid NapraPac, Naprapac, Vimovo)  Nabumetone (Relafen)  Oxaprozin (Daypro)  Piroxicam (Feldene)  Rofecoxib (Vioxx)  Salsalate (Amigesic, Anaflex 750, Disalcid, Marthritic, Mono-gesic, Salflex, Salsitab)  Sodium salicylate (various generics)  Sulindac (Clinoril)  Tolmetin (Tolectin)  Valdecoxib (Bextra)  Mefenamic Acid (Ponstel)  Famotidine and Ibuprofen (Duexis) but not famotidine by itself  Meclofenamate (Meclomen)    Antibiotics  Bactrim  Gentamycin  Tobramycin  Vancomycin  Tetracycline  Macrobid    Other                  IV contrast dye

## 2022-08-08 DIAGNOSIS — I10 ESSENTIAL HYPERTENSION: ICD-10-CM

## 2022-08-08 RX ORDER — METOPROLOL SUCCINATE 25 MG/1
TABLET, EXTENDED RELEASE ORAL
Qty: 90 TABLET | Refills: 3 | Status: SHIPPED | OUTPATIENT
Start: 2022-08-08

## 2022-08-10 DIAGNOSIS — F41.9 ANXIETY: Primary | ICD-10-CM

## 2022-08-10 RX ORDER — ALPRAZOLAM 0.25 MG/1
0.25 TABLET ORAL NIGHTLY PRN
COMMUNITY
End: 2022-08-10 | Stop reason: SDUPTHER

## 2022-08-10 RX ORDER — ALPRAZOLAM 0.25 MG/1
0.25 TABLET ORAL NIGHTLY PRN
Qty: 30 TABLET | Refills: 1 | Status: SHIPPED | OUTPATIENT
Start: 2022-08-10 | End: 2022-10-09

## 2022-10-04 ENCOUNTER — HOSPITAL ENCOUNTER (OUTPATIENT)
Dept: ULTRASOUND IMAGING | Age: 67
Discharge: HOME | End: 2022-10-04
Payer: MEDICARE

## 2022-10-04 DIAGNOSIS — R16.1 SPLENOMEGALY: ICD-10-CM

## 2022-10-04 PROCEDURE — 76705 ECHO EXAM OF ABDOMEN: CPT

## 2022-10-11 NOTE — PROGRESS NOTES
[address] [place] [address] Jonathan Ng  Dept: [phone]  Dept Fax: [phone]  Loc: GEOVANNY Costa is a 69 y.o. female who presents today for:  No chief complaint on file. HPI:     HPI    Hypertension: Patient here for follow-up of elevated blood pressure. She is not exercising and is adherent to low salt diet. Blood pressure is well controlled at home. Cardiac symptoms none. Patient denies chest pain, dyspnea and palpitations. Cardiovascular risk factors: dyslipidemia, hypertension, obesity (BMI >= 30 kg/m2) and sedentary lifestyle. Use of agents associated with hypertension: none. History of target organ damage: none. Hyperlipidemia: Patient presents with hyperlipidemia. She was tested because hypertension. Her last labs show   Lab Results   Component Value Date    CHOL 167 04/19/2022    CHOL 152 04/06/2022    CHOL 145 04/05/2021     Lab Results   Component Value Date    TRIG 71 04/19/2022    TRIG 115 04/06/2022    TRIG 115 04/05/2021     Lab Results   Component Value Date    HDL 74 04/19/2022    HDL 69 04/06/2022    HDL 63 04/05/2021     Lab Results   Component Value Date    LDLCALC 79 04/19/2022    LDLCALC 60 04/06/2022    LDLCALC 59 04/05/2021     Lab Results   Component Value Date    VLDL 17 10/13/2018    VLDL 19 10/09/2017    VLDL 18 05/13/2017     Lab Results   Component Value Date    CHOLHDLRATIO 0.9 10/13/2018    CHOLHDLRATIO 1 10/09/2017    CHOLHDLRATIO 2.14 05/13/2017     There is not a family history of hyperlipidemia. There is not a family history of early ischemia heart disease. Hypothyroidism: Patient presents for evaluation of thyroid function. Symptoms consist of fatigue, arthralgias. Symptoms have present for several years. The symptoms are fatigue. The problem has been gradually worsening.   Previous thyroid studies include TSH, free thyroxine index and triiodothyronine total. The hypothyroidism is due to hypothyroidism. Lab Results   Component Value Date    TSH 1.700 04/19/2022    [id number] 96 10/27/2020    FT3 0.807 10/13/2018    T4FREE 1.48 10/27/2020       Depression: Patient complains of depression. She complains of anhedonia, depressed mood, difficulty concentrating and fatigue. Onset was approximately 2011, waxes and wanes since that time. She denies current suicidal and homicidal plan or intent. Family history significant for depression  In her daughter. Possible organic causes contributing are: none. 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 Previous treatment includes Paxil and no therapy. She complains of the following side effects from the treatment: none. Her daughter is complaining that she was crying every day and stressed out all the time. She would like a refill on xanax and is still taking wellbutrin with the paxil and doing much better. ***    Her kids have been noticing more memory loss since her stroke. She is forgetting the short term memory issues like recent conversations and where she put things at home. Now in exelon at home. This is complicated by a history of multi-infarct CVA. Kids requesting increase. ***    She is still working through residual slow thinking and delayed reactions after a stroke. She also has daily headaches that are new to her. Better with tylenol. ***    She has just gone through total hysterectomy for uterine cancer in [facility]. She is recovering well and oncology does not think she will need chemo or radiation. Follow up scheduled at the [facility] regularly. ***     This is associated with more urinary leakage with coughing and sneezing and when overfull. Also more constipation. This is controlled well on current medications and she thinks may be a little better after hysterectomy.   ***    Gout on 9/9/21 getting better with steroids and abx. Also taking tart cherry OTC which is helping prn.  ***    Wearing a loop recorder for afib that was noticed after hysterectomy surgery. Following [place]ology in [place]. ***    More unsteady after CVA . ***          Reviewed chart forpast medical history , surgical history , allergies, social history , family history and medications. Health Maintenance   Topic Date Due    Hepatitis C screen  Never done    DTaP/Tdap/Td vaccine (1 - Tdap) Never done    Colorectal Cancer Screen  Never done    Shingles vaccine (1 of 2) Never done    COVID-19 Vaccine (3 - Booster for Pfizer series) 08/27/2021    Flu vaccine (1) 08/01/2022    Annual Wellness Visit (AWV)  10/07/2022    Lipids  04/19/2023    Depression Monitoring  06/16/2023    Breast cancer screen  05/24/2024    DEXA (modify frequency per FRAX score)  Completed    Pneumococcal 65+ years Vaccine  Completed    Hepatitis A vaccine  Aged Out    Hib vaccine  Aged Out    Meningococcal (ACWY) vaccine  Aged Out       Subjective:      Constitutional:Negative for fever, chills, diaphoresis, activity change, appetite change and fatigue. HENT: Negative for hearing loss, ear pain, congestion, sore throat, rhinorrhea, postnasal drip and ear discharge. Eyes: Negative for photophobia and visual disturbance. Respiratory: Negative for cough, chest tightness, shortness of breath and wheezing. Cardiovascular: Negative for chest pain and leg swelling. Gastrointestinal: Negative for nausea, vomiting, abdominal pain, diarrhea and constipation. Genitourinary: Negative for dysuria, urgency and frequency. Neurological: Negative for weakness, light-headedness and headaches. Psychiatric/Behavioral: Negative for sleep disturbance.      :     There were no vitals filed for this visit.   Wt Readings from Last 3 Encounters:   07/27/22 189 lb (85.7 kg)   06/16/22 185 lb 3.2 oz (84 kg)   05/24/22 182 lb 9.6 oz (82.8 kg)       Physical Exam        Assessment/Plan   Diagnoses and all orders for this visit:    Essential hypertension    Pure hypercholesterolemia    Acquired hypothyroidism    Malignant neoplasm of body of uterus, unspecified site ([facility]    Morbidly obese ([facility]    Unsteady gait    Falls frequently    Osteopenia, unspecified location    Stage 3b chronic kidney disease ([facility]    Thrombocytopenia ([facility]    Hypernatremia    History of gout    Memory loss    Recurrent major depressive disorder, in full remission [facility]    Cerebral multi-infarct state    Anxiety    Stage 3a chronic kidney disease ([facility]          Reccommended tobacco cessation options including pharmacologicmethods, counseled great than 3 minutes during this visit:  Yes  []  No  []    Patient given educational materials - see patient instructions. Discussed use, benefit, and side effectsof prescribed medications. All patient questions answered. Pt voiced understanding. Reviewed health maintenance. Instructed to continue current medications, diet and exercise. Patient agreed with treatment plan. Followup as directed.      Electronically signed by Monserrat Camarena MD

## 2022-10-12 ENCOUNTER — OFFICE VISIT (OUTPATIENT)
Dept: FAMILY MEDICINE CLINIC | Age: 67
End: 2022-10-12
Payer: MEDICARE

## 2022-10-12 VITALS
SYSTOLIC BLOOD PRESSURE: 136 MMHG | WEIGHT: 188 LBS | OXYGEN SATURATION: 95 % | DIASTOLIC BLOOD PRESSURE: 84 MMHG | RESPIRATION RATE: 16 BRPM | HEART RATE: 56 BPM | HEIGHT: 60 IN | BODY MASS INDEX: 36.91 KG/M2 | TEMPERATURE: 97.1 F

## 2022-10-12 DIAGNOSIS — I10 ESSENTIAL HYPERTENSION: ICD-10-CM

## 2022-10-12 DIAGNOSIS — Z87.39 HISTORY OF GOUT: ICD-10-CM

## 2022-10-12 DIAGNOSIS — C54.9 MALIGNANT NEOPLASM OF BODY OF UTERUS, UNSPECIFIED SITE (HCC): ICD-10-CM

## 2022-10-12 DIAGNOSIS — E03.9 ACQUIRED HYPOTHYROIDISM: ICD-10-CM

## 2022-10-12 DIAGNOSIS — I69.30 CEREBRAL MULTI-INFARCT STATE: ICD-10-CM

## 2022-10-12 DIAGNOSIS — R29.6 FALLS FREQUENTLY: ICD-10-CM

## 2022-10-12 DIAGNOSIS — N81.4 CYSTOCELE WITH PROLAPSE: ICD-10-CM

## 2022-10-12 DIAGNOSIS — Z00.00 MEDICARE ANNUAL WELLNESS VISIT, SUBSEQUENT: Primary | ICD-10-CM

## 2022-10-12 DIAGNOSIS — R41.3 MEMORY LOSS: ICD-10-CM

## 2022-10-12 DIAGNOSIS — F41.9 ANXIETY: ICD-10-CM

## 2022-10-12 DIAGNOSIS — E87.0 HYPERNATREMIA: ICD-10-CM

## 2022-10-12 DIAGNOSIS — F33.42 RECURRENT MAJOR DEPRESSIVE DISORDER, IN FULL REMISSION (HCC): ICD-10-CM

## 2022-10-12 DIAGNOSIS — E66.01 MORBIDLY OBESE (HCC): ICD-10-CM

## 2022-10-12 DIAGNOSIS — D69.6 THROMBOCYTOPENIA (HCC): ICD-10-CM

## 2022-10-12 DIAGNOSIS — N18.32 STAGE 3B CHRONIC KIDNEY DISEASE (HCC): ICD-10-CM

## 2022-10-12 DIAGNOSIS — Z23 NEED FOR INFLUENZA VACCINATION: ICD-10-CM

## 2022-10-12 DIAGNOSIS — E78.00 PURE HYPERCHOLESTEROLEMIA: ICD-10-CM

## 2022-10-12 DIAGNOSIS — M85.80 OSTEOPENIA, UNSPECIFIED LOCATION: ICD-10-CM

## 2022-10-12 DIAGNOSIS — R26.81 UNSTEADY GAIT: ICD-10-CM

## 2022-10-12 DIAGNOSIS — N18.31 STAGE 3A CHRONIC KIDNEY DISEASE (HCC): ICD-10-CM

## 2022-10-12 PROCEDURE — G8427 DOCREV CUR MEDS BY ELIG CLIN: HCPCS | Performed by: FAMILY MEDICINE

## 2022-10-12 PROCEDURE — G0439 PPPS, SUBSEQ VISIT: HCPCS | Performed by: FAMILY MEDICINE

## 2022-10-12 PROCEDURE — 99213 OFFICE O/P EST LOW 20 MIN: CPT | Performed by: FAMILY MEDICINE

## 2022-10-12 PROCEDURE — G0008 ADMIN INFLUENZA VIRUS VAC: HCPCS | Performed by: FAMILY MEDICINE

## 2022-10-12 PROCEDURE — 1090F PRES/ABSN URINE INCON ASSESS: CPT | Performed by: FAMILY MEDICINE

## 2022-10-12 PROCEDURE — 1036F TOBACCO NON-USER: CPT | Performed by: FAMILY MEDICINE

## 2022-10-12 PROCEDURE — G8484 FLU IMMUNIZE NO ADMIN: HCPCS | Performed by: FAMILY MEDICINE

## 2022-10-12 PROCEDURE — 1123F ACP DISCUSS/DSCN MKR DOCD: CPT | Performed by: FAMILY MEDICINE

## 2022-10-12 PROCEDURE — G8417 CALC BMI ABV UP PARAM F/U: HCPCS | Performed by: FAMILY MEDICINE

## 2022-10-12 PROCEDURE — 90694 VACC AIIV4 NO PRSRV 0.5ML IM: CPT | Performed by: FAMILY MEDICINE

## 2022-10-12 PROCEDURE — G8399 PT W/DXA RESULTS DOCUMENT: HCPCS | Performed by: FAMILY MEDICINE

## 2022-10-12 PROCEDURE — 3017F COLORECTAL CA SCREEN DOC REV: CPT | Performed by: FAMILY MEDICINE

## 2022-10-12 RX ORDER — PAROXETINE HYDROCHLORIDE 40 MG/1
TABLET, FILM COATED ORAL
Qty: 90 TABLET | Refills: 3
Start: 2022-10-12

## 2022-10-12 SDOH — ECONOMIC STABILITY: FOOD INSECURITY: WITHIN THE PAST 12 MONTHS, YOU WORRIED THAT YOUR FOOD WOULD RUN OUT BEFORE YOU GOT MONEY TO BUY MORE.: NEVER TRUE

## 2022-10-12 SDOH — ECONOMIC STABILITY: FOOD INSECURITY: WITHIN THE PAST 12 MONTHS, THE FOOD YOU BOUGHT JUST DIDN'T LAST AND YOU DIDN'T HAVE MONEY TO GET MORE.: NEVER TRUE

## 2022-10-12 ASSESSMENT — PATIENT HEALTH QUESTIONNAIRE - PHQ9
4. FEELING TIRED OR HAVING LITTLE ENERGY: 0
9. THOUGHTS THAT YOU WOULD BE BETTER OFF DEAD, OR OF HURTING YOURSELF: 0
SUM OF ALL RESPONSES TO PHQ QUESTIONS 1-9: 1
1. LITTLE INTEREST OR PLEASURE IN DOING THINGS: 0
SUM OF ALL RESPONSES TO PHQ QUESTIONS 1-9: 1
3. TROUBLE FALLING OR STAYING ASLEEP: 0
5. POOR APPETITE OR OVEREATING: 0
2. FEELING DOWN, DEPRESSED OR HOPELESS: 1
SUM OF ALL RESPONSES TO PHQ9 QUESTIONS 1 & 2: 1
8. MOVING OR SPEAKING SO SLOWLY THAT OTHER PEOPLE COULD HAVE NOTICED. OR THE OPPOSITE, BEING SO FIGETY OR RESTLESS THAT YOU HAVE BEEN MOVING AROUND A LOT MORE THAN USUAL: 0
SUM OF ALL RESPONSES TO PHQ QUESTIONS 1-9: 1
6. FEELING BAD ABOUT YOURSELF - OR THAT YOU ARE A FAILURE OR HAVE LET YOURSELF OR YOUR FAMILY DOWN: 0
SUM OF ALL RESPONSES TO PHQ QUESTIONS 1-9: 1
10. IF YOU CHECKED OFF ANY PROBLEMS, HOW DIFFICULT HAVE THESE PROBLEMS MADE IT FOR YOU TO DO YOUR WORK, TAKE CARE OF THINGS AT HOME, OR GET ALONG WITH OTHER PEOPLE: 0
7. TROUBLE CONCENTRATING ON THINGS, SUCH AS READING THE NEWSPAPER OR WATCHING TELEVISION: 0

## 2022-10-12 ASSESSMENT — SOCIAL DETERMINANTS OF HEALTH (SDOH): HOW HARD IS IT FOR YOU TO PAY FOR THE VERY BASICS LIKE FOOD, HOUSING, MEDICAL CARE, AND HEATING?: NOT HARD AT ALL

## 2022-10-12 ASSESSMENT — LIFESTYLE VARIABLES
HOW OFTEN DO YOU HAVE A DRINK CONTAINING ALCOHOL: 2-4 TIMES A MONTH
HOW MANY STANDARD DRINKS CONTAINING ALCOHOL DO YOU HAVE ON A TYPICAL DAY: 1 OR 2

## 2022-10-12 NOTE — PROGRESS NOTES
Medicare Annual Wellness Visit    Marlin Hurtado is here for Medicare AWV    Assessment & Plan   Medicare annual wellness visit, subsequent  Essential hypertension  Pure hypercholesterolemia  -     Comprehensive Metabolic Panel, Fasting; Future  -     Lipid Panel; Future  Acquired hypothyroidism  -     TSH with Reflex; Future  Malignant neoplasm of body of uterus, unspecified site ([facility]  Morbidly obese ([facility]  Unsteady gait  Falls frequently  Osteopenia, unspecified location  Stage 3b chronic kidney disease ([facility]  -     CBC; Future  Thrombocytopenia (HCC)  Hypernatremia  History of gout  Memory loss  Recurrent major depressive disorder, in full remission ([facility]  -     PARoxetine (PAXIL) 40 MG tablet; 1/2 tab daily, Disp-90 tablet, R-3Adjust Sig  Cerebral multi-infarct state  Anxiety  Stage 3a chronic kidney disease ([facility]  Need for influenza vaccination  -     Influenza, FLUAD, (age 72 y+), IM, Preservative Free, 0.5 mL  Cystocele with prolapse  -     External Referral To Ob-Gyn    Recommendations for Preventive Services Due: see orders and patient instructions/AVS.  Recommended screening schedule for the next 5-10 years is provided to the patient in written form: see Patient Instructions/AVS.     Return in 6 months (on 4/12/2023) for Medicare Annual Wellness Visit in 1 year. Subjective     Hypertension: Patient here for follow-up of elevated blood pressure. She is not exercising and is adherent to low salt diet. Blood pressure is well controlled at home. Cardiac symptoms none. Patient denies chest pain, dyspnea and palpitations. Cardiovascular risk factors: dyslipidemia, hypertension, obesity (BMI >= 30 kg/m2) and sedentary lifestyle. Use of agents associated with hypertension: none. History of target organ damage: none. Hyperlipidemia: Patient presents with hyperlipidemia. She was tested because hypertension.   Her last labs show   Lab Results   Component Value Date    CHOL 167 04/19/2022    CHOL 152 04/06/2022    CHOL 145 04/05/2021     Lab Results   Component Value Date    TRIG 71 04/19/2022    TRIG 115 04/06/2022    TRIG 115 04/05/2021     Lab Results   Component Value Date    HDL 74 04/19/2022    HDL 69 04/06/2022    HDL 63 04/05/2021     Lab Results   Component Value Date    LDLCALC 79 04/19/2022    LDLCALC 60 04/06/2022    LDLCALC 59 04/05/2021     Lab Results   Component Value Date    VLDL 17 10/13/2018    VLDL 19 10/09/2017    VLDL 18 05/13/2017     Lab Results   Component Value Date    CHOLHDLRATIO 0.9 10/13/2018    CHOLHDLRATIO 1 10/09/2017    CHOLHDLRATIO 2.14 05/13/2017     There is not a family history of hyperlipidemia. There is not a family history of early ischemia heart disease. Hypothyroidism: Patient presents for evaluation of thyroid function. Symptoms consist of fatigue, arthralgias. Symptoms have present for several years. The symptoms are fatigue. The problem has been gradually worsening. Previous thyroid studies include TSH, free thyroxine index and triiodothyronine total. The hypothyroidism is due to hypothyroidism. Lab Results   Component Value Date    TSH 1.700 04/19/2022    [id number] 96 10/27/2020    FT3 0.807 10/13/2018    T4FREE 1.48 10/27/2020       Depression: Patient complains of depression. She complains of anhedonia, depressed mood, difficulty concentrating and fatigue. Onset was approximately 2011, waxes and wanes since that time. She denies current suicidal and homicidal plan or intent. Family history significant for depression  In her daughter. Possible organic causes contributing are: none. 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 Previous treatment includes Paxil and no therapy. She complains of the following side effects from the treatment: none. Her daughter is complaining that she was crying every day and stressed out all the time.   She would like a refill on xanax and is still taking wellbutrin with the paxil and doing much better. However, hematology is worried that the paxil may be causing lower platelets. We discussed decreasing the dose to 20 mg to see if symptoms return and to see if this would help the platelets prior to going back to the hematologist.    Her kids have been noticing more memory loss since her stroke. She is forgetting the short term memory issues like recent conversations and where she put things at home. Now in exelon at home. This is complicated by a history of multi-infarct CVA. Kids requesting increase, dong well on the higher dose of exelon. She is still working through residual slow thinking and delayed reactions after a stroke. She also has daily headaches that are new to her. Better with tylenol. She has just gone through total hysterectomy for uterine cancer in [place]. She is recovering well and oncology does not think she will need chemo or radiation. [facility]. This is associated with more urinary leakage with coughing and sneezing and when overfull. Also more constipation. This is controlled well on current medications and she thinks may be a little better after hysterectomy. However, she has now noticed a sensation of something prolapsing. Gout on 9/9/21 getting better with steroids and abx. Also taking tart cherry OTC which is helping prn. Wearing a loop recorder for afib that was noticed after hysterectomy surgery. Following with cardiology in [address] [place] [address]. More unsteady after CVA . The following acute and/or chronic problems were also addressed today:  See above    Patient's complete Health Risk Assessment and screening values have been reviewed and are found in Flowsheets. The following problems were reviewed today and where indicated follow up appointments were made and/or referrals ordered.     Positive Risk Factor Screenings with Interventions:    Fall Risk:  Do you feel unsteady or are you worried about falling? : (!) yes  2 or more falls in past year?: no  Fall with injury in past year?: no   Fall Risk Interventions:    PT prn             Health Habits/Nutrition:  Physical Activity: Inactive    Days of Exercise per Week: 0 days    Minutes of Exercise per Session: 0 min     Have you lost any weight without trying in the past 3 months?: No  Body mass index: (!) 36.71  Have you seen the dentist within the past year?: Appointment is scheduled  Health Habits/Nutrition Interventions:  Inadequate physical activity:  patient agrees to increase physical activity as follows: walk a little daily  Dental exam overdue:  patient encouraged to make appointment with his/her dentist    Hearing/Vision:  Do you or your family notice any trouble with your hearing that hasn't been managed with hearing aids?: No  Do you have difficulty driving, watching TV, or doing any of your daily activities because of your eyesight?: (!) Yes  Have you had an eye exam within the past year?: Yes  No results found. Hearing/Vision Interventions:  Vision concerns:  patient encouraged to make appointment with his/her eye specialist            Objective   Vitals:    10/12/22 0857   BP: 136/84   Site: Left Upper Arm   Position: Sitting   Cuff Size: Large Adult   Pulse: 56   Resp: 16   Temp: 97.1 °F (36.2 °C)   TempSrc: Temporal   SpO2: 95%   Weight: 188 lb (85.3 kg)   Height: 5' (1.524 m)      Body mass index is 36.72 kg/m². Physical Exam   Constitutional: Vital signs are normal. She appears well-developed and well-nourished. She is active. HENT:   Head: Normocephalic and atraumatic. Right Ear: Tympanic membrane, external ear and ear canal normal. No drainage or tenderness. Left Ear: Tympanic membrane, external ear and ear canal normal. No drainage or tenderness. Nose: Nose normal. No mucosal edema or rhinorrhea.    Mouth/Throat: Uvula is midline, oropharynx is clear and moist and mucous membranes are normal. Mucous membranes are not pale. Normal dentition. No posterior oropharyngeal edema or posterior oropharyngeal erythema. Eyes: Lids are normal. Right eye exhibits no chemosis and no discharge. Left eye exhibits no chemosis and no drainage. Right conjunctiva has no hemorrhage. Left conjunctiva has no hemorrhage. Right eye exhibits normal extraocular motion. Left eye exhibits normal extraocular motion. Right pupil is round and reactive. Left pupil is round and reactive. Pupils are equal.   Cardiovascular: Normal rate, regular rhythm, S1 normal, S2 normal and normal heart sounds. Exam reveals no gallop. No murmur heard. Pulmonary/Chest: Effort normal and breath sounds normal. No respiratory distress. She has no wheezes. She has no rhonchi. She has no rales. Abdominal: Soft. Normal appearance and bowel sounds are normal. She exhibits no distension and no mass. There is no hepatosplenomegaly. No tenderness. She has no rigidity, no rebound and no guarding. No hernia. Musculoskeletal:        Right lower leg: She exhibits no edema. Left lower leg: She exhibits no edema. Neurological: She is alert. :  cystocele noted on pelvic exam prolapses from the vaginal opening. Allergies   Allergen Reactions    Donepezil      nightmares     Prior to Visit Medications    Medication Sig Taking? Authorizing Provider   PARoxetine (PAXIL) 40 MG tablet 1/2 tab daily Yes Uzma Ham MD   metoprolol succinate (TOPROL XL) 25 MG extended release tablet TAKE ONE TABLET DAILY, BY MOUTH. Yes Uzma Ham MD   simvastatin (ZOCOR) 80 MG tablet TAKE ONE TABLET DAILY AT BEDTIME, BY MOUTH. Yes Uzma Ham MD   Pramoxine-Calamine Ji Lemon EX) Apply topically Yes Historical Provider, MD   indapamide (LOZOL) 2.5 MG tablet Take 1 tab daily.  Yes Milana Higginbotham MD   ferrous sulfate (IRON 325) 325 (65 Fe) MG tablet Take 325 mg by mouth daily  Yes Historical Provider, MD   folic acid (FOLVITE) 1 MG tablet TAKE ONE TABLET DAILY, BY MOUTH. Yes Ruba Bean MD   levothyroxine (SYNTHROID) 200 MCG tablet TAKE 1 TABLET BY MOUTH ONCE DAILY, TAKE 1&1/2 TAB ON MONDAY EVERY OTHER WEEK. Yes Ruba Bean MD   rivastigmine (EXELON) 9.5 MG/24HR Place 1 patch onto the skin daily Yes Ruba Bean MD   buPROPion (WELLBUTRIN XL) 300 MG extended release tablet 1 PO QD Yes Ruba Bean MD   losartan-hydroCHLOROthiazide (HYZAAR) 100-25 MG per tablet TAKE ONE TABLET DAILY, BY MOUTH.  Yes Ruba Bean MD   clopidogrel (PLAVIX) 75 MG tablet STOP if pt not taking this at home Yes Sanju Jennings MD   CALCIUM CITRATE-VITAMIN D PO Take 1 tablet by mouth 2 times daily (with meals) Yes Historical Provider, MD   Glucosamine-Chondroit-Vit C-Mn (GLUCOSAMINE 1500 COMPLEX) CAPS Take 1 tablet by mouth daily  Patient taking differently: Take 1 tablet by mouth 2 times daily Yes Ruba Bean MD   aspirin 81 MG EC tablet Take 81 mg by mouth nightly Yes Historical Provider, MD   Multiple Vitamins-Minerals (MULTIVITAMIN & MINERAL PO) Take 1 tablet by mouth daily  Yes Historical Provider, MD       CareTeam (Including outside providers/suppliers regularly involved in providing care):   Patient Care Team:  Ruba Bean MD as PCP - General (Family Medicine)  Ruba Bean MD as PCP - [facility] Empaneled Provider     Reviewed and updated this visit:  Tobacco  Allergies  Meds  Med Hx  Surg Hx  Soc Hx  Fam Hx

## 2022-10-12 NOTE — PATIENT INSTRUCTIONS
Personalized Preventive Plan for Paola Piper - 10/12/2022  Medicare offers a range of preventive health benefits. Some of the tests and screenings are paid in full while other may be subject to a deductible, co-insurance, and/or copay. Some of these benefits include a comprehensive review of your medical history including lifestyle, illnesses that may run in your family, and various assessments and screenings as appropriate. After reviewing your medical record and screening and assessments performed today your provider may have ordered immunizations, labs, imaging, and/or referrals for you. A list of these orders (if applicable) as well as your Preventive Care list are included within your After Visit Summary for your review. Other Preventive Recommendations:    A preventive eye exam performed by an eye specialist is recommended every 1-2 years to screen for glaucoma; cataracts, macular degeneration, and other eye disorders. A preventive dental visit is recommended every 6 months. Try to get at least 150 minutes of exercise per week or 10,000 steps per day on a pedometer . Order or download the FREE \"Exercise & Physical Activity: Your Everyday Guide\" from [employer]. Call 0-698.955.5723 or search [employer] online. You need 0881-6149 mg of calcium and 5697-6677 IU of vitamin D per day. It is possible to meet your calcium requirement with diet alone, but a vitamin D supplement is usually necessary to meet this goal.  When exposed to the sun, use a sunscreen that protects against both UVA and UVB radiation with an SPF of 30 or greater. Reapply every 2 to 3 hours or after sweating, drying off with a towel, or swimming. Always wear a seat belt when traveling in a car. Always wear a helmet when riding a bicycle or motorcycle.

## 2022-10-12 NOTE — PROGRESS NOTES
Vaccine Information Sheet, \"Influenza - Inactivated\"  given to Malcolm Speaker, or parent/legal guardian of  Malcolm Speaker and verbalized understanding. Patient responses:    Have you ever had a reaction to a flu vaccine? No  Do you have an allergy to eggs, neomycin or polymixin? No  Do you have an allergy to Thimerosal, contact lens solution, or Merthiolate? No  Have you ever had Guillian [place] Syndrome? No  Do you have any current illness? No  Do you have a temperature above 100 degrees? No  Are you pregnant? No  If pregnant, permission obtained from physician? N/a  Do you have an active neurological disorder? No      Flu vaccine given per order. Please see immunization tab. Immunizations Administered       Name Date Dose Route    Influenza, FLUAD, (age 72 y+), Adjuvanted, 0.5mL 10/12/2022 0.5 mL Intramuscular    Site: Deltoid- Left    Lot: [id number]    NDC: 17725-275-97            VIS GIVEN. CONSENT SIGNED  PATIENT TOLERATED WELL.

## 2022-11-16 DIAGNOSIS — E03.9 ACQUIRED HYPOTHYROIDISM: ICD-10-CM

## 2022-11-17 RX ORDER — LEVOTHYROXINE SODIUM 0.2 MG/1
TABLET ORAL
Qty: 93 TABLET | Refills: 0 | Status: SHIPPED | OUTPATIENT
Start: 2022-11-17

## 2022-11-22 DIAGNOSIS — I69.30 CEREBRAL MULTI-INFARCT STATE: ICD-10-CM

## 2022-11-22 RX ORDER — FOLIC ACID 1 MG/1
TABLET ORAL
Qty: 90 TABLET | Refills: 0 | Status: SHIPPED | OUTPATIENT
Start: 2022-11-22

## 2022-12-05 ENCOUNTER — TELEPHONE (OUTPATIENT)
Dept: FAMILY MEDICINE CLINIC | Age: 67
End: 2022-12-05

## 2022-12-05 DIAGNOSIS — I10 ESSENTIAL HYPERTENSION: ICD-10-CM

## 2022-12-05 RX ORDER — INDAPAMIDE 2.5 MG/1
TABLET, FILM COATED ORAL
Qty: 90 TABLET | Refills: 0 | Status: SHIPPED | OUTPATIENT
Start: 2022-12-05

## 2022-12-05 RX ORDER — LOSARTAN POTASSIUM AND HYDROCHLOROTHIAZIDE 25; 100 MG/1; MG/1
TABLET ORAL
Qty: 90 TABLET | Refills: 0 | Status: SHIPPED | OUTPATIENT
Start: 2022-12-05

## 2022-12-05 RX ORDER — NIRMATRELVIR AND RITONAVIR 300-100 MG
KIT ORAL
Qty: 30 TABLET | Refills: 0 | Status: SHIPPED | OUTPATIENT
Start: 2022-12-05 | End: 2022-12-10

## 2022-12-07 DIAGNOSIS — F33.42 RECURRENT MAJOR DEPRESSIVE DISORDER, IN FULL REMISSION (HCC): ICD-10-CM

## 2022-12-07 RX ORDER — PAROXETINE HYDROCHLORIDE 40 MG/1
TABLET, FILM COATED ORAL
Qty: 90 TABLET | Refills: 0 | Status: SHIPPED | OUTPATIENT
Start: 2022-12-07

## 2022-12-19 ENCOUNTER — OFFICE VISIT (OUTPATIENT)
Dept: CARDIOLOGY CLINIC | Age: 67
End: 2022-12-19
Payer: MEDICARE

## 2022-12-19 VITALS
HEART RATE: 76 BPM | SYSTOLIC BLOOD PRESSURE: 128 MMHG | DIASTOLIC BLOOD PRESSURE: 76 MMHG | HEIGHT: 60 IN | BODY MASS INDEX: 37.11 KG/M2 | WEIGHT: 189 LBS

## 2022-12-19 DIAGNOSIS — I25.10 CORONARY ARTERY DISEASE INVOLVING NATIVE CORONARY ARTERY OF NATIVE HEART WITHOUT ANGINA PECTORIS: ICD-10-CM

## 2022-12-19 DIAGNOSIS — F33.42 RECURRENT MAJOR DEPRESSIVE DISORDER, IN FULL REMISSION (HCC): Primary | ICD-10-CM

## 2022-12-19 DIAGNOSIS — I10 ESSENTIAL HYPERTENSION: Primary | ICD-10-CM

## 2022-12-19 DIAGNOSIS — I34.0 NONRHEUMATIC MITRAL VALVE REGURGITATION: ICD-10-CM

## 2022-12-19 PROCEDURE — 93000 ELECTROCARDIOGRAM COMPLETE: CPT | Performed by: NUCLEAR MEDICINE

## 2022-12-19 PROCEDURE — G8427 DOCREV CUR MEDS BY ELIG CLIN: HCPCS | Performed by: NUCLEAR MEDICINE

## 2022-12-19 PROCEDURE — 3074F SYST BP LT 130 MM HG: CPT | Performed by: NUCLEAR MEDICINE

## 2022-12-19 PROCEDURE — 99213 OFFICE O/P EST LOW 20 MIN: CPT | Performed by: NUCLEAR MEDICINE

## 2022-12-19 PROCEDURE — 1036F TOBACCO NON-USER: CPT | Performed by: NUCLEAR MEDICINE

## 2022-12-19 PROCEDURE — 3078F DIAST BP <80 MM HG: CPT | Performed by: NUCLEAR MEDICINE

## 2022-12-19 PROCEDURE — 3017F COLORECTAL CA SCREEN DOC REV: CPT | Performed by: NUCLEAR MEDICINE

## 2022-12-19 PROCEDURE — G8417 CALC BMI ABV UP PARAM F/U: HCPCS | Performed by: NUCLEAR MEDICINE

## 2022-12-19 PROCEDURE — 1123F ACP DISCUSS/DSCN MKR DOCD: CPT | Performed by: NUCLEAR MEDICINE

## 2022-12-19 PROCEDURE — 1090F PRES/ABSN URINE INCON ASSESS: CPT | Performed by: NUCLEAR MEDICINE

## 2022-12-19 PROCEDURE — G8484 FLU IMMUNIZE NO ADMIN: HCPCS | Performed by: NUCLEAR MEDICINE

## 2022-12-19 PROCEDURE — G8399 PT W/DXA RESULTS DOCUMENT: HCPCS | Performed by: NUCLEAR MEDICINE

## 2022-12-19 RX ORDER — ALPRAZOLAM 0.25 MG/1
0.25 TABLET ORAL NIGHTLY PRN
Qty: 30 TABLET | Refills: 0 | Status: SHIPPED | OUTPATIENT
Start: 2022-12-19 | End: 2023-01-18

## 2022-12-19 NOTE — TELEPHONE ENCOUNTER
Last visit- 10/12/2022  Next visit- 4/12/2023    Requested Prescriptions     Pending Prescriptions Disp Refills    ALPRAZolam (XANAX) 0.25 MG tablet 30 tablet 0     Sig: Take 1 tablet by mouth nightly as needed for Sleep for up to 30 days.

## 2022-12-19 NOTE — PROGRESS NOTES
[address] [address]  [facility] [address]  Dept: [phone]  Dept Fax: [phone]  Loc: [phone]    Visit Date: 12/19/2022    Russell Batres is a 69 y.o. female who presents todayfor:  Chief Complaint   Patient presents with    1 Year Follow Up    Valvular Heart Disease    Hypertension   Still follows at [facility]   Has a loop for stroke work up   Some memory issues lately   Getting MRI soon   No chest pain   No changes in breathing  Known MR and moderate CAD        HPI:  HPI  Past Medical History:   Diagnosis Date    Acute arterial ischemic stroke, multifocal, anterior circulation (HCC)     Dr Leo Alu    Anemia     Dr Ubaldo Cody pox     Endometrial adenocarcinoma ([facility] 01/30/2020    Hyperlipidemia     Hypertension     Dr Malik Poster    Hypothyroidism     Measles     Memory loss     Mumps     Uterine cancer ([facility] 01/2020    Endometrioid carcinoma, Michi Halima      Past Surgical History:   Procedure Laterality Date    ENDOMETRIAL BIOPSY  12/11/2019    HYSTERECTOMY, TOTAL ABDOMINAL (CERVIX REMOVED)  01/30/2020    total    OVARY REMOVAL  01/30/2020    total     Family History   Problem Relation Age of Onset    Arthritis Mother     Other Daughter         SVT and PE     Social History     Tobacco Use    Smoking status: Never    Smokeless tobacco: Never   Substance Use Topics    Alcohol use: Yes     Comment: socially      Current Outpatient Medications   Medication Sig Dispense Refill    PARoxetine (PAXIL) 40 MG tablet TAKE ONE TABLET DAILY, BY MOUTH. 90 tablet 0    losartan-hydroCHLOROthiazide (HYZAAR) 100-25 MG per tablet TAKE ONE TABLET DAILY, BY MOUTH. 90 tablet 0    indapamide (LOZOL) 2.5 MG tablet TAKE ONE TABLET DAILY IN THE MORNING, BY MOUTH. 90 tablet 0    folic acid (FOLVITE) 1 MG tablet TAKE ONE TABLET DAILY, BY MOUTH.  90 tablet 0    levothyroxine (SYNTHROID) 200 MCG tablet TAKE 1 TABLET BY MOUTH ONCE DAILY, TAKE 1&1/2 TAB ON MONDAY EVERY OTHER WEEK. 93 tablet 0    metoprolol succinate (TOPROL XL) 25 MG extended release tablet TAKE ONE TABLET DAILY, BY MOUTH. 90 tablet 3    simvastatin (ZOCOR) 80 MG tablet TAKE ONE TABLET DAILY AT BEDTIME, BY MOUTH. 90 tablet 0    Pramoxine-Calamine (CALADRYL EX) Apply topically      ferrous sulfate (IRON 325) 325 (65 Fe) MG tablet Take 325 mg by mouth daily       rivastigmine (EXELON) 9.5 MG/24HR Place 1 patch onto the skin daily 90 patch 3    buPROPion (WELLBUTRIN XL) 300 MG extended release tablet 1 PO QD 90 tablet 3    clopidogrel (PLAVIX) 75 MG tablet STOP if pt not taking this at home 90 tablet 3    CALCIUM CITRATE-VITAMIN D PO Take 1 tablet by mouth 2 times daily (with meals)      Glucosamine-Chondroit-Vit C-Mn (GLUCOSAMINE 1500 COMPLEX) CAPS Take 1 tablet by mouth daily (Patient taking differently: Take 1 tablet by mouth 2 times daily) 90 capsule 3    aspirin 81 MG EC tablet Take 81 mg by mouth nightly      Multiple Vitamins-Minerals (MULTIVITAMIN & MINERAL PO) Take 1 tablet by mouth daily        No current facility-administered medications for this visit.      Allergies   Allergen Reactions    Donepezil      nightmares     Health Maintenance   Topic Date Due    Hepatitis C screen  Never done    Colorectal Cancer Screen  Never done    COVID-19 Vaccine (3 - Booster for Begum Peter series) 10/07/2023 (Originally 5/22/2021)    DTaP/Tdap/Td vaccine (1 - Tdap) 10/12/2023 (Originally [birth year]/6/1972)    Shingles vaccine (1 of 2) 10/12/2023 (Originally 5/6/2003)    Lipids  04/19/2023    Depression Monitoring  10/12/2023    Annual Wellness Visit (AWV)  10/13/2023    Breast cancer screen  05/24/2024    DEXA (modify frequency per FRAX score)  Completed    Flu vaccine  Completed    Pneumococcal 65+ years Vaccine  Completed    Hepatitis A vaccine  Aged Out    Hib vaccine  Aged Out    Meningococcal (ACWY) vaccine  Aged Out       Subjective:  Review of Systems  General:   No fever, no chills, No fatigue or weight loss  Pulmonary:    No dyspnea, no wheezing  Cardiac:    Denies recent chest pain,   GI:     No nausea or vomiting, no abdominal pain  Neuro:    No dizziness or light headedness,   Musculoskeletal:  No recent active issues  Extremities:   No edema, no obvious claudication     Objective:  Physical Exam  /76   Pulse 76   Ht 5' (1.524 m)   Wt 189 lb (85.7 kg)   BMI 36.91 kg/m²   General:   Well developed, well nourished  Lungs:   Clear to auscultation  Heart:    Normal S1 S2, Slight murmur. no rubs, no gallops  Abdomen:   Soft, non tender, no organomegalies, positive bowel sounds  Extremities:   No edema, no cyanosis, good peripheral pulses  Neurological:   Awake, alert, oriented. No obvious focal deficits  Musculoskelatal:  No obvious deformities    Assessment:      Diagnosis Orders   1. Essential hypertension  EKG 12 lead      2. Coronary artery disease involving native coronary artery of native heart without angina pectoris  EKG 12 lead      3. Nonrheumatic mitral valve regurgitation        As above  Cardiac fair for now  ECG in office was done today. I reviewed the ECG. No acute findings    Plan:  No follow-ups on file. Echo   Continue risk factor modification and medical management  Thank you for allowing me to participate in the care of your patient. Please don't hesitate to contact me regarding any further issues related to the patient care      Orders Placed:  Orders Placed This Encounter   Procedures    EKG 12 lead     Order Specific Question:   Reason for Exam?     Answer: Other       Medications Prescribed:  No orders of the defined types were placed in this encounter. Discussed use, benefit, and side effects of prescribed medications. All patient questions answered. Pt voicedunderstanding. Instructed to continue current medications, diet and exercise. Continue risk factor modification and medical management. Patient agreed with treatment plan.  Follow up as directed.     Electronically signedby Anushka Guillermo MD on 12/19/2022 at 12:22 PM

## 2022-12-28 ENCOUNTER — HOSPITAL ENCOUNTER (OUTPATIENT)
Dept: NON INVASIVE DIAGNOSTICS | Age: 67
Discharge: HOME | End: 2022-12-28
Payer: MEDICARE

## 2022-12-28 DIAGNOSIS — I34.0 NONRHEUMATIC MITRAL VALVE REGURGITATION: ICD-10-CM

## 2022-12-28 DIAGNOSIS — I25.10 CORONARY ARTERY DISEASE INVOLVING NATIVE CORONARY ARTERY OF NATIVE HEART WITHOUT ANGINA PECTORIS: ICD-10-CM

## 2022-12-28 DIAGNOSIS — I10 ESSENTIAL HYPERTENSION: ICD-10-CM

## 2022-12-28 PROCEDURE — 93306 TTE W/DOPPLER COMPLETE: CPT

## 2022-12-29 ENCOUNTER — APPOINTMENT (OUTPATIENT)
Dept: GENERAL RADIOLOGY | Age: 67
End: 2022-12-29
Payer: MEDICARE

## 2022-12-29 ENCOUNTER — HOSPITAL ENCOUNTER (OUTPATIENT)
Age: 67
Setting detail: OBSERVATION
Discharge: HOME | End: 2022-12-30
Attending: EMERGENCY MEDICINE
Payer: MEDICARE

## 2022-12-29 DIAGNOSIS — R07.9 CHEST PAIN IN ADULT: Primary | ICD-10-CM

## 2022-12-29 DIAGNOSIS — I69.30 CEREBRAL MULTI-INFARCT STATE: ICD-10-CM

## 2022-12-29 LAB
ALBUMIN SERPL-MCNC: 3.5 GM/DL (ref 3.4–5)
ALP BLD-CCNC: 86 U/L (ref 46–116)
ALT SERPL-CCNC: 27 U/L (ref 14–63)
ANION GAP: 10 MEQ/L (ref 8–16)
ANISOCYTOSIS: SLIGHT
AST SERPL-CCNC: 26 U/L (ref 15–37)
BASOPHILIA: SLIGHT
BASOPHILS # BLD: 1.1 % (ref 0–3)
BASOPHILS ABSOLUTE: 0.1 THOU/MM3 (ref 0–0.1)
BILIRUB SERPL-MCNC: 0.4 MG/DL (ref 0.2–1)
BUN BLDV-MCNC: 26 MG/DL (ref 7–18)
CHLORIDE BLD-SCNC: 105 MEQ/L (ref 98–107)
CO2: 29 MEQ/L (ref 21–32)
CREAT SERPL-MCNC: 1.5 MG/DL (ref 0.6–1.3)
EKG ATRIAL RATE: 67 BPM
EKG P AXIS: 40 DEGREES
EKG P-R INTERVAL: 122 MS
EKG Q-T INTERVAL: 384 MS
EKG QRS DURATION: 96 MS
EKG QTC CALCULATION (BAZETT): 405 MS
EKG R AXIS: 18 DEGREES
EKG T AXIS: 45 DEGREES
EKG VENTRICULAR RATE: 67 BPM
EOSINOPHILS ABSOLUTE: 0.3 THOU/MM3 (ref 0–0.5)
EOSINOPHILS RELATIVE PERCENT: 4 % (ref 0–4)
GFR, ESTIMATED: 37 ML/MIN/1.73M2
GLUCOSE BLD-MCNC: 74 MG/DL (ref 74–106)
HCT VFR BLD CALC: 39.3 % (ref 37–47)
HEMOGLOBIN: 12.8 GM/DL (ref 12–16)
IMMATURE GRANS (ABS): 0.02 THOU/MM3 (ref 0–0.07)
IMMATURE GRANULOCYTES: 0 %
LYMPHOCYTES # BLD: 30.7 % (ref 15–47)
LYMPHOCYTES ABSOLUTE: 2 THOU/MM3 (ref 1–4.8)
MACROCYTES: ABNORMAL
MCH RBC QN AUTO: 32.7 PG (ref 26–32)
MCHC RBC AUTO-ENTMCNC: 32.6 GM/DL (ref 31–35)
MCV RBC AUTO: 100.3 FL (ref 81–99)
MONOCYTES: 0.7 THOU/MM3 (ref 0.3–1.3)
MONOCYTES: 10.1 % (ref 0–12)
NT PRO BNP: 339 PG/ML (ref 0–900)
PDW BLD-RTO: 12.6 % (ref 11.5–14.9)
PLATELET # BLD: 36 THOU/MM3 (ref 130–400)
PLATELET ESTIMATE: ABNORMAL
PMV BLD AUTO: 12.9 FL (ref 9.4–12.4)
POC CALCIUM: 9.2 MG/DL (ref 8.5–10.1)
POTASSIUM SERPL-SCNC: 4.3 MEQ/L (ref 3.5–5.1)
RBC # BLD: 3.92 MILL/MM3 (ref 4.1–5.3)
SCAN OF BLOOD SMEAR: NORMAL
SEG NEUTROPHILS: 53.8 % (ref 43–75)
SEGMENTED NEUTROPHILS ABSOLUTE COUNT: 3.5 THOU/MM3 (ref 1.8–7.7)
SODIUM BLD-SCNC: 144 MEQ/L (ref 136–145)
TOTAL PROTEIN: 7.1 GM/DL (ref 6.4–8.2)
TROPONIN T: < 0.01 NG/ML
TROPONIN, HIGH SENSITIVITY: 7.3 PG/ML (ref 0–51.3)
WBC # BLD: 6.5 THOU/MM3 (ref 4.8–10.8)

## 2022-12-29 PROCEDURE — 2580000003 HC RX 258: Performed by: INTERNAL MEDICINE

## 2022-12-29 PROCEDURE — G0378 HOSPITAL OBSERVATION PER HR: HCPCS

## 2022-12-29 PROCEDURE — 80053 COMPREHEN METABOLIC PANEL: CPT

## 2022-12-29 PROCEDURE — 36415 COLL VENOUS BLD VENIPUNCTURE: CPT

## 2022-12-29 PROCEDURE — 84484 ASSAY OF TROPONIN QUANT: CPT

## 2022-12-29 PROCEDURE — 93005 ELECTROCARDIOGRAM TRACING: CPT | Performed by: EMERGENCY MEDICINE

## 2022-12-29 PROCEDURE — 99285 EMERGENCY DEPT VISIT HI MDM: CPT

## 2022-12-29 PROCEDURE — 85025 COMPLETE CBC W/AUTO DIFF WBC: CPT

## 2022-12-29 PROCEDURE — 71045 X-RAY EXAM CHEST 1 VIEW: CPT

## 2022-12-29 PROCEDURE — 83880 ASSAY OF NATRIURETIC PEPTIDE: CPT

## 2022-12-29 PROCEDURE — 6370000000 HC RX 637 (ALT 250 FOR IP): Performed by: EMERGENCY MEDICINE

## 2022-12-29 RX ORDER — SODIUM CHLORIDE 9 MG/ML
INJECTION, SOLUTION INTRAVENOUS PRN
Status: DISCONTINUED | OUTPATIENT
Start: 2022-12-29 | End: 2022-12-30 | Stop reason: HOSPADM

## 2022-12-29 RX ORDER — ASPIRIN 81 MG/1
324 TABLET, CHEWABLE ORAL ONCE
Status: COMPLETED | OUTPATIENT
Start: 2022-12-29 | End: 2022-12-29

## 2022-12-29 RX ORDER — ACETAMINOPHEN 650 MG/1
650 SUPPOSITORY RECTAL EVERY 6 HOURS PRN
Status: DISCONTINUED | OUTPATIENT
Start: 2022-12-29 | End: 2022-12-30 | Stop reason: HOSPADM

## 2022-12-29 RX ORDER — LEVOTHYROXINE SODIUM 200 UG/1
200 CAPSULE ORAL DAILY
Status: DISCONTINUED | OUTPATIENT
Start: 2022-12-30 | End: 2022-12-30 | Stop reason: HOSPADM

## 2022-12-29 RX ORDER — METOPROLOL SUCCINATE 25 MG/1
25 TABLET, EXTENDED RELEASE ORAL DAILY
Status: DISCONTINUED | OUTPATIENT
Start: 2022-12-30 | End: 2022-12-30 | Stop reason: HOSPADM

## 2022-12-29 RX ORDER — HYDROCHLOROTHIAZIDE 25 MG/1
25 TABLET ORAL DAILY
Status: DISCONTINUED | OUTPATIENT
Start: 2022-12-30 | End: 2022-12-29 | Stop reason: CLARIF

## 2022-12-29 RX ORDER — NITROGLYCERIN 0.4 MG/1
0.4 TABLET SUBLINGUAL EVERY 5 MIN PRN
Status: DISCONTINUED | OUTPATIENT
Start: 2022-12-29 | End: 2022-12-30 | Stop reason: HOSPADM

## 2022-12-29 RX ORDER — LOSARTAN POTASSIUM AND HYDROCHLOROTHIAZIDE 25; 100 MG/1; MG/1
1 TABLET ORAL DAILY
Status: DISCONTINUED | OUTPATIENT
Start: 2022-12-30 | End: 2022-12-30 | Stop reason: HOSPADM

## 2022-12-29 RX ORDER — ASPIRIN 81 MG/1
81 TABLET ORAL NIGHTLY
Status: DISCONTINUED | OUTPATIENT
Start: 2022-12-30 | End: 2022-12-30 | Stop reason: HOSPADM

## 2022-12-29 RX ORDER — ALPRAZOLAM 0.25 MG/1
0.25 TABLET ORAL NIGHTLY PRN
Status: DISCONTINUED | OUTPATIENT
Start: 2022-12-29 | End: 2022-12-30 | Stop reason: HOSPADM

## 2022-12-29 RX ORDER — INDAPAMIDE 2.5 MG/1
2.5 TABLET, FILM COATED ORAL DAILY
Status: DISCONTINUED | OUTPATIENT
Start: 2022-12-30 | End: 2022-12-30 | Stop reason: HOSPADM

## 2022-12-29 RX ORDER — POLYETHYLENE GLYCOL 3350 17 G/17G
17 POWDER, FOR SOLUTION ORAL DAILY PRN
Status: DISCONTINUED | OUTPATIENT
Start: 2022-12-29 | End: 2022-12-30 | Stop reason: HOSPADM

## 2022-12-29 RX ORDER — CLOPIDOGREL BISULFATE 75 MG/1
75 TABLET ORAL ONCE
Status: DISCONTINUED | OUTPATIENT
Start: 2022-12-30 | End: 2022-12-30 | Stop reason: HOSPADM

## 2022-12-29 RX ORDER — SODIUM CHLORIDE 0.9 % (FLUSH) 0.9 %
5-40 SYRINGE (ML) INJECTION EVERY 12 HOURS SCHEDULED
Status: DISCONTINUED | OUTPATIENT
Start: 2022-12-29 | End: 2022-12-30 | Stop reason: HOSPADM

## 2022-12-29 RX ORDER — ONDANSETRON 4 MG/1
4 TABLET, ORALLY DISINTEGRATING ORAL EVERY 8 HOURS PRN
Status: DISCONTINUED | OUTPATIENT
Start: 2022-12-29 | End: 2022-12-30 | Stop reason: HOSPADM

## 2022-12-29 RX ORDER — ONDANSETRON 2 MG/ML
4 INJECTION INTRAMUSCULAR; INTRAVENOUS EVERY 6 HOURS PRN
Status: DISCONTINUED | OUTPATIENT
Start: 2022-12-29 | End: 2022-12-30 | Stop reason: HOSPADM

## 2022-12-29 RX ORDER — ACETAMINOPHEN 325 MG/1
650 TABLET ORAL EVERY 6 HOURS PRN
Status: DISCONTINUED | OUTPATIENT
Start: 2022-12-29 | End: 2022-12-30 | Stop reason: HOSPADM

## 2022-12-29 RX ORDER — NITROGLYCERIN 0.4 MG/1
0.4 TABLET SUBLINGUAL EVERY 5 MIN PRN
Status: DISCONTINUED | OUTPATIENT
Start: 2022-12-29 | End: 2022-12-29 | Stop reason: SDUPTHER

## 2022-12-29 RX ORDER — SODIUM CHLORIDE 0.9 % (FLUSH) 0.9 %
5-40 SYRINGE (ML) INJECTION PRN
Status: DISCONTINUED | OUTPATIENT
Start: 2022-12-29 | End: 2022-12-30 | Stop reason: HOSPADM

## 2022-12-29 RX ORDER — RIVASTIGMINE 9.5 MG/24H
1 PATCH, EXTENDED RELEASE TRANSDERMAL NIGHTLY
Status: DISCONTINUED | OUTPATIENT
Start: 2022-12-29 | End: 2022-12-30 | Stop reason: HOSPADM

## 2022-12-29 RX ADMIN — ASPIRIN 81 MG 324 MG: 81 TABLET ORAL at 14:29

## 2022-12-29 RX ADMIN — NITROGLYCERIN 0.4 MG: 0.4 TABLET, ORALLY DISINTEGRATING SUBLINGUAL at 14:34

## 2022-12-29 RX ADMIN — SODIUM CHLORIDE, PRESERVATIVE FREE 10 ML: 5 INJECTION INTRAVENOUS at 21:00

## 2022-12-29 ASSESSMENT — ENCOUNTER SYMPTOMS
BLOOD IN STOOL: 0
SHORTNESS OF BREATH: 0
ABDOMINAL PAIN: 0
CHEST TIGHTNESS: 1
SORE THROAT: 0
DIARRHEA: 0
EYE PAIN: 0
WHEEZING: 0
EYE DISCHARGE: 0

## 2022-12-29 ASSESSMENT — PAIN - FUNCTIONAL ASSESSMENT: PAIN_FUNCTIONAL_ASSESSMENT: 0-10

## 2022-12-29 ASSESSMENT — PAIN SCALES - GENERAL
PAINLEVEL_OUTOF10: 2
PAINLEVEL_OUTOF10: 0

## 2022-12-29 ASSESSMENT — PAIN DESCRIPTION - LOCATION: LOCATION: CHEST

## 2022-12-29 NOTE — H&P
HISTORY AND PHYSICAL             Date: 12/29/2022        Patient Name: Andie Gatica     YOB: 1955      Age:  69 y.o. Chief Complaint     Chief Complaint   Patient presents with    Chest Pain          History Obtained From   patient, spouse    History of Present Illness   This is a pt with hx hbp, memory issues, ckd who developed a vague persistent sensation across her chest after eating spicy food. She also noted burping. No nausea, sob, sweat. The discomfort persisted and she presented to [facility] where EKG and trop were normal.  Nevertheless it was decided to transfer here. Of note, she had a cath in 2019 showing very mild cad. She sees dr Rena Antunez    Past Medical History     Past Medical History:   Diagnosis Date    Acute arterial ischemic stroke, multifocal, anterior circulation ([facility]     Dr Chet Dawkins    Anemia     Dr Edwin Fan pox     Endometrial adenocarcinoma ([facility] 01/30/2020    Hyperlipidemia     Hypertension     Dr Jammie Medina    Hypothyroidism     Measles     Memory loss     Mumps     Uterine cancer ([facility] 01/2020    Endometrioid carcinoma, Alva Gonsalves        Past Surgical History     Past Surgical History:   Procedure Laterality Date    ENDOMETRIAL BIOPSY  12/11/2019    HYSTERECTOMY, TOTAL ABDOMINAL (CERVIX REMOVED)  01/30/2020    total    OVARY REMOVAL  01/30/2020    total        Medications Prior to Admission     Prior to Admission medications    Medication Sig Start Date End Date Taking? Authorizing Provider   sertraline (ZOLOFT) 50 MG tablet  12/14/22   Historical Provider, MD   ALPRAZolam Larnell Satchel) 0.25 MG tablet Take 1 tablet by mouth nightly as needed for Sleep for up to 30 days.  12/19/22 1/18/23  Kell Carvajal MD   losartan-hydroCHLOROthiazide (HYZAAR) 100-25 MG per tablet TAKE ONE TABLET DAILY, BY MOUTH. 12/5/22   Kell Carvajal MD   indapamide (LOZOL) 2.5 MG tablet TAKE ONE TABLET DAILY IN THE MORNING, BY MOUTH. 12/5/22   Kell Carvajal MD   folic acid (FOLVITE) 1 MG tablet TAKE ONE TABLET DAILY, BY MOUTH. 11/22/22   Judge Ania MD   levothyroxine (SYNTHROID) 200 MCG tablet TAKE 1 TABLET BY MOUTH ONCE DAILY, TAKE 1&1/2 TAB ON MONDAY EVERY OTHER WEEK. 11/17/22   Hi Salinas MD   metoprolol succinate (TOPROL XL) 25 MG extended release tablet TAKE ONE TABLET DAILY, BY MOUTH. 8/8/22   Hi Salinas MD   simvastatin (ZOCOR) 80 MG tablet TAKE ONE TABLET DAILY AT BEDTIME, BY MOUTH. 7/15/22   Hi Salinas MD   rivastigmine (EXELON) 9.5 MG/24HR Place 1 patch onto the skin daily 4/6/22   Hi Salinas MD   buPROPion (WELLBUTRIN XL) 300 MG extended release tablet 1 PO QD 4/6/22   Hi Salinas MD   clopidogrel (PLAVIX) 75 MG tablet STOP if pt not taking this at home 3/30/21   Saskia Werner MD   CALCIUM CITRATE-VITAMIN D PO Take 1 tablet by mouth 2 times daily (with meals)    Historical Provider, MD   Glucosamine-Chondroit-Vit C-Mn (GLUCOSAMINE 1500 COMPLEX) CAPS Take 1 tablet by mouth daily  Patient taking differently: Take 1 tablet by mouth 2 times daily 10/5/20   Hi Salinas MD   aspirin 81 MG EC tablet Take 81 mg by mouth nightly    Historical Provider, MD   Multiple Vitamins-Minerals (MULTIVITAMIN & MINERAL PO) Take 1 tablet by mouth daily     Historical Provider, MD        Allergies   Donepezil    Social History     Social History       Tobacco History       Smoking Status  Never      Smokeless Tobacco Use  Never              Alcohol History       Alcohol Use Status  Yes Drinks/Week  2 Glasses of wine per week Amount  2.0 standard drinks of alcohol/wk              Drug Use       Drug Use Status  No              Sexual Activity       Sexually Active  Not Currently Partners  Male                    Family History     Family History   Problem Relation Age of Onset    Arthritis Mother     Other Daughter         SVT and PE       Review of Systems   Review of Systems  See above    Physical Exam   BP (!) 149/74   Pulse 73   Temp 96.9 °F (36.1 °C) (Temporal)   Resp 21   Ht 5' (1.524 m)   Wt 180 lb (81.6 kg)   SpO2 96%   BMI 35.15 kg/m²     Physical Exam  General appearance - alert, well appearing, and in no distress    Mental Status - alert, oriented to person, place, and time          Neck - supple, no significant adenopathy        Chest - clear to auscultation, no wheezes, rales or rhonchi, symmetric air entry     Heart - normal rate and regular rhythm, systolic murmur 2/6 at 2nd left intercostal space and at 2nd right intercostal space     Abdomen - soft, nontender, nondistended, no masses or organomegaly         Neurological - alert, oriented, normal speech, no focal findings or movement disorder noted     Musculoskeletal -   msk exam:[id number]}     Extremities - no pedal edema noted     Skin - normal coloration and turgor, no rashes, no suspicious skin lesions noted    Labs      Recent Results (from the past 24 hour(s))   EKG 12 Lead    Collection Time: 12/29/22  2:16 PM   Result Value Ref Range    Ventricular Rate 67 BPM    Atrial Rate 67 BPM    P-R Interval 122 ms    QRS Duration 96 ms    Q-T Interval 384 ms    QTc Calculation (Bazett) 405 ms    P Axis 40 degrees    R Axis 18 degrees    T Axis 45 degrees   CBC with Auto Differential    Collection Time: 12/29/22  2:30 PM   Result Value Ref Range    WBC 6.5 4.8 - 10.8 thou/mm3    RBC 3.92 (L) 4.10 - 5.30 mill/mm3    Hemoglobin 12.8 12.0 - 16.0 gm/dl    Hematocrit 39.3 37.0 - 47.0 %    .3 (H) 81.0 - 99.0 fL    MCH 32.7 (H) 26.0 - 32.0 pg    MCHC 32.6 31.0 - 35.0 gm/dl    RDW 12.6 11.5 - 14.9 %    Platelets 36 (L) 032 - 400 thou/mm3    MPV 12.9 (H) 9.4 - 12.4 fL   Brain Natriuretic Peptide    Collection Time: 12/29/22  2:30 PM   Result Value Ref Range    NT Pro-.0 0.0 - 900.0 pg/mL   Troponin    Collection Time: 12/29/22  2:30 PM   Result Value Ref Range    Troponin, High Sensitivity 7.3 0.0 - 51.3 pg/mL   Comprehensive Metabolic Panel    Collection Time: 12/29/22  2:30 PM   Result Value Ref Range    Glucose 74 74 - 106 mg/dl    Creatinine 1.5 (H) 0.6 - 1.3 mg/dl    BUN 26 (H) 7 - 18 mg/dl    Sodium 144 136 - 145 meq/l    Potassium 4.3 3.5 - 5.1 meq/l    Chloride 105 98 - 107 meq/l    CO2 29 21 - 32 meq/l    POC CALCIUM 9.2 8.5 - 10.1 mg/dl    AST 26 15 - 37 U/L    Alkaline Phosphatase 86 46 - 116 U/L    Total Protein 7.1 6.4 - 8.2 gm/dl    Albumin 3.5 3.4 - 5.0 gm/dl    Total Bilirubin 0.4 0.2 - 1.0 mg/dl    ALT 27 14 - 63 U/L   Glomerular Filtration Rate, Estimated    Collection Time: 12/29/22  2:30 PM   Result Value Ref Range    GFR, Estimated 37 (A) >60 ml/min/1.73m2   ANION GAP    Collection Time: 12/29/22  2:30 PM   Result Value Ref Range    Anion Gap 10.0 8.0 - 16.0 meq/l        Imaging/Diagnostics Last 24 Hours   XR CHEST PORTABLE    Result Date: 12/29/2022  PROCEDURE: XR CHEST PORTABLE CLINICAL INFORMATION: Chest pain . TECHNIQUE: Portable position not indicated COMPARISON: 6/12/2019 FINDINGS: Cardiomediastinal silhouette is within normal limits. Retrocardiac density likely hiatal hernia. No infiltrates or effusions. Vascularity is normal. Loop recorder left chest. EKG leads overlie the chest.     Stable chest no acute cardiopulmonary process **This report has been created using voice recognition software. It may contain minor errors which are inherent in voice recognition technology. ** Final report electronically signed by Dr. Vannessa Bautista on 12/29/2022 2:41 PM      Assessment      Hospital Problems             Last Modified POA    * (Principal) Chest pain 12/29/2022 Yes    Hypothyroidism due to acquired atrophy of thyroid 12/29/2022 Yes    Hypertension 12/29/2022 Yes       Plan   Chest pain- low probability of cardiac origin; consult Cardio.   Suspect GI origin  Hbp- continue home meds  Ckd- monitor    Consultations Ordered:  IP CONSULT TO CARDIOLOGY    Electronically signed by Loli Valles MD on 12/29/22 at 6:42 PM EST

## 2022-12-29 NOTE — ED NOTES
Attempted to call report. Nurse unavailable at this time. Awaiting return call.      River Perera, BROWN  12/29/22 1423

## 2022-12-29 NOTE — ED NOTES
Patient presents with complaint of chest pain that started earlier today. States that she thought it was indigestion because of the food she ate but then felt like it may not be. Skin is pink warm and dry. Respirations are even and unlabored. States she does have a loop.      Cresencio Quevedo RN  12/29/22 [id number]

## 2022-12-29 NOTE — ED PROVIDER NOTES
[address] ENCOUNTER          Pt Name: Yuli Rueda  MRN: [id number]  [place] 1955  Date of evaluation: 12/29/2022  Physician: Tracy Loya MD,       [address] [place] [address]       Chief Complaint   Patient presents with    Chest Pain         HISTORY OF PRESENT ILLNESS    HPI  Yuli Rueda is a 69 y.o. female who presented via private vehicle with above-mentioned complaint. She is accompanied by her  at daughter. Pain started 2 hours ago when she was working in her shop. She describes her pain as moderate substernal persistent chest tightness which did not radiate anywhere and was not relieved or exacerbated by anything. She has no history of heart disease. She underwent an outpatient echocardiogram yesterday which was normal.  She has a history of hypertension and chronic renal insufficiency. She has not been ill recently. She denies fever or chills. She has no cough or shortness of breath. REVIEW OF SYSTEMS   Review of Systems   Constitutional:  Negative for chills and fever. HENT:  Negative for sore throat. Eyes:  Negative for pain and discharge. Respiratory:  Positive for chest tightness. Negative for shortness of breath and wheezing. Cardiovascular:  Negative for chest pain and palpitations. Gastrointestinal:  Negative for abdominal pain, blood in stool and diarrhea. Genitourinary:  Negative for dysuria and hematuria. Musculoskeletal:  Negative for neck pain and neck stiffness. Neurological:  Negative for seizures, syncope and headaches. Hematological:  Negative for adenopathy. Psychiatric/Behavioral:  Negative for agitation and hallucinations. PAST MEDICAL AND SURGICAL HISTORY     Patient Active Problem List   Diagnosis Code    Depression F32. A    Hypothyroid E03.9    Hyperlipidemia E78.5    Hypertension I10    Abnormal findings on diagnostic imaging of other specified body structures R93.89    Dyspnea on exertion R06.09    Morbidly obese (HCC) E66.01    Osteopenia of multiple sites M85.89    Fall at home, initial encounter [address] XXXA, Y92.009    Senile osteoporosis M81.0    COVID U07.1    Recurrent major depressive disorder, in full remission ([facility] F33.42    Thrombocytopenia ([facility] D69.6    Malignant neoplasm of body of uterus, unspecified site [facility]) C54.9    Chronic renal disease, stage III ([facility] [373962] N18.30     Past Medical History:   Diagnosis Date    Acute arterial ischemic stroke, multifocal, anterior circulation ([facility]     Dr Daily Laboy    Anemia     Dr Olvera Civil pox     Endometrial adenocarcinoma ([facility] 01/30/2020    Hyperlipidemia     Hypertension     Dr Ira Brantley    Hypothyroidism     Measles     Memory loss     Mumps     Uterine cancer ([facility] 01/2020    Endometrioid carcinoma, Primus Juba     Past Surgical History:   Procedure Laterality Date    ENDOMETRIAL BIOPSY  12/11/2019    HYSTERECTOMY, TOTAL ABDOMINAL (CERVIX REMOVED)  01/30/2020    total    OVARY REMOVAL  01/30/2020    total         MEDICATIONS     Current Facility-Administered Medications:     nitroGLYCERIN (NITROSTAT) SL tablet 0.4 mg, 0.4 mg, SubLINGual, Q5 Min PRN, Adela Villegas MD, 0.4 mg at 12/29/22 1434    Current Outpatient Medications:     sertraline (ZOLOFT) 50 MG tablet, , Disp: , Rfl:     ALPRAZolam (XANAX) 0.25 MG tablet, Take 1 tablet by mouth nightly as needed for Sleep for up to 30 days. , Disp: 30 tablet, Rfl: 0    losartan-hydroCHLOROthiazide (HYZAAR) 100-25 MG per tablet, TAKE ONE TABLET DAILY, BY MOUTH., Disp: 90 tablet, Rfl: 0    indapamide (LOZOL) 2.5 MG tablet, TAKE ONE TABLET DAILY IN THE MORNING, BY MOUTH., Disp: 90 tablet, Rfl: 0    folic acid (FOLVITE) 1 MG tablet, TAKE ONE TABLET DAILY, BY MOUTH., Disp: 90 tablet, Rfl: 0    levothyroxine (SYNTHROID) 200 MCG tablet, TAKE 1 TABLET BY MOUTH ONCE DAILY, TAKE 1&1/2 TAB ON MONDAY EVERY OTHER WEEK., Disp: 93 tablet, Rfl: 0    metoprolol succinate (TOPROL XL) 25 MG extended release tablet, TAKE ONE TABLET DAILY, BY MOUTH., Disp: 90 tablet, Rfl: 3    simvastatin (ZOCOR) 80 MG tablet, TAKE ONE TABLET DAILY AT BEDTIME, BY MOUTH., Disp: 90 tablet, Rfl: 0    rivastigmine (EXELON) 9.5 MG/24HR, Place 1 patch onto the skin daily, Disp: 90 patch, Rfl: 3    buPROPion (WELLBUTRIN XL) 300 MG extended release tablet, 1 PO QD, Disp: 90 tablet, Rfl: 3    clopidogrel (PLAVIX) 75 MG tablet, STOP if pt not taking this at home, Disp: 90 tablet, Rfl: 3    CALCIUM CITRATE-VITAMIN D PO, Take 1 tablet by mouth 2 times daily (with meals), Disp: , Rfl:     Glucosamine-Chondroit-Vit C-Mn (GLUCOSAMINE 1500 COMPLEX) CAPS, Take 1 tablet by mouth daily (Patient taking differently: Take 1 tablet by mouth 2 times daily), Disp: 90 capsule, Rfl: 3    aspirin 81 MG EC tablet, Take 81 mg by mouth nightly, Disp: , Rfl:     Multiple Vitamins-Minerals (MULTIVITAMIN & MINERAL PO), Take 1 tablet by mouth daily , Disp: , Rfl:     Previous Medications    ALPRAZOLAM (XANAX) 0.25 MG TABLET    Take 1 tablet by mouth nightly as needed for Sleep for up to 30 days. ASPIRIN 81 MG EC TABLET    Take 81 mg by mouth nightly    BUPROPION (WELLBUTRIN XL) 300 MG EXTENDED RELEASE TABLET    1 PO QD    CALCIUM CITRATE-VITAMIN D PO    Take 1 tablet by mouth 2 times daily (with meals)    CLOPIDOGREL (PLAVIX) 75 MG TABLET    STOP if pt not taking this at home    FOLIC ACID (FOLVITE) 1 MG TABLET    TAKE ONE TABLET DAILY, BY MOUTH. GLUCOSAMINE-CHONDROIT-VIT C-MN (GLUCOSAMINE 1500 COMPLEX) CAPS    Take 1 tablet by mouth daily    INDAPAMIDE (LOZOL) 2.5 MG TABLET    TAKE ONE TABLET DAILY IN THE MORNING, BY MOUTH. LEVOTHYROXINE (SYNTHROID) 200 MCG TABLET    TAKE 1 TABLET BY MOUTH ONCE DAILY, TAKE 1&1/2 TAB ON MONDAY EVERY OTHER WEEK. LOSARTAN-HYDROCHLOROTHIAZIDE (HYZAAR) 100-25 MG PER TABLET    TAKE ONE TABLET DAILY, BY MOUTH.     METOPROLOL SUCCINATE (TOPROL XL) 25 MG EXTENDED RELEASE TABLET    TAKE ONE TABLET DAILY, BY MOUTH. MULTIPLE VITAMINS-MINERALS (MULTIVITAMIN & MINERAL PO)    Take 1 tablet by mouth daily     RIVASTIGMINE (EXELON) 9.5 MG/24HR    Place 1 patch onto the skin daily    SERTRALINE (ZOLOFT) 50 MG TABLET        SIMVASTATIN (ZOCOR) 80 MG TABLET    TAKE ONE TABLET DAILY AT BEDTIME, BY MOUTH. SOCIAL HISTORY     Social History     Social History Narrative    Not on file     Social History     Tobacco Use    Smoking status: Never    Smokeless tobacco: Never   Vaping Use    Vaping Use: Never used   Substance Use Topics    Alcohol use: Yes     Alcohol/week: 2.0 standard drinks     Types: 2 Glasses of wine per week    Drug use: No         ALLERGIES     Allergies   Allergen Reactions    Donepezil      nightmares         FAMILY HISTORY     Family History   Problem Relation Age of Onset    Arthritis Mother     Other Daughter         SVT and PE         PHYSICAL EXAM     ED Triage Vitals [12/29/22 1418]   BP Temp Temp Source Heart Rate Resp SpO2 Height Weight   (!) 157/95 96.9 °F (36.1 °C) Temporal 69 16 100 % 5' (1.524 m) 180 lb (81.6 kg)     Initial vital signs and nursing assessment reviewed and normal. Body mass index is 35.15 kg/m². Pulsoximetry is normal per my interpretation. Additional Vital Signs:  Vitals:    12/29/22 1505   BP: 134/88   Pulse: 71   Resp: 15   Temp:    SpO2: 95%       Physical Exam  Vitals and nursing note reviewed. Constitutional:       General: She is in acute distress. Appearance: She is well-developed. She is ill-appearing. HENT:      Head: Atraumatic. Eyes:      Conjunctiva/sclera: Conjunctivae normal.      Pupils: Pupils are equal, round, and reactive to light. Neck:      Thyroid: No thyromegaly. Vascular: No JVD. Trachea: No tracheal deviation. Cardiovascular:      Rate and Rhythm: Normal rate and regular rhythm. Heart sounds: No murmur heard. No friction rub. No gallop.    Pulmonary:      Effort: Pulmonary effort is normal.      Breath sounds: Normal breath sounds. Abdominal:      General: Bowel sounds are normal.      Palpations: Abdomen is soft. Tenderness: There is no abdominal tenderness. Musculoskeletal:         General: No tenderness. Cervical back: Neck supple. Right lower leg: No edema. Left lower leg: No edema. Neurological:      Mental Status: She is alert. PREVIOUS RECORDS  AND EXTERNAL INFORMATION REVIEWED   History obtained from chart review and the patient. Pertinent previous records reviewed: Ruma Velazco MEDICAL DECISION MAKING   Initial Assessment Summary:   Chest pain work-up  Please see ED course section below for continuation and resolution of this initial assessment. Comorbid conditions pertinent to this ED encounter:  She had several risk factors for CAD including hypertension, chronic kidney disease, hyperlipidemia, obesity      Differential Diagnosis includes but is not limited to: Angina, MI, GERD      Plan:   Chest pain work-up, lab work-up eluding cardiac markers. Chest x-ray and EKG              ED RESULTS   Laboratory results (none if blank):  Labs Reviewed   CBC WITH AUTO DIFFERENTIAL - Abnormal; Notable for the following components:       Result Value    RBC 3.92 (*)     .3 (*)     MCH 32.7 (*)     Platelets 36 (*)     MPV 12.9 (*)     All other components within normal limits   COMPREHENSIVE METABOLIC PANEL - Abnormal; Notable for the following components:    Creatinine 1.5 (*)     BUN 26 (*)     All other components within normal limits   GLOMERULAR FILTRATION RATE, ESTIMATED - Abnormal; Notable for the following components:    GFR, Estimated 37 (*)     All other components within normal limits   BRAIN NATRIURETIC PEPTIDE   TROPONIN   ANION GAP     (Any cultures that may have been sent were not resulted at the time of this patient ED visit)  See ED course below for lab results interpretation if applicable.       Radiologic studies results available at the moment of this note (None if blank):  XR CHEST PORTABLE   Final Result   Stable chest no acute cardiopulmonary process            **This report has been created using voice recognition software. It may contain minor errors which are inherent in voice recognition technology. **      Final report electronically signed by Dr. Gerson Lai on 12/29/2022 2:41 PM        All radiology images reviewed by me in addition to interpretation provided by the radiologist.  See ED course below for radiology results interpretation if applicable. EKG interpretation (none if blank):  EKG Interpretation    Interpreted by me    Rhythm: normal sinus   Rate: normal  Axis: normal  Ectopy: none  Conduction: normal  ST Segments: no acute change  T Waves: no acute change  Q Waves: none    Clinical Impression: no acute changes and normal EKG  All EKGs are interpreted by our Cardiology department, final interpretation may not be available as of the writing of this note. See ED course below for radiology results interpretation if applicable. ED COURSE   ED Medications administered this visit (None if left blank):   Medications   nitroGLYCERIN (NITROSTAT) SL tablet 0.4 mg (0.4 mg SubLINGual Given 12/29/22 1434)   aspirin chewable tablet 324 mg (324 mg Oral Given 12/29/22 1429)                CRITICAL CARE:      PROCEDURES: (None if blank)  Procedures:     MEDICATION CHANGES     New Prescriptions    No medications on file         FINAL DISPOSITION   Code Status: Full code      MDM  Patient presented with chest tightness as mentioned above she received aspirin and 1 nitroglycerin sublingual.  She had normal chest x-ray, EKG and troponin. Reevaluation at 3:15 PM:  She was awake and alert, she looked comfortable. She said that her pain has resolved. She will be admitted for cardiac work-up. I discussed diagnosis and treatment plan with her and her family.     FINAL DIAGNOSES:  Final diagnoses:   Chest pain in adult       Condition: condition: good  Dispo: Admit to telemetry  DISPOSITION Decision To Admit 12/29/2022 03:18:17 PM  Admit to [facility]    Outpatient follow up (If applicable):  No follow-up provider specified. The results of pertinent diagnostic studies and exam findings were discussed with the patient/surrogate. The patients provisional diagnosis and plan of care were discussed with the patient and present family who expressed understanding. Medications were reviewed and indications and risks of medications were discussed with the patient/family present. Strict return precautions and follow up instructions were discussed with the patient prior to discharge, with which the patient agrees. This transcription was electronically signed. It was dictated by use of voice recognition software and electronically transcribed. The transcription may contain errors not detected in proofreading.          Lida Velasquez MD  12/29/22 2004

## 2022-12-29 NOTE — ED NOTES
ED to inpatient nurses report    Chief Complaint   Patient presents with    Chest Pain      Present to ED from home  LOC: alert and orientated to name, place, date, confused at times due to dementia  Vital signs   Vitals:    12/29/22 1450 12/29/22 1505 12/29/22 1550 12/29/22 1605   BP: 131/78 134/88 (!) 162/89 (!) 149/74   Pulse: 76 71 66 73   Resp: 23 15 21 21   Temp:       TempSrc:       SpO2: 94% 95% 96% 96%   Weight:       Height:          Oxygen Baseline room air    Current needs required none Bipap/Cpap No  LDAs:   Peripheral IV 12/29/22 Right Antecubital (Active)   Site Assessment Clean, dry & intact 12/29/22 1428   Line Status Specimen collected; Flushed 12/29/22 1428   Dressing Status New dressing applied 12/29/22 1428     Mobility: Independent  Pending ED orders:   Present condition: patient lives at home with spouse. Presented today after eating lunch. She at first thought she had indigestion but then was concerned otherwise. Patient was treated with 1 nitro. This relieved her chest pain. Patient is noted to have a cardiac history with loop present.      C-SSRS Risk of Suicide: No Risk  Swallow Screening    Preferred Language: English     Electronically signed by Winston Dumont RN on 12/29/2022 at 4:49 PM       Winston Dumont RN  12/29/22 0287

## 2022-12-29 NOTE — ED NOTES
Patient states that the chest pain is relieved. States that she does not need any more nitro.      Ekaterina Ward RN  12/29/22 1929

## 2022-12-30 ENCOUNTER — TELEPHONE (OUTPATIENT)
Dept: FAMILY MEDICINE CLINIC | Age: 67
End: 2022-12-30

## 2022-12-30 VITALS
BODY MASS INDEX: 35.34 KG/M2 | WEIGHT: 180 LBS | OXYGEN SATURATION: 97 % | RESPIRATION RATE: 18 BRPM | HEART RATE: 70 BPM | HEIGHT: 60 IN | TEMPERATURE: 97.7 F | SYSTOLIC BLOOD PRESSURE: 155 MMHG | DIASTOLIC BLOOD PRESSURE: 86 MMHG

## 2022-12-30 DIAGNOSIS — E78.00 PURE HYPERCHOLESTEROLEMIA: ICD-10-CM

## 2022-12-30 LAB
ALBUMIN SERPL-MCNC: 3.9 G/DL (ref 3.5–5.1)
ALP BLD-CCNC: 83 U/L (ref 38–126)
ALT SERPL-CCNC: 18 U/L (ref 11–66)
ANION GAP SERPL CALCULATED.3IONS-SCNC: 15 MEQ/L (ref 8–16)
AST SERPL-CCNC: 26 U/L (ref 5–40)
BILIRUB SERPL-MCNC: 0.4 MG/DL (ref 0.3–1.2)
BUN BLDV-MCNC: 25 MG/DL (ref 7–22)
CALCIUM SERPL-MCNC: 9.6 MG/DL (ref 8.5–10.5)
CHLORIDE BLD-SCNC: 103 MEQ/L (ref 98–111)
CO2: 24 MEQ/L (ref 23–33)
CREAT SERPL-MCNC: 1 MG/DL (ref 0.4–1.2)
EKG ATRIAL RATE: 61 BPM
EKG P AXIS: 38 DEGREES
EKG P-R INTERVAL: 126 MS
EKG Q-T INTERVAL: 428 MS
EKG QRS DURATION: 104 MS
EKG QTC CALCULATION (BAZETT): 430 MS
EKG R AXIS: 16 DEGREES
EKG T AXIS: 53 DEGREES
EKG VENTRICULAR RATE: 61 BPM
GFR SERPL CREATININE-BSD FRML MDRD: > 60 ML/MIN/1.73M2
GLUCOSE BLD-MCNC: 83 MG/DL (ref 70–108)
POTASSIUM SERPL-SCNC: 3.8 MEQ/L (ref 3.5–5.2)
SODIUM BLD-SCNC: 142 MEQ/L (ref 135–145)
TOTAL PROTEIN: 6.7 G/DL (ref 6.1–8)

## 2022-12-30 PROCEDURE — 6370000000 HC RX 637 (ALT 250 FOR IP): Performed by: INTERNAL MEDICINE

## 2022-12-30 PROCEDURE — 93005 ELECTROCARDIOGRAM TRACING: CPT | Performed by: INTERNAL MEDICINE

## 2022-12-30 PROCEDURE — G0378 HOSPITAL OBSERVATION PER HR: HCPCS

## 2022-12-30 PROCEDURE — 2580000003 HC RX 258: Performed by: INTERNAL MEDICINE

## 2022-12-30 PROCEDURE — 36415 COLL VENOUS BLD VENIPUNCTURE: CPT

## 2022-12-30 PROCEDURE — 80053 COMPREHEN METABOLIC PANEL: CPT

## 2022-12-30 RX ORDER — SUCRALFATE 1 G/1
1 TABLET ORAL ONCE
Status: COMPLETED | OUTPATIENT
Start: 2022-12-30 | End: 2022-12-30

## 2022-12-30 RX ORDER — SUCRALFATE 1 G/1
1 TABLET ORAL 4 TIMES DAILY
Qty: 28 TABLET | Refills: 0 | Status: SHIPPED | OUTPATIENT
Start: 2022-12-30 | End: 2023-01-04 | Stop reason: SDUPTHER

## 2022-12-30 RX ORDER — CLOPIDOGREL BISULFATE 75 MG/1
75 TABLET ORAL DAILY
Qty: 30 TABLET | Refills: 5 | Status: SHIPPED | OUTPATIENT
Start: 2022-12-30

## 2022-12-30 RX ORDER — CLOPIDOGREL BISULFATE 75 MG/1
75 TABLET ORAL DAILY
Qty: 30 TABLET | Refills: 5 | Status: SHIPPED | OUTPATIENT
Start: 2022-12-30 | End: 2022-12-30 | Stop reason: SDUPTHER

## 2022-12-30 RX ORDER — SIMVASTATIN 80 MG
TABLET ORAL
Qty: 90 TABLET | Refills: 3 | Status: SHIPPED | OUTPATIENT
Start: 2022-12-30

## 2022-12-30 RX ORDER — CLOPIDOGREL BISULFATE 75 MG/1
75 TABLET ORAL ONCE
Qty: 30 TABLET | Refills: 5 | Status: SHIPPED | OUTPATIENT
Start: 2022-12-30 | End: 2022-12-30 | Stop reason: SDUPTHER

## 2022-12-30 RX ADMIN — LOSARTAN POTASSIUM AND HYDROCHLOROTHIAZIDE 1 TABLET: 25; 100 TABLET ORAL at 10:58

## 2022-12-30 RX ADMIN — INDAPAMIDE 2.5 MG: 2.5 TABLET, FILM COATED ORAL at 10:58

## 2022-12-30 RX ADMIN — LEVOTHYROXINE SODIUM 200 MCG: 200 CAPSULE ORAL at 06:29

## 2022-12-30 RX ADMIN — METOPROLOL SUCCINATE 25 MG: 25 TABLET, EXTENDED RELEASE ORAL at 10:58

## 2022-12-30 RX ADMIN — Medication 50 MG: at 11:05

## 2022-12-30 RX ADMIN — SUCRALFATE 1 G: 1 TABLET ORAL at 14:31

## 2022-12-30 RX ADMIN — SODIUM CHLORIDE, PRESERVATIVE FREE 10 ML: 5 INJECTION INTRAVENOUS at 11:00

## 2022-12-30 NOTE — CARE COORDINATION
Case Management Assessment  Initial Evaluation    Date/Time of Evaluation: 12/30/2022 10:02 AM  Assessment Completed by: Sara Torrez RN    If patient is discharged prior to next notation, then this note serves as note for discharge by case management. Patient Name: Moris Hatch                   YOB: 1955  Diagnosis: Chest pain [R07.9]  Chest pain in adult [R07.9]                   Date / Time: 12/29/2022  2:18 PM  Location: [address] [place] [address]     Patient Admission Status: Observation   Readmission Risk (Low < 19, Mod (19-27), High > 27): No data recorded  Current PCP: Sandro Morrow MD  PCP verified by CM? Yes    Chart Reviewed: Yes      History Provided by: Patient  Patient Orientation: Alert and Oriented    Patient Cognition: Alert    Hospitalization in the last 30 days (Readmission):  No    If yes, Readmission Assessment in  Navigator will be completed. Advance Directives:      Code Status: Full Code   Patient's Primary Decision Maker is: Named in Scanned ACP Document    Primary Decision Maker: Josselin Gloria - Spouse - [phone]    Secondary Decision Maker: april kincaid - Child - [phone]    Discharge Planning:    Patient lives with: Spouse/Significant Other Type of Home: House  Primary Care Giver: Self  Patient Support Systems include: Spouse/Significant Other   Current Financial resources: Medicare  Current community resources: None  Current services prior to admission: None            Current DME:  No.            Type of Home Care services:  None    ADLS  Prior functional level: Independent in ADLs/IADLs  Current functional level: Independent in ADLs/IADLs    Family can provide assistance at DC: Yes  Would you like Case Management to discuss the discharge plan with any other family members/significant others, and if so, who?     Plans to Return to Present Housing: Yes  Other Identified Issues/Barriers to RETURNING to current housing: No barriers  Potential Assistance needed at discharge: N/A            Potential DME:  No  Patient expects to discharge to:  Home  Plan for transportation at discharge:  Spouse    Financial    Payor: MEDICARE / Plan: MEDICARE PART A AND B / Product Type: *No Product type* /     Does insurance require precert for SNF: No    Potential assistance Purchasing Medications: No  Meds-to-Beds request: Yes      [address], [address] [facility] [place] - P [phone] - F [phone]  [address]  Phone: [phone] Fax: [phone] [facility] [address], [address] [facility] [phone] - F [phone]  [address] [facility]  Phone: [phone] Fax: [phone] - [address], [address] [phone] Derick Hernandezel [phone]  [address] [place] [address] Drive [address]  Phone: [phone] Fax: [phone]      Notes:    Factors facilitating achievement of predicted outcomes: Caregiver support and Good insight into deficits    Barriers to discharge: Decreased proprioception    Additional Case Management Notes: Admitted from ER with CP. Had OP echo day prior. Creat noted 1.5. GFR 37. Hx CRI. Cardiology consulted. Procedure: No.    The Plan for Transition of Care is related to the following treatment goals of Chest pain [R07.9]  Chest pain in adult [R07.9]    Patient Goals/Plan/Treatment Preferences: Met with Nini Mitchell this morning. 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 Occasionally falls, sight issues. She does not drive due to this. Resides with spouse. No discharge needs expected. Transportation/Food Security/Housekeeping Addressed: No issues identified.      Mary George RN  Case Management Department

## 2022-12-30 NOTE — PROGRESS NOTES
Pt discharged home with . Discharge paperwork reviewed with patient. Went over medications, follow-up appointments, discharge instructions, and patient education. No further questions or concerns voiced by patient.

## 2022-12-30 NOTE — TELEPHONE ENCOUNTER
Pt is being discharged from [facility] today and needs to schedule follow up in 7-10 days. There isnt anything available on your schedule. Please advise on where to schedule pt.

## 2022-12-30 NOTE — PLAN OF CARE
Problem: Discharge Planning  Goal: Discharge to home or other facility with appropriate resources  Outcome: Completed     Problem: Cardiovascular - Adult  Goal: Maintains optimal cardiac output and hemodynamic stability  12/30/2022 1522 by Devin Olsen RN  Outcome: Completed  12/30/2022 1520 by Devin lOsen RN  Outcome: Progressing  Flowsheets (Taken 12/30/2022 1520)  Maintains optimal cardiac output and hemodynamic stability:   Monitor blood pressure and heart rate   Monitor urine output and notify Licensed Independent Practitioner for values outside of normal range   Assess for signs of decreased cardiac output   Administer fluid and/or volume expanders as ordered   Administer vasoactive medications as ordered  Goal: Absence of cardiac dysrhythmias or at baseline  12/30/2022 1522 by Devin Olsen RN  Outcome: Completed  12/30/2022 1520 by Devin Olsen RN  Outcome: Progressing  Flowsheets (Taken 12/30/2022 1520)  Absence of cardiac dysrhythmias or at baseline:   Monitor cardiac rate and rhythm   Assess for signs of decreased cardiac output   Administer antiarrhythmia medication and electrolyte replacement as ordered     Problem: Metabolic/Fluid and Electrolytes - Adult  Goal: Electrolytes maintained within normal limits  12/30/2022 1522 by Devin Olsen RN  Outcome: Completed  12/30/2022 1520 by Devin Olsen RN  Outcome: Progressing  Flowsheets (Taken 12/30/2022 1520)  Electrolytes maintained within normal limits:   Monitor response to electrolyte replacements, including repeat lab results as appropriate   Administer electrolyte replacement as ordered   Monitor labs and assess patient for signs and symptoms of electrolyte imbalances

## 2022-12-30 NOTE — PLAN OF CARE
Problem: Cardiovascular - Adult  Goal: Maintains optimal cardiac output and hemodynamic stability  Outcome: Progressing  Flowsheets (Taken 12/30/2022 1520)  Maintains optimal cardiac output and hemodynamic stability:   Monitor blood pressure and heart rate   Monitor urine output and notify Licensed Independent Practitioner for values outside of normal range   Assess for signs of decreased cardiac output   Administer fluid and/or volume expanders as ordered   Administer vasoactive medications as ordered  Goal: Absence of cardiac dysrhythmias or at baseline  Outcome: Progressing  Flowsheets (Taken 12/30/2022 1520)  Absence of cardiac dysrhythmias or at baseline:   Monitor cardiac rate and rhythm   Assess for signs of decreased cardiac output   Administer antiarrhythmia medication and electrolyte replacement as ordered     Problem: Metabolic/Fluid and Electrolytes - Adult  Goal: Electrolytes maintained within normal limits  Outcome: Progressing  Flowsheets (Taken 12/30/2022 1520)  Electrolytes maintained within normal limits:   Monitor response to electrolyte replacements, including repeat lab results as appropriate   Administer electrolyte replacement as ordered   Monitor labs and assess patient for signs and symptoms of electrolyte imbalances

## 2022-12-30 NOTE — CONSULTS
[facility]  Cardiology Consult        Patient:  Mag Parrish  YOB: 1955  MRN: [id number]     Acct: [id number]    PCP: Mary Black MD    Date of Admission: 12/29/2022      Reason for Consultation:  Chest pain      History Of Present Illness:    67 y.o. pleasant female c hx of CVA, HTN, HLD, endometiral cancer s/p surgery, Anxiety who presented to the hospital with complaints of chest pain.  As per patient the chest pains were mild tightness after eating spicy food.  The discomfort lasted only few minutes.  No alleviating or aggravating factors.  Denies any shortness of breath.  Patient sees Dr. Parikh for cardiac care.  EKG is NSR, no ST-T changes.  At present she is asmptomatic.  She underwent cath recently which showed no significant CAD.  Echo showed EF 45-50%, had moderate MR in the past.      All labs, EKG's, diagnostic testing and images as well as cardiac cath, stress testing were reviewed during this encounter.    Past Medical History:          Diagnosis Date    Acute arterial ischemic stroke, multifocal, anterior circulation (HCC)     Dr Dial    Anemia     Dr Jerome    Anxiety     Arthritis     Chicken pox     Endometrial adenocarcinoma ([facility]) 01/30/2020    Hyperlipidemia     Hypertension     Dr Spring    Hypothyroidism     Measles     Memory loss     Mumps     Uterine cancer ([facility]) 01/2020    Endometrioid carcinomaChaz       Past Surgical History:          Procedure Laterality Date    ENDOMETRIAL BIOPSY  12/11/2019    HYSTERECTOMY, TOTAL ABDOMINAL (CERVIX REMOVED)  01/30/2020    total    OVARY REMOVAL  01/30/2020    total       Medications Prior to Admission:      Prior to Admission medications    Medication Sig Start Date End Date Taking? Authorizing Provider   simvastatin (ZOCOR) 80 MG tablet TAKE ONE TABLET DAILY AT BEDTIME, BY MOUTH. 12/30/22   Mary Black MD   sertraline (ZOLOFT) 50 MG tablet  12/14/22   Historical Provider, MD   ALPRAZolam  Aftab Distance) 0.25 MG tablet Take 1 tablet by mouth nightly as needed for Sleep for up to 30 days.  12/19/22 1/18/23  Swapnil Edwards MD   losartan-hydroCHLOROthiazide (HYZAAR) 100-25 MG per tablet TAKE ONE TABLET DAILY, BY MOUTH. 12/5/22   Swapnil Edwards MD   indapamide (LOZOL) 2.5 MG tablet TAKE ONE TABLET DAILY IN THE MORNING, BY MOUTH. 12/5/22   Swapnil Edwards MD   folic acid (FOLVITE) 1 MG tablet TAKE ONE TABLET DAILY, BY MOUTH. 11/22/22   Swati Basilio MD   levothyroxine (SYNTHROID) 200 MCG tablet TAKE 1 TABLET BY MOUTH ONCE DAILY, TAKE 1&1/2 TAB ON MONDAY EVERY OTHER WEEK. 11/17/22   Swapnil Edwards MD   metoprolol succinate (TOPROL XL) 25 MG extended release tablet TAKE ONE TABLET DAILY, BY MOUTH. 8/8/22   Swapnil Edwards MD   rivastigmine (EXELON) 9.5 MG/24HR Place 1 patch onto the skin daily 4/6/22   Swapnil Edwards MD   buPROPion (WELLBUTRIN XL) 300 MG extended release tablet 1 PO QD 4/6/22   Swapnil Edwards MD   clopidogrel (PLAVIX) 75 MG tablet STOP if pt not taking this at home 3/30/21   Austin Albright MD   CALCIUM CITRATE-VITAMIN D PO Take 1 tablet by mouth 2 times daily (with meals)    Historical Provider, MD   Glucosamine-Chondroit-Vit C-Mn (GLUCOSAMINE 1500 COMPLEX) CAPS Take 1 tablet by mouth daily  Patient taking differently: Take 1 tablet by mouth 2 times daily 10/5/20   Swapnil Edwards MD   aspirin 81 MG EC tablet Take 81 mg by mouth nightly    Historical Provider, MD   Multiple Vitamins-Minerals (MULTIVITAMIN & MINERAL PO) Take 1 tablet by mouth daily     Historical Provider, MD       Current Facility-Administered Medications   Medication Dose Route Frequency Provider Last Rate Last Admin    ALPRAZolam Aftab Distance) tablet 0.25 mg  0.25 mg Oral Nightly PRN Belinda Ruiz MD        aspirin EC tablet 81 mg  81 mg Oral Nightly Belinda Ruiz MD        clopidogrel (PLAVIX) tablet 75 mg  75 mg Oral Once Belinda Ruiz MD        losartan-hydroCHLOROthiazide (HYZAAR) 100-25 MG per tablet 1 tablet (Patient Supplied)  1 tablet Oral Daily Estela Medina MD   1 tablet at 12/30/22 1058    rivastigmine (EXELON) 9.5 MG/24HR 1 patch (Patient Supplied)  1 patch TransDERmal Nightly Estela Medina MD        sertraline (ZOLOFT) tablet 50 mg  50 mg Oral Daily Estela Medina MD   50 mg at 12/30/22 1105    Levothyroxine Sodium CAPS 200 mcg  200 mcg Oral Daily Estela Medina MD   200 mcg at 12/30/22 7772    metoprolol succinate (TOPROL XL) extended release tablet 25 mg  25 mg Oral Daily Estela Medina MD   25 mg at 12/30/22 1058    sodium chloride flush 0.9 % injection 5-40 mL  5-40 mL IntraVENous 2 times per day Estela Medina MD   10 mL at 12/30/22 1100    sodium chloride flush 0.9 % injection 5-40 mL  5-40 mL IntraVENous PRN Estela Medina MD        0.9 % sodium chloride infusion   IntraVENous PRN Estela Mednia MD        ondansetron (ZOFRAN-ODT) disintegrating tablet 4 mg  4 mg Oral Q8H PRN Estela Medina MD        Or    ondansetron [facility]) injection 4 mg  4 mg IntraVENous Q6H PRN Estela Medina MD        polyethylene glycol (GLYCOLAX) packet 17 g  17 g Oral Daily PRN Estela Medina MD        acetaminophen (TYLENOL) tablet 650 mg  650 mg Oral Q6H PRN Estela Medina MD        Or    acetaminophen (TYLENOL) suppository 650 mg  650 mg Rectal Q6H PRN Estela Medina MD        nitroGLYCERIN (NITROSTAT) SL tablet 0.4 mg  0.4 mg SubLINGual Q5 Min PRN Estela Medina MD        indapamide (LOZOL) tablet 2.5 mg (Patient Supplied)  2.5 mg Oral Daily Estela Medina MD   2.5 mg at 12/30/22 1058       Allergies:  Donepezil    Social History:    TOBACCO:   reports that she has never smoked. She has never used smokeless tobacco.  ETOH:   reports current alcohol use of about 2.0 standard drinks per week.       Family History:        Problem Relation Age of Onset    Arthritis Mother     Other Daughter         SVT and PE         Review of Systems -   General ROS: negative  Psychological ROS: negative  Hematological and Lymphatic ROS: No history of blood clots or bleeding disorder. Respiratory ROS: no cough, shortness of breath, or wheezing  Cardiovascular ROS: As per HPI  Gastrointestinal ROS: negative  Genito-Urinary ROS: no dysuria, trouble voiding, or hematuria  Musculoskeletal ROS: negative  Neurological ROS: no TIA or stroke symptoms  Dermatological ROS: negative    All others reviewed and are negative.        BP (!) 155/86   Pulse 70   Temp 97.7 °F (36.5 °C) (Oral)   Resp 18   Ht 5' (1.524 m)   Wt 180 lb (81.6 kg)   SpO2 97%   BMI 35.15 kg/m²       Physical Examination:   General appearance - alert, in no distress  Mental status - alert, oriented to person, place, and time  Neck - supple, no significant adenopathy, no JVD, or carotid bruits  Chest - clear to auscultation, no wheezes, rales or rhonchi, symmetric air entry  Heart - normal rate, regular rhythm, normal S1, S2, no murmurs, rubs, clicks or gallops  Abdomen - soft, nontender, nondistended, no masses or organomegaly  Neurological - alert, oriented, normal speech, no focal findings or movement disorder noted  Musculoskeletal - no joint tenderness, deformity or swelling  Extremities - peripheral pulses normal, no pedal edema, no clubbing or cyanosis  Skin - normal coloration and turgor, no rashes, no suspicious skin lesions noted      LABS:    Recent Labs     12/29/22 1919   TROPONINT < 0.010     CBC:   Lab Results   Component Value Date/Time    WBC 6.5 12/29/2022 02:30 PM    RBC 3.92 12/29/2022 02:30 PM    HGB 12.8 12/29/2022 02:30 PM    HCT 39.3 12/29/2022 02:30 PM    .3 12/29/2022 02:30 PM    MCH 32.7 12/29/2022 02:30 PM    MCHC 32.6 12/29/2022 02:30 PM    RDW 12.6 12/29/2022 02:30 PM    PLT 36 12/29/2022 02:30 PM    MPV 12.9 12/29/2022 02:30 PM     BMP:    Lab Results   Component Value Date/Time     12/29/2022 02:30 PM    K 4.3 12/29/2022 02:30 PM    K 3.9 03/29/2021 06:38 PM     12/29/2022 02:30 PM    CO2 29 12/29/2022 02:30 PM    BUN 26 12/29/2022 02:30 PM LABALBU 3.5 12/29/2022 02:30 PM    CREATININE 1.5 12/29/2022 02:30 PM    CALCIUM 9.6 07/25/2022 10:38 AM    LABGLOM 49 07/25/2022 10:38 AM    GLUCOSE 74 12/29/2022 02:30 PM    GLUCOSE 93 05/14/2016 06:30 AM     Hepatic Function Panel:    Lab Results   Component Value Date/Time    ALKPHOS 86 12/29/2022 02:30 PM    ALT 27 12/29/2022 02:30 PM    AST 26 12/29/2022 02:30 PM    PROT 7.1 12/29/2022 02:30 PM    BILITOT 0.4 12/29/2022 02:30 PM    BILIDIR 0.1 10/13/2018 12:00 AM    LABALBU 3.5 12/29/2022 02:30 PM     Magnesium:  No results found for: MG  Warfarin PT/INR:  No components found for: PTPATWAR, PTINRWAR  HgBA1c:  No results found for: LABA1C  FLP:    Lab Results   Component Value Date/Time    TRIG 71 04/19/2022 10:44 AM    HDL 74 04/19/2022 10:44 AM    LDLCALC 79 04/19/2022 10:44 AM     TSH:    Lab Results   Component Value Date/Time    TSH 1.700 04/19/2022 10:44 AM     BNP: No components found for: PRO-BNP      Assessment/Plan:    Patient Active Problem List   Diagnosis    Depression    Hypothyroidism due to acquired atrophy of thyroid    Hyperlipidemia    Hypertension    Abnormal findings on diagnostic imaging of other specified body structures    Dyspnea on exertion    Morbidly obese (HCC)    Osteopenia of multiple sites    Fall at home, initial encounter    Senile osteoporosis    COVID    Recurrent major depressive disorder, in full remission (HCC)    Thrombocytopenia ([facility]    Malignant neoplasm of body of uterus, unspecified site [facility])    Chronic renal disease, stage III ([facility] [899063]    Chest pain in adult     Chest pain, atypical in nature  Mild LV dysfunction EF 45-50%  HTN  HLD  Depression    Atypical symptoms  Consider PPI therapy  Reviewed prior cardiac workup  No further cardiac workup  Follow up as outpatient with Dr. Nancy Ireland    Please do note hesitate to contact me for any further questions. Thank you for the opportunity to be involved in this patient's care.     Code Status: Full Code    Electronically signed by Shirley Styles MD on 12/30/2022 at 11:55 AM

## 2023-01-04 ENCOUNTER — OFFICE VISIT (OUTPATIENT)
Dept: FAMILY MEDICINE CLINIC | Age: 68
End: 2023-01-04

## 2023-01-04 VITALS
HEIGHT: 60 IN | OXYGEN SATURATION: 95 % | BODY MASS INDEX: 36.91 KG/M2 | DIASTOLIC BLOOD PRESSURE: 99 MMHG | TEMPERATURE: 97.1 F | WEIGHT: 188 LBS | RESPIRATION RATE: 17 BRPM | SYSTOLIC BLOOD PRESSURE: 155 MMHG | HEART RATE: 67 BPM

## 2023-01-04 DIAGNOSIS — Z09 HOSPITAL DISCHARGE FOLLOW-UP: ICD-10-CM

## 2023-01-04 DIAGNOSIS — R06.09 DYSPNEA ON EXERTION: ICD-10-CM

## 2023-01-04 DIAGNOSIS — R07.9 CHEST PAIN IN ADULT: ICD-10-CM

## 2023-01-04 DIAGNOSIS — K21.00 GASTROESOPHAGEAL REFLUX DISEASE WITH ESOPHAGITIS WITHOUT HEMORRHAGE: Primary | ICD-10-CM

## 2023-01-04 RX ORDER — SUCRALFATE 1 G/1
1 TABLET ORAL 4 TIMES DAILY
Qty: 60 TABLET | Refills: 0 | Status: SHIPPED | OUTPATIENT
Start: 2023-01-04

## 2023-01-04 ASSESSMENT — PATIENT HEALTH QUESTIONNAIRE - PHQ9
SUM OF ALL RESPONSES TO PHQ9 QUESTIONS 1 & 2: 0
1. LITTLE INTEREST OR PLEASURE IN DOING THINGS: 0
10. IF YOU CHECKED OFF ANY PROBLEMS, HOW DIFFICULT HAVE THESE PROBLEMS MADE IT FOR YOU TO DO YOUR WORK, TAKE CARE OF THINGS AT HOME, OR GET ALONG WITH OTHER PEOPLE: 0
SUM OF ALL RESPONSES TO PHQ QUESTIONS 1-9: 9
SUM OF ALL RESPONSES TO PHQ QUESTIONS 1-9: 9
7. TROUBLE CONCENTRATING ON THINGS, SUCH AS READING THE NEWSPAPER OR WATCHING TELEVISION: 0
9. THOUGHTS THAT YOU WOULD BE BETTER OFF DEAD, OR OF HURTING YOURSELF: 0
6. FEELING BAD ABOUT YOURSELF - OR THAT YOU ARE A FAILURE OR HAVE LET YOURSELF OR YOUR FAMILY DOWN: 3
SUM OF ALL RESPONSES TO PHQ QUESTIONS 1-9: 9
5. POOR APPETITE OR OVEREATING: 0
8. MOVING OR SPEAKING SO SLOWLY THAT OTHER PEOPLE COULD HAVE NOTICED. OR THE OPPOSITE, BEING SO FIGETY OR RESTLESS THAT YOU HAVE BEEN MOVING AROUND A LOT MORE THAN USUAL: 0
SUM OF ALL RESPONSES TO PHQ QUESTIONS 1-9: 9
4. FEELING TIRED OR HAVING LITTLE ENERGY: 3
2. FEELING DOWN, DEPRESSED OR HOPELESS: 0
3. TROUBLE FALLING OR STAYING ASLEEP: 3

## 2023-01-04 NOTE — PROGRESS NOTES
Post-Discharge Transitional Care  Follow Up      Debra Agustin   YOB: 1955    Date of Office Visit:  1/4/2023  Date of Hospital Admission: 12/29/22  Date of Hospital Discharge: 12/30/22  Risk of hospital readmission (high >=14%. Medium >=10%) :No data recorded    Care management risk score Rising risk (score 2-5) and Complex Care (Scores >=6): No Risk Score On File     Non face to face  following discharge, date last encounter closed (first attempt may have been earlier): *No documented post hospital discharge outreach found in the last 14 days    Call initiated 2 business days of discharge: *No response recorded in the last 14 days    ASSESSMENT/PLAN:   Gastroesophageal reflux disease with esophagitis without hemorrhage  Chest pain in adult  Dyspnea on exertion  Hospital discharge follow-up  -     NC DISCHARGE MEDS RECONCILED W/ CURRENT OUTPATIENT MED LIST    Medical Decision Making: high complexity  Return if symptoms worsen or fail to improve. On this date 1/4/2023 I have spent 30 minutes reviewing previous notes, test results and face to face with the patient discussing the diagnosis and importance of compliance with the treatment plan as well as documenting on the day of the visit. Subjective:   HPI:  Follow up of Hospital problems/diagnosis(es): GERD    Inpatient course: Discharge summary reviewed- see chart. Interval history/Current status: good. GI :never went for colonoscopy but we discussed possible need for EGD  cardiology FU scheduled for December. Will message them to make them aware of the admission.     Patient Active Problem List   Diagnosis    Depression    Hypothyroidism due to acquired atrophy of thyroid    Hyperlipidemia    Hypertension    Abnormal findings on diagnostic imaging of other specified body structures    Dyspnea on exertion    Morbidly obese (HCC)    Osteopenia of multiple sites    Fall at home, initial encounter    Senile osteoporosis    COVID Recurrent major depressive disorder, in full remission ([facility]    Thrombocytopenia ([facility]    Malignant neoplasm of body of uterus, unspecified site [facility])    Chronic renal disease, stage III [facility]) [650057]    Chest pain in adult       Medications listed as ordered at the time of discharge from hospital     Medication List            Accurate as of January 4, 2023 10:26 AM. If you have any questions, ask your nurse or doctor. CHANGE how you take these medications      Glucosamine 1500 Complex Caps  Take 1 tablet by mouth daily  What changed: when to take this            CONTINUE taking these medications      ALPRAZolam 0.25 MG tablet  Commonly known as: Xanax  Take 1 tablet by mouth nightly as needed for Sleep for up to 30 days. aspirin 81 MG EC tablet     buPROPion 300 MG extended release tablet  Commonly known as: WELLBUTRIN XL  1 PO QD     clopidogrel 75 MG tablet  Commonly known as: Plavix  Take 1 tablet by mouth daily     folic acid 1 MG tablet  Commonly known as: FOLVITE  TAKE ONE TABLET DAILY, BY MOUTH.     indapamide 2.5 MG tablet  Commonly known as: LOZOL  TAKE ONE TABLET DAILY IN THE MORNING, BY MOUTH.     levothyroxine 200 MCG tablet  Commonly known as: SYNTHROID  TAKE 1 TABLET BY MOUTH ONCE DAILY, TAKE 1&1/2 TAB ON MONDAY EVERY OTHER WEEK.     losartan-hydroCHLOROthiazide 100-25 MG per tablet  Commonly known as: HYZAAR  TAKE ONE TABLET DAILY, BY MOUTH.     metoprolol succinate 25 MG extended release tablet  Commonly known as: TOPROL XL  TAKE ONE TABLET DAILY, BY MOUTH.      MULTIVITAMIN & MINERAL PO     rivastigmine 9.5 MG/24HR  Commonly known as: Exelon  Place 1 patch onto the skin daily     sertraline 50 MG tablet  Commonly known as: ZOLOFT     simvastatin 80 MG tablet  Commonly known as: ZOCOR  TAKE ONE TABLET DAILY AT BEDTIME, BY MOUTH.     sucralfate 1 GM tablet  Commonly known as: CARAFATE  Take 1 tablet by mouth 4 times daily               Where to Get Your Medications        These medications were sent to [address] [place] [address] - F [phone]  [facility], [address]      Phone: [phone]   sucralfate 1 GM tablet           Medications marked \"taking\" at this time  Outpatient Medications Marked as Taking for the 1/4/23 encounter (Office Visit) with Shiv Ham MD   Medication Sig Dispense Refill    sucralfate (CARAFATE) 1 GM tablet Take 1 tablet by mouth 4 times daily 60 tablet 0    simvastatin (ZOCOR) 80 MG tablet TAKE ONE TABLET DAILY AT BEDTIME, BY MOUTH. 90 tablet 3    clopidogrel (PLAVIX) 75 MG tablet Take 1 tablet by mouth daily 30 tablet 5    sertraline (ZOLOFT) 50 MG tablet       ALPRAZolam (XANAX) 0.25 MG tablet Take 1 tablet by mouth nightly as needed for Sleep for up to 30 days. 30 tablet 0    losartan-hydroCHLOROthiazide (HYZAAR) 100-25 MG per tablet TAKE ONE TABLET DAILY, BY MOUTH. 90 tablet 0    indapamide (LOZOL) 2.5 MG tablet TAKE ONE TABLET DAILY IN THE MORNING, BY MOUTH. 90 tablet 0    folic acid (FOLVITE) 1 MG tablet TAKE ONE TABLET DAILY, BY MOUTH. 90 tablet 0    levothyroxine (SYNTHROID) 200 MCG tablet TAKE 1 TABLET BY MOUTH ONCE DAILY, TAKE 1&1/2 TAB ON MONDAY EVERY OTHER WEEK. 93 tablet 0    metoprolol succinate (TOPROL XL) 25 MG extended release tablet TAKE ONE TABLET DAILY, BY MOUTH. 90 tablet 3    rivastigmine (EXELON) 9.5 MG/24HR Place 1 patch onto the skin daily 90 patch 3    buPROPion (WELLBUTRIN XL) 300 MG extended release tablet 1 PO QD 90 tablet 3    Glucosamine-Chondroit-Vit C-Mn (GLUCOSAMINE 1500 COMPLEX) CAPS Take 1 tablet by mouth daily (Patient taking differently: Take 1 tablet by mouth 2 times daily) 90 capsule 3    aspirin 81 MG EC tablet Take 81 mg by mouth nightly      Multiple Vitamins-Minerals (MULTIVITAMIN & MINERAL PO) Take 1 tablet by mouth daily           Medications patient taking as of now reconciled against medications ordered at time of hospital discharge:  Yes    A comprehensive review of systems was negative except for what was noted in the HPI. Objective:    BP (!) 155/99 (Site: Left Upper Arm, Position: Sitting, Cuff Size: Medium Adult)   Pulse 67   Temp 97.1 °F (36.2 °C) (Temporal)   Resp 17   Ht 5' (1.524 m)   Wt 188 lb (85.3 kg)   SpO2 95%   BMI 36.72 kg/m²       Physical Exam   Constitutional: Vital signs are normal. She appears well-developed and well-nourished. She is active. HENT:   Head: Normocephalic and atraumatic. Right Ear: Tympanic membrane, external ear and ear canal normal. No drainage or tenderness. Left Ear: Tympanic membrane, external ear and ear canal normal. No drainage or tenderness. Nose: Nose normal. No mucosal edema or rhinorrhea. Mouth/Throat: Uvula is midline, oropharynx is clear and moist and mucous membranes are normal. Mucous membranes are not pale. Normal dentition. No posterior oropharyngeal edema or posterior oropharyngeal erythema. Eyes: Lids are normal. Right eye exhibits no chemosis and no discharge. Left eye exhibits no chemosis and no drainage. Right conjunctiva has no hemorrhage. Left conjunctiva has no hemorrhage. Right eye exhibits normal extraocular motion. Left eye exhibits normal extraocular motion. Right pupil is round and reactive. Left pupil is round and reactive. Pupils are equal.   Cardiovascular: Normal rate, regular rhythm, S1 normal, S2 normal and normal heart sounds. Exam reveals no gallop. No murmur heard. Pulmonary/Chest: Effort normal and breath sounds normal. No respiratory distress. She has no wheezes. She has no rhonchi. She has no rales. Abdominal: Soft. Normal appearance and bowel sounds are normal. She exhibits no distension and no mass. There is no hepatosplenomegaly. No tenderness. She has no rigidity, no rebound and no guarding. No hernia. Musculoskeletal:        Right lower leg: She exhibits no edema. Left lower leg: She exhibits no edema. Neurological: She is alert.        An electronic signature was used to authenticate this note.   --Kell Carvajal MD

## 2023-01-04 NOTE — Clinical Note
Happy New Year! Just wanted to notify you of Inna's recent admission. Not sure if you want to see her sooner then December.

## 2023-01-04 NOTE — DISCHARGE SUMMARY
Discharge Summary    Patient:  Vero Wood  YOB: 1955    MRN: [id number]   Acct: [de-identified]    Primary Care Physician: Jordy Beltran MD    Admit date:  12/29/2022    Discharge date:  12/30/2022       Discharge Diagnoses:   Chest pain in adult  Principal Problem:    Chest pain in adult  Active Problems:    Hypothyroidism due to acquired atrophy of thyroid    Hypertension  Resolved Problems:    * No resolved hospital problems. *        Admitted for: (HPI) atypical chest pressure    Hospital Course: Briefly this is a 61-year-old female that was admitted secondary to sudden onset of left and right had central sternal postprandial chest pressure, the patient does have a history of coronary disease, she was admitted and serial cardiac enzymes were negative she had no signs of ischemic changes on her admission EKG, she had no chest pain discharge, she has had stable vital signs, she has had in recent past cardiac cath, she was seen by cardiology and based on her presentation it is suspected that the source of her pain is related to possible esophagitis therefore she has been prescribed Carafate she will resume her home medications she has been discharged in good condition with her  to go home they are independent. Consultants:  Patient Care Team:  Jordy Beltran MD as PCP - General (Family Medicine)  Jordy Beltran MD as PCP - REHABILITATION [facility] Provider    Discharge Medications:       Medication List        CHANGE how you take these medications      clopidogrel 75 MG tablet  Commonly known as: Plavix  Take 1 tablet by mouth daily  What changed:   how much to take  how to take this  when to take this  additional instructions            CONTINUE taking these medications      ALPRAZolam 0.25 MG tablet  Commonly known as: Xanax  Take 1 tablet by mouth nightly as needed for Sleep for up to 30 days.      aspirin 81 MG EC tablet     buPROPion 300 MG extended release tablet  Commonly known as: WELLBUTRIN XL  1 PO QD     folic acid 1 MG tablet  Commonly known as: FOLVITE  TAKE ONE TABLET DAILY, BY MOUTH.     indapamide 2.5 MG tablet  Commonly known as: LOZOL  TAKE ONE TABLET DAILY IN THE MORNING, BY MOUTH.     levothyroxine 200 MCG tablet  Commonly known as: SYNTHROID  TAKE 1 TABLET BY MOUTH ONCE DAILY, TAKE 1&1/2 TAB ON MONDAY EVERY OTHER WEEK.     losartan-hydroCHLOROthiazide 100-25 MG per tablet  Commonly known as: HYZAAR  TAKE ONE TABLET DAILY, BY MOUTH.     metoprolol succinate 25 MG extended release tablet  Commonly known as: TOPROL XL  TAKE ONE TABLET DAILY, BY MOUTH. MULTIVITAMIN & MINERAL PO     rivastigmine 9.5 MG/24HR  Commonly known as: Exelon  Place 1 patch onto the skin daily     sertraline 50 MG tablet  Commonly known as: ZOLOFT     simvastatin 80 MG tablet  Commonly known as: ZOCOR  TAKE ONE TABLET DAILY AT BEDTIME, BY MOUTH.             STOP taking these medications      CALCIUM CITRATE-VITAMIN D PO            ASK your doctor about these medications      Glucosamine 1500 Complex Caps  Take 1 tablet by mouth daily               Where to Get Your Medications        These medications were sent to [address] [place] [address] -  [phone]  [facility], [address] [place] [address]      Phone: [phone]   clopidogrel 75 MG tablet  simvastatin 80 MG tablet           Physical Exam:    Vitals:  Vitals:    12/29/22 1605 12/29/22 2033 12/29/22 2334 12/30/22 0845   BP: (!) 149/74 (!) 129/92 128/84 (!) 155/86   Pulse: 73 71 77 70   Resp: 21 20 18 18   Temp:  97.5 °F (36.4 °C) 97.5 °F (36.4 °C) 97.7 °F (36.5 °C)   TempSrc:  Oral Oral Oral   SpO2: 96% 97% 100% 97%   Weight:       Height:         Weight: Weight: 180 lb (81.6 kg)     24 hour intake/output:No intake or output data in the 24 hours ending 01/04/23 1323    General appearance - alert awake appears to be in no acute distress  Chest - Bilateral air entry, no wheeze  Heart - S1S2 RRR, no murmurs or gallops  Abdomen - soft, non tender, normoactive bowel sounds  Neurological - non focal  Extremities - no edema  Skin - no rashes or lesions    Procedures: Not applicable    Diagnostic Test:  Not applicable    Radiology reports as per the Radiologist  Radiology:  XR CHEST PORTABLE    Result Date: 12/29/2022  PROCEDURE: XR CHEST PORTABLE CLINICAL INFORMATION: Chest pain . TECHNIQUE: Portable position not indicated COMPARISON: 6/12/2019 FINDINGS: Cardiomediastinal silhouette is within normal limits. Retrocardiac density likely hiatal hernia. No infiltrates or effusions. Vascularity is normal. Loop recorder left chest. EKG leads overlie the chest.     Stable chest no acute cardiopulmonary process **This report has been created using voice recognition software. It may contain minor errors which are inherent in voice recognition technology. ** Final report electronically signed by Dr. Tracy Miller on 12/29/2022 2:41 PM      Results for orders placed or performed during the hospital encounter of 12/29/22   CBC with Auto Differential   Result Value Ref Range    WBC 6.5 4.8 - 10.8 thou/mm3    RBC 3.92 (L) 4.10 - 5.30 mill/mm3    Hemoglobin 12.8 12.0 - 16.0 gm/dl    Hematocrit 39.3 37.0 - 47.0 %    .3 (H) 81.0 - 99.0 fL    MCH 32.7 (H) 26.0 - 32.0 pg    MCHC 32.6 31.0 - 35.0 gm/dl    RDW 12.6 11.5 - 14.9 %    Platelets 36 (L) 015 - 400 thou/mm3    MPV 12.9 (H) 9.4 - 12.4 fL    Seg Neutrophils 53.8 43.0 - 75.0 %    Segs Absolute 3.5 1.8 - 7.7 thou/mm3    Lymphocytes 30.7 15.0 - 47.0 %    Lymphocytes Absolute 2.0 1.0 - 4.8 thou/mm3    Monocytes 10.1 0.0 - 12.0 %    Monocytes 0.7 0.3 - 1.3 thou/mm3    Eosinophils % 4.0 0.0 - 4.0 %    Eosinophils Absolute 0.3 0.0 - 0.5 thou/mm3    Basophils 1.1 0.0 - 3.0 %    Basophils Absolute 0.1 0.0 - 0.1 thou/mm3    Immature Granulocytes 0 %    Immature Grans (Abs) 0.02 0.00 - 0.07 thou/mm3    Platelet Estimate DECREASED Adequate    Macrocytes 1+ Absent    Anisocytosis SLIGHT Absent BASOPHILIA SLIGHT Absent   Brain Natriuretic Peptide   Result Value Ref Range    NT Pro-.0 0.0 - 900.0 pg/mL   Troponin   Result Value Ref Range    Troponin, High Sensitivity 7.3 0.0 - 51.3 pg/mL   Comprehensive Metabolic Panel   Result Value Ref Range    Glucose 74 74 - 106 mg/dl    Creatinine 1.5 (H) 0.6 - 1.3 mg/dl    BUN 26 (H) 7 - 18 mg/dl    Sodium 144 136 - 145 meq/l    Potassium 4.3 3.5 - 5.1 meq/l    Chloride 105 98 - 107 meq/l    CO2 29 21 - 32 meq/l    POC CALCIUM 9.2 8.5 - 10.1 mg/dl    AST 26 15 - 37 U/L    Alkaline Phosphatase 86 46 - 116 U/L    Total Protein 7.1 6.4 - 8.2 gm/dl    Albumin 3.5 3.4 - 5.0 gm/dl    Total Bilirubin 0.4 0.2 - 1.0 mg/dl    ALT 27 14 - 63 U/L   Glomerular Filtration Rate, Estimated   Result Value Ref Range    GFR, Estimated 37 (A) >60 ml/min/1.73m2   ANION GAP   Result Value Ref Range    Anion Gap 10.0 8.0 - 16.0 meq/l   Troponin   Result Value Ref Range    Troponin T < 0.010 ng/ml   Scan of Blood Smear   Result Value Ref Range    SCAN OF BLOOD SMEAR see below    Comprehensive Metabolic Panel   Result Value Ref Range    Glucose 83 70 - 108 mg/dL    Creatinine 1.0 0.4 - 1.2 mg/dL    BUN 25 (H) 7 - 22 mg/dL    Sodium 142 135 - 145 meq/L    Potassium 3.8 3.5 - 5.2 meq/L    Chloride 103 98 - 111 meq/L    CO2 24 23 - 33 meq/L    Calcium 9.6 8.5 - 10.5 mg/dL    AST 26 5 - 40 U/L    Alkaline Phosphatase 83 38 - 126 U/L    Total Protein 6.7 6.1 - 8.0 g/dL    Albumin 3.9 3.5 - 5.1 g/dL    Total Bilirubin 0.4 0.3 - 1.2 mg/dL    ALT 18 11 - 66 U/L   Anion Gap   Result Value Ref Range    Anion Gap 15.0 8.0 - 16.0 meq/L   Glomerular Filtration Rate, Estimated   Result Value Ref Range    Est, Glom Filt Rate >60 >60 ml/min/1.73m2   EKG 12 Lead   Result Value Ref Range    Ventricular Rate 67 BPM    Atrial Rate 67 BPM    P-R Interval 122 ms    QRS Duration 96 ms    Q-T Interval 384 ms    QTc Calculation (Bazett) 405 ms    P Axis 40 degrees    R Axis 18 degrees    T Axis 45 degrees EKG 12 Lead   Result Value Ref Range    Ventricular Rate 61 BPM    Atrial Rate 61 BPM    P-R Interval 126 ms    QRS Duration 104 ms    Q-T Interval 428 ms    QTc Calculation (Bazett) 430 ms    P Axis 38 degrees    R Axis 16 degrees    T Axis 53 degrees       Diet:  No diet orders on file    Activity:  Activity as tolerated (Patient may move about with assist as indicated or with supervision.)    Follow-up:  in the next few days with David Carrion MD,  in the next few days with your primary care doctor    Disposition: home    Condition: Stable      Time Spent: 35 minutes    Electronically signed by Hitesh Ibarra MD on 1/4/2023 at 1:24 PM    Discharging Hospitalist

## 2023-02-13 DIAGNOSIS — F41.9 ANXIETY: ICD-10-CM

## 2023-02-13 RX ORDER — ALPRAZOLAM 0.25 MG/1
0.25 TABLET ORAL NIGHTLY PRN
Qty: 30 TABLET | Refills: 1 | Status: SHIPPED | OUTPATIENT
Start: 2023-02-13 | End: 2023-04-14

## 2023-04-14 ENCOUNTER — TELEPHONE (OUTPATIENT)
Dept: CARDIOLOGY CLINIC | Age: 68
End: 2023-04-14

## 2023-04-17 ENCOUNTER — OFFICE VISIT (OUTPATIENT)
Dept: FAMILY MEDICINE CLINIC | Age: 68
End: 2023-04-17
Payer: MEDICARE

## 2023-04-17 VITALS
RESPIRATION RATE: 16 BRPM | HEIGHT: 60 IN | BODY MASS INDEX: 35.14 KG/M2 | HEART RATE: 71 BPM | OXYGEN SATURATION: 96 % | SYSTOLIC BLOOD PRESSURE: 144 MMHG | DIASTOLIC BLOOD PRESSURE: 90 MMHG | TEMPERATURE: 97.2 F | WEIGHT: 179 LBS

## 2023-04-17 DIAGNOSIS — G47.01 INSOMNIA DUE TO MEDICAL CONDITION: ICD-10-CM

## 2023-04-17 DIAGNOSIS — D69.6 THROMBOCYTOPENIA (HCC): ICD-10-CM

## 2023-04-17 DIAGNOSIS — C54.9 MALIGNANT NEOPLASM OF BODY OF UTERUS, UNSPECIFIED SITE (HCC): ICD-10-CM

## 2023-04-17 DIAGNOSIS — F41.9 ANXIETY: ICD-10-CM

## 2023-04-17 DIAGNOSIS — M85.80 OSTEOPENIA, UNSPECIFIED LOCATION: ICD-10-CM

## 2023-04-17 DIAGNOSIS — N18.31 STAGE 3A CHRONIC KIDNEY DISEASE (HCC): ICD-10-CM

## 2023-04-17 DIAGNOSIS — R29.6 FALLS FREQUENTLY: ICD-10-CM

## 2023-04-17 DIAGNOSIS — E78.00 PURE HYPERCHOLESTEROLEMIA: ICD-10-CM

## 2023-04-17 DIAGNOSIS — I69.30 CEREBRAL MULTI-INFARCT STATE: Primary | ICD-10-CM

## 2023-04-17 DIAGNOSIS — N81.4 CYSTOCELE WITH PROLAPSE: ICD-10-CM

## 2023-04-17 DIAGNOSIS — I10 ESSENTIAL HYPERTENSION: ICD-10-CM

## 2023-04-17 DIAGNOSIS — N18.32 STAGE 3B CHRONIC KIDNEY DISEASE (HCC): ICD-10-CM

## 2023-04-17 DIAGNOSIS — F33.42 RECURRENT MAJOR DEPRESSIVE DISORDER, IN FULL REMISSION (HCC): ICD-10-CM

## 2023-04-17 DIAGNOSIS — Z87.39 HISTORY OF GOUT: ICD-10-CM

## 2023-04-17 DIAGNOSIS — R26.81 UNSTEADY GAIT: ICD-10-CM

## 2023-04-17 DIAGNOSIS — E03.9 ACQUIRED HYPOTHYROIDISM: ICD-10-CM

## 2023-04-17 DIAGNOSIS — R11.0 NAUSEA: ICD-10-CM

## 2023-04-17 DIAGNOSIS — R06.09 DYSPNEA ON EXERTION: ICD-10-CM

## 2023-04-17 DIAGNOSIS — K21.00 GASTROESOPHAGEAL REFLUX DISEASE WITH ESOPHAGITIS WITHOUT HEMORRHAGE: ICD-10-CM

## 2023-04-17 DIAGNOSIS — E66.01 MORBIDLY OBESE (HCC): ICD-10-CM

## 2023-04-17 PROCEDURE — 1090F PRES/ABSN URINE INCON ASSESS: CPT | Performed by: FAMILY MEDICINE

## 2023-04-17 PROCEDURE — 3079F DIAST BP 80-89 MM HG: CPT | Performed by: FAMILY MEDICINE

## 2023-04-17 PROCEDURE — 99214 OFFICE O/P EST MOD 30 MIN: CPT | Performed by: FAMILY MEDICINE

## 2023-04-17 PROCEDURE — 3017F COLORECTAL CA SCREEN DOC REV: CPT | Performed by: FAMILY MEDICINE

## 2023-04-17 PROCEDURE — 3075F SYST BP GE 130 - 139MM HG: CPT | Performed by: FAMILY MEDICINE

## 2023-04-17 PROCEDURE — 1036F TOBACCO NON-USER: CPT | Performed by: FAMILY MEDICINE

## 2023-04-17 PROCEDURE — 96372 THER/PROPH/DIAG INJ SC/IM: CPT | Performed by: FAMILY MEDICINE

## 2023-04-17 PROCEDURE — G8399 PT W/DXA RESULTS DOCUMENT: HCPCS | Performed by: FAMILY MEDICINE

## 2023-04-17 PROCEDURE — G8417 CALC BMI ABV UP PARAM F/U: HCPCS | Performed by: FAMILY MEDICINE

## 2023-04-17 PROCEDURE — G8427 DOCREV CUR MEDS BY ELIG CLIN: HCPCS | Performed by: FAMILY MEDICINE

## 2023-04-17 PROCEDURE — 1123F ACP DISCUSS/DSCN MKR DOCD: CPT | Performed by: FAMILY MEDICINE

## 2023-04-17 RX ORDER — LOSARTAN POTASSIUM AND HYDROCHLOROTHIAZIDE 25; 100 MG/1; MG/1
TABLET ORAL
Qty: 90 TABLET | Refills: 1 | Status: SHIPPED | OUTPATIENT
Start: 2023-04-17

## 2023-04-17 RX ORDER — FOLIC ACID 1 MG/1
TABLET ORAL
Qty: 90 TABLET | Refills: 1 | Status: SHIPPED | OUTPATIENT
Start: 2023-04-17

## 2023-04-17 RX ORDER — TRAZODONE HYDROCHLORIDE 50 MG/1
50 TABLET ORAL NIGHTLY PRN
Qty: 30 TABLET | Refills: 5 | Status: SHIPPED | OUTPATIENT
Start: 2023-04-17

## 2023-04-17 RX ORDER — LEVOTHYROXINE SODIUM 0.2 MG/1
TABLET ORAL
Qty: 93 TABLET | Refills: 1 | Status: SHIPPED | OUTPATIENT
Start: 2023-04-17

## 2023-04-17 RX ORDER — ONDANSETRON 8 MG/1
8 TABLET, ORALLY DISINTEGRATING ORAL EVERY 8 HOURS PRN
Qty: 40 TABLET | Refills: 3 | Status: SHIPPED | OUTPATIENT
Start: 2023-04-17

## 2023-04-17 RX ORDER — PROMETHAZINE HYDROCHLORIDE 25 MG/ML
6.25 INJECTION, SOLUTION INTRAMUSCULAR; INTRAVENOUS ONCE
Status: COMPLETED | OUTPATIENT
Start: 2023-04-17 | End: 2023-04-17

## 2023-04-17 RX ORDER — INDAPAMIDE 2.5 MG/1
TABLET, FILM COATED ORAL
Qty: 90 TABLET | Refills: 1 | Status: SHIPPED | OUTPATIENT
Start: 2023-04-17

## 2023-04-17 RX ORDER — OMEPRAZOLE 20 MG/1
20 CAPSULE, DELAYED RELEASE ORAL
Qty: 90 CAPSULE | Refills: 1 | Status: SHIPPED | OUTPATIENT
Start: 2023-04-17

## 2023-04-17 RX ADMIN — PROMETHAZINE HYDROCHLORIDE 6.25 MG: 25 INJECTION, SOLUTION INTRAMUSCULAR; INTRAVENOUS at 10:45

## 2023-04-17 SDOH — ECONOMIC STABILITY: INCOME INSECURITY: HOW HARD IS IT FOR YOU TO PAY FOR THE VERY BASICS LIKE FOOD, HOUSING, MEDICAL CARE, AND HEATING?: NOT HARD AT ALL

## 2023-04-17 SDOH — ECONOMIC STABILITY: HOUSING INSECURITY
IN THE LAST 12 MONTHS, WAS THERE A TIME WHEN YOU DID NOT HAVE A STEADY PLACE TO SLEEP OR SLEPT IN A SHELTER (INCLUDING NOW)?: NO

## 2023-04-17 SDOH — ECONOMIC STABILITY: FOOD INSECURITY: WITHIN THE PAST 12 MONTHS, YOU WORRIED THAT YOUR FOOD WOULD RUN OUT BEFORE YOU GOT MONEY TO BUY MORE.: NEVER TRUE

## 2023-04-17 SDOH — ECONOMIC STABILITY: FOOD INSECURITY: WITHIN THE PAST 12 MONTHS, THE FOOD YOU BOUGHT JUST DIDN'T LAST AND YOU DIDN'T HAVE MONEY TO GET MORE.: NEVER TRUE

## 2023-04-17 NOTE — PROGRESS NOTES
Administrations This Visit       promethazine (PHENERGAN) injection 6.25 mg       Admin Date  04/17/2023  10:45 Action  Given Dose  6.25 mg Route  IntraMUSCular Site  Deltoid Left Administered By  Cindy Bello MA    Ordering Provider: Clarissa Babb MD    [address]: [id number]    Lot#: [id number]    : [address]    Patient Supplied?: No                  Pt tolerated appropriately.

## 2023-04-22 DIAGNOSIS — R41.3 MEMORY LOSS: ICD-10-CM

## 2023-04-25 RX ORDER — RIVASTIGMINE 9.5 MG/24H
PATCH, EXTENDED RELEASE TRANSDERMAL
Qty: 90 PATCH | Refills: 1 | Status: SHIPPED | OUTPATIENT
Start: 2023-04-25

## 2023-05-06 DIAGNOSIS — F33.42 RECURRENT MAJOR DEPRESSIVE DISORDER, IN FULL REMISSION (HCC): ICD-10-CM

## 2023-05-08 RX ORDER — BUPROPION HYDROCHLORIDE 300 MG/1
TABLET ORAL
Qty: 90 TABLET | Refills: 1 | Status: SHIPPED | OUTPATIENT
Start: 2023-05-08

## 2023-05-16 DIAGNOSIS — E03.9 ACQUIRED HYPOTHYROIDISM: ICD-10-CM

## 2023-05-16 RX ORDER — LEVOTHYROXINE SODIUM 0.2 MG/1
TABLET ORAL
Qty: 93 TABLET | Refills: 3 | Status: SHIPPED | OUTPATIENT
Start: 2023-05-16

## 2023-05-25 DIAGNOSIS — I69.30 CEREBRAL MULTI-INFARCT STATE: ICD-10-CM

## 2023-05-25 RX ORDER — FOLIC ACID 1 MG/1
TABLET ORAL
Qty: 90 TABLET | Refills: 1 | Status: SHIPPED | OUTPATIENT
Start: 2023-05-25

## 2023-06-01 DIAGNOSIS — I10 ESSENTIAL HYPERTENSION: ICD-10-CM

## 2023-06-01 RX ORDER — INDAPAMIDE 2.5 MG/1
TABLET, FILM COATED ORAL
Qty: 90 TABLET | Refills: 0 | Status: SHIPPED | OUTPATIENT
Start: 2023-06-01

## 2023-06-21 ENCOUNTER — APPOINTMENT (OUTPATIENT)
Dept: CT IMAGING | Age: 68
End: 2023-06-21
Payer: MEDICARE

## 2023-06-21 ENCOUNTER — HOSPITAL ENCOUNTER (INPATIENT)
Age: 68
LOS: 2 days | Discharge: HOME | End: 2023-06-23
Attending: EMERGENCY MEDICINE | Admitting: STUDENT IN AN ORGANIZED HEALTH CARE EDUCATION/TRAINING PROGRAM
Payer: MEDICARE

## 2023-06-21 DIAGNOSIS — R41.82 ALTERED MENTAL STATUS, UNSPECIFIED ALTERED MENTAL STATUS TYPE: Primary | ICD-10-CM

## 2023-06-21 PROBLEM — R29.90 STROKE-LIKE SYMPTOMS: Status: ACTIVE | Noted: 2023-06-21

## 2023-06-21 LAB
ALBUMIN SERPL BCP-MCNC: 3.6 GM/DL (ref 3.4–5)
ALP SERPL-CCNC: 99 U/L (ref 46–116)
ALT SERPL W P-5'-P-CCNC: 35 U/L (ref 14–63)
AMORPH SED URNS QL MICRO: ABNORMAL
ANION GAP SERPL CALC-SCNC: 6 MEQ/L (ref 8–16)
APTT: 24 SECONDS (ref 22–38)
AST SERPL W P-5'-P-CCNC: 30 U/L (ref 15–37)
BACTERIA URNS QL MICRO: ABNORMAL
BASOPHILS # BLD: 0.7 % (ref 0–3)
BASOPHILS ABSOLUTE: 0.1 THOU/MM3 (ref 0–0.1)
BILIRUB SERPL-MCNC: 0.5 MG/DL (ref 0.2–1)
BILIRUB UR QL STRIP.AUTO: NEGATIVE
BUN SERPL-MCNC: 25 MG/DL (ref 7–18)
CALCIUM SERPL-MCNC: 9.5 MG/DL (ref 8.5–10.1)
CASTS #/AREA URNS LPF: ABNORMAL /LPF
CHARACTER UR: CLEAR
CHLORIDE SERPL-SCNC: 100 MEQ/L (ref 98–107)
CO2 SERPL-SCNC: 31 MEQ/L (ref 21–32)
COLOR UR AUTO: YELLOW
CREAT SERPL-MCNC: 1.3 MG/DL (ref 0.6–1.3)
CRYSTALS VITF MICRO: ABNORMAL
EOSINOPHILS ABSOLUTE: 0.1 THOU/MM3 (ref 0–0.5)
EOSINOPHILS RELATIVE PERCENT: 0.7 % (ref 0–4)
EPI CELLS #/AREA URNS HPF: ABNORMAL /HPF
GFR SERPL CREATININE-BSD FRML MDRD: 44 ML/MIN/1.73M2
GLUCOSE BLD STRIP.AUTO-MCNC: 94 MG/DL (ref 70–108)
GLUCOSE SERPL-MCNC: 97 MG/DL (ref 74–106)
GLUCOSE UR QL STRIP.AUTO: NEGATIVE MG/DL
HCT VFR BLD CALC: 40 % (ref 37–47)
HEMOGLOBIN: 13.1 GM/DL (ref 12–16)
HGB UR STRIP.AUTO-MCNC: NEGATIVE MG/L
IMMATURE GRANS (ABS): 0.02 THOU/MM3 (ref 0–0.07)
IMMATURE GRANULOCYTES: 0 %
INR BLD: 1 (ref 0.85–1.13)
KETONES UR QL STRIP.AUTO: NEGATIVE
LEUKOCYTE ESTERASE UR QL STRIP.AUTO: ABNORMAL
LYMPHOCYTES # BLD AUTO: 15.1 % (ref 15–47)
LYMPHOCYTES ABSOLUTE: 1.3 THOU/MM3 (ref 1–4.8)
MCH RBC QN AUTO: 32.1 PG (ref 26–32)
MCHC RBC AUTO-ENTMCNC: 32.8 GM/DL (ref 31–35)
MCV RBC AUTO: 98 FL (ref 81–99)
MONOCYTES: 0.7 THOU/MM3 (ref 0.3–1.3)
MONOCYTES: 8.4 % (ref 0–12)
MUCOUS THREADS URNS QL MICRO: ABNORMAL
NITRITE UR QL STRIP.AUTO: NEGATIVE
PDW BLD-RTO: 13.1 % (ref 11.5–14.9)
PH UR STRIP.AUTO: 7 [PH] (ref 5–9)
PLATELET # BLD AUTO: 121 THOU/MM3 (ref 130–400)
PMV BLD AUTO: 11.3 FL (ref 9.4–12.4)
POTASSIUM SERPL-SCNC: 3.5 MEQ/L (ref 3.5–5.1)
PROT SERPL-MCNC: 7.4 GM/DL (ref 6.4–8.2)
PROT UR STRIP.AUTO-MCNC: NEGATIVE MG/DL
RBC # BLD: 4.08 MILL/MM3 (ref 4.1–5.3)
RBC #/AREA URNS HPF: ABNORMAL /HPF
REFLEX TO URINE C & S: ABNORMAL
SEG NEUTROPHILS: 74.9 % (ref 43–75)
SEGMENTED NEUTROPHILS ABSOLUTE COUNT: 6.3 THOU/MM3 (ref 1.8–7.7)
SODIUM SERPL-SCNC: 137 MEQ/L (ref 136–145)
SP GR UR STRIP.AUTO: 1.01 (ref 1–1.03)
TROPONIN, HIGH SENSITIVITY: 7.8 PG/ML (ref 0–51.3)
UROBILINOGEN UR STRIP-ACNC: 0.2 EU/DL (ref 0–1)
WBC # BLD: 8.5 THOU/MM3 (ref 4.8–10.8)
WBC # UR STRIP.AUTO: ABNORMAL /HPF

## 2023-06-21 PROCEDURE — 80053 COMPREHEN METABOLIC PANEL: CPT

## 2023-06-21 PROCEDURE — 93005 ELECTROCARDIOGRAM TRACING: CPT | Performed by: EMERGENCY MEDICINE

## 2023-06-21 PROCEDURE — 81001 URINALYSIS AUTO W/SCOPE: CPT

## 2023-06-21 PROCEDURE — 85610 PROTHROMBIN TIME: CPT

## 2023-06-21 PROCEDURE — 70450 CT HEAD/BRAIN W/O DYE: CPT

## 2023-06-21 PROCEDURE — 84484 ASSAY OF TROPONIN QUANT: CPT

## 2023-06-21 PROCEDURE — 85025 COMPLETE CBC W/AUTO DIFF WBC: CPT

## 2023-06-21 PROCEDURE — 82948 REAGENT STRIP/BLOOD GLUCOSE: CPT

## 2023-06-21 PROCEDURE — 2580000003 HC RX 258: Performed by: EMERGENCY MEDICINE

## 2023-06-21 PROCEDURE — 6370000000 HC RX 637 (ALT 250 FOR IP): Performed by: EMERGENCY MEDICINE

## 2023-06-21 PROCEDURE — 2060000000 HC ICU INTERMEDIATE R&B

## 2023-06-21 PROCEDURE — 96360 HYDRATION IV INFUSION INIT: CPT

## 2023-06-21 PROCEDURE — 85730 THROMBOPLASTIN TIME PARTIAL: CPT

## 2023-06-21 PROCEDURE — 99285 EMERGENCY DEPT VISIT HI MDM: CPT

## 2023-06-21 RX ORDER — CLOPIDOGREL BISULFATE 75 MG/1
75 TABLET ORAL ONCE
Status: COMPLETED | OUTPATIENT
Start: 2023-06-21 | End: 2023-06-21

## 2023-06-21 RX ORDER — CLOPIDOGREL BISULFATE 75 MG/1
TABLET ORAL
Qty: 90 TABLET | Refills: 1 | Status: SHIPPED | OUTPATIENT
Start: 2023-06-21

## 2023-06-21 RX ORDER — 0.9 % SODIUM CHLORIDE 0.9 %
1000 INTRAVENOUS SOLUTION INTRAVENOUS ONCE
Status: COMPLETED | OUTPATIENT
Start: 2023-06-21 | End: 2023-06-21

## 2023-06-21 RX ORDER — ASPIRIN 81 MG/1
324 TABLET, CHEWABLE ORAL ONCE
Status: COMPLETED | OUTPATIENT
Start: 2023-06-21 | End: 2023-06-21

## 2023-06-21 RX ADMIN — CLOPIDOGREL BISULFATE 75 MG: 75 TABLET ORAL at 21:31

## 2023-06-21 RX ADMIN — SODIUM CHLORIDE 1000 ML: 9 INJECTION, SOLUTION INTRAVENOUS at 20:00

## 2023-06-21 RX ADMIN — ASPIRIN 324 MG: 81 TABLET, CHEWABLE ORAL at 20:58

## 2023-06-21 ASSESSMENT — PAIN SCALES - GENERAL: PAINLEVEL_OUTOF10: 0

## 2023-06-21 ASSESSMENT — PAIN - FUNCTIONAL ASSESSMENT: PAIN_FUNCTIONAL_ASSESSMENT: NONE - DENIES PAIN

## 2023-06-21 NOTE — TELEPHONE ENCOUNTER
Last visit- 4/17/2023  Next visit- 10/16/2023    Requested Prescriptions     Pending Prescriptions Disp Refills    clopidogrel (PLAVIX) 75 MG tablet [Pharmacy Med Name: CLOPIDOGREL 75 MG TABLET] 90 tablet 1     Sig: TAKE ONE TABLET DAILY, BY MOUTH.

## 2023-06-22 ENCOUNTER — APPOINTMENT (OUTPATIENT)
Dept: CT IMAGING | Age: 68
End: 2023-06-22
Payer: MEDICARE

## 2023-06-22 ENCOUNTER — APPOINTMENT (OUTPATIENT)
Dept: MRI IMAGING | Age: 68
End: 2023-06-22
Payer: MEDICARE

## 2023-06-22 PROBLEM — R41.82 ALTERED MENTAL STATUS: Status: ACTIVE | Noted: 2023-06-22

## 2023-06-22 LAB
ANION GAP SERPL CALC-SCNC: 10 MEQ/L (ref 8–16)
BUN SERPL-MCNC: 24 MG/DL (ref 7–22)
CALCIUM SERPL-MCNC: 8.9 MG/DL (ref 8.5–10.5)
CHLORIDE SERPL-SCNC: 98 MEQ/L (ref 98–111)
CO2 SERPL-SCNC: 29 MEQ/L (ref 23–33)
CREAT SERPL-MCNC: 1.1 MG/DL (ref 0.4–1.2)
GFR SERPL CREATININE-BSD FRML MDRD: 54 ML/MIN/1.73M2
GLUCOSE SERPL-MCNC: 90 MG/DL (ref 70–108)
POTASSIUM SERPL-SCNC: 3.7 MEQ/L (ref 3.5–5.2)
SODIUM SERPL-SCNC: 137 MEQ/L (ref 135–145)

## 2023-06-22 PROCEDURE — 97116 GAIT TRAINING THERAPY: CPT

## 2023-06-22 PROCEDURE — 97162 PT EVAL MOD COMPLEX 30 MIN: CPT

## 2023-06-22 PROCEDURE — 2580000003 HC RX 258

## 2023-06-22 PROCEDURE — 92523 SPEECH SOUND LANG COMPREHEN: CPT

## 2023-06-22 PROCEDURE — 2060000000 HC ICU INTERMEDIATE R&B

## 2023-06-22 PROCEDURE — 6370000000 HC RX 637 (ALT 250 FOR IP)

## 2023-06-22 PROCEDURE — 93010 ELECTROCARDIOGRAM REPORT: CPT | Performed by: INTERNAL MEDICINE

## 2023-06-22 PROCEDURE — 36415 COLL VENOUS BLD VENIPUNCTURE: CPT

## 2023-06-22 PROCEDURE — 74176 CT ABD & PELVIS W/O CONTRAST: CPT

## 2023-06-22 PROCEDURE — 97535 SELF CARE MNGMENT TRAINING: CPT

## 2023-06-22 PROCEDURE — 6360000002 HC RX W HCPCS

## 2023-06-22 PROCEDURE — 95816 EEG AWAKE AND DROWSY: CPT | Performed by: PSYCHIATRY & NEUROLOGY

## 2023-06-22 PROCEDURE — 92610 EVALUATE SWALLOWING FUNCTION: CPT

## 2023-06-22 PROCEDURE — 80048 BASIC METABOLIC PNL TOTAL CA: CPT

## 2023-06-22 PROCEDURE — 95816 EEG AWAKE AND DROWSY: CPT

## 2023-06-22 PROCEDURE — 70551 MRI BRAIN STEM W/O DYE: CPT

## 2023-06-22 PROCEDURE — 97166 OT EVAL MOD COMPLEX 45 MIN: CPT

## 2023-06-22 RX ORDER — ACETAMINOPHEN 325 MG/1
650 TABLET ORAL EVERY 4 HOURS PRN
Status: DISCONTINUED | OUTPATIENT
Start: 2023-06-22 | End: 2023-06-23 | Stop reason: HOSPADM

## 2023-06-22 RX ORDER — ENOXAPARIN SODIUM 100 MG/ML
40 INJECTION SUBCUTANEOUS DAILY
Status: DISCONTINUED | OUTPATIENT
Start: 2023-06-22 | End: 2023-06-23 | Stop reason: HOSPADM

## 2023-06-22 RX ORDER — LOSARTAN POTASSIUM AND HYDROCHLOROTHIAZIDE 25; 100 MG/1; MG/1
1 TABLET ORAL DAILY
Status: DISCONTINUED | OUTPATIENT
Start: 2023-06-22 | End: 2023-06-23 | Stop reason: HOSPADM

## 2023-06-22 RX ORDER — RIVASTIGMINE 4.6 MG/24H
2 PATCH, EXTENDED RELEASE TRANSDERMAL DAILY
Status: DISCONTINUED | OUTPATIENT
Start: 2023-06-22 | End: 2023-06-23 | Stop reason: HOSPADM

## 2023-06-22 RX ORDER — HYDROCORTISONE ACETATE 0.5 %
1 CREAM (GRAM) TOPICAL 2 TIMES DAILY
Status: DISCONTINUED | OUTPATIENT
Start: 2023-06-22 | End: 2023-06-22 | Stop reason: RX

## 2023-06-22 RX ORDER — METOPROLOL SUCCINATE 25 MG/1
25 TABLET, EXTENDED RELEASE ORAL DAILY
Status: DISCONTINUED | OUTPATIENT
Start: 2023-06-22 | End: 2023-06-23 | Stop reason: HOSPADM

## 2023-06-22 RX ORDER — CLOPIDOGREL BISULFATE 75 MG/1
75 TABLET ORAL DAILY
Status: DISCONTINUED | OUTPATIENT
Start: 2023-06-22 | End: 2023-06-23 | Stop reason: HOSPADM

## 2023-06-22 RX ORDER — PANTOPRAZOLE SODIUM 40 MG/1
40 TABLET, DELAYED RELEASE ORAL
Status: DISCONTINUED | OUTPATIENT
Start: 2023-06-22 | End: 2023-06-23 | Stop reason: HOSPADM

## 2023-06-22 RX ORDER — HYDROCHLOROTHIAZIDE 25 MG/1
50 TABLET ORAL DAILY
Status: DISCONTINUED | OUTPATIENT
Start: 2023-06-22 | End: 2023-06-23 | Stop reason: HOSPADM

## 2023-06-22 RX ORDER — SUCRALFATE 1 G/1
1 TABLET ORAL 4 TIMES DAILY
Status: DISCONTINUED | OUTPATIENT
Start: 2023-06-22 | End: 2023-06-23 | Stop reason: HOSPADM

## 2023-06-22 RX ORDER — FOLIC ACID 1 MG/1
1 TABLET ORAL DAILY
Status: DISCONTINUED | OUTPATIENT
Start: 2023-06-22 | End: 2023-06-23 | Stop reason: HOSPADM

## 2023-06-22 RX ORDER — MULTIVITAMIN WITH IRON
1 TABLET ORAL DAILY
Status: DISCONTINUED | OUTPATIENT
Start: 2023-06-22 | End: 2023-06-23 | Stop reason: HOSPADM

## 2023-06-22 RX ORDER — ONDANSETRON 2 MG/ML
4 INJECTION INTRAMUSCULAR; INTRAVENOUS EVERY 6 HOURS PRN
Status: DISCONTINUED | OUTPATIENT
Start: 2023-06-22 | End: 2023-06-23 | Stop reason: HOSPADM

## 2023-06-22 RX ORDER — BUPROPION HYDROCHLORIDE 300 MG/1
300 TABLET ORAL DAILY
Status: DISCONTINUED | OUTPATIENT
Start: 2023-06-22 | End: 2023-06-23 | Stop reason: HOSPADM

## 2023-06-22 RX ORDER — LEVOTHYROXINE SODIUM 0.1 MG/1
200 TABLET ORAL DAILY
Status: DISCONTINUED | OUTPATIENT
Start: 2023-06-22 | End: 2023-06-23 | Stop reason: HOSPADM

## 2023-06-22 RX ORDER — POLYETHYLENE GLYCOL 3350 17 G/17G
17 POWDER, FOR SOLUTION ORAL DAILY PRN
Status: DISCONTINUED | OUTPATIENT
Start: 2023-06-22 | End: 2023-06-23 | Stop reason: HOSPADM

## 2023-06-22 RX ORDER — ASPIRIN 81 MG/1
81 TABLET ORAL DAILY
Status: DISCONTINUED | OUTPATIENT
Start: 2023-06-22 | End: 2023-06-23 | Stop reason: HOSPADM

## 2023-06-22 RX ORDER — ONDANSETRON 4 MG/1
4 TABLET, ORALLY DISINTEGRATING ORAL EVERY 8 HOURS PRN
Status: DISCONTINUED | OUTPATIENT
Start: 2023-06-22 | End: 2023-06-23 | Stop reason: HOSPADM

## 2023-06-22 RX ORDER — ASPIRIN 81 MG/1
81 TABLET ORAL NIGHTLY
Status: DISCONTINUED | OUTPATIENT
Start: 2023-06-22 | End: 2023-06-22 | Stop reason: SDUPTHER

## 2023-06-22 RX ORDER — TRAZODONE HYDROCHLORIDE 50 MG/1
50 TABLET ORAL NIGHTLY PRN
Status: DISCONTINUED | OUTPATIENT
Start: 2023-06-22 | End: 2023-06-23 | Stop reason: HOSPADM

## 2023-06-22 RX ORDER — ATORVASTATIN CALCIUM 40 MG/1
40 TABLET, FILM COATED ORAL DAILY
Status: DISCONTINUED | OUTPATIENT
Start: 2023-06-22 | End: 2023-06-23 | Stop reason: HOSPADM

## 2023-06-22 RX ADMIN — SUCRALFATE 1 G: 1 TABLET ORAL at 09:30

## 2023-06-22 RX ADMIN — LEVOTHYROXINE SODIUM 200 MCG: 0.1 TABLET ORAL at 09:30

## 2023-06-22 RX ADMIN — Medication 1 TABLET: at 09:30

## 2023-06-22 RX ADMIN — SUCRALFATE 1 G: 1 TABLET ORAL at 20:42

## 2023-06-22 RX ADMIN — FOLIC ACID 1 MG: 1 TABLET ORAL at 09:30

## 2023-06-22 RX ADMIN — SERTRALINE 50 MG: 50 TABLET, FILM COATED ORAL at 09:30

## 2023-06-22 RX ADMIN — ASPIRIN 81 MG: 81 TABLET, COATED ORAL at 09:30

## 2023-06-22 RX ADMIN — ENOXAPARIN SODIUM 40 MG: 100 INJECTION SUBCUTANEOUS at 09:30

## 2023-06-22 RX ADMIN — CEFTRIAXONE SODIUM 1000 MG: 1 INJECTION, POWDER, FOR SOLUTION INTRAMUSCULAR; INTRAVENOUS at 03:05

## 2023-06-22 RX ADMIN — PANTOPRAZOLE SODIUM 40 MG: 40 TABLET, DELAYED RELEASE ORAL at 06:10

## 2023-06-22 RX ADMIN — SUCRALFATE 1 G: 1 TABLET ORAL at 17:30

## 2023-06-22 RX ADMIN — BUPROPION HYDROCHLORIDE 300 MG: 300 TABLET, FILM COATED, EXTENDED RELEASE ORAL at 09:30

## 2023-06-22 RX ADMIN — ATORVASTATIN CALCIUM 40 MG: 40 TABLET, FILM COATED ORAL at 09:30

## 2023-06-22 RX ADMIN — CLOPIDOGREL BISULFATE 75 MG: 75 TABLET ORAL at 09:30

## 2023-06-22 ASSESSMENT — ENCOUNTER SYMPTOMS
RHINORRHEA: 0
PHOTOPHOBIA: 0
NAUSEA: 0
ABDOMINAL PAIN: 0
COLOR CHANGE: 0
BACK PAIN: 0
SHORTNESS OF BREATH: 0
COUGH: 0
VOMITING: 0
TROUBLE SWALLOWING: 0
SORE THROAT: 0

## 2023-06-22 ASSESSMENT — PAIN SCALES - GENERAL
PAINLEVEL_OUTOF10: 0

## 2023-06-22 NOTE — PROGRESS NOTES
[facility] Laboratory Technician Worksheet      EEG Date: 2023    Name: Gwen Valdes   : 1955   Age: 77 y.o.   SEX: female    ROOM: [facility] MRN: [id number]           CSN: [id number]      Ordering Provider: Vignesh Diaz  EEG Number: [id number] Time of Test:      Hand: Right   Sedation: no    H.V. Done: No  age protocol  Photic: Yes    Sleep: No  Drowsy: Yes   Sleep Deprived: No    Seizures observed: no    Mentality: alert      Clinical History:altered mental status, falls, hx cva, residual memory loss, mri pending,   Ct  Impression:     No acute intracranial abnormality    Past Medical History:       Diagnosis Date    Acute arterial ischemic stroke, multifocal, anterior circulation [facility]     Dr Maria D Astudillo    Anemia     Dr Isaac Crouch pox     Endometrial adenocarcinoma ([facility] 2020    Hyperlipidemia     Hypertension     Dr Nilo Bear    Hypothyroidism     Measles     Memory loss     Mumps     Uterine cancer ([facility] 2020    Endometrioid carcinoma, Aloma Raja       Scheduled Meds:   enoxaparin  40 mg SubCUTAneous Daily    aspirin  81 mg Oral Daily    buPROPion  300 mg Oral Daily    clopidogrel  75 mg Oral Daily    folic acid  1 mg Oral Daily    [Held by provider] hydroCHLOROthiazide  50 mg Oral Daily    levothyroxine  200 mcg Oral Daily    [Held by provider] losartan-hydroCHLOROthiazide  1 tablet Oral Daily    [Held by provider] metoprolol succinate  25 mg Oral Daily    multivitamin  1 tablet Oral Daily    pantoprazole  40 mg Oral QAM AC    rivastigmine  2 patch TransDERmal Daily    sertraline  50 mg Oral Daily    atorvastatin  40 mg Oral Daily    sucralfate  1 g Oral 4x Daily    cefTRIAXone (ROCEPHIN) IV  1,000 mg IntraVENous Q24H     Continuous Infusions:  PRN Meds:.ondansetron **OR** ondansetron, polyethylene glycol, [Held by provider] traZODone, acetaminophen    Technician: Marycarmen Osorio 2023

## 2023-06-22 NOTE — PROGRESS NOTES
Hospitalist History & Physical    Patient:  Kendy Monge    Unit/Bed:4A-12/012-A  YOB: 1955  MRN: [id number]   Acct: [de-identified]   PCP: Natanael Lorenzo MD  Code Status: Full Code    Date of Service: Pt seen/examined on 06/22/23 and admitted to Inpatient with expected LOS greater than two midnights due to medical therapy. Chief Complaint: Confusion    Assessment/Plan:    Acute pyelonephritis: UA (+) mild leukocyte esterase and few bacteria. Low grade fever of 100.9.  (+) R CV tenderness  Urine culture pending  CT abdomen pelvis. Start Rocephin    Altered mental status: Likely secondary to #1 but cannot rule out neurological process at this time. Developed acute, new onset confusion according to family. Has a hx of CVA 3 years ago to which she has residual memory loss. CT head (-) acute. Received loading dose of ASA and 75 mg of Plavix at outlying facility. UA shows (+) trace leukocyte esterase and few bacteria. Patient has low grade fever of 100.9. MRI in AM  Neurology consult  Antibiotics as above  ASA/Plavix  Continue home Statin. PT/OT/SLP  Bedside speech eval prior to PO intake  Neuro checks Q4h.  NIHSS q shift. HLD: Continue home Statin    Primary HTN: Hold all antihypertensives until CVA rule out. Hypothyroidism: Continue home levothyroxine    Anxiety: Continue home sertraline    Hx of CVA: Has residual memory loss. DVT prophylaxis: Lovenox daily        History of Present Illness:  Kendy Monge is a 77 y.o. female with PMHx of CVA, HLD, HTN, hypothyroidism and anxiety, who presented to [facility] with chief complaint of altered mental status. There is no family at bedside and the patient is mildly confused so history is limited. She reports that she woke up this morning and felt confused. She said she couldn't remember anything. She didn't notice any focal deficits. She didn't notice any new numbness or tingling.   She said that she believes she has had a stroke in

## 2023-06-22 NOTE — PROGRESS NOTES
[address] [place] [address]  SPEECH THERAPY  [facility] NEUROSCIENCES 4A  Speech - Language - Cognitive Evaluation + Clinical Swallow Evaluation    SLP Individual Minutes  Time In: 0641  Time Out: 5525  Minutes: 24  Timed Code Treatment Minutes: 0 Minutes     Speech, Language, Cognitive Evaluation: 16 minutes  Clinical Swallow Evaluation: 8 minutes    Date: 2023  Patient Name: Yudith Bingham      CSN: [id number]   : 1955  (77 y.o.)  Gender: female   Referring Physician:  VANE Fox CNP  Diagnosis: Stroke-like symptoms   Precautions: Fall risk   History of Present Illness/Injury: Patient admitted to [facility] for above dx. Per chart review, Abbie Wooten is a 77 y.o. female with PMHx of CVA, HLD, HTN, hypothyroidism and anxiety, who presented to [facility] with chief complaint of altered mental status. There is no family at bedside and the patient is mildly confused so history is limited. She reports that she woke up this morning and felt confused. She said she couldn't remember anything. She didn't notice any focal deficits. She didn't notice any new numbness or tingling. She said that she believes she has had a stroke in the past.  Per chart review, the patient's last know well was around noon. She was outside around 4 pm when she fell. She called out to her  who found her and she was confused. She could not tell him her name or the month. The patient does not remember this incident. There is no evidence of head trauma. Apparently she had a fall last week as well. She states she does not ambulate well. She did suffer a CVA approximately 3 years ago from which she has some residual memory loss. At the time of exam, the patient denies any pain. Denies headache or visual changes. Denies lightheaded or dizziness. She is mildly confused. She does not know the date or her age. She states that she was 48. She has no numbness or tingling. No focal neuro deficits. No facial droop. \" MRI

## 2023-06-22 NOTE — CONSULTS
has been electronically signed by: Maricarmen Carroll on 06/21/2023 08:44 PM ** ORIGINAL REPORT ** CT head without contrast Comparison: CT/SR - CT HEAD WO CONTRAST - 03/29/2021 05:21 PM EDT Findings: No acute intracranial hemorrhage. No acute territorial vascular infarction. No mass or mass-effect. No ventriculomegaly. Parenchymal volume loss. Diffuse hypoattenuation in the white matter, nonspecific but likely due to chronic small vessel ischemia. Atherosclerotic calcifications of the carotid siphons. Orbits are unremarkable. The paranasal sinuses and mastoid cells are clear. No acute bony abnormality. Unremarkable soft tissues. Impression: No acute intracranial abnormality. This document has been electronically signed by: Sae Lagunas MD on 06/21/2023 08:40 PM All CTs at this facility use dose modulation techniques and iterative reconstructions, and/or weight-based dosing when appropriate to reduce radiation to a low as reasonably achievable. MRI BRAIN WO CONTRAST    Result Date: 6/22/2023  PROCEDURE: MRI BRAIN WO CONTRAST CLINICAL INFORMATION Stroke like symptoms. COMPARISON: CT scan of the brain dated 6/21/2023. Lianet Aragon TECHNIQUE: Multiplanar and multiple spin echo MRI images were obtained of the brain without contrast. FINDINGS: The diffusion-weighted images are normal.  The brain volume is slightly reduced. There are old infarcts in the right and to a lesser extent left cerebellar hemispheres, left thalamus, left basal ganglia and right frontal periventricular white matter. There is a a mild amount of signal hyperintensity on the FLAIR and T2-weighted sequences in the white matter of the brain. This is consistent with mild severity chronic small vessel ischemic changes. There is encephalomalacia in the left occipital lobe There are no intra-or extra-axial collections. There is no hydrocephalus, midline shift or mass effect. There is mineralization in the medial aspects of the basal ganglia bilaterally.  There

## 2023-06-22 NOTE — ED NOTES
Reported off care to [facility] crew. Pt condition is still unchanged. Pt is awake and alert, but still confused and disoriented to time and place only alert to self. She was placed onto the cot and straps x 4. Pt loaded up into EMS and transported to [facility] 4A-12 .      Mariela Norris RN  06/21/23 0516

## 2023-06-22 NOTE — CARE COORDINATION
Case Management Assessment  Initial Evaluation    Date/Time of Evaluation: 6/22/2023 12:17 PM  Assessment Completed by: Maria E Haddad RN    If patient is discharged prior to next notation, then this note serves as note for discharge by case management. Patient Name: Renato Stevenson                   YOB: 1955  Diagnosis: Stroke-like symptoms [R29.90]  Altered mental status, unspecified altered mental status type [R41.82]                   Date / Time: 6/21/2023  8:14 PM  Location: [address] [place] [address]     Patient Admission Status: Inpatient   Readmission Risk Low 0-14, Mod 15-19), High > 20: Readmission Risk Score: 11.6    Current PCP: Alina Gibbs MD  PCP verified by CM? Yes    Chart Reviewed: Yes      History Provided by: Patient  Patient Orientation: Alert and Oriented    Patient Cognition: Alert    Hospitalization in the last 30 days (Readmission):  No    If yes, Readmission Assessment in CM Navigator will be completed. Advance Directives:      Code Status: Full Code   Patient's Primary Decision Maker is: Named in Scanned ACP Document    Primary Decision Maker: Cole Luis E - Spouse - [phone]    Secondary Decision Maker: april kincaid - Child - [phone]    Discharge Planning:    Patient lives with: Spouse/Significant Other Type of Home: House  Primary Care Giver: Self  Patient Support Systems include: Spouse/Significant Other   Current Financial resources: Medicare  Current community resources: None  Current services prior to admission: None            Current DME:              Type of Home Care services:  None    ADLS  Prior functional level: Independent in ADLs/IADLs  Current functional level: Independent in ADLs/IADLs    Family can provide assistance at DC: Yes  Would you like Case Management to discuss the discharge plan with any other family members/significant others, and if so, who?  Yes ( Iza Trejo)  Plans to Return to Present Housing: Yes  Other Identified

## 2023-06-22 NOTE — PROGRESS NOTES
Patient/family has been educated on their personal risk factors of:  Hypertension  Previous stroke  Hyperlipidemia  smoking cessation    They have been given hand outs on the following medications:  asa  Plavix  Simvastatin  Hyzaar    Treatment for stroke includes:  Risk factor modifications  Following the medication regime prescribed by physician      Educated on FAST-Face-Arm-Speech-Time    A stroke is a brain attack. Stroke is a brain injury. It occurs when the brain's blood supply is interrupted. Blood carries oxygen and nutrients to the brain. Without oxygen and nutrients from blood, brain tissue starts to die rapidly. This can happen in less than 10 minutes. A stroke occurs when blood flow to the brain is blocked (called ischemic stroke). This is caused by one of the following:   Sudden decreased blood flow   Damage to a blood vessel supplying blood to the brain can occur suddenly from either:   Injury   A clot that forms and breaks off from another part of the body (such as the heart or neck)   There are certain conditions which predispose people to form blood clots, such as:   Cancer   Pregnancy   Atrial fibrillation   Certain autoimmune diseases   Local blood clot   A build-up of fatty substances ( atherosclerotic plaque ) along the inner lining of the artery causes:   Narrowing of artery   Reduced elasticity   Local inflammation   Blood protein defects leading to increased clotting tendency   Decreased blood flow in the artery   Clot in an artery supplying the brain   Inflammatory conditions in the blood vessels (vasculitis)   A stroke may also occur if a blood vessel breaks and bleeds into or around the brain. This is called hemorrhagic stroke. This condition needs to be monitored closely. Be sure to keep all appointments. Have exams and blood tests done as directed. Call 911 If Any of the Following Occurs   It is important that you and those around you know the warning signs for stroke.  CALL

## 2023-06-22 NOTE — PROGRESS NOTES
This Virtual RN completed admission requirements with both  and daughter who are currently at the bedside. Pt resting comfortably in bed, call light within reach. No concerns voiced at this time.

## 2023-06-22 NOTE — ED TRIAGE NOTES
Pt walked back to ER room 1 from triage with  at bedside. Pt has a HX of CVA strokes. Pt was last seen normal around noon 12 o clock today when she had lunch with her daughter. She also had a fall around 4 pm.  brings her into ER because she is \"out in left field\" She is acting more confused than normal and she is slow to respond and answer questions.

## 2023-06-22 NOTE — PLAN OF CARE
Problem: Discharge Planning  Goal: Discharge to home or other facility with appropriate resources  Outcome: Progressing  Flowsheets (Taken 6/22/2023 0021)  Discharge to home or other facility with appropriate resources:   Identify barriers to discharge with patient and caregiver   Arrange for needed discharge resources and transportation as appropriate   Identify discharge learning needs (meds, wound care, etc)   Refer to discharge planning if patient needs post-hospital services based on physician order or complex needs related to functional status, cognitive ability or social support system     Problem: Pain  Goal: Verbalizes/displays adequate comfort level or baseline comfort level  Outcome: Progressing  Flowsheets (Taken 6/22/2023 0021)  Verbalizes/displays adequate comfort level or baseline comfort level:   Encourage patient to monitor pain and request assistance   Assess pain using appropriate pain scale   Administer analgesics based on type and severity of pain and evaluate response   Implement non-pharmacological measures as appropriate and evaluate response     Problem: Skin/Tissue Integrity  Goal: Absence of new skin breakdown  Description: 1. Monitor for areas of redness and/or skin breakdown  2. Assess vascular access sites hourly  3. Every 4-6 hours minimum:  Change oxygen saturation probe site  4. Every 4-6 hours:  If on nasal continuous positive airway pressure, respiratory therapy assess nares and determine need for appliance change or resting period. Outcome: Progressing  Note: No signs of skin breakdown. Skin warm, dry, and intact. Mucous membranes pink and moist.  Assistance with turns/ambulation provided PRN. Will continue to monitor.        Problem: Chronic Conditions and Co-morbidities  Goal: Patient's chronic conditions and co-morbidity symptoms are monitored and maintained or improved  Outcome: Progressing  Flowsheets (Taken 6/22/2023 0021)  Care Plan - Patient's Chronic Conditions

## 2023-06-22 NOTE — PROGRESS NOTES
[address] [place] [address]  INPATIENT PHYSICAL THERAPY  EVALUATION  [facility] 4A - 4A-12/012-A    Time In: 07  Time Out: 0804  Timed Code Treatment Minutes: 16 Minutes  Minutes: 25          Date: 2023  Patient Name: Jacob Marion,  Gender:  female        MRN: [id number]  : 1955  (67 y.o.)      Referring Practitioner: VANE Umana CNP  Diagnosis: stroke-like sx  Additional Pertinent Hx: per H&P: Jacob Marion is a 77 y.o. female with PMHx of CVA, HLD, HTN, hypothyroidism and anxiety, who presented to [facility] with chief complaint of altered mental status. There is no family at bedside and the patient is mildly confused so history is limited. She reports that she woke up this morning and felt confused. She said she couldn't remember anything. She didn't notice any focal deficits. She didn't notice any new numbness or tingling. She said that she believes she has had a stroke in the past.  Per chart review, the patient's last know well was around noon. She was outside around 4 pm when she fell. She called out to her  who found her and she was confused. She could not tell him her name or the month. The patient does not remember this incident. There is no evidence of head trauma. Apparently she had a fall last week as well. She states she does not ambulate well. She did suffer a CVA approximately 3 years ago from which she has some residual memory loss. At the time of exam, the patient denies any pain. Denies headache or visual changes. Denies lightheaded or dizziness. She is mildly confused. She does not know the date or her age. She states that she was 48. She has no numbness or tingling. No focal neuro deficits. No facial droop. Restrictions/Precautions:  Restrictions/Precautions: Fall Risk, General Precautions    Subjective:  Chart Reviewed: Yes  Patient assessed for rehabilitation services?: Yes  Subjective: RN approved session.  pt pleasant and agreeable

## 2023-06-22 NOTE — ED NOTES
[address] Jessica PAGER.       Ale Lindsay, BROWN  06/21/23 349 Alvarez Chen, RN  06/21/23 6759

## 2023-06-22 NOTE — ED NOTES
Report called to 4A RN staff.       Benoit Sierra RN  06/21/23 9095 Area H Indication Text: Tumors in this location are included in Area H (eyelids, eyebrows, nose, lips, chin, ear, pre-auricular, post-auricular, temple, genitalia, hands, feet, ankles and areola).  Tissue conservation is critical in these anatomic locations.

## 2023-06-22 NOTE — PROGRESS NOTES
Physician Progress Note      Sam Maxwell  [facility] #:                  [id number]  :                       1955  ADMIT DATE:       2023 8:14 PM  DISCH DATE:  RESPONDING  PROVIDER #:        Carmenza Chen MD          QUERY TEXT:    Pt admitted with Acute pyelonephritis. Pt noted to have Developed acute, new   onset confusion according to family. Has a hx of CVA 3 years ago to which she   has residual memory loss. If possible, please document in the progress notes   and discharge summary if you are evaluating and / or treating any of the   following: The medical record reflects the following:  Risk Factors: 76 yo, patient fell on the back porch at 4 PM, she called her    who came home and check on her she was confused, she called him in a   different name and did not know the year or the month. Has been said that she   was sitting on the ground. Patient has no recollection of the incident. Clinical Indicators: alert to name and place, disoriented to time. delayed   response. New confusion and agitation doc in ns flow sheet. Acute   pyelonephritis  Treatment: bed alrms, MRI, Neurology consult, PT/OT/SLP, Bedside speech eval   prior to PO intake, and Neuro checks Q4h.  NIHSS q shift. Thank you. Michell Gunn RN, Clinical Documentation Integrity, [employer], [facility]), CRCR  Options provided:  -- Metabolic encephalopathy  -- Alcoholic encephalopathy  -- Anoxic encephalopathy  -- Hypertensive encephalopathy  -- Delirium due to, Please specify cause. -- Delirium  -- Other - I will add my own diagnosis  -- Disagree - Not applicable / Not valid  -- Disagree - Clinically unable to determine / Unknown  -- Refer to Clinical Documentation Reviewer    PROVIDER RESPONSE TEXT:    This patient has metabolic encephalopathy.     Query created by: Devika Scott on 2023 7:58 AM      Electronically signed by:  Carmenza Chen MD 2023 8:05 AM

## 2023-06-22 NOTE — ED NOTES
Pt stable and off to Radiology via ED cart with RN. Pt states no concerns and vitals stable.         Trace Navarro RN  06/21/23 2015

## 2023-06-22 NOTE — PLAN OF CARE
Hospitalist Quick Note  Pt admitted earlier this am by nocturnist - AMS with hx of CVA. U/a slightly abn and with some possible back pain thus concern for pyelo. Started on rocephin and CT abd/pelvis done this am (p). Mental status improving per pt and  at bedside. Neuro c/s also with hx of CVA and MRI brain (p). ?need EEG. Holding BP meds until CVA r/o. Await further testing and neuro recs.       Electronically signed by Constanza Downs MD on 6/22/2023 at 10:47 AM

## 2023-06-22 NOTE — ED PROVIDER NOTES
[facility]  [address]  Phone: [phone]    eMERGENCY dEPARTMENT eNCOUnter           [address]       Chief Complaint   Patient presents with    Altered Mental Status       Nurses Notes reviewed and I agree except as noted in the HPI. HISTORY OF PRESENT ILLNESS    Edmond Banegas is a 77 y.o. female with history of CVA, chronically insufficiency, hypertension and thrombocytopenia who presents via private vehicle with a chief complaint of mental status change and confusion. She is accompanied by her  and daughter. Apparently patient fell on the back porch at 4 PM, she called her  who came home and check on her she was confused, she called him in a different name and did not know the year or the month. Has been said that she was sitting on the ground. Patient has no recollection of the incident. There is no evidence of head injury. Last time she was seen normal was at 12 noon today. She fell 1 time last week as well. Patient has history of minor CVA 3 years ago which resulted in memory loss which has partially recovered. She also had residual right peripheral vision loss. REVIEW OF SYSTEMS     Negative except as mentioned above    PAST MEDICAL HISTORY    has a past medical history of Acute arterial ischemic stroke, multifocal, anterior circulation ([address] Anemia, Anxiety, Arthritis, Chicken pox, Endometrial adenocarcinoma ([address] Hyperlipidemia, Hypertension, Hypothyroidism, Measles, Memory loss, Mumps, and Uterine cancer ([address] SURGICAL HISTORY      has a past surgical history that includes Endometrial biopsy (12/11/2019); Hysterectomy, total abdominal (01/30/2020); and Ovary removal (01/30/2020). CURRENT MEDICATIONS       Previous Medications    ASPIRIN 81 MG EC TABLET    Take 1 tablet by mouth nightly    BUPROPION (WELLBUTRIN XL) 300 MG EXTENDED RELEASE TABLET    TAKE ONE TABLET DAILY, BY MOUTH.     CLOPIDOGREL (PLAVIX) 75 MG

## 2023-06-22 NOTE — PROGRESS NOTES
Stroke Folder given.    What is Stroke/CVA  Signs and Symptoms of stroke (BEFAST)  Treatments for Stroke  Personal Risk Factors for Stroke discussed  Education--Call [phone]

## 2023-06-22 NOTE — PLAN OF CARE
Problem: Discharge Planning  Goal: Discharge to home or other facility with appropriate resources  Outcome: Progressing  Flowsheets (Taken 6/22/2023 0021 by Bernadine Herring, RN)  Discharge to home or other facility with appropriate resources:   Identify barriers to discharge with patient and caregiver   Arrange for needed discharge resources and transportation as appropriate   Identify discharge learning needs (meds, wound care, etc)   Refer to discharge planning if patient needs post-hospital services based on physician order or complex needs related to functional status, cognitive ability or social support system     Problem: Pain  Goal: Verbalizes/displays adequate comfort level or baseline comfort level  Outcome: Progressing  Flowsheets (Taken 6/22/2023 0021 by Bernadine Herring, RN)  Verbalizes/displays adequate comfort level or baseline comfort level:   Encourage patient to monitor pain and request assistance   Assess pain using appropriate pain scale   Administer analgesics based on type and severity of pain and evaluate response   Implement non-pharmacological measures as appropriate and evaluate response     Problem: Safety - Adult  Goal: Free from fall injury  Outcome: Progressing  Flowsheets (Taken 6/22/2023 2023)  Free From Fall Injury:   Instruct family/caregiver on patient safety   Based on caregiver fall risk screen, instruct family/caregiver to ask for assistance with transferring infant if caregiver noted to have fall risk factors     Problem: Skin/Tissue Integrity  Goal: Absence of new skin breakdown  Description: 1. Monitor for areas of redness and/or skin breakdown  2. Assess vascular access sites hourly  3. Every 4-6 hours minimum:  Change oxygen saturation probe site  4. Every 4-6 hours:  If on nasal continuous positive airway pressure, respiratory therapy assess nares and determine need for appliance change or resting period.   Outcome: Progressing     Problem: Chronic Conditions and

## 2023-06-22 NOTE — PROGRESS NOTES
Patient admitted to [facility] Room 12 via cart/stretcher  No complaints upon arrival to the room. IV site free of s/s of infection or infiltration. Vital signs obtained. Assessment and data collection initiated. Oriented to room. Policies and procedures for 4A explained All questions answered with no further questions at this time. Fall prevention and safety brochure discussed with patient. 2 person skin check completed with Ishan Torres. Patient declines PCP notification.   Patient's  and daughter are at the bedside

## 2023-06-22 NOTE — PROGRESS NOTES
[address]  INPATIENT OCCUPATIONAL THERAPY  [facility] NEUROSCIENCES 4A  EVALUATION    Time:    Time In: 05  Time Out: 1002  Timed Code Treatment Minutes: 13 Minutes  Minutes: 23          Date: 2023  Patient Name: Hesham Santana,   Gender: female      MRN: [id number]  : 1955  (77 y.o.)  Referring Practitioner: VANE Crespo CNP  Diagnosis: stroke-like symptoms  Additional Pertinent Hx: Per ER note on 2023: Pt is a 77 y.o. female with history of CVA, chronically insufficiency, hypertension and thrombocytopenia who presents via private vehicle with a chief complaint of mental status change and confusion. She is accompanied by her  and daughter. Apparently patient fell on the back porch at 4 PM, she called her  who came home and check on her she was confused, she called him in a different name and did not know the year or the month. Has been said that she was sitting on the ground. Patient has no recollection of the incident. There is no evidence of head injury. Last time she was seen normal was at 12 noon today. She fell 1 time last week as well. Patient has history of minor CVA 3 years ago which resulted in memory loss which has partially recovered. She also had residual right peripheral vision loss. Restrictions/Precautions:  Restrictions/Precautions: Fall Risk, General Precautions    Subjective  Chart Reviewed: Yes, Orders, Progress Notes, History and Physical, Imaging, Labs  Patient assessed for rehabilitation services?: Yes    Subjective: Pt seated in recliner upon arrival of therapist. Pt's  present throughout session. Pt very pleasant and cooperative with therapy.     Pain: no c/o pain    Vitals: Nurse checked vitals prior to session    Social/Functional History:  Lives With: Spouse  Type of Home: House  Home Layout: Multi-level, Bed/Bath upstairs, Able to Live on Main level with bedroom/bathroom  Home Access: Stairs to enter without

## 2023-06-22 NOTE — ED NOTES
Pt assisted onto bedside commode, she did well, urine sample sent to lab.       Verónica Tavera RN  06/21/23 8136

## 2023-06-22 NOTE — PROCEDURES
Date: 6/22/2023  Referring physician: Yina Monreal PA-C    Indication  Patient aged 77 y with encephalopathy. EEG done to assess for epileptiform activity. Introduction  This routine 20-minute EEG was recorded using the International 10-20 System on a [employer] [place] workstation at 256 samples/s. Automated spike and seizure detection algorithms were applied. Description  During the maximal alert state, a well-regulated, symmetric, and reactive 7-8 Hz posterior dominant rhythm was seen. No consistent focal slowing or interhemispheric asymmetry was noted. Stage I and stage II sleep were observed. There were no interictal epileptiform discharges or electrographic seizures. Random delta slowing seen. Activations  Hyperventilation was not performed. Intermittent photic stimulation was performed and demonstrated no posterior driving response. Impression  Abnormal awake EEG. The slowing mentioned above suggests mild non specific encephalopathy. No epileptiform discharges were identified. Please note the absence of such activity on this record cannot conclusively rule out an epileptic disorder. If such is still clinically suspected, a repeat study with sleep deprivation and/or prolonged sampling may be helpful. Jie Krishnan MD  Epilepsy Board Certified. Neurology Board Certified.     Electronically Signed

## 2023-06-23 VITALS
HEART RATE: 68 BPM | WEIGHT: 182.32 LBS | BODY MASS INDEX: 35.79 KG/M2 | SYSTOLIC BLOOD PRESSURE: 135 MMHG | HEIGHT: 60 IN | DIASTOLIC BLOOD PRESSURE: 69 MMHG | RESPIRATION RATE: 16 BRPM | OXYGEN SATURATION: 98 % | TEMPERATURE: 98 F

## 2023-06-23 LAB
ANION GAP SERPL CALC-SCNC: 13 MEQ/L (ref 8–16)
BUN SERPL-MCNC: 19 MG/DL (ref 7–22)
CALCIUM SERPL-MCNC: 9.1 MG/DL (ref 8.5–10.5)
CHLORIDE SERPL-SCNC: 102 MEQ/L (ref 98–111)
CHOLEST SERPL-MCNC: 183 MG/DL (ref 100–199)
CO2 SERPL-SCNC: 27 MEQ/L (ref 23–33)
CREAT SERPL-MCNC: 1 MG/DL (ref 0.4–1.2)
DEPRECATED MEAN GLUCOSE BLD GHB EST-ACNC: 81 MG/DL (ref 70–126)
DEPRECATED RDW RBC AUTO: 47.9 FL (ref 35–45)
EKG ATRIAL RATE: 75 BPM
EKG P AXIS: 48 DEGREES
EKG P-R INTERVAL: 148 MS
EKG Q-T INTERVAL: 368 MS
EKG QRS DURATION: 108 MS
EKG QTC CALCULATION (BAZETT): 410 MS
EKG R AXIS: 34 DEGREES
EKG T AXIS: 62 DEGREES
EKG VENTRICULAR RATE: 75 BPM
ERYTHROCYTE [DISTWIDTH] IN BLOOD BY AUTOMATED COUNT: 13.2 % (ref 11.5–14.5)
GFR SERPL CREATININE-BSD FRML MDRD: > 60 ML/MIN/1.73M2
GLUCOSE SERPL-MCNC: 84 MG/DL (ref 70–108)
HBA1C MFR BLD HPLC: 4.7 % (ref 4.4–6.4)
HCT VFR BLD AUTO: 40 % (ref 37–47)
HDLC SERPL-MCNC: 73 MG/DL
HGB BLD-MCNC: 13 GM/DL (ref 12–16)
LDLC SERPL CALC-MCNC: 92 MG/DL
MCH RBC QN AUTO: 32.1 PG (ref 26–33)
MCHC RBC AUTO-ENTMCNC: 32.5 GM/DL (ref 32.2–35.5)
MCV RBC AUTO: 98.8 FL (ref 81–99)
PLATELET # BLD AUTO: 106 THOU/MM3 (ref 130–400)
PMV BLD AUTO: 11.7 FL (ref 9.4–12.4)
POTASSIUM SERPL-SCNC: 3.5 MEQ/L (ref 3.5–5.2)
RBC # BLD AUTO: 4.05 MILL/MM3 (ref 4.2–5.4)
SODIUM SERPL-SCNC: 142 MEQ/L (ref 135–145)
T4 FREE SERPL-MCNC: 1.46 NG/DL (ref 0.93–1.76)
TRIGL SERPL-MCNC: 91 MG/DL (ref 0–199)
TSH SERPL DL<=0.005 MIU/L-ACNC: 0.42 UIU/ML (ref 0.4–4.2)
WBC # BLD AUTO: 6.8 THOU/MM3 (ref 4.8–10.8)

## 2023-06-23 PROCEDURE — 6360000002 HC RX W HCPCS

## 2023-06-23 PROCEDURE — 97129 THER IVNTJ 1ST 15 MIN: CPT

## 2023-06-23 PROCEDURE — 80048 BASIC METABOLIC PNL TOTAL CA: CPT

## 2023-06-23 PROCEDURE — 6370000000 HC RX 637 (ALT 250 FOR IP)

## 2023-06-23 PROCEDURE — 84439 ASSAY OF FREE THYROXINE: CPT

## 2023-06-23 PROCEDURE — 80061 LIPID PANEL: CPT

## 2023-06-23 PROCEDURE — 97110 THERAPEUTIC EXERCISES: CPT

## 2023-06-23 PROCEDURE — 6370000000 HC RX 637 (ALT 250 FOR IP): Performed by: FAMILY MEDICINE

## 2023-06-23 PROCEDURE — 2580000003 HC RX 258

## 2023-06-23 PROCEDURE — 97535 SELF CARE MNGMENT TRAINING: CPT

## 2023-06-23 PROCEDURE — 97530 THERAPEUTIC ACTIVITIES: CPT

## 2023-06-23 PROCEDURE — 83036 HEMOGLOBIN GLYCOSYLATED A1C: CPT

## 2023-06-23 PROCEDURE — 85027 COMPLETE CBC AUTOMATED: CPT

## 2023-06-23 PROCEDURE — 36415 COLL VENOUS BLD VENIPUNCTURE: CPT

## 2023-06-23 PROCEDURE — 84443 ASSAY THYROID STIM HORMONE: CPT

## 2023-06-23 RX ORDER — HYDROCORTISONE ACETATE 0.5 %
1 CREAM (GRAM) TOPICAL 2 TIMES DAILY
Qty: 60 CAPSULE | Refills: 0 | Status: SHIPPED
Start: 2023-06-23

## 2023-06-23 RX ORDER — CEFDINIR 300 MG/1
300 CAPSULE ORAL 2 TIMES DAILY
Qty: 10 CAPSULE | Refills: 0 | Status: SHIPPED | OUTPATIENT
Start: 2023-06-23 | End: 2023-06-28

## 2023-06-23 RX ADMIN — ATORVASTATIN CALCIUM 40 MG: 40 TABLET, FILM COATED ORAL at 10:06

## 2023-06-23 RX ADMIN — FOLIC ACID 1 MG: 1 TABLET ORAL at 10:06

## 2023-06-23 RX ADMIN — SUCRALFATE 1 G: 1 TABLET ORAL at 14:11

## 2023-06-23 RX ADMIN — ASPIRIN 81 MG: 81 TABLET, COATED ORAL at 10:05

## 2023-06-23 RX ADMIN — CLOPIDOGREL BISULFATE 75 MG: 75 TABLET ORAL at 10:06

## 2023-06-23 RX ADMIN — LEVOTHYROXINE SODIUM 200 MCG: 0.1 TABLET ORAL at 06:03

## 2023-06-23 RX ADMIN — PANTOPRAZOLE SODIUM 40 MG: 40 TABLET, DELAYED RELEASE ORAL at 06:03

## 2023-06-23 RX ADMIN — Medication 1 TABLET: at 10:06

## 2023-06-23 RX ADMIN — BUPROPION HYDROCHLORIDE 300 MG: 300 TABLET, FILM COATED, EXTENDED RELEASE ORAL at 10:06

## 2023-06-23 RX ADMIN — SUCRALFATE 1 G: 1 TABLET ORAL at 10:06

## 2023-06-23 RX ADMIN — ENOXAPARIN SODIUM 40 MG: 100 INJECTION SUBCUTANEOUS at 10:06

## 2023-06-23 RX ADMIN — ACETAMINOPHEN 650 MG: 325 TABLET ORAL at 04:15

## 2023-06-23 RX ADMIN — CEFTRIAXONE SODIUM 1000 MG: 1 INJECTION, POWDER, FOR SOLUTION INTRAMUSCULAR; INTRAVENOUS at 03:10

## 2023-06-23 RX ADMIN — SERTRALINE 50 MG: 50 TABLET, FILM COATED ORAL at 10:06

## 2023-06-23 ASSESSMENT — PAIN SCALES - GENERAL
PAINLEVEL_OUTOF10: 8
PAINLEVEL_OUTOF10: 0

## 2023-06-23 ASSESSMENT — PAIN DESCRIPTION - PAIN TYPE: TYPE: ACUTE PAIN

## 2023-06-23 ASSESSMENT — PAIN - FUNCTIONAL ASSESSMENT: PAIN_FUNCTIONAL_ASSESSMENT: ACTIVITIES ARE NOT PREVENTED

## 2023-06-23 ASSESSMENT — PAIN DESCRIPTION - DESCRIPTORS: DESCRIPTORS: ACHING

## 2023-06-23 ASSESSMENT — PAIN DESCRIPTION - FREQUENCY: FREQUENCY: INTERMITTENT

## 2023-06-23 ASSESSMENT — PAIN DESCRIPTION - ONSET: ONSET: ON-GOING

## 2023-06-23 ASSESSMENT — PAIN DESCRIPTION - LOCATION: LOCATION: GENERALIZED

## 2023-06-23 NOTE — PROGRESS NOTES
[address]  Occupational Therapy  Daily Note  Time:   Time In: 4540  Time Out: 0773  Timed Code Treatment Minutes: 24 Minutes  Minutes: 24          Date: 2023  Patient Name: Renato Stevenson,   Gender: female      Room: [facility]12/012-A  MRN: [id number]  : 1955  (77 y.o.)  Referring Practitioner: VANE Rubin CNP  Diagnosis: stroke-like symptoms  Additional Pertinent Hx: Per ER note on 2023: Pt is a 77 y.o. female with history of CVA, chronically insufficiency, hypertension and thrombocytopenia who presents via private vehicle with a chief complaint of mental status change and confusion. She is accompanied by her  and daughter. Apparently patient fell on the back porch at 4 PM, she called her  who came home and check on her she was confused, she called him in a different name and did not know the year or the month. Has been said that she was sitting on the ground. Patient has no recollection of the incident. There is no evidence of head injury. Last time she was seen normal was at 12 noon today. She fell 1 time last week as well. Patient has history of minor CVA 3 years ago which resulted in memory loss which has partially recovered. She also had residual right peripheral vision loss. Restrictions/Precautions:  Restrictions/Precautions: Fall Risk, General Precautions     SUBJECTIVE: Patient seated in bedside chair upon arrival; agreeable to therapy this date. Patient pleasant and cooperative throughout session. PAIN: 0/10:     Vitals: Vitals not assessed per clinical judgement, see nursing flowsheet    COGNITION: Decreased Problem Solving and Decreased Safety Awareness    ADL:   Grooming: Moderate Assistance. Wash/brush hair . BALANCE:  Sitting Balance:  Supervision. Standing Balance: Supervision.  No device, no LOB    Patient completed dynamic standing cooking task that facilitated balance, problem solving, increase

## 2023-06-23 NOTE — PROGRESS NOTES
Pt was alone in the room dealing with stroke-like symptoms. She was emotional that she wanted to go home to be with her . She was encouraged and was anointed. 06/23/23 1652   Encounter Summary   Encounter Overview/Reason  Initial Encounter   Service Provided For: Patient   [facility]   Support System Spouse; Children   Last Encounter  06/23/23  (Anointed)   Complexity of Encounter Low   Begin Time 1235   End Time  1242   Total Time Calculated 7 min   Spiritual/Emotional needs   Type Spiritual Support   Rituals, Rites and Sacraments   Type Anointing   Assessment/Intervention/Outcome   Assessment Anxious   Intervention Empowerment   Outcome Encouraged

## 2023-06-23 NOTE — PROGRESS NOTES
[facility]  INPATIENT SPEECH THERAPY  [facility] NEUROSCIENCES 4A  DAILY NOTE    TIME   SLP Individual Minutes  Time In: 4073  Time Out: 6052  Minutes: 8  Timed Code Treatment Minutes: 8 Minutes       Date: 2023  Patient Name: Varsha Phillips      CSN: [id number]   : 1955  (77 y.o.)  Gender: female   Referring Physician:  VANE Zapata CNP  Diagnosis: Stroke-like symptoms   Precautions: Fall risk   Current Diet: Regular diet and thin liquids  Swallowing Strategies: Full Upright Position, Small Bite/Sip, Alternate Solids and Liquids, Limit Distractions, and Monitor for Fatigue  Date of Last MBS/FEES: Not Applicable    Pain:  No pain reported. Subjective:  BROWN Buckley approved completion of treatment. Patient sitting in recliner upon ST arrival. Agreeable to brief session of skilled ST services. Family present throughout. Pleasant and cooperative throughout tasks.     Short-Term Goals:  SHORT TERM GOAL #1:  Goal 1: Patient will complete orientation, biographical, and immediate/delayed recall tasks with 60% accuracy given min cues to improve retention of novel and newly presented information  INTERVENTIONS:  Orientation  Location: [facility]  City: independent  Year: independent  Month: logical cue  Date: logical cue  MYLES: logical cue    *patient unable to see calendar in room; patient one off on month, date, and MYLES (July, , Thursday)    SHORT TERM GOAL #2:  Goal 2: Patient will complete problem solving, visual/verbal reasoning, and executive functioning tasks with 60% accuracy given min cues to improve safe return to IADLs/ADLs  INTERVENTIONS:  Problem Solving/Reasoning - Call Light  ID call light: independent  Reasons for using call light: unable to elicit    *patient with decreased reasoning and poor insight; patient stated she is able to get up and walk around without assistance; ST educated patient re: safety concerns and importance of utilizing call light and reasons for

## 2023-06-23 NOTE — PLAN OF CARE
Problem: Discharge Planning  Goal: Discharge to home or other facility with appropriate resources  6/23/2023 1506 by Harshal Hanson RN  Outcome: Adequate for Discharge  6/23/2023 1448 by RUDDY Mclaughlin  Outcome: Progressing     Problem: Pain  Goal: Verbalizes/displays adequate comfort level or baseline comfort level  Outcome: Adequate for Discharge     Problem: Safety - Adult  Goal: Free from fall injury  Outcome: Adequate for Discharge     Problem: Skin/Tissue Integrity  Goal: Absence of new skin breakdown  Description: 1. Monitor for areas of redness and/or skin breakdown  2. Assess vascular access sites hourly  3. Every 4-6 hours minimum:  Change oxygen saturation probe site  4. Every 4-6 hours:  If on nasal continuous positive airway pressure, respiratory therapy assess nares and determine need for appliance change or resting period.   Outcome: Adequate for Discharge     Problem: Chronic Conditions and Co-morbidities  Goal: Patient's chronic conditions and co-morbidity symptoms are monitored and maintained or improved  Outcome: Adequate for Discharge     Problem: Nutrition Deficit:  Goal: Optimize nutritional status  Outcome: Adequate for Discharge     Problem: Neurosensory - Adult  Goal: Achieves stable or improved neurological status  Outcome: Adequate for Discharge     Problem: Infection - Adult  Goal: Absence of infection at discharge  Outcome: Adequate for Discharge  Goal: Absence of infection during hospitalization  Outcome: Adequate for Discharge

## 2023-06-23 NOTE — DISCHARGE SUMMARY
Hospitalist Discharge Summary     Patient Identification:  Pawan Scott  : 1955  MRN: [id number]   Account: [de-identified]     Admit date: 2023  Discharge date: 2023   Attending provider: Kristen Barry MD        Primary care provider: Evelyn Ornelas MD     Discharge Diagnoses:        Hospital Course:   Pawan Scott is a 77 y.o. female admitted to [facility] on 2023 for ***. See H&P by Zachary Blake MD on *** for admitting details. ***    Discharge Medications:     Medication List        START taking these medications      cefdinir 300 MG capsule  Commonly known as: OMNICEF  Take 1 capsule by mouth 2 times daily for 5 days            CONTINUE taking these medications      aspirin 81 MG EC tablet     buPROPion 300 MG extended release tablet  Commonly known as: WELLBUTRIN XL  TAKE ONE TABLET DAILY, BY MOUTH.     clopidogrel 75 MG tablet  Commonly known as: PLAVIX  TAKE ONE TABLET DAILY, BY MOUTH.     folic acid 1 MG tablet  Commonly known as: FOLVITE  TAKE ONE TABLET DAILY, BY MOUTH. Glucosamine 1500 Complex Caps  Take 1 tablet by mouth 2 times daily     indapamide 2.5 MG tablet  Commonly known as: LOZOL  TAKE ONE TABLET DAILY IN THE MORNING, BY MOUTH.     levothyroxine 200 MCG tablet  Commonly known as: SYNTHROID  TAKE 1 TABLET BY MOUTH ONCE DAILY, TAKE 1&1/2 TAB ON MONDAY EVERY OTHER WEEK.     losartan-hydroCHLOROthiazide 100-25 MG per tablet  Commonly known as: HYZAAR  TAKE ONE TABLET DAILY, BY MOUTH.     metoprolol succinate 25 MG extended release tablet  Commonly known as: TOPROL XL  TAKE ONE TABLET DAILY, BY MOUTH.      MULTIVITAMIN & MINERAL PO     omeprazole 20 MG delayed release capsule  Commonly known as: PRILOSEC  Take 1 capsule by mouth every morning (before breakfast)     ondansetron 8 MG Tbdp disintegrating tablet  Commonly known as: ZOFRAN-ODT  Place 1 tablet under the tongue every 8 hours as needed for Nausea or Vomiting     rivastigmine 9.5

## 2023-06-23 NOTE — CARE COORDINATION
6/23/23, 2:50 PM EDT    Patient goals/plan/ treatment preferences discussed by  and . Patient goals/plan/ treatment preferences reviewed with patient/ family. Patient/ family verbalize understanding of discharge plan and are in agreement with goal/plan/treatment preferences. Understanding was demonstrated using the teach back method. AVS provided by RN at time of discharge, which includes all necessary medical information pertaining to the patients current course of illness, treatment, post-discharge goals of care, and treatment preferences. Services At/After Discharge: None       IMM Letter  IMM Letter given to Patient/Family/Significant other/Guardian/POA/by[de-identified] staff  IMM Letter date given[de-identified] 06/22/23  IMM Letter time given[de-identified] 1132     Delvin Ormond will be discharged to home with her spouse. Met with them both and they deny needs. Spouse will stay home with her to make sure she is safe. They have children and grandchildren that are supportive and can help if needed. He told SW that her cognition is worse toward the end of the day, so he makes sure he is with her at that time.

## 2023-06-23 NOTE — PROGRESS NOTES
[address] [place] [address]  INPATIENT PHYSICAL THERAPY  DAILY NOTE  GAL COATESS 4A - 4A-12/012-A    Time In: 5760  Time Out: 3288  Timed Code Treatment Minutes: 26 Minutes  Minutes: 26          Date: 2023  Patient Name: Kelsi Boogie,  Gender:  female        MRN: [id number]  : 1955  (77 y.o.)     Referring Practitioner: VANE Mohamud CNP  Diagnosis: stroke-like sx  Additional Pertinent Hx: per H&P: Kelsi Boogie is a 77 y.o. female with PMHx of CVA, HLD, HTN, hypothyroidism and anxiety, who presented to [facility] with chief complaint of altered mental status. There is no family at bedside and the patient is mildly confused so history is limited. She reports that she woke up this morning and felt confused. She said she couldn't remember anything. She didn't notice any focal deficits. She didn't notice any new numbness or tingling. She said that she believes she has had a stroke in the past.  Per chart review, the patient's last know well was around noon. She was outside around 4 pm when she fell. She called out to her  who found her and she was confused. She could not tell him her name or the month. The patient does not remember this incident. There is no evidence of head trauma. Apparently she had a fall last week as well. She states she does not ambulate well. She did suffer a CVA approximately 3 years ago from which she has some residual memory loss. At the time of exam, the patient denies any pain. Denies headache or visual changes. Denies lightheaded or dizziness. She is mildly confused. She does not know the date or her age. She states that she was 48. She has no numbness or tingling. No focal neuro deficits. No facial droop.      Prior Level of Function:  Lives With: Spouse  Type of Home: House  Home Layout: Multi-level, Bed/Bath upstairs, Able to Live on Main level with bedroom/bathroom  Home Access: Stairs to enter without rails  Entrance Stairs - Number

## 2023-06-23 NOTE — PROGRESS NOTES
Discharge teaching and instructions for diagnosis/procedure of UTI and AMS completed with patient using teachback method. AVS reviewed by this RN and virtual RN. Patient voiced understanding regarding prescriptions, follow up appointments, and care of self at home. Discharged in a wheelchair to  home with support per family.

## 2023-06-23 NOTE — PLAN OF CARE
Problem: Discharge Planning  Goal: Discharge to home or other facility with appropriate resources  6/22/2023 2127 by Krystal Aguiar RN  Outcome: Progressing  Flowsheets (Taken 6/22/2023 2127)  Discharge to home or other facility with appropriate resources:   Identify barriers to discharge with patient and caregiver   Arrange for needed discharge resources and transportation as appropriate   Identify discharge learning needs (meds, wound care, etc)   Refer to discharge planning if patient needs post-hospital services based on physician order or complex needs related to functional status, cognitive ability or social support system     Problem: Pain  Goal: Verbalizes/displays adequate comfort level or baseline comfort level  6/22/2023 2127 by Krystal Aguiar RN  Outcome: Progressing  Flowsheets (Taken 6/22/2023 2127)  Verbalizes/displays adequate comfort level or baseline comfort level:   Assess pain using appropriate pain scale   Administer analgesics based on type and severity of pain and evaluate response   Encourage patient to monitor pain and request assistance   Implement non-pharmacological measures as appropriate and evaluate response     Problem: Safety - Adult  Goal: Free from fall injury  6/22/2023 2127 by Krystal Aguiar RN  Outcome: Progressing  Note: Patient remains free from falls this shift. Fall precautions in place with bed/chair exit alarmed. Fall sign posted and fall armband in place. Nonskid footwear used with transferring. Educated patient to use call light when in need of staff assistance with transferring, ambulating, and other activities of daily living. Patient appropriately uses call light this shift. Problem: Skin/Tissue Integrity  Goal: Absence of new skin breakdown  Description: 1. Monitor for areas of redness and/or skin breakdown  2. Assess vascular access sites hourly  3. Every 4-6 hours minimum:  Change oxygen saturation probe site  4.   Every 4-6 hours:  If on nasal

## 2023-06-26 ENCOUNTER — TELEPHONE (OUTPATIENT)
Dept: FAMILY MEDICINE CLINIC | Age: 68
End: 2023-06-26

## 2023-06-30 ENCOUNTER — NURSE ONLY (OUTPATIENT)
Dept: FAMILY MEDICINE CLINIC | Age: 68
End: 2023-06-30

## 2023-06-30 VITALS — HEART RATE: 73 BPM | DIASTOLIC BLOOD PRESSURE: 82 MMHG | SYSTOLIC BLOOD PRESSURE: 136 MMHG

## 2023-06-30 DIAGNOSIS — N39.0 URINARY TRACT INFECTION WITHOUT HEMATURIA, SITE UNSPECIFIED: ICD-10-CM

## 2023-06-30 DIAGNOSIS — I10 ESSENTIAL HYPERTENSION: Primary | ICD-10-CM

## 2023-06-30 LAB
BILIRUBIN URINE: NEGATIVE
BLOOD URINE, POC: NEGATIVE
CHARACTER, URINE: CLEAR
COLOR, URINE: YELLOW
GLUCOSE URINE: NEGATIVE MG/DL
KETONES, URINE: NEGATIVE
LEUKOCYTE CLUMPS, URINE: ABNORMAL
NITRITE, URINE: NEGATIVE
PH, URINE: 6.5 (ref 5–9)
PROTEIN, URINE: NEGATIVE MG/DL
SPECIFIC GRAVITY, URINE: 1.01 (ref 1–1.03)
UROBILINOGEN, URINE: 0.2 EU/DL (ref 0–1)

## 2023-07-02 LAB — BACTERIA UR CULT: NORMAL

## 2023-07-11 PROBLEM — E66.9 OBESITY (BMI 30-39.9): Status: ACTIVE | Noted: 2023-07-11

## 2023-07-11 PROBLEM — F41.9 ANXIETY DISORDER: Status: ACTIVE | Noted: 2023-07-11

## 2023-07-11 PROBLEM — N39.0 URINARY TRACT INFECTION WITHOUT HEMATURIA: Status: ACTIVE | Noted: 2023-07-11

## 2023-07-11 PROBLEM — G93.41 METABOLIC ENCEPHALOPATHY: Status: ACTIVE | Noted: 2023-07-11

## 2023-07-11 PROBLEM — E03.9 HYPOTHYROIDISM: Status: ACTIVE | Noted: 2023-07-11

## 2023-07-11 PROBLEM — N17.9 AKI (ACUTE KIDNEY INJURY) (HCC): Status: ACTIVE | Noted: 2023-07-11

## 2023-07-11 PROBLEM — Z86.73 HISTORY OF CVA (CEREBROVASCULAR ACCIDENT): Status: ACTIVE | Noted: 2023-07-11

## 2023-07-11 PROBLEM — R50.9 LOW GRADE FEVER: Status: ACTIVE | Noted: 2023-07-11

## 2023-07-25 ENCOUNTER — NURSE ONLY (OUTPATIENT)
Dept: LAB | Age: 68
End: 2023-07-25

## 2023-07-25 DIAGNOSIS — N18.31 STAGE 3A CHRONIC KIDNEY DISEASE (HCC): ICD-10-CM

## 2023-07-25 LAB
ANION GAP SERPL CALC-SCNC: 10 MEQ/L (ref 8–16)
BUN SERPL-MCNC: 23 MG/DL (ref 7–22)
CALCIUM SERPL-MCNC: 10.1 MG/DL (ref 8.5–10.5)
CHLORIDE SERPL-SCNC: 104 MEQ/L (ref 98–111)
CO2 SERPL-SCNC: 29 MEQ/L (ref 23–33)
CREAT SERPL-MCNC: 0.8 MG/DL (ref 0.4–1.2)
GFR SERPL CREATININE-BSD FRML MDRD: > 60 ML/MIN/1.73M2
GLUCOSE SERPL-MCNC: 91 MG/DL (ref 70–108)
POTASSIUM SERPL-SCNC: 4.2 MEQ/L (ref 3.5–5.2)
SODIUM SERPL-SCNC: 143 MEQ/L (ref 135–145)

## 2023-07-26 ENCOUNTER — OFFICE VISIT (OUTPATIENT)
Dept: NEPHROLOGY | Age: 68
End: 2023-07-26
Payer: MEDICARE

## 2023-07-26 VITALS
OXYGEN SATURATION: 92 % | HEIGHT: 60 IN | SYSTOLIC BLOOD PRESSURE: 148 MMHG | HEART RATE: 64 BPM | WEIGHT: 178 LBS | DIASTOLIC BLOOD PRESSURE: 86 MMHG | BODY MASS INDEX: 34.95 KG/M2

## 2023-07-26 DIAGNOSIS — I50.22 CHRONIC SYSTOLIC (CONGESTIVE) HEART FAILURE (HCC): ICD-10-CM

## 2023-07-26 DIAGNOSIS — N18.2 CKD (CHRONIC KIDNEY DISEASE), STAGE II: Primary | ICD-10-CM

## 2023-07-26 DIAGNOSIS — I10 PRIMARY HYPERTENSION: ICD-10-CM

## 2023-07-26 PROCEDURE — 3017F COLORECTAL CA SCREEN DOC REV: CPT | Performed by: INTERNAL MEDICINE

## 2023-07-26 PROCEDURE — 1036F TOBACCO NON-USER: CPT | Performed by: INTERNAL MEDICINE

## 2023-07-26 PROCEDURE — G8417 CALC BMI ABV UP PARAM F/U: HCPCS | Performed by: INTERNAL MEDICINE

## 2023-07-26 PROCEDURE — 1123F ACP DISCUSS/DSCN MKR DOCD: CPT | Performed by: INTERNAL MEDICINE

## 2023-07-26 PROCEDURE — 99213 OFFICE O/P EST LOW 20 MIN: CPT | Performed by: INTERNAL MEDICINE

## 2023-07-26 PROCEDURE — G8428 CUR MEDS NOT DOCUMENT: HCPCS | Performed by: INTERNAL MEDICINE

## 2023-07-26 PROCEDURE — 3079F DIAST BP 80-89 MM HG: CPT | Performed by: INTERNAL MEDICINE

## 2023-07-26 PROCEDURE — 3077F SYST BP >= 140 MM HG: CPT | Performed by: INTERNAL MEDICINE

## 2023-07-26 PROCEDURE — 1090F PRES/ABSN URINE INCON ASSESS: CPT | Performed by: INTERNAL MEDICINE

## 2023-07-26 PROCEDURE — G8399 PT W/DXA RESULTS DOCUMENT: HCPCS | Performed by: INTERNAL MEDICINE

## 2023-08-10 DIAGNOSIS — I10 ESSENTIAL HYPERTENSION: ICD-10-CM

## 2023-08-10 RX ORDER — METOPROLOL SUCCINATE 25 MG/1
TABLET, EXTENDED RELEASE ORAL
Qty: 90 TABLET | Refills: 1 | Status: SHIPPED | OUTPATIENT
Start: 2023-08-10

## 2023-08-11 ENCOUNTER — HOSPITAL ENCOUNTER (OUTPATIENT)
Age: 68
Discharge: HOME | End: 2023-08-11
Payer: MEDICARE

## 2023-08-11 ENCOUNTER — HOSPITAL ENCOUNTER (OUTPATIENT)
Dept: WOMENS IMAGING | Age: 68
Discharge: HOME | End: 2023-08-11
Payer: MEDICARE

## 2023-08-11 ENCOUNTER — TELEPHONE (OUTPATIENT)
Dept: FAMILY MEDICINE CLINIC | Age: 68
End: 2023-08-11

## 2023-08-11 VITALS — BODY MASS INDEX: 32.39 KG/M2 | HEIGHT: 60 IN | WEIGHT: 165 LBS

## 2023-08-11 DIAGNOSIS — R41.82 ALTERED MENTAL STATUS, UNSPECIFIED ALTERED MENTAL STATUS TYPE: Primary | ICD-10-CM

## 2023-08-11 DIAGNOSIS — R41.82 ALTERED MENTAL STATUS, UNSPECIFIED ALTERED MENTAL STATUS TYPE: ICD-10-CM

## 2023-08-11 DIAGNOSIS — Z12.31 VISIT FOR SCREENING MAMMOGRAM: ICD-10-CM

## 2023-08-11 LAB
BACTERIA URNS QL MICRO: ABNORMAL /HPF
BILIRUB UR QL STRIP.AUTO: NEGATIVE
CASTS #/AREA URNS LPF: ABNORMAL /LPF
CASTS 2: ABNORMAL /LPF
CHARACTER UR: ABNORMAL
COLOR: YELLOW
CRYSTALS URNS MICRO: ABNORMAL
EPITHELIAL CELLS, UA: ABNORMAL /HPF
GLUCOSE UR QL STRIP.AUTO: NEGATIVE MG/DL
HGB UR QL STRIP.AUTO: ABNORMAL
KETONES UR QL STRIP.AUTO: NEGATIVE
MISCELLANEOUS 2: ABNORMAL
NITRITE UR QL STRIP: NEGATIVE
PH UR STRIP.AUTO: 7 [PH] (ref 5–9)
PROT UR STRIP.AUTO-MCNC: ABNORMAL MG/DL
RBC URINE: > 100 /HPF
RENAL EPI CELLS #/AREA URNS HPF: ABNORMAL /[HPF]
SP GR UR REFRACT.AUTO: 1.01 (ref 1–1.03)
UROBILINOGEN, URINE: 0.2 EU/DL (ref 0–1)
WBC #/AREA URNS HPF: > 100 /HPF
WBC #/AREA URNS HPF: ABNORMAL /[HPF]
YEAST LIKE FUNGI URNS QL MICRO: ABNORMAL

## 2023-08-11 PROCEDURE — 87086 URINE CULTURE/COLONY COUNT: CPT

## 2023-08-11 PROCEDURE — 81001 URINALYSIS AUTO W/SCOPE: CPT

## 2023-08-11 PROCEDURE — 77063 BREAST TOMOSYNTHESIS BI: CPT

## 2023-08-11 NOTE — TELEPHONE ENCOUNTER
came into office stating patient has had confusion and irritable since Tuesday. She had bladder surgery on Tuesday and has not acted herself since. He is calling the surgeons office as well. He is asking for a UA order. He had an at home test and it did show positive for UTI. Orders placed to have done at [facility].

## 2023-08-13 LAB
BACTERIA UR CULT: ABNORMAL
ORGANISM: ABNORMAL

## 2023-08-17 ENCOUNTER — NURSE ONLY (OUTPATIENT)
Dept: FAMILY MEDICINE CLINIC | Age: 68
End: 2023-08-17
Payer: MEDICARE

## 2023-08-17 DIAGNOSIS — R82.998 LEUKOCYTES IN URINE: ICD-10-CM

## 2023-08-17 DIAGNOSIS — N39.0 URINARY TRACT INFECTION WITHOUT HEMATURIA, SITE UNSPECIFIED: Primary | ICD-10-CM

## 2023-08-17 LAB
BILIRUBIN, POC: NEGATIVE
BLOOD URINE, POC: NORMAL
CLARITY, POC: NORMAL
COLOR, POC: YELLOW
GLUCOSE URINE, POC: NEGATIVE
KETONES, POC: NEGATIVE
LEUKOCYTE EST, POC: NORMAL
NITRITE, POC: NEGATIVE
PH, POC: 6.5
PROTEIN, POC: 30
SPECIFIC GRAVITY, POC: 1.01
UROBILINOGEN, POC: 0.2

## 2023-08-17 PROCEDURE — 81003 URINALYSIS AUTO W/O SCOPE: CPT | Performed by: FAMILY MEDICINE

## 2023-08-17 PROCEDURE — 99211 OFF/OP EST MAY X REQ PHY/QHP: CPT | Performed by: FAMILY MEDICINE

## 2023-08-17 NOTE — PROGRESS NOTES
Chief Complaint   Patient presents with    Urinary Tract Infection       Results for POC orders placed in visit on 08/17/23   POCT Urinalysis No Micro (Auto)   Result Value Ref Range    Color, UA yellow     Clarity, UA cloudy     Glucose, UA POC negative     Bilirubin, UA negative     Ketones, UA negative     Spec Grav, UA 1.015     Blood, UA POC moderate     pH, UA 6.5     Protein, UA POC 30     Urobilinogen, UA 0.2     Leukocytes, UA large     Nitrite, UA negative        Urine culture  Will treat based on results    Call patient

## 2023-08-19 LAB
BACTERIA UR CULT: ABNORMAL
ORGANISM: ABNORMAL

## 2023-09-07 ENCOUNTER — HOSPITAL ENCOUNTER (OUTPATIENT)
Dept: WOMENS IMAGING | Age: 68
Discharge: HOME | End: 2023-09-07
Attending: RADIOLOGY
Payer: MEDICARE

## 2023-09-07 VITALS — HEIGHT: 60 IN | BODY MASS INDEX: 32.39 KG/M2 | WEIGHT: 165 LBS

## 2023-09-07 DIAGNOSIS — R92.2 BREAST DENSITY: ICD-10-CM

## 2023-09-07 PROCEDURE — 76642 ULTRASOUND BREAST LIMITED: CPT

## 2023-09-18 NOTE — PROGRESS NOTES
[facility]  [address] [facility]  Dept: [phone]  Dept Fax: [phone]  Loc: [address] L[address] is a 77 y.o. female who presents today for:  Chief Complaint   Patient presents with    Knee Pain       HPI:     HPI    Hypertension: Patient here for follow-up of elevated blood pressure. She is not exercising and is adherent to low salt diet. Blood pressure is well controlled at home. Cardiac symptoms none. Patient denies chest pain, dyspnea and palpitations. Cardiovascular risk factors: dyslipidemia, hypertension, obesity (BMI >= 30 kg/m2) and sedentary lifestyle. Use of agents associated with hypertension: none. History of target organ damage: none. Hyperlipidemia: Patient presents with hyperlipidemia. She was tested because hypertension. Her last labs show   Lab Results   Component Value Date    CHOL 183 06/23/2023    CHOL 164 04/10/2023    CHOL 167 04/19/2022     Lab Results   Component Value Date    TRIG 91 06/23/2023    TRIG 86 04/10/2023    TRIG 71 04/19/2022     Lab Results   Component Value Date    HDL 73 06/23/2023    HDL 77 04/10/2023    HDL 74 04/19/2022     Lab Results   Component Value Date    LDLCALC 92 06/23/2023    LDLCALC 70 04/10/2023    LDLCALC 79 04/19/2022     Lab Results   Component Value Date    VLDL 17 10/13/2018    VLDL 19 10/09/2017    VLDL 18 05/13/2017     Lab Results   Component Value Date    CHOLHDLRATIO 0.9 10/13/2018    CHOLHDLRATIO 1 10/09/2017    CHOLHDLRATIO 2.14 05/13/2017     There is not a family history of hyperlipidemia. There is not a family history of early ischemia heart disease. Hypothyroidism: Patient presents for evaluation of thyroid function. Symptoms consist of fatigue, arthralgias. Symptoms have present for several years. The symptoms are fatigue. The problem has been gradually worsening.   Previous thyroid studies include TSH, free thyroxine index and

## 2023-09-19 ENCOUNTER — OFFICE VISIT (OUTPATIENT)
Dept: FAMILY MEDICINE CLINIC | Age: 68
End: 2023-09-19
Payer: MEDICARE

## 2023-09-19 VITALS
TEMPERATURE: 97 F | DIASTOLIC BLOOD PRESSURE: 80 MMHG | WEIGHT: 181 LBS | SYSTOLIC BLOOD PRESSURE: 138 MMHG | OXYGEN SATURATION: 94 % | HEART RATE: 67 BPM | RESPIRATION RATE: 16 BRPM | HEIGHT: 60 IN | BODY MASS INDEX: 35.53 KG/M2

## 2023-09-19 DIAGNOSIS — N81.4 CYSTOCELE WITH PROLAPSE: ICD-10-CM

## 2023-09-19 DIAGNOSIS — M25.561 ACUTE PAIN OF RIGHT KNEE: ICD-10-CM

## 2023-09-19 DIAGNOSIS — D69.6 THROMBOCYTOPENIA (HCC): ICD-10-CM

## 2023-09-19 DIAGNOSIS — K21.00 GASTROESOPHAGEAL REFLUX DISEASE WITH ESOPHAGITIS WITHOUT HEMORRHAGE: ICD-10-CM

## 2023-09-19 DIAGNOSIS — I10 ESSENTIAL HYPERTENSION: ICD-10-CM

## 2023-09-19 DIAGNOSIS — E03.9 ACQUIRED HYPOTHYROIDISM: ICD-10-CM

## 2023-09-19 DIAGNOSIS — E87.0 HYPERNATREMIA: ICD-10-CM

## 2023-09-19 DIAGNOSIS — M85.80 OSTEOPENIA, UNSPECIFIED LOCATION: ICD-10-CM

## 2023-09-19 DIAGNOSIS — I69.30 CEREBRAL MULTI-INFARCT STATE: ICD-10-CM

## 2023-09-19 DIAGNOSIS — R29.898 WEAKNESS OF RIGHT LOWER EXTREMITY: ICD-10-CM

## 2023-09-19 DIAGNOSIS — Z87.39 HISTORY OF GOUT: ICD-10-CM

## 2023-09-19 DIAGNOSIS — R06.09 DYSPNEA ON EXERTION: ICD-10-CM

## 2023-09-19 DIAGNOSIS — F41.9 ANXIETY: ICD-10-CM

## 2023-09-19 DIAGNOSIS — F33.42 RECURRENT MAJOR DEPRESSIVE DISORDER, IN FULL REMISSION (HCC): Primary | ICD-10-CM

## 2023-09-19 DIAGNOSIS — C54.9 MALIGNANT NEOPLASM OF BODY OF UTERUS, UNSPECIFIED SITE (HCC): ICD-10-CM

## 2023-09-19 DIAGNOSIS — N18.31 STAGE 3A CHRONIC KIDNEY DISEASE (HCC): ICD-10-CM

## 2023-09-19 DIAGNOSIS — G47.01 INSOMNIA DUE TO MEDICAL CONDITION: ICD-10-CM

## 2023-09-19 DIAGNOSIS — R41.3 MEMORY LOSS: ICD-10-CM

## 2023-09-19 DIAGNOSIS — R26.81 UNSTEADY GAIT: ICD-10-CM

## 2023-09-19 DIAGNOSIS — R29.6 FALLS FREQUENTLY: ICD-10-CM

## 2023-09-19 DIAGNOSIS — E66.01 MORBIDLY OBESE (HCC): ICD-10-CM

## 2023-09-19 DIAGNOSIS — E78.00 PURE HYPERCHOLESTEROLEMIA: ICD-10-CM

## 2023-09-19 PROCEDURE — 3017F COLORECTAL CA SCREEN DOC REV: CPT | Performed by: FAMILY MEDICINE

## 2023-09-19 PROCEDURE — 3075F SYST BP GE 130 - 139MM HG: CPT | Performed by: FAMILY MEDICINE

## 2023-09-19 PROCEDURE — 1123F ACP DISCUSS/DSCN MKR DOCD: CPT | Performed by: FAMILY MEDICINE

## 2023-09-19 PROCEDURE — 1036F TOBACCO NON-USER: CPT | Performed by: FAMILY MEDICINE

## 2023-09-19 PROCEDURE — 1090F PRES/ABSN URINE INCON ASSESS: CPT | Performed by: FAMILY MEDICINE

## 2023-09-19 PROCEDURE — 3079F DIAST BP 80-89 MM HG: CPT | Performed by: FAMILY MEDICINE

## 2023-09-19 PROCEDURE — G8399 PT W/DXA RESULTS DOCUMENT: HCPCS | Performed by: FAMILY MEDICINE

## 2023-09-19 PROCEDURE — 99214 OFFICE O/P EST MOD 30 MIN: CPT | Performed by: FAMILY MEDICINE

## 2023-09-19 PROCEDURE — G8417 CALC BMI ABV UP PARAM F/U: HCPCS | Performed by: FAMILY MEDICINE

## 2023-09-19 PROCEDURE — G8427 DOCREV CUR MEDS BY ELIG CLIN: HCPCS | Performed by: FAMILY MEDICINE

## 2023-09-19 RX ORDER — SERTRALINE HYDROCHLORIDE 100 MG/1
TABLET, FILM COATED ORAL
COMMUNITY
Start: 2023-09-13

## 2023-09-19 RX ORDER — DIVALPROEX SODIUM 125 MG/1
125 TABLET, DELAYED RELEASE ORAL DAILY
COMMUNITY
Start: 2023-09-05

## 2023-10-04 ENCOUNTER — NURSE ONLY (OUTPATIENT)
Dept: FAMILY MEDICINE CLINIC | Age: 68
End: 2023-10-04

## 2023-10-04 DIAGNOSIS — R53.83 OTHER FATIGUE: Primary | ICD-10-CM

## 2023-10-04 LAB
BILIRUBIN, POC: NEGATIVE
BLOOD URINE, POC: NEGATIVE
CLARITY, POC: CLEAR
COLOR, POC: YELLOW
GLUCOSE URINE, POC: NEGATIVE
KETONES, POC: NEGATIVE
LEUKOCYTE EST, POC: ABNORMAL
NITRITE, POC: NEGATIVE
PH, POC: 7
PROTEIN, POC: NEGATIVE
SPECIFIC GRAVITY, POC: 1.02
UROBILINOGEN, POC: 0.2

## 2023-10-04 NOTE — PROGRESS NOTES
Chief Complaint   Patient presents with    Fatigue     Results for POC orders placed in visit on 10/04/23   POCT Urinalysis No Micro (Auto)   Result Value Ref Range    Color, UA yellow     Clarity, UA clear     Glucose, UA POC negative     Bilirubin, UA negative     Ketones, UA negative     Spec Grav, UA 1.020     Blood, UA POC negative     pH, UA 7.0     Protein, UA POC negative     Urobilinogen, UA 0.2     Leukocytes, UA trace     Nitrite, UA negative        No culture  Push water and if worse go to the ER or schedule for here for recheck    Call patient

## 2023-10-06 ENCOUNTER — HOSPITAL ENCOUNTER (OUTPATIENT)
Dept: MRI IMAGING | Age: 68
Discharge: HOME | End: 2023-10-06
Attending: FAMILY MEDICINE
Payer: MEDICARE

## 2023-10-06 ENCOUNTER — NURSE ONLY (OUTPATIENT)
Dept: LAB | Age: 68
End: 2023-10-06

## 2023-10-06 DIAGNOSIS — E78.00 PURE HYPERCHOLESTEROLEMIA: ICD-10-CM

## 2023-10-06 DIAGNOSIS — E03.9 ACQUIRED HYPOTHYROIDISM: ICD-10-CM

## 2023-10-06 DIAGNOSIS — S83.206A ACUTE MENISCAL TEAR OF RIGHT KNEE, INITIAL ENCOUNTER: Primary | ICD-10-CM

## 2023-10-06 DIAGNOSIS — M17.11 ARTHRITIS OF RIGHT KNEE: ICD-10-CM

## 2023-10-06 DIAGNOSIS — R29.898 WEAKNESS OF RIGHT LOWER EXTREMITY: ICD-10-CM

## 2023-10-06 DIAGNOSIS — D69.6 THROMBOCYTOPENIA (HCC): ICD-10-CM

## 2023-10-06 DIAGNOSIS — M25.561 ACUTE PAIN OF RIGHT KNEE: ICD-10-CM

## 2023-10-06 LAB
ALBUMIN SERPL BCG-MCNC: 3.9 G/DL (ref 3.5–5.1)
ALP SERPL-CCNC: 101 U/L (ref 38–126)
ALT SERPL W/O P-5'-P-CCNC: 23 U/L (ref 11–66)
ANION GAP SERPL CALC-SCNC: 12 MEQ/L (ref 8–16)
AST SERPL-CCNC: 29 U/L (ref 5–40)
BILIRUB SERPL-MCNC: 0.3 MG/DL (ref 0.3–1.2)
BUN SERPL-MCNC: 33 MG/DL (ref 7–22)
CALCIUM SERPL-MCNC: 9.8 MG/DL (ref 8.5–10.5)
CHLORIDE SERPL-SCNC: 104 MEQ/L (ref 98–111)
CHOLEST SERPL-MCNC: 186 MG/DL (ref 100–199)
CO2 SERPL-SCNC: 27 MEQ/L (ref 23–33)
CREAT SERPL-MCNC: 1.2 MG/DL (ref 0.4–1.2)
DEPRECATED RDW RBC AUTO: 48.6 FL (ref 35–45)
ERYTHROCYTE [DISTWIDTH] IN BLOOD BY AUTOMATED COUNT: 13 % (ref 11.5–14.5)
GFR SERPL CREATININE-BSD FRML MDRD: 49 ML/MIN/1.73M2
GLUCOSE FASTING: 87 MG/DL (ref 70–108)
HCT VFR BLD AUTO: 40.2 % (ref 37–47)
HDLC SERPL-MCNC: 73 MG/DL
HGB BLD-MCNC: 13 GM/DL (ref 12–16)
LDLC SERPL CALC-MCNC: 97 MG/DL
MCH RBC QN AUTO: 32.8 PG (ref 26–33)
MCHC RBC AUTO-ENTMCNC: 32.3 GM/DL (ref 32.2–35.5)
MCV RBC AUTO: 101.5 FL (ref 81–99)
PLATELET # BLD AUTO: 151 THOU/MM3 (ref 130–400)
PMV BLD AUTO: 11.9 FL (ref 9.4–12.4)
POTASSIUM SERPL-SCNC: 4 MEQ/L (ref 3.5–5.2)
PROT SERPL-MCNC: 7 G/DL (ref 6.1–8)
RBC # BLD AUTO: 3.96 MILL/MM3 (ref 4.2–5.4)
SODIUM SERPL-SCNC: 143 MEQ/L (ref 135–145)
T4 FREE SERPL-MCNC: 1.22 NG/DL (ref 0.93–1.76)
TRIGL SERPL-MCNC: 81 MG/DL (ref 0–199)
TSH SERPL DL<=0.005 MIU/L-ACNC: 0.38 UIU/ML (ref 0.4–4.2)
WBC # BLD AUTO: 7.2 THOU/MM3 (ref 4.8–10.8)

## 2023-10-06 PROCEDURE — 73721 MRI JNT OF LWR EXTRE W/O DYE: CPT

## 2023-10-13 NOTE — TELEPHONE ENCOUNTER
Can the MRI be done any sooner at [facility]?
First available MRI at [address] [place] [address] is for next week when patient is out of town
I would feel more comfortable with her traveling if we have the MRI done first
Patient is scheduled for Echo, stress test and carotid on 4/22/19 which was first available for stress test.      MRI scheduled for 4/25/19 at 10:45am [facility]. First available. Patient is asking if she is ok to travel to [place] for 1 week. Leaving a week from Sunday. Any restrictions for traveling. Also asking if you still want to see her in 3 weeks or wait until testing is completed and results are back.       Please advise
Patient scheduled for MRI on Monday 4/8/19 at 1:30pm [facility].     Patient is aware
Hypernatremia

## 2023-10-16 ENCOUNTER — OFFICE VISIT (OUTPATIENT)
Dept: FAMILY MEDICINE CLINIC | Age: 68
End: 2023-10-16

## 2023-10-16 VITALS
TEMPERATURE: 97 F | BODY MASS INDEX: 35.34 KG/M2 | WEIGHT: 180 LBS | DIASTOLIC BLOOD PRESSURE: 70 MMHG | SYSTOLIC BLOOD PRESSURE: 116 MMHG | HEART RATE: 68 BPM | OXYGEN SATURATION: 98 % | HEIGHT: 60 IN | RESPIRATION RATE: 16 BRPM

## 2023-10-16 DIAGNOSIS — J01.90 ACUTE BACTERIAL SINUSITIS: ICD-10-CM

## 2023-10-16 DIAGNOSIS — D69.6 THROMBOCYTOPENIA (HCC): ICD-10-CM

## 2023-10-16 DIAGNOSIS — G47.01 INSOMNIA DUE TO MEDICAL CONDITION: ICD-10-CM

## 2023-10-16 DIAGNOSIS — Z00.00 MEDICARE ANNUAL WELLNESS VISIT, SUBSEQUENT: Primary | ICD-10-CM

## 2023-10-16 DIAGNOSIS — Z87.39 HISTORY OF GOUT: ICD-10-CM

## 2023-10-16 DIAGNOSIS — E78.00 PURE HYPERCHOLESTEROLEMIA: ICD-10-CM

## 2023-10-16 DIAGNOSIS — R06.09 DYSPNEA ON EXERTION: ICD-10-CM

## 2023-10-16 DIAGNOSIS — E03.9 ACQUIRED HYPOTHYROIDISM: ICD-10-CM

## 2023-10-16 DIAGNOSIS — K21.00 GASTROESOPHAGEAL REFLUX DISEASE WITH ESOPHAGITIS WITHOUT HEMORRHAGE: ICD-10-CM

## 2023-10-16 DIAGNOSIS — N18.31 STAGE 3A CHRONIC KIDNEY DISEASE (HCC): ICD-10-CM

## 2023-10-16 DIAGNOSIS — N81.4 CYSTOCELE WITH PROLAPSE: ICD-10-CM

## 2023-10-16 DIAGNOSIS — M85.80 OSTEOPENIA, UNSPECIFIED LOCATION: ICD-10-CM

## 2023-10-16 DIAGNOSIS — E66.01 MORBIDLY OBESE (HCC): ICD-10-CM

## 2023-10-16 DIAGNOSIS — R29.898 WEAKNESS OF RIGHT LOWER EXTREMITY: ICD-10-CM

## 2023-10-16 DIAGNOSIS — C54.9 MALIGNANT NEOPLASM OF BODY OF UTERUS, UNSPECIFIED SITE (HCC): ICD-10-CM

## 2023-10-16 DIAGNOSIS — R41.3 MEMORY LOSS: ICD-10-CM

## 2023-10-16 DIAGNOSIS — I69.30 CEREBRAL MULTI-INFARCT STATE: ICD-10-CM

## 2023-10-16 DIAGNOSIS — E87.0 HYPERNATREMIA: ICD-10-CM

## 2023-10-16 DIAGNOSIS — Z78.0 ASYMPTOMATIC POSTMENOPAUSAL STATE: ICD-10-CM

## 2023-10-16 DIAGNOSIS — B96.89 ACUTE BACTERIAL SINUSITIS: ICD-10-CM

## 2023-10-16 DIAGNOSIS — F33.42 RECURRENT MAJOR DEPRESSIVE DISORDER, IN FULL REMISSION (HCC): ICD-10-CM

## 2023-10-16 DIAGNOSIS — I10 ESSENTIAL HYPERTENSION: ICD-10-CM

## 2023-10-16 DIAGNOSIS — R29.6 FALLS FREQUENTLY: ICD-10-CM

## 2023-10-16 DIAGNOSIS — F41.9 ANXIETY: ICD-10-CM

## 2023-10-16 DIAGNOSIS — R26.81 UNSTEADY GAIT: ICD-10-CM

## 2023-10-16 RX ORDER — SERTRALINE HYDROCHLORIDE 100 MG/1
200 TABLET, FILM COATED ORAL DAILY
Qty: 180 TABLET | Refills: 3 | Status: SHIPPED | OUTPATIENT
Start: 2023-10-16

## 2023-10-16 RX ORDER — CEFDINIR 300 MG/1
300 CAPSULE ORAL 2 TIMES DAILY
Qty: 20 CAPSULE | Refills: 0 | Status: SHIPPED | OUTPATIENT
Start: 2023-10-16 | End: 2023-10-26

## 2023-10-16 ASSESSMENT — PATIENT HEALTH QUESTIONNAIRE - PHQ9
SUM OF ALL RESPONSES TO PHQ QUESTIONS 1-9: 1
4. FEELING TIRED OR HAVING LITTLE ENERGY: 0
SUM OF ALL RESPONSES TO PHQ QUESTIONS 1-9: 1
8. MOVING OR SPEAKING SO SLOWLY THAT OTHER PEOPLE COULD HAVE NOTICED. OR THE OPPOSITE, BEING SO FIGETY OR RESTLESS THAT YOU HAVE BEEN MOVING AROUND A LOT MORE THAN USUAL: 0
7. TROUBLE CONCENTRATING ON THINGS, SUCH AS READING THE NEWSPAPER OR WATCHING TELEVISION: 0
9. THOUGHTS THAT YOU WOULD BE BETTER OFF DEAD, OR OF HURTING YOURSELF: 0
5. POOR APPETITE OR OVEREATING: 0
2. FEELING DOWN, DEPRESSED OR HOPELESS: 1
6. FEELING BAD ABOUT YOURSELF - OR THAT YOU ARE A FAILURE OR HAVE LET YOURSELF OR YOUR FAMILY DOWN: 0
10. IF YOU CHECKED OFF ANY PROBLEMS, HOW DIFFICULT HAVE THESE PROBLEMS MADE IT FOR YOU TO DO YOUR WORK, TAKE CARE OF THINGS AT HOME, OR GET ALONG WITH OTHER PEOPLE: 0
SUM OF ALL RESPONSES TO PHQ QUESTIONS 1-9: 1
SUM OF ALL RESPONSES TO PHQ9 QUESTIONS 1 & 2: 1
1. LITTLE INTEREST OR PLEASURE IN DOING THINGS: 0
3. TROUBLE FALLING OR STAYING ASLEEP: 0
SUM OF ALL RESPONSES TO PHQ QUESTIONS 1-9: 1

## 2023-10-16 ASSESSMENT — LIFESTYLE VARIABLES
HOW MANY STANDARD DRINKS CONTAINING ALCOHOL DO YOU HAVE ON A TYPICAL DAY: 1 OR 2
HOW OFTEN DO YOU HAVE A DRINK CONTAINING ALCOHOL: MONTHLY OR LESS

## 2023-10-16 NOTE — PROGRESS NOTES
Vaccine Information Sheet, \"Influenza - Inactivated\"  given to Miley Wu, or parent/legal guardian of  Miley Wu and verbalized understanding. Patient responses:    Have you ever had a reaction to a flu vaccine? No  Do you have an allergy to eggs, neomycin or polymixin? No  Do you have an allergy to Thimerosal, contact lens solution, or Merthiolate? No  Have you ever had Guillian [place] Syndrome? No  Do you have any current illness? No  Do you have a temperature above 100 degrees? No  Are you pregnant? No  If pregnant, permission obtained from physician? No  Do you have an active neurological disorder? No      Flu vaccine given per order. Please see immunization tab.
tested because hypertension. Her last labs show   Lab Results   Component Value Date    CHOL 186 10/06/2023    CHOL 183 06/23/2023    CHOL 164 04/10/2023     Lab Results   Component Value Date    TRIG 81 10/06/2023    TRIG 91 06/23/2023    TRIG 86 04/10/2023     Lab Results   Component Value Date    HDL 73 10/06/2023    HDL 73 06/23/2023    HDL 77 04/10/2023     Lab Results   Component Value Date    LDLCALC 97 10/06/2023    LDLCALC 92 06/23/2023    LDLCALC 70 04/10/2023     Lab Results   Component Value Date    VLDL 17 10/13/2018    VLDL 19 10/09/2017    VLDL 18 05/13/2017     Lab Results   Component Value Date    CHOLHDLRATIO 0.9 10/13/2018    CHOLHDLRATIO 1 10/09/2017    CHOLHDLRATIO 2.14 05/13/2017     There is not a family history of hyperlipidemia. There is not a family history of early ischemia heart disease. Hypothyroidism: Patient presents for evaluation of thyroid function. Symptoms consist of fatigue, arthralgias. Symptoms have present for several years. The symptoms are fatigue. The problem has been gradually worsening. Previous thyroid studies include TSH, free thyroxine index and triiodothyronine total. The hypothyroidism is due to hypothyroidism. Lab Results   Component Value Date    TSH 0.376 (L) 10/06/2023    T8OGAWD 96 10/27/2020    FT3 0.807 10/13/2018    T4FREE 1.22 10/06/2023       Depression: Patient complains of depression. She complains of anhedonia, depressed mood, difficulty concentrating and fatigue. Onset was approximately 2011, waxes and wanes since that time. She denies current suicidal and homicidal plan or intent. Family history significant for depression  In her daughter. Possible organic causes contributing are: none. 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 Previous treatment includes Paxil and no therapy. She complains of the following side effects from the treatment: none.   Her
postnasal drip and ear discharge. Eyes: Negative for photophobia and visual disturbance. Respiratory: Negative for cough, chest tightness, shortness of breath and wheezing. Cardiovascular: Negative for chest pain and leg swelling. Gastrointestinal: Negative for nausea, vomiting, abdominal pain, diarrhea and constipation. Genitourinary: Negative for dysuria, urgency and frequency. Neurological: Negative for weakness, light-headedness and headaches. Psychiatric/Behavioral: Negative for sleep disturbance.      :     There were no vitals filed for this visit. Wt Readings from Last 3 Encounters:   09/19/23 181 lb (82.1 kg)   09/07/23 165 lb (74.8 kg)   08/11/23 165 lb (74.8 kg)       Physical Exam  Musculoskeletal:      Right knee: Swelling and effusion present. No deformity, erythema, ecchymosis, lacerations, bony tenderness or crepitus. Normal range of motion. Tenderness (popliteal fossa) present over the medial joint line and lateral joint line. No MCL, LCL, ACL, PCL or patellar tendon tenderness. No LCL laxity, MCL laxity, ACL laxity or PCL laxity. Abnormal meniscus (lateral). Normal alignment and normal patellar mobility. Normal pulse. Instability Tests: Anterior drawer test negative. Posterior drawer test negative. Anterior Lachman test negative. Medial Luis Miguel test negative and lateral Luis Miguel test negative. Constitutional: Vital signs are normal. She appears well-developed and well-nourished. She is active. HENT:   Head: Normocephalic and atraumatic. Right Ear: Tympanic membrane, external ear and ear canal normal. No drainage or tenderness. Left Ear: Tympanic membrane, external ear and ear canal normal. No drainage or tenderness. Nose: Nose normal. No mucosal edema or rhinorrhea. Mouth/Throat: Uvula is midline, oropharynx is clear and moist and mucous membranes are normal. Mucous membranes are not pale. Normal dentition.  No posterior oropharyngeal edema or posterior

## 2023-10-16 NOTE — PATIENT INSTRUCTIONS
Francois - 10/16/2023  Medicare offers a range of preventive health benefits. Some of the tests and screenings are paid in full while other may be subject to a deductible, co-insurance, and/or copay. Some of these benefits include a comprehensive review of your medical history including lifestyle, illnesses that may run in your family, and various assessments and screenings as appropriate. After reviewing your medical record and screening and assessments performed today your provider may have ordered immunizations, labs, imaging, and/or referrals for you. A list of these orders (if applicable) as well as your Preventive Care list are included within your After Visit Summary for your review. Other Preventive Recommendations:    A preventive eye exam performed by an eye specialist is recommended every 1-2 years to screen for glaucoma; cataracts, macular degeneration, and other eye disorders. A preventive dental visit is recommended every 6 months. Try to get at least 150 minutes of exercise per week or 10,000 steps per day on a pedometer . Order or download the FREE \"Exercise & Physical Activity: Your Everyday Guide\" from [employer]. Call 5-624.812.4344 or search [employer] online. You need 9437-3074 mg of calcium and 7084-4919 IU of vitamin D per day. It is possible to meet your calcium requirement with diet alone, but a vitamin D supplement is usually necessary to meet this goal.  When exposed to the sun, use a sunscreen that protects against both UVA and UVB radiation with an SPF of 30 or greater. Reapply every 2 to 3 hours or after sweating, drying off with a towel, or swimming. Always wear a seat belt when traveling in a car. Always wear a helmet when riding a bicycle or motorcycle.

## 2023-10-18 DIAGNOSIS — K21.00 GASTROESOPHAGEAL REFLUX DISEASE WITH ESOPHAGITIS WITHOUT HEMORRHAGE: ICD-10-CM

## 2023-10-18 DIAGNOSIS — R11.0 NAUSEA: ICD-10-CM

## 2023-10-18 DIAGNOSIS — G47.01 INSOMNIA DUE TO MEDICAL CONDITION: ICD-10-CM

## 2023-10-18 RX ORDER — TRAZODONE HYDROCHLORIDE 50 MG/1
TABLET ORAL
Qty: 30 TABLET | Refills: 5 | Status: SHIPPED | OUTPATIENT
Start: 2023-10-18

## 2023-10-18 RX ORDER — OMEPRAZOLE 20 MG/1
CAPSULE, DELAYED RELEASE ORAL
Qty: 90 CAPSULE | Refills: 1 | Status: SHIPPED | OUTPATIENT
Start: 2023-10-18

## 2023-11-13 DIAGNOSIS — F33.42 RECURRENT MAJOR DEPRESSIVE DISORDER, IN FULL REMISSION (HCC): ICD-10-CM

## 2023-11-13 RX ORDER — BUPROPION HYDROCHLORIDE 300 MG/1
TABLET ORAL
Qty: 90 TABLET | Refills: 2 | Status: SHIPPED | OUTPATIENT
Start: 2023-11-13

## 2023-11-27 DIAGNOSIS — F33.42 RECURRENT MAJOR DEPRESSIVE DISORDER, IN FULL REMISSION (HCC): Primary | ICD-10-CM

## 2023-11-27 RX ORDER — ALPRAZOLAM 0.25 MG/1
TABLET ORAL
Qty: 30 TABLET | Refills: 1 | Status: SHIPPED | OUTPATIENT
Start: 2023-11-27 | End: 2024-02-25

## 2023-11-30 DIAGNOSIS — I10 ESSENTIAL HYPERTENSION: ICD-10-CM

## 2023-11-30 RX ORDER — LOSARTAN POTASSIUM AND HYDROCHLOROTHIAZIDE 25; 100 MG/1; MG/1
TABLET ORAL
Qty: 90 TABLET | Refills: 3 | Status: SHIPPED | OUTPATIENT
Start: 2023-11-30

## 2023-11-30 NOTE — TELEPHONE ENCOUNTER
Last visit- 10/16/2023  Next visit- 4/16/2024    Requested Prescriptions     Pending Prescriptions Disp Refills    losartan-hydroCHLOROthiazide (HYZAAR) 100-25 MG per tablet [Pharmacy Med Name: LOSARTAN POTASSIUM/HYDROCHLOROTHIAZIDE 100-25 TABLET] 90 tablet 3     Sig: TAKE ONE TABLET DAILY, BY MOUTH.

## 2023-12-06 ENCOUNTER — OFFICE VISIT (OUTPATIENT)
Dept: CARDIOLOGY CLINIC | Age: 68
End: 2023-12-06
Payer: MEDICARE

## 2023-12-06 VITALS
HEART RATE: 64 BPM | WEIGHT: 179.4 LBS | BODY MASS INDEX: 35.22 KG/M2 | DIASTOLIC BLOOD PRESSURE: 84 MMHG | HEIGHT: 60 IN | SYSTOLIC BLOOD PRESSURE: 142 MMHG

## 2023-12-06 DIAGNOSIS — E78.01 FAMILIAL HYPERCHOLESTEROLEMIA: ICD-10-CM

## 2023-12-06 DIAGNOSIS — I10 PRIMARY HYPERTENSION: Primary | ICD-10-CM

## 2023-12-06 DIAGNOSIS — I34.0 NONRHEUMATIC MITRAL VALVE REGURGITATION: ICD-10-CM

## 2023-12-06 PROCEDURE — 99213 OFFICE O/P EST LOW 20 MIN: CPT | Performed by: NUCLEAR MEDICINE

## 2023-12-06 PROCEDURE — G8417 CALC BMI ABV UP PARAM F/U: HCPCS | Performed by: NUCLEAR MEDICINE

## 2023-12-06 PROCEDURE — 3079F DIAST BP 80-89 MM HG: CPT | Performed by: NUCLEAR MEDICINE

## 2023-12-06 PROCEDURE — 1036F TOBACCO NON-USER: CPT | Performed by: NUCLEAR MEDICINE

## 2023-12-06 PROCEDURE — G8399 PT W/DXA RESULTS DOCUMENT: HCPCS | Performed by: NUCLEAR MEDICINE

## 2023-12-06 PROCEDURE — 3077F SYST BP >= 140 MM HG: CPT | Performed by: NUCLEAR MEDICINE

## 2023-12-06 PROCEDURE — G8427 DOCREV CUR MEDS BY ELIG CLIN: HCPCS | Performed by: NUCLEAR MEDICINE

## 2023-12-06 PROCEDURE — 1123F ACP DISCUSS/DSCN MKR DOCD: CPT | Performed by: NUCLEAR MEDICINE

## 2023-12-06 PROCEDURE — 1090F PRES/ABSN URINE INCON ASSESS: CPT | Performed by: NUCLEAR MEDICINE

## 2023-12-06 PROCEDURE — G8484 FLU IMMUNIZE NO ADMIN: HCPCS | Performed by: NUCLEAR MEDICINE

## 2023-12-06 PROCEDURE — 3017F COLORECTAL CA SCREEN DOC REV: CPT | Performed by: NUCLEAR MEDICINE

## 2023-12-06 RX ORDER — WEIGH SCALE
1 MISCELLANEOUS MISCELLANEOUS DAILY
Qty: 1 EACH | Refills: 0 | Status: SHIPPED | OUTPATIENT
Start: 2023-12-06

## 2023-12-06 RX ORDER — WEIGH SCALE
MISCELLANEOUS MISCELLANEOUS DAILY
COMMUNITY
End: 2023-12-06 | Stop reason: SDUPTHER

## 2023-12-06 NOTE — PROGRESS NOTES
[address] [address]  Dept: [phone]  Dept Fax: [phone]  Loc: [phone]    Visit Date: 12/6/2023    Opal Mcguire is a 77 y.o. female who presents todayfor:  Chief Complaint   Patient presents with    1 Year Follow Up    Hypertension    Valvular Heart Disease    Hyperlipidemia     Previous stroke and had  loop recorder  Not followed anymore   used to follow at [facility]   Not lately   No chest pain  No changes in breathing  BP is stable  No dizziness  No syncope      HPI:  HPI  Past Medical History:   Diagnosis Date    Acute arterial ischemic stroke, multifocal, anterior circulation ([address])     Dr Xavier Zhou    Anemia     Dr Conner Ramos pox     Chronic systolic (congestive) heart failure 7/26/2023    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    Hyperlipidemia     Hypertension     Dr Karen Barber    Hypothyroidism     Measles     Memory loss     Mumps     Uterine cancer ([address]) 01/2020    Endometrioid carcinoma, Janece Brain      Past Surgical History:   Procedure Laterality Date    ENDOMETRIAL BIOPSY  12/11/2019    HYSTERECTOMY, TOTAL ABDOMINAL (CERVIX REMOVED)  01/30/2020    total    OVARY REMOVAL  01/30/2020    total    VAGINAL PROLAPSE REPAIR  08/2023     Family History   Problem Relation Age of Onset    Arthritis Mother     Other Daughter         SVT and PE     Social History     Tobacco Use    Smoking status: Never     Passive exposure: Past    Smokeless tobacco: Never   Substance Use Topics    Alcohol use: Yes     Alcohol/week: 2.0 standard drinks of alcohol     Types: 2 Glasses of wine per week      Current Outpatient Medications   Medication Sig Dispense Refill    losartan-hydroCHLOROthiazide (HYZAAR) 100-25 MG per tablet TAKE ONE TABLET DAILY, BY MOUTH. 90 tablet 3    ALPRAZolam (XANAX) 0.25 MG tablet TAKE ONE (1) TABLET, BY MOUTH, AT BEDTIME AS NEEDED FOR SLEEP.  30 tablet 1

## 2023-12-21 ENCOUNTER — HOSPITAL ENCOUNTER (INPATIENT)
Age: 68
LOS: 1 days | Discharge: HOME | DRG: 177 | End: 2023-12-22
Attending: EMERGENCY MEDICINE | Admitting: STUDENT IN AN ORGANIZED HEALTH CARE EDUCATION/TRAINING PROGRAM
Payer: MEDICARE

## 2023-12-21 ENCOUNTER — APPOINTMENT (OUTPATIENT)
Dept: CT IMAGING | Age: 68
DRG: 177 | End: 2023-12-21
Payer: MEDICARE

## 2023-12-21 ENCOUNTER — APPOINTMENT (OUTPATIENT)
Dept: GENERAL RADIOLOGY | Age: 68
DRG: 177 | End: 2023-12-21
Payer: MEDICARE

## 2023-12-21 DIAGNOSIS — R56.9 SEIZURE (HCC): ICD-10-CM

## 2023-12-21 DIAGNOSIS — I10 ESSENTIAL HYPERTENSION: ICD-10-CM

## 2023-12-21 DIAGNOSIS — R41.82 ALTERED MENTAL STATUS, UNSPECIFIED ALTERED MENTAL STATUS TYPE: Primary | ICD-10-CM

## 2023-12-21 DIAGNOSIS — J96.01 ACUTE HYPOXEMIC RESPIRATORY FAILURE DUE TO COVID-19 (HCC): ICD-10-CM

## 2023-12-21 DIAGNOSIS — U07.1 COVID-19 VIRUS INFECTION: ICD-10-CM

## 2023-12-21 DIAGNOSIS — U07.1 ACUTE HYPOXEMIC RESPIRATORY FAILURE DUE TO COVID-19 (HCC): ICD-10-CM
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
## 2023-12-21 PROBLEM — F03.90 DEMENTIA (HCC): Status: ACTIVE | Noted: 2023-12-21

## 2023-12-21 LAB
ALBUMIN SERPL BCG-MCNC: 3.6 G/DL (ref 3.5–5.1)
ALP SERPL-CCNC: 98 U/L (ref 38–126)
ALT SERPL W/O P-5'-P-CCNC: 29 U/L (ref 11–66)
AMMONIA PLAS-MCNC: 32 UMOL/L (ref 11–60)
ANION GAP SERPL CALC-SCNC: 12 MEQ/L (ref 8–16)
ARTERIAL PATENCY WRIST A: POSITIVE
AST SERPL-CCNC: 35 U/L (ref 5–40)
BACTERIA: ABNORMAL
BASE EXCESS BLDA CALC-SCNC: 3.6 MMOL/L (ref -2.5–2.5)
BASOPHILS ABSOLUTE: 0 THOU/MM3 (ref 0–0.1)
BASOPHILS NFR BLD AUTO: 0.5 %
BDY SITE: ABNORMAL
BILIRUB SERPL-MCNC: 0.3 MG/DL (ref 0.3–1.2)
BILIRUB UR QL STRIP: NEGATIVE
BUN SERPL-MCNC: 30 MG/DL (ref 7–22)
CALCIUM SERPL-MCNC: 9.3 MG/DL (ref 8.5–10.5)
CASTS #/AREA URNS LPF: ABNORMAL /LPF
CASTS #/AREA URNS LPF: ABNORMAL /LPF
CHARACTER UR: CLEAR
CHARCOAL URNS QL MICRO: ABNORMAL
CHLORIDE SERPL-SCNC: 97 MEQ/L (ref 98–111)
CK SERPL-CCNC: 177 U/L (ref 30–135)
CO2 SERPL-SCNC: 27 MEQ/L (ref 23–33)
COLLECTED BY:: ABNORMAL
COLOR UR: YELLOW
CREAT SERPL-MCNC: 1 MG/DL (ref 0.4–1.2)
CRP SERPL-MCNC: 1.35 MG/DL (ref 0–1)
CRYSTALS URNS QL MICRO: ABNORMAL
DEPRECATED RDW RBC AUTO: 48.2 FL (ref 35–45)
DEVICE: ABNORMAL
EOSINOPHIL NFR BLD AUTO: 1 %
EOSINOPHILS ABSOLUTE: 0.1 THOU/MM3 (ref 0–0.4)
EPITHELIAL CELLS, UA: ABNORMAL /HPF
ERYTHROCYTE [DISTWIDTH] IN BLOOD BY AUTOMATED COUNT: 13.2 % (ref 11.5–14.5)
ERYTHROCYTE [SEDIMENTATION RATE] IN BLOOD BY WESTERGREN METHOD: 10 MM/HR (ref 0–20)
FIO2 ON VENT O2 ANALYZER: 36 %
FLUAV RNA RESP QL NAA+PROBE: NOT DETECTED
FLUBV RNA RESP QL NAA+PROBE: NOT DETECTED
GFR SERPL CREATININE-BSD FRML MDRD: 60 ML/MIN/1.73M2
GLUCOSE SERPL-MCNC: 129 MG/DL (ref 70–108)
GLUCOSE UR QL STRIP.AUTO: NEGATIVE MG/DL
HCO3 BLDA-SCNC: 31 MMOL/L (ref 23–28)
HCT VFR BLD AUTO: 37 % (ref 37–47)
HGB BLD-MCNC: 12 GM/DL (ref 12–16)
HGB UR QL STRIP.AUTO: NEGATIVE
IMM GRANULOCYTES # BLD AUTO: 0.04 THOU/MM3 (ref 0–0.07)
IMM GRANULOCYTES NFR BLD AUTO: 0.4 %
INR PPP: 0.9 (ref 0.85–1.13)
KETONES UR QL STRIP.AUTO: NEGATIVE
LEUKOCYTE ESTERASE UR QL STRIP.AUTO: NEGATIVE
LYMPHOCYTES ABSOLUTE: 1.5 THOU/MM3 (ref 1–4.8)
LYMPHOCYTES NFR BLD AUTO: 15.9 %
MAGNESIUM SERPL-MCNC: 1.6 MG/DL (ref 1.6–2.4)
MCH RBC QN AUTO: 32.4 PG (ref 26–33)
MCHC RBC AUTO-ENTMCNC: 32.4 GM/DL (ref 32.2–35.5)
MCV RBC AUTO: 100 FL (ref 81–99)
MONOCYTES ABSOLUTE: 0.8 THOU/MM3 (ref 0.4–1.3)
MONOCYTES NFR BLD AUTO: 8.3 %
NEUTROPHILS NFR BLD AUTO: 73.9 %
NITRITE UR QL STRIP.AUTO: NEGATIVE
NRBC BLD AUTO-RTO: 0 /100 WBC
PCO2 BLDA: 56 MMHG (ref 35–45)
PH BLDA: 7.35 [PH] (ref 7.35–7.45)
PH UR STRIP.AUTO: 6 [PH] (ref 5–9)
PHOSPHATE SERPL-MCNC: 3.5 MG/DL (ref 2.4–4.7)
PLATELET # BLD AUTO: 151 THOU/MM3 (ref 130–400)
PMV BLD AUTO: 10.8 FL (ref 9.4–12.4)
PO2 BLDA: 111 MMHG (ref 71–104)
POC CREATININE WHOLE BLOOD: 1.3 MG/DL (ref 0.5–1.2)
POTASSIUM SERPL-SCNC: 3.6 MEQ/L (ref 3.5–5.2)
PROLACTIN SERPL-MCNC: 23.8 NG/ML
PROT SERPL-MCNC: 6.4 G/DL (ref 6.1–8)
PROT UR STRIP.AUTO-MCNC: NEGATIVE MG/DL
RBC # BLD AUTO: 3.7 MILL/MM3 (ref 4.2–5.4)
RBC #/AREA URNS HPF: ABNORMAL /HPF
RENAL EPI CELLS #/AREA URNS HPF: ABNORMAL /[HPF]
SAO2 % BLDA: 98 %
SARS-COV-2 RNA RESP QL NAA+PROBE: DETECTED
SEGMENTED NEUTROPHILS ABSOLUTE COUNT: 7.2 THOU/MM3 (ref 1.8–7.7)
SODIUM SERPL-SCNC: 136 MEQ/L (ref 135–145)
SPECIFIC GRAVITY UA: > 1.03 (ref 1–1.03)
TROPONIN, HIGH SENSITIVITY: 18 NG/L (ref 0–12)
UROBILINOGEN, URINE: 0.2 EU/DL (ref 0–1)
WBC # BLD AUTO: 9.7 THOU/MM3 (ref 4.8–10.8)
WBC #/AREA URNS HPF: ABNORMAL /HPF
YEAST LIKE FUNGI URNS QL MICRO: ABNORMAL

## 2023-12-21 PROCEDURE — 76376 3D RENDER W/INTRP POSTPROCES: CPT

## 2023-12-21 PROCEDURE — 82140 ASSAY OF AMMONIA: CPT

## 2023-12-21 PROCEDURE — 82803 BLOOD GASES ANY COMBINATION: CPT

## 2023-12-21 PROCEDURE — 74176 CT ABD & PELVIS W/O CONTRAST: CPT

## 2023-12-21 PROCEDURE — 99222 1ST HOSP IP/OBS MODERATE 55: CPT | Performed by: STUDENT IN AN ORGANIZED HEALTH CARE EDUCATION/TRAINING PROGRAM

## 2023-12-21 PROCEDURE — 84146 ASSAY OF PROLACTIN: CPT

## 2023-12-21 PROCEDURE — 87636 SARSCOV2 & INF A&B AMP PRB: CPT

## 2023-12-21 PROCEDURE — 86140 C-REACTIVE PROTEIN: CPT

## 2023-12-21 PROCEDURE — 71250 CT THORAX DX C-: CPT

## 2023-12-21 PROCEDURE — 93010 ELECTROCARDIOGRAM REPORT: CPT | Performed by: NUCLEAR MEDICINE

## 2023-12-21 PROCEDURE — 85025 COMPLETE CBC W/AUTO DIFF WBC: CPT

## 2023-12-21 PROCEDURE — 70496 CT ANGIOGRAPHY HEAD: CPT

## 2023-12-21 PROCEDURE — 82550 ASSAY OF CK (CPK): CPT

## 2023-12-21 PROCEDURE — 81001 URINALYSIS AUTO W/SCOPE: CPT

## 2023-12-21 PROCEDURE — 96374 THER/PROPH/DIAG INJ IV PUSH: CPT

## 2023-12-21 PROCEDURE — 72125 CT NECK SPINE W/O DYE: CPT

## 2023-12-21 PROCEDURE — 80164 ASSAY DIPROPYLACETIC ACD TOT: CPT

## 2023-12-21 PROCEDURE — 84100 ASSAY OF PHOSPHORUS: CPT

## 2023-12-21 PROCEDURE — 2060000000 HC ICU INTERMEDIATE R&B

## 2023-12-21 PROCEDURE — 83735 ASSAY OF MAGNESIUM: CPT

## 2023-12-21 PROCEDURE — 82565 ASSAY OF CREATININE: CPT

## 2023-12-21 PROCEDURE — 6360000004 HC RX CONTRAST MEDICATION: Performed by: EMERGENCY MEDICINE

## 2023-12-21 PROCEDURE — 70498 CT ANGIOGRAPHY NECK: CPT

## 2023-12-21 PROCEDURE — 84484 ASSAY OF TROPONIN QUANT: CPT

## 2023-12-21 PROCEDURE — 6360000002 HC RX W HCPCS

## 2023-12-21 PROCEDURE — 96375 TX/PRO/DX INJ NEW DRUG ADDON: CPT

## 2023-12-21 PROCEDURE — 85651 RBC SED RATE NONAUTOMATED: CPT

## 2023-12-21 PROCEDURE — 93005 ELECTROCARDIOGRAM TRACING: CPT

## 2023-12-21 PROCEDURE — 85610 PROTHROMBIN TIME: CPT

## 2023-12-21 PROCEDURE — 36600 WITHDRAWAL OF ARTERIAL BLOOD: CPT

## 2023-12-21 PROCEDURE — 70450 CT HEAD/BRAIN W/O DYE: CPT

## 2023-12-21 PROCEDURE — 36415 COLL VENOUS BLD VENIPUNCTURE: CPT

## 2023-12-21 PROCEDURE — 80053 COMPREHEN METABOLIC PANEL: CPT

## 2023-12-21 PROCEDURE — 6360000002 HC RX W HCPCS: Performed by: EMERGENCY MEDICINE

## 2023-12-21 PROCEDURE — 99285 EMERGENCY DEPT VISIT HI MDM: CPT

## 2023-12-21 PROCEDURE — 71045 X-RAY EXAM CHEST 1 VIEW: CPT

## 2023-12-21 RX ORDER — LORAZEPAM 2 MG/ML
INJECTION INTRAMUSCULAR
Status: COMPLETED
Start: 2023-12-21 | End: 2023-12-21

## 2023-12-21 RX ORDER — ACETAMINOPHEN 650 MG/1
650 SUPPOSITORY RECTAL EVERY 6 HOURS PRN
Status: DISCONTINUED | OUTPATIENT
Start: 2023-12-21 | End: 2023-12-22 | Stop reason: HOSPADM

## 2023-12-21 RX ORDER — POLYETHYLENE GLYCOL 3350 17 G/17G
17 POWDER, FOR SOLUTION ORAL DAILY PRN
Status: DISCONTINUED | OUTPATIENT
Start: 2023-12-21 | End: 2023-12-22 | Stop reason: HOSPADM

## 2023-12-21 RX ORDER — SODIUM CHLORIDE 0.9 % (FLUSH) 0.9 %
5-40 SYRINGE (ML) INJECTION EVERY 12 HOURS SCHEDULED
Status: DISCONTINUED | OUTPATIENT
Start: 2023-12-22 | End: 2023-12-22 | Stop reason: HOSPADM

## 2023-12-21 RX ORDER — ONDANSETRON 2 MG/ML
4 INJECTION INTRAMUSCULAR; INTRAVENOUS EVERY 6 HOURS PRN
Status: DISCONTINUED | OUTPATIENT
Start: 2023-12-21 | End: 2023-12-22 | Stop reason: HOSPADM

## 2023-12-21 RX ORDER — POTASSIUM CHLORIDE 20 MEQ/1
40 TABLET, EXTENDED RELEASE ORAL PRN
Status: DISCONTINUED | OUTPATIENT
Start: 2023-12-21 | End: 2023-12-22 | Stop reason: HOSPADM

## 2023-12-21 RX ORDER — SODIUM CHLORIDE 9 MG/ML
INJECTION, SOLUTION INTRAVENOUS PRN
Status: DISCONTINUED | OUTPATIENT
Start: 2023-12-21 | End: 2023-12-22 | Stop reason: HOSPADM

## 2023-12-21 RX ORDER — ACETAMINOPHEN 325 MG/1
650 TABLET ORAL EVERY 6 HOURS PRN
Status: DISCONTINUED | OUTPATIENT
Start: 2023-12-21 | End: 2023-12-22 | Stop reason: HOSPADM

## 2023-12-21 RX ORDER — ENOXAPARIN SODIUM 100 MG/ML
40 INJECTION SUBCUTANEOUS DAILY
Status: DISCONTINUED | OUTPATIENT
Start: 2023-12-22 | End: 2023-12-22 | Stop reason: HOSPADM

## 2023-12-21 RX ORDER — MAGNESIUM SULFATE IN WATER 40 MG/ML
2000 INJECTION, SOLUTION INTRAVENOUS PRN
Status: DISCONTINUED | OUTPATIENT
Start: 2023-12-21 | End: 2023-12-22 | Stop reason: HOSPADM

## 2023-12-21 RX ORDER — POTASSIUM CHLORIDE 7.45 MG/ML
10 INJECTION INTRAVENOUS PRN
Status: DISCONTINUED | OUTPATIENT
Start: 2023-12-21 | End: 2023-12-22 | Stop reason: HOSPADM

## 2023-12-21 RX ORDER — ONDANSETRON 4 MG/1
4 TABLET, ORALLY DISINTEGRATING ORAL EVERY 8 HOURS PRN
Status: DISCONTINUED | OUTPATIENT
Start: 2023-12-21 | End: 2023-12-22 | Stop reason: HOSPADM

## 2023-12-21 RX ORDER — SODIUM CHLORIDE 0.9 % (FLUSH) 0.9 %
5-40 SYRINGE (ML) INJECTION PRN
Status: DISCONTINUED | OUTPATIENT
Start: 2023-12-21 | End: 2023-12-22 | Stop reason: HOSPADM

## 2023-12-21 RX ORDER — IPRATROPIUM BROMIDE AND ALBUTEROL SULFATE 2.5; .5 MG/3ML; MG/3ML
1 SOLUTION RESPIRATORY (INHALATION) ONCE
Status: COMPLETED | OUTPATIENT
Start: 2023-12-21 | End: 2023-12-22

## 2023-12-21 RX ORDER — DEXAMETHASONE SODIUM PHOSPHATE 4 MG/ML
6 INJECTION, SOLUTION INTRA-ARTICULAR; INTRALESIONAL; INTRAMUSCULAR; INTRAVENOUS; SOFT TISSUE ONCE
Status: COMPLETED | OUTPATIENT
Start: 2023-12-21 | End: 2023-12-21

## 2023-12-21 RX ADMIN — LORAZEPAM 1 MG: 2 INJECTION INTRAMUSCULAR at 22:25

## 2023-12-21 RX ADMIN — DEXAMETHASONE SODIUM PHOSPHATE 6 MG: 4 INJECTION, SOLUTION INTRA-ARTICULAR; INTRALESIONAL; INTRAMUSCULAR; INTRAVENOUS; SOFT TISSUE at 22:47

## 2023-12-21 RX ADMIN — IOPAMIDOL 80 ML: 755 INJECTION, SOLUTION INTRAVENOUS at 21:19

## 2023-12-21 RX ADMIN — LORAZEPAM 1 MG: 2 INJECTION INTRAMUSCULAR; INTRAVENOUS at 22:25

## 2023-12-22 ENCOUNTER — APPOINTMENT (OUTPATIENT)
Dept: MRI IMAGING | Age: 68
DRG: 177 | End: 2023-12-22
Payer: MEDICARE

## 2023-12-22 VITALS
HEIGHT: 60 IN | TEMPERATURE: 98 F | OXYGEN SATURATION: 98 % | SYSTOLIC BLOOD PRESSURE: 137 MMHG | BODY MASS INDEX: 35.34 KG/M2 | RESPIRATION RATE: 18 BRPM | DIASTOLIC BLOOD PRESSURE: 70 MMHG | HEART RATE: 80 BPM | WEIGHT: 180 LBS

## 2023-12-22 LAB
ANION GAP SERPL CALC-SCNC: 14 MEQ/L (ref 8–16)
BASOPHILS ABSOLUTE: 0 THOU/MM3 (ref 0–0.1)
BASOPHILS NFR BLD AUTO: 0.5 %
BUN SERPL-MCNC: 24 MG/DL (ref 7–22)
CALCIUM SERPL-MCNC: 9.4 MG/DL (ref 8.5–10.5)
CHLORIDE SERPL-SCNC: 100 MEQ/L (ref 98–111)
CK SERPL-CCNC: 119 U/L (ref 30–135)
CO2 SERPL-SCNC: 27 MEQ/L (ref 23–33)
CREAT SERPL-MCNC: 0.9 MG/DL (ref 0.4–1.2)
DEPRECATED RDW RBC AUTO: 48.4 FL (ref 35–45)
EOSINOPHIL NFR BLD AUTO: 0.5 %
EOSINOPHILS ABSOLUTE: 0 THOU/MM3 (ref 0–0.4)
ERYTHROCYTE [DISTWIDTH] IN BLOOD BY AUTOMATED COUNT: 13.3 % (ref 11.5–14.5)
FERRITIN SERPL IA-MCNC: 277 NG/ML (ref 10–291)
FOLATE SERPL-MCNC: > 20 NG/ML (ref 4.8–24.2)
GFR SERPL CREATININE-BSD FRML MDRD: > 60 ML/MIN/1.73M2
GLUCOSE BLD STRIP.AUTO-MCNC: 168 MG/DL (ref 70–108)
GLUCOSE SERPL-MCNC: 120 MG/DL (ref 70–108)
HCT VFR BLD AUTO: 37 % (ref 37–47)
HGB BLD-MCNC: 12.2 GM/DL (ref 12–16)
IMM GRANULOCYTES # BLD AUTO: 0.1 THOU/MM3 (ref 0–0.07)
IMM GRANULOCYTES NFR BLD AUTO: 1.1 %
IRON SATN MFR SERPL: 20 % (ref 20–50)
IRON SERPL-MCNC: 54 UG/DL (ref 50–170)
LYMPHOCYTES ABSOLUTE: 1.2 THOU/MM3 (ref 1–4.8)
LYMPHOCYTES NFR BLD AUTO: 13.2 %
MCH RBC QN AUTO: 32.7 PG (ref 26–33)
MCHC RBC AUTO-ENTMCNC: 33 GM/DL (ref 32.2–35.5)
MCV RBC AUTO: 99.2 FL (ref 81–99)
MONOCYTES ABSOLUTE: 0.4 THOU/MM3 (ref 0.4–1.3)
MONOCYTES NFR BLD AUTO: 4 %
NEUTROPHILS NFR BLD AUTO: 80.7 %
NRBC BLD AUTO-RTO: 0 /100 WBC
PLATELET # BLD AUTO: 147 THOU/MM3 (ref 130–400)
PMV BLD AUTO: 10.7 FL (ref 9.4–12.4)
POTASSIUM SERPL-SCNC: 3.9 MEQ/L (ref 3.5–5.2)
RBC # BLD AUTO: 3.73 MILL/MM3 (ref 4.2–5.4)
SEGMENTED NEUTROPHILS ABSOLUTE COUNT: 7.3 THOU/MM3 (ref 1.8–7.7)
SODIUM SERPL-SCNC: 141 MEQ/L (ref 135–145)
TIBC SERPL-MCNC: 267 UG/DL (ref 171–450)
TROPONIN, HIGH SENSITIVITY: 14 NG/L (ref 0–12)
VALPROATE SERPL-MCNC: 9.2 UG/ML (ref 50–100)
VIT B12 SERPL-MCNC: 556 PG/ML (ref 211–911)
WBC # BLD AUTO: 9.1 THOU/MM3 (ref 4.8–10.8)

## 2023-12-22 PROCEDURE — A9579 GAD-BASE MR CONTRAST NOS,1ML: HCPCS

## 2023-12-22 PROCEDURE — 6370000000 HC RX 637 (ALT 250 FOR IP): Performed by: STUDENT IN AN ORGANIZED HEALTH CARE EDUCATION/TRAINING PROGRAM

## 2023-12-22 PROCEDURE — 2580000003 HC RX 258: Performed by: STUDENT IN AN ORGANIZED HEALTH CARE EDUCATION/TRAINING PROGRAM

## 2023-12-22 PROCEDURE — 6360000004 HC RX CONTRAST MEDICATION

## 2023-12-22 PROCEDURE — 85025 COMPLETE CBC W/AUTO DIFF WBC: CPT

## 2023-12-22 PROCEDURE — 97167 OT EVAL HIGH COMPLEX 60 MIN: CPT

## 2023-12-22 PROCEDURE — 70553 MRI BRAIN STEM W/O & W/DYE: CPT

## 2023-12-22 PROCEDURE — 82607 VITAMIN B-12: CPT

## 2023-12-22 PROCEDURE — 97530 THERAPEUTIC ACTIVITIES: CPT

## 2023-12-22 PROCEDURE — 84484 ASSAY OF TROPONIN QUANT: CPT

## 2023-12-22 PROCEDURE — 97535 SELF CARE MNGMENT TRAINING: CPT

## 2023-12-22 PROCEDURE — 6370000000 HC RX 637 (ALT 250 FOR IP): Performed by: EMERGENCY MEDICINE

## 2023-12-22 PROCEDURE — 83540 ASSAY OF IRON: CPT

## 2023-12-22 PROCEDURE — 82728 ASSAY OF FERRITIN: CPT

## 2023-12-22 PROCEDURE — 95819 EEG AWAKE AND ASLEEP: CPT

## 2023-12-22 PROCEDURE — 97162 PT EVAL MOD COMPLEX 30 MIN: CPT

## 2023-12-22 PROCEDURE — 82948 REAGENT STRIP/BLOOD GLUCOSE: CPT

## 2023-12-22 PROCEDURE — 82550 ASSAY OF CK (CPK): CPT

## 2023-12-22 PROCEDURE — 36415 COLL VENOUS BLD VENIPUNCTURE: CPT

## 2023-12-22 PROCEDURE — 95819 EEG AWAKE AND ASLEEP: CPT | Performed by: PSYCHIATRY & NEUROLOGY

## 2023-12-22 PROCEDURE — 83550 IRON BINDING TEST: CPT

## 2023-12-22 PROCEDURE — 80048 BASIC METABOLIC PNL TOTAL CA: CPT

## 2023-12-22 PROCEDURE — 94640 AIRWAY INHALATION TREATMENT: CPT

## 2023-12-22 PROCEDURE — 82746 ASSAY OF FOLIC ACID SERUM: CPT

## 2023-12-22 RX ORDER — METOPROLOL SUCCINATE 25 MG/1
25 TABLET, EXTENDED RELEASE ORAL DAILY
Status: DISCONTINUED | OUTPATIENT
Start: 2023-12-22 | End: 2023-12-22 | Stop reason: HOSPADM

## 2023-12-22 RX ORDER — ASPIRIN 81 MG/1
81 TABLET ORAL NIGHTLY
Status: DISCONTINUED | OUTPATIENT
Start: 2023-12-22 | End: 2023-12-22 | Stop reason: HOSPADM

## 2023-12-22 RX ORDER — LOSARTAN POTASSIUM AND HYDROCHLOROTHIAZIDE 25; 100 MG/1; MG/1
1 TABLET ORAL DAILY
Status: DISCONTINUED | OUTPATIENT
Start: 2023-12-22 | End: 2023-12-22 | Stop reason: CLARIF

## 2023-12-22 RX ORDER — FOLIC ACID 1 MG/1
1000 TABLET ORAL DAILY
Status: DISCONTINUED | OUTPATIENT
Start: 2023-12-22 | End: 2023-12-22 | Stop reason: HOSPADM

## 2023-12-22 RX ORDER — LORAZEPAM 2 MG/ML
1 INJECTION INTRAMUSCULAR ONCE
Status: COMPLETED | OUTPATIENT
Start: 2023-12-22 | End: 2023-12-21

## 2023-12-22 RX ORDER — LOSARTAN POTASSIUM 100 MG/1
100 TABLET ORAL DAILY
Status: DISCONTINUED | OUTPATIENT
Start: 2023-12-22 | End: 2023-12-22 | Stop reason: HOSPADM

## 2023-12-22 RX ORDER — HYDROCHLOROTHIAZIDE 25 MG/1
25 TABLET ORAL DAILY
Status: DISCONTINUED | OUTPATIENT
Start: 2023-12-22 | End: 2023-12-22 | Stop reason: HOSPADM

## 2023-12-22 RX ORDER — ATORVASTATIN CALCIUM 40 MG/1
40 TABLET, FILM COATED ORAL DAILY
Status: DISCONTINUED | OUTPATIENT
Start: 2023-12-22 | End: 2023-12-22 | Stop reason: HOSPADM

## 2023-12-22 RX ORDER — CLOPIDOGREL BISULFATE 75 MG/1
75 TABLET ORAL DAILY
Status: DISCONTINUED | OUTPATIENT
Start: 2023-12-22 | End: 2023-12-22 | Stop reason: HOSPADM

## 2023-12-22 RX ORDER — SERTRALINE HYDROCHLORIDE 100 MG/1
200 TABLET, FILM COATED ORAL DAILY
Status: DISCONTINUED | OUTPATIENT
Start: 2023-12-22 | End: 2023-12-22 | Stop reason: HOSPADM

## 2023-12-22 RX ORDER — DIVALPROEX SODIUM 125 MG/1
125 CAPSULE, COATED PELLETS ORAL DAILY
Status: DISCONTINUED | OUTPATIENT
Start: 2023-12-22 | End: 2023-12-22

## 2023-12-22 RX ORDER — LOSARTAN POTASSIUM AND HYDROCHLOROTHIAZIDE 25; 100 MG/1; MG/1
0.5 TABLET ORAL DAILY
Qty: 90 TABLET | Refills: 3 | Status: SHIPPED | OUTPATIENT
Start: 2023-12-22

## 2023-12-22 RX ORDER — DIVALPROEX SODIUM 125 MG/1
125 CAPSULE, COATED PELLETS ORAL 2 TIMES DAILY
Status: DISCONTINUED | OUTPATIENT
Start: 2023-12-22 | End: 2023-12-22 | Stop reason: HOSPADM

## 2023-12-22 RX ORDER — LEVOTHYROXINE SODIUM 0.1 MG/1
200 TABLET ORAL DAILY
Status: DISCONTINUED | OUTPATIENT
Start: 2023-12-22 | End: 2023-12-22 | Stop reason: HOSPADM

## 2023-12-22 RX ORDER — BUPROPION HYDROCHLORIDE 300 MG/1
300 TABLET ORAL DAILY
Status: DISCONTINUED | OUTPATIENT
Start: 2023-12-22 | End: 2023-12-22 | Stop reason: HOSPADM

## 2023-12-22 RX ORDER — DIVALPROEX SODIUM 125 MG/1
125 CAPSULE, COATED PELLETS ORAL 2 TIMES DAILY
Qty: 60 CAPSULE | Refills: 3 | Status: SHIPPED | OUTPATIENT
Start: 2023-12-22

## 2023-12-22 RX ORDER — TRAZODONE HYDROCHLORIDE 50 MG/1
50 TABLET ORAL NIGHTLY
Status: DISCONTINUED | OUTPATIENT
Start: 2023-12-22 | End: 2023-12-22 | Stop reason: HOSPADM

## 2023-12-22 RX ORDER — PANTOPRAZOLE SODIUM 40 MG/1
40 TABLET, DELAYED RELEASE ORAL
Status: DISCONTINUED | OUTPATIENT
Start: 2023-12-22 | End: 2023-12-22 | Stop reason: HOSPADM

## 2023-12-22 RX ADMIN — CLOPIDOGREL BISULFATE 75 MG: 75 TABLET ORAL at 08:39

## 2023-12-22 RX ADMIN — LEVOTHYROXINE SODIUM 200 MCG: 100 TABLET ORAL at 05:52

## 2023-12-22 RX ADMIN — PANTOPRAZOLE SODIUM 40 MG: 40 TABLET, DELAYED RELEASE ORAL at 05:53

## 2023-12-22 RX ADMIN — BUPROPION HYDROCHLORIDE 300 MG: 300 TABLET, FILM COATED, EXTENDED RELEASE ORAL at 08:39

## 2023-12-22 RX ADMIN — SERTRALINE 200 MG: 100 TABLET, FILM COATED ORAL at 08:39

## 2023-12-22 RX ADMIN — GADOTERIDOL 15 ML: 279.3 INJECTION, SOLUTION INTRAVENOUS at 14:32

## 2023-12-22 RX ADMIN — DIVALPROEX SODIUM 125 MG: 125 CAPSULE, COATED PELLETS ORAL at 08:39

## 2023-12-22 RX ADMIN — SODIUM CHLORIDE, PRESERVATIVE FREE 10 ML: 5 INJECTION INTRAVENOUS at 08:39

## 2023-12-22 RX ADMIN — IPRATROPIUM BROMIDE AND ALBUTEROL SULFATE 1 DOSE: 2.5; .5 SOLUTION RESPIRATORY (INHALATION) at 00:00

## 2023-12-22 RX ADMIN — FOLIC ACID 1000 MCG: 1 TABLET ORAL at 08:39

## 2023-12-22 RX ADMIN — ATORVASTATIN CALCIUM 40 MG: 40 TABLET, FILM COATED ORAL at 08:39

## 2023-12-22 NOTE — ED NOTES
RN called to room from pts family member for seizure like activity. Verbal order of 1mg of ativan per Dr. Savannah Andre.

## 2023-12-22 NOTE — CARE COORDINATION
Case Management Assessment  Initial Evaluation    Date/Time of Evaluation: 12/22/2023 12:04 PM  Assessment Completed by: Celine Tidwell RN    If patient is discharged prior to next notation, then this note serves as note for discharge by case management. Patient Name: Gregor López                   YOB: 1955  Diagnosis: Seizure ([address]) [R56.9]  Altered mental status, unspecified altered mental status type [R41.82]  Accident due to mechanical fall without injury, initial encounter [W19. XXXA]  Acute hypoxemic respiratory failure due to COVID-19 ([address]) [U07.1, J96.01]  COVID-19 virus infection [U07.1]                   Date / Time: 12/21/2023  7:02 PM  Location: [address] [place] [address]     Patient Admission Status: Inpatient   Readmission Risk Low 0-14, Mod 15-19), High > 20: Readmission Risk Score: 15.5    Current PCP: Luly Jones MD  PCP verified by CM? Yes    Chart Reviewed: Yes      History Provided by: Patient  Patient Orientation: Alert and Oriented    Patient Cognition: Alert    Hospitalization in the last 30 days (Readmission):  No    If yes, Readmission Assessment in CM Navigator will be completed.     Advance Directives:      Code Status: Full Code   Patient's Primary Decision Maker is: Named in Scanned ACP Document    Primary Decision Maker: Hiram Mercado - Spouse - [phone]    Secondary Decision Maker: april kincaid - Child - [phone]    Discharge Planning:    Patient lives with: Spouse/Significant Other Type of Home: House  Primary Care Giver: Self  Patient Support Systems include: Spouse/Significant Other, Children   Current Financial resources: Medicare  Current community resources: None  Current services prior to admission: None            Current DME:              Type of Home Care services:  None    ADLS  Prior functional level: Assistance with the following:, Shopping, Cooking  Current functional level: Assistance with the following:, Cooking, Shopping    Family can provide assistance

## 2023-12-22 NOTE — PLAN OF CARE
Problem: Discharge Planning  Goal: Discharge to home or other facility with appropriate resources  12/22/2023 1155 by Estrellita Dick RN  Outcome: Progressing  Flowsheets (Taken 12/22/2023 0901)  Discharge to home or other facility with appropriate resources:   Identify barriers to discharge with patient and caregiver   Arrange for needed discharge resources and transportation as appropriate   Identify discharge learning needs (meds, wound care, etc)  12/22/2023 0255 by Joe Morgan RN  Outcome: Progressing     Problem: Skin/Tissue Integrity  Goal: Absence of new skin breakdown  Description: 1. Monitor for areas of redness and/or skin breakdown  2. Assess vascular access sites hourly  3. Every 4-6 hours minimum:  Change oxygen saturation probe site  4. Every 4-6 hours:  If on nasal continuous positive airway pressure, respiratory therapy assess nares and determine need for appliance change or resting period.   12/22/2023 1155 by Estrellita Dick RN  Outcome: Progressing  12/22/2023 0255 by Joe Morgan RN  Outcome: Progressing     Problem: Safety - Adult  Goal: Free from fall injury  12/22/2023 1155 by Estrellita Dick RN  Outcome: Progressing  Flowsheets (Taken 12/22/2023 0951)  Free From Fall Injury:   Instruct family/caregiver on patient safety   Based on caregiver fall risk screen, instruct family/caregiver to ask for assistance with transferring infant if caregiver noted to have fall risk factors  12/22/2023 0255 by Joe Morgan RN  Outcome: Progressing     Problem: Neurosensory - Adult  Goal: Achieves stable or improved neurological status  12/22/2023 1155 by Estrellita Dick RN  Outcome: Progressing  12/22/2023 0255 by Joe Morgan RN  Outcome: Progressing  Goal: Absence of seizures  12/22/2023 1155 by Estrellita Dick RN  Outcome: Progressing  12/22/2023 0255 by Joe Morgan RN  Outcome: Progressing  Goal: Remains free of injury related to seizures

## 2023-12-22 NOTE — ED NOTES
Patient transported to  [facility] by cart in stable condition. IV infusing and line is patent.    Full PPE precautions taken

## 2023-12-22 NOTE — PLAN OF CARE
Problem: Discharge Planning  Goal: Discharge to home or other facility with appropriate resources  Outcome: Progressing     Problem: Skin/Tissue Integrity  Goal: Absence of new skin breakdown  Description: 1. Monitor for areas of redness and/or skin breakdown  2. Assess vascular access sites hourly  3. Every 4-6 hours minimum:  Change oxygen saturation probe site  4. Every 4-6 hours:  If on nasal continuous positive airway pressure, respiratory therapy assess nares and determine need for appliance change or resting period.   Outcome: Progressing     Problem: Safety - Adult  Goal: Free from fall injury  Outcome: Progressing     Problem: Neurosensory - Adult  Goal: Achieves stable or improved neurological status  Outcome: Progressing  Goal: Absence of seizures  Outcome: Progressing  Goal: Remains free of injury related to seizures activity  Outcome: Progressing  Goal: Achieves maximal functionality and self care  Outcome: Progressing     Problem: Musculoskeletal - Adult  Goal: Return mobility to safest level of function  Outcome: Progressing  Goal: Return ADL status to a safe level of function  Outcome: Progressing     Problem: Infection - Adult  Goal: Absence of infection at discharge  Outcome: Progressing     Problem: Metabolic/Fluid and Electrolytes - Adult  Goal: Electrolytes maintained within normal limits  Outcome: Progressing  Goal: Hemodynamic stability and optimal renal function maintained  Outcome: Progressing     Problem: Chronic Conditions and Co-morbidities  Goal: Patient's chronic conditions and co-morbidity symptoms are monitored and maintained or improved  Outcome: Progressing     Problem: Pain  Goal: Verbalizes/displays adequate comfort level or baseline comfort level  Outcome: Progressing

## 2023-12-22 NOTE — H&P
Internal Medicine Resident History and Physical          Patient: Pelon Chahal  : 1955  MRN: [id number]     Acct: [de-identified]    PCP: Davie Kayser, MD  Date of Admission: 2023  Date of Service: Pt seen/examined on 23  and Admitted to Inpatient with expected LOS greater than two midnights due to medical therapy. Assessment and Plan:  Syncopal episode with mechanical fall: Unknown etiology. Pt was found down at home.  reports hit her head on cabinet. PMHx of stroke. No strokelike symptoms on exam.  CTA did show mild atherosclerotic narrowing of distal basilar artery and bilateral posterior cerebral arteries L>R. Did not show focal occlusion.  -Fall risk precautions  -Up with assistance  -Monitor on telemetry  -Continue to manage below conditions. Acute hypoxic respiratory failure: Patient is not on oxygen at home. Currently requiring 4 L NC.  -O2 protocol, wean as tolerated  -I-S  -Every 4 hours prn albuterol  COVID: Patient satting 98% on 4 L NC. Was 100% on room air at admit. CRP 1.35. WBC 9.1. Denies cough/shortness of breath on admit. Previous history of COVID with Paxlovid use in 2022. -Will start Decadron 6 mg qd 2/2 pt use of oxygen.   -Unclear when patient's infection started as she has no complaints of COVID related symptoms. Not sure if Paxlovid would be a good choice for her at this time as numerous medications that she is on interact with Paxlovid. May consider remdesivir if patient is a suitable candidate. Seizure-like symptoms: Possibly 2/2 recent fall. Vs seizure causing recent fall. Patient has hx of Alzheimer's disease. Patient has history of dementia family reports patient staring straight ahead and not aware of them in the room while in the ED. Also report an incidence at home where daughter observed mother's eyes rolled back and head and she developed tremors RUE. They report postictal state for 5-10 minutes after tremors resolved. modulation techniques and iterative    reconstructions, and/or weight-based dosing   when appropriate to reduce radiation to a low as reasonably achievable. CT Head W/O Contrast   Final Result   1. No acute intracranial abnormality. 2. Global cerebral volume loss and chronic microvascular ischemic changes. This document has been electronically signed by: Bryson Benito MD on    12/21/2023 08:18 PM      All CTs at this facility use dose modulation techniques and iterative    reconstructions, and/or weight-based dosing   when appropriate to reduce radiation to a low as reasonably achievable. CT LUMBAR RECONSTRUCTION WO POST PROCESS   Final Result   1. No CT evidence of acute traumatic lumbar spine injury. This document has been electronically signed by: Bryson Benito MD on    12/21/2023 08:28 PM      All CTs at this facility use dose modulation techniques and iterative    reconstructions, and/or weight-based dosing   when appropriate to reduce radiation to a low as reasonably achievable. CT THORACIC RECONSTRUCTION WO POST PROCESS   Final Result   Impression:   No acute finding. This document has been electronically signed by: Bryson Benito MD on    12/21/2023 08:25 PM      All CTs at this facility use dose modulation techniques and iterative    reconstructions, and/or weight-based dosing   when appropriate to reduce radiation to a low as reasonably achievable. CT CHEST WO CONTRAST   Final Result   1. No acute finding. This document has been electronically signed by: Bryson Benito MD on    12/21/2023 08:29 PM      All CTs at this facility use dose modulation techniques and iterative    reconstructions, and/or weight-based dosing   when appropriate to reduce radiation to a low as reasonably achievable. CT ABDOMEN PELVIS WO CONTRAST Additional Contrast? None   Final Result   1. No acute finding.       This document has been electronically signed by: Bryson Benito MD on

## 2023-12-22 NOTE — ED TRIAGE NOTES
Pt arrives via EMS with c/o of dizziness with a fall. Pt  states he had called her to remind her to take her pill and could not get ahold of her. Pt  states he got to the house and she was on the floor. Pt states she does not remember falling at this time. Pt states she has a headache right now and  states she hit her head on the cabinet. Pt blood sugar 120 per EMS. Pt is on plavix at this time. Respirations easy and unlabored. EKG completed at this time.

## 2023-12-22 NOTE — FLOWSHEET NOTE
12/22/23 1050   Safe Environment   Safety Measures MD at bedside;Call light within reach;Standard Safety Measures  (virtual safety round attempted)     Virtual nurse attempted safety round with patient; MD assisting at the moment; camera was not turned on for patient's privacy, patient is safe with staff. Thank you.

## 2023-12-22 NOTE — ED NOTES
RN called to room stating pt alarms are going off. Pt denies SOB, CP fatigue. RN saw 77% oxygenation with good pleth. RN placed pt on 4L NC and notified provider.

## 2023-12-22 NOTE — ED PROVIDER NOTES
PATIENT NAME: Andrzej Fuentes  MRN: [id number]  : 1955  FAIRCHILD: 2023    I performed a history and physical examination of the patient and discussed management with the Resident. I reviewed the Resident's note and agree with the documented findings and plan of care. Any areas of disagreement are noted on the chart. I was personally present for the key portions of any procedures and have documented in the chart those procedures where I was not present during the key portions. I have reviewed the emergency nurses triage note and agree with the chief complaint, past medical history, past surgical history, allergies, medications, social and family history as documented unless otherwise noted below. MEDICAL DEDISION MAKING (MDM)     Andrzej Fuentes is a 77 y.o. female who presents to Emergency Department with Dizziness     Patient presents to ED for evaluation of dizziness, fall, and weakness. Patient fell twice today at home. First one was unwitnessed and she was found sitting on the floor. Second fall was witnessed by her daughter and the patient slumped over by the sink and fell again. PMH of CVA as TIA, dementia, anxiety, CHF, endometrial carcinoma, HLD, HTN, thyroidism, memory loss, and morbid obesity. Exam: AVSS. Heart and lungs are unremarkable. Abdomen soft. Neurologically intact. Patient is oriented to person, not to time and place. Patient has mid and lower back tenderness. SaO2 dropped to 77% when she went to CAT scan. EKG interpreted by me as normal sinus rhythm, ventricular rate 72 bpm, HI interval 148 ms, QRS duration 104 ms, QTc 411 ms, no acute ischemic changes    ED workups are consistent with altered mental status, hypoxia, hypercapnia, Covid-19 infection and metabolic encephalopathy. Decadron given. Hemadynamically stable. CT head neg. She had some confusion during ED stay and stroke alert was called, patient CTA head and neck. D/w intervention neuro.  Recommending hospitalist

## 2023-12-22 NOTE — ED PROVIDER NOTES
[facility] DEPT  EMERGENCY DEPARTMENT ENCOUNTER          Pt Name: Aristeo Valle  MRN: [id number]  [address] [facility] [place], [facility] 1955  Date of evaluation: 12/21/2023  Physician: Fazal Noguera DO  Supervising Attending Physician: Ramon Sánchez MD       CHIEF COMPLAINT       Chief Complaint   Patient presents with    Dizziness         HISTORY OF PRESENT ILLNESS    HPI  Aristeo Valle is a 77 y.o. female with past medical history significant for prior ischemic stroke, dementia, hypertension, lipidemia, HF R EF. Patient has baseline mild dementia per family. Approximatly 3 PM, patient  called her to remind her to take her medication, however she did not respond to his call like she normally does.  returned home, found her down on ground. She had recovered somewhat after being helped up. Several hours later, daughter came to visit, found patient slumped over kitchen sink. Shortly thereafter, daughter observed mother's eyes rolled back of her head, began to develop tremor of right upper extremity and shoulders. These convulsions lasted approximately 3 to 5 minutes. After they ceased, him endorses she was unresponsive to questions for the next 5 to 10 minutes. They note that she has not been acting herself since all of this at unfolded. Patient is recovering from sinusitis 2 weeks prior, per family. During review patient is able to answer questions and is alert and oriented. Informed patient and her family. Patient is not endorsing any fever, chills, cough, chest pain, abdominal pain, diarrhea or constipation, urinary changes, myalgia. REVIEW OF SYSTEMS   Review of Systems   Constitutional:  Positive for fatigue. Negative for chills, diaphoresis and fever. HENT:  Negative for congestion, rhinorrhea, sinus pain and sore throat. Respiratory:  Negative for cough, chest tightness, shortness of breath and wheezing. Cardiovascular:  Negative for chest pain and palpitations. focally right upper extremity, shoulders with \"eyes rolling back of her head\". 1 witnessed episode of tremors with no LOC in ED. 1 mg Ativan given  CBC with diff, CMP, magnesium, prolactin, protime-INR, ammonia  CT head without contrast  History of CVA. In ED, concerned of transient strokelike symptoms, then alerted staff. No significant deficits noted on exam.  CTA head, neck  Apneic episodes. No history of obstructive sleep apnea. Patient with desaturations to 77% while sleeping. SpO2 100% on room air while awake. ABG, nasal cannula  COVID-19. Positive COVID-19 screen in ED  Dexamethasone 6 mg  DuoNeb      Code Status:  Not addressed during this ED visit    Social determinants of health impacting treatment or disposition:  Considered and not applicable       PREVIOUS RECORDS  AND EXTERNAL INFORMATION REVIEWED   History obtained from: chart review and the patient. Pertinent previous and/or external records reviewed: Noncontributory. Case discussed with specialties other than Emergency Medicine: Not Applicable      ED COURSE   ED Medications administered this visit (None if left blank):   Medications - No data to display             PROCEDURES: (None if blank)  Procedures:       MEDICATION CHANGES     New Prescriptions    No medications on file         FINAL DISPOSITION   Medical Decision Making  Patient with 1 witnessed fall, 1 witnessed focal seizure prior to arrival in ED. 1 way sepsis tremors and ED with no loss of consciousness. Imaging negative. Recommend MRI imaging. COVID-19 positive. Patient with transient hypoxia which she sleeps, suspect MARY. CRITICAL CARE:  None    Shared Decision-Making was performed, disposition discussed with the patient/family and questions answered. Outpatient follow up (If applicable):  No follow-up provider specified. Not applicable          FINAL DIAGNOSES:  Final diagnoses:   None       Condition: condition: fair  Dispo:  Admit to [address] [place] [address]

## 2023-12-22 NOTE — CARE COORDINATION
Andrzej Fuentes was evaluated today and a DME order was entered for a wheeled walker because she requires this to successfully complete daily living tasks of eating, bathing, toileting, and personal cares. A wheeled walker is necessary due to the patient's unsteady gait, upper body weakness, and inability to  an ambulation device; and she can ambulate only by pushing a walker instead of a lesser assistive device such as a cane, crutch, or standard walker. The need for this equipment was discussed with the patient and she understands and is in agreement.

## 2023-12-22 NOTE — PROCEDURES
EEG REPORT       Patient: Candiss Holiday Age: 77 y.o. MRN: [id number]      Referring Provider: No ref. provider found    History: This routine 30 minute scalp EEG was recorded with video- monitoring for a 77 y.o. Vince Jaramillo female  who presented with encephalopathy. This EEG was performed to evaluate for focal and epileptiform abnormalities.      Candiss Holiday   Current Facility-Administered Medications   Medication Dose Route Frequency Provider Last Rate Last Admin    aspirin EC tablet 81 mg  81 mg Oral Nightly Ova Mires, DO        [Held by provider] buPROPion (WELLBUTRIN XL) extended release tablet 300 mg  300 mg Oral Daily Ova Mires, DO   300 mg at 12/22/23 6993    clopidogrel (PLAVIX) tablet 75 mg  75 mg Oral Daily Ova Mires, DO   75 mg at 12/22/23 3675    divalproex (DEPAKOTE SPRINKLE) DR capsule 125 mg  125 mg Oral Daily Ova Mires, DO   125 mg at 59/44/91 2236    folic acid (FOLVITE) tablet 1,000 mcg  1,000 mcg Oral Daily Ova Mires, DO   1,000 mcg at 12/22/23 2980    levothyroxine (SYNTHROID) tablet 200 mcg  200 mcg Oral Daily Ova Mires, DO   200 mcg at 12/22/23 2080    metoprolol succinate (TOPROL XL) extended release tablet 25 mg  25 mg Oral Daily Ova Mires, DO        pantoprazole (PROTONIX) tablet 40 mg  40 mg Oral QAM AC Ova Mires, DO   40 mg at 12/22/23 0553    sertraline (ZOLOFT) tablet 200 mg  200 mg Oral Daily Ova Mires, DO   200 mg at 12/22/23 5054    atorvastatin (LIPITOR) tablet 40 mg  40 mg Oral Daily Ova Mires, DO   40 mg at 12/22/23 7341    traZODone (DESYREL) tablet 50 mg  50 mg Oral Nightly Ova Mires, DO        [Held by provider] losartan (COZAAR) tablet 100 mg  100 mg Oral Daily Ova Mires, DO        And    [Held by provider] hydroCHLOROthiazide (HYDRODIURIL) tablet 25 mg  25 mg Oral Daily Ova Mires, DO        sodium chloride flush 0.9 % injection 5-40 mL  5-40 mL IntraVENous 2 times per day Ova Mires, DO

## 2023-12-22 NOTE — DISCHARGE INSTRUCTIONS
Take half a tablet of Hyzaar which is losartan-hydrochlorothiazide combination. Take Depakote 2 times a day. Check your blood pressure and write down readings on paper and follow up with PCP with readings. Stop taking Wellbutrin as it can increase the risk of recurrent seizures.

## 2023-12-22 NOTE — CONSULTS
Neurology Consult Note    Date:12/22/2023       APPQ:[id number]  Patient Monica Gill     Date of Birth:[birth year]     Age:68 y.o. Requesting Physician: Parish Nick MD     Reason for Consult:  Evaluate for stroke alert last night      Chief Complaint:   Chief Complaint   Patient presents with    Dizziness       Subjective     Shanna Gillis is a 77 y.o. female with a history of previous stroke, anemia, anxiety, arthritis, chicken pox, chronic systolic heart failure, dementia, endometrial adenocarcinoma, hyperlipidemia, hypertension, hypothyroidism, mumps, uterine cancer who presented to [facility] ED yesterday 12/21/23 for the evaluation of a fall. Patient present with  at bedside.  states that she has baseline dementia and forgets things often. He states that he calls the patient everyday to remind her to take her afternoon medications. He states that he called her yesterday and she did not answer which prompted him to come home to check on her. He states that when he got home she was on the ground attempting to get back up. He states he helped her up and she was fine after that. Their daughter then came over shortly after and patient was found slumped over the kitchen sink with her eyes rolled back in her head and her right upper extremity shaking from the wrist to the shoulder. He states this episode lasted probably roughly 30 seconds. The patient was then confused after the episode.  denies any loss of bowel or bladder function or and tongue biting. Patient's  then called EMS and she was brought to the ED. Patient was noted to be hypoxic in the ED with oxygen saturation 77%. A stroke alert was subsequently called in the ED for increased confusion per chart review. ED provider contacted Dr. Louis Rivera neurointerventionalist who reviewed the CT head and CTA head and neck which both appeared without any abnormalities. Patient was deemed not a candidate for TNK or neurointervention.  It

## 2023-12-23 LAB
EKG ATRIAL RATE: 72 BPM
EKG P AXIS: 29 DEGREES
EKG P-R INTERVAL: 148 MS
EKG Q-T INTERVAL: 376 MS
EKG QRS DURATION: 104 MS
EKG QTC CALCULATION (BAZETT): 411 MS
EKG R AXIS: 15 DEGREES
EKG T AXIS: 48 DEGREES
EKG VENTRICULAR RATE: 72 BPM

## 2023-12-26 ENCOUNTER — TELEPHONE (OUTPATIENT)
Dept: FAMILY MEDICINE CLINIC | Age: 68
End: 2023-12-26

## 2023-12-26 NOTE — TELEPHONE ENCOUNTER
Care Transitions Initial Follow Up Call    Outreach made within 2 business days of discharge: Yes    Patient: Mitchell Booth Patient : 1955   MRN: [id number]  Reason for Admission: There are no discharge diagnoses documented for the most recent discharge. Discharge Date: 23       Spoke with: jaskaran    Discharge department/facility: [facility]    TCM Interactive Patient Contact:  Was patient able to fill all prescriptions: Yes  Was patient instructed to bring all medications to the follow-up visit: Yes  Is patient taking all medications as directed in the discharge summary?  Yes  Does patient understand their discharge instructions: Yes  Does patient have questions or concerns that need addressed prior to 7-14 day follow up office visit: no    Scheduled appointment with PCP within 7-14 days    Follow Up  Future Appointments   Date Time Provider [address] [place] [address]   2024 12:50 PM [place] [facility]   2024 10:30 AM MD Wayne Toth [facility]   2024 11:00 AM Juliana Dick[address]

## 2023-12-27 ENCOUNTER — CLINICAL DOCUMENTATION ONLY (OUTPATIENT)
Facility: CLINIC | Age: 68
End: 2023-12-27

## 2023-12-27 ENCOUNTER — TELEPHONE (OUTPATIENT)
Dept: FAMILY MEDICINE CLINIC | Age: 68
End: 2023-12-27

## 2023-12-27 NOTE — TELEPHONE ENCOUNTER
As long as she is feeling better and has follow up with neurology and cardiology in the next month then no need to come here    Call

## 2023-12-27 NOTE — TELEPHONE ENCOUNTER
Patient  called wanting to schedule a hospital f/u. She went in for passing out on 12-22-23 and was discharged on 12-23-23. Suggestions on where to schedule patient? Call  please. Per  do not call pt as she doesn't remember who she talks to.        [phone]

## 2023-12-28 NOTE — PROGRESS NOTES
[address] [facility] Laboratory Technician Worksheet      EEG Date: 2023    Name: Yousuf Black   : 1955   Age: 77 y.o. SEX: female    ROOM: [facility] MRN: [id number]           CSN: [id number]      Ordering Provider: Alejandro Jay  EEG Number: [id number] Time of Test:  8372    Hand: Right   Sedation: no    H.V. Done: No  age protocol  Photic: Yes    Sleep: Yes  Drowsy: Yes   Sleep Deprived: No    Seizures observed: no    Mentality: lethargic      Clinical History:HX of Alzheimer's, Dementia and CVA (left occipital). pt fell and hit her head; had tremors in RUE and, suspected post ictal state for 4-5 mins after. Ct of the head 2023  IMPRESSION:  1. No acute intracranial abnormality. 2. Global cerebral volume loss and chronic microvascular ischemic changes. This document has been electronically signed by: Angela Marinelli MD on   2023 08:18 PM  CTA of the head 2023  IMPRESSION:  1. Mild atherosclerotic narrowing of the distal basilar artery and   bilateral posterior cerebral arteries, left-greater-than-right. No focal   occlusion.      This document has been electronically signed by: Angela Marinelli MD on   2023 10:08 PM  MRI of the brain- Pending     Past Medical History:       Diagnosis Date    Acute arterial ischemic stroke, multifocal, anterior circulation ([address])     Dr Frieda Scherer    Anemia     Dr Randa Dick pox     Chronic systolic (congestive) heart failure 2023    Dementia ([facility])     Endometrial adenocarcinoma ([facility]) 2020    Hyperlipidemia     Hypertension     Dr Pavel Hunt    Hypothyroidism     Measles     Memory loss     Mumps     Uterine cancer ([address]) 2020    Endometrioid carcinoma, Arlester Allis       Scheduled Meds:   aspirin  81 mg Oral Nightly    buPROPion  300 mg Oral Daily    clopidogrel  75 mg Oral Daily    divalproex  125 mg Oral Daily    folic acid  6,656 mcg Oral Daily    levothyroxine  200 mcg Oral Daily
[facility]  INPATIENT PHYSICAL THERAPY  EVALUATION  [facility] 4A - 4A-12/012-A    Time In: 5648  Time Out: 1328  Timed Code Treatment Minutes: 8 Minutes  Minutes: 17          Date: 2023  Patient Name: Yousuf Black,  Gender:  female        MRN: [id number]  : 1955  (77 y.o.)         Diagnosis: Llamas Aristeo, DO  Additional Pertinent Hx: Per EMR: : Yousuf Black is a 77 y.o. female with PMHx of STEPHANIE stroke, anemia, anxiety, arthritis, HFrEF, endometrial adenocarcinoma, HLD, HTN, hypothyroid who presents to [facility] with CC: Dizziness s/p fall.  called her and when he could not get a hold of her came home. Found her down. Patient does not remember fall.  said she fell and hit head on cabinet? Reports headache. BG measured at 120 per EMS. Patient on Plavix. Stable in ED. Patient tested positive for COVID. On interview today, family in the room with patient. There are 2 daughters and  in the room. Per family patient has not been acting like herself today. Said prior to today they have seen no evidence of change. They states she has some dementia. They have not seen her acting ill recently. Patient herself says currently has no pain (except for headache), she endorses tremors, weakness and lightheadedness, no shortness of breath, cough, sputum production, N/V/C/D. Per nursing, patient dozed off and desatted to 77% while in CT. Oxygenating 77% with good Plath. She was placed on 4 L NC. Family also reported a seizure episode in which patient was nonresponsive for approximately 5 minutes, she stared straight forward, per Dr. Emelia Hunt, patient had postictal state. Apparently this has happened a few times in the last few days. Patient was also reported as arriving dehydrated with dry mouth and poor pulses. Patient has had similar presentations in the past.  Most recently 2023. She reported a fall a week before hand then.
[facility] OCCUPATIONAL THERAPY  [facility] NEUROSCIENCES 4A  EVALUATION    Time:   Time In: 08  Time Out: 08  Timed Code Treatment Minutes: 8 Minutes  Minutes: 16          Date: 2023  Patient Name: Flaquita Cramer,   Gender: female      MRN: [id number]  : 1955  (77 y.o.)  Referring Practitioner: Erlene Habermann, DO  Diagnosis: Seizures       Restrictions/Precautions:  Restrictions/Precautions: Fall Risk  Position Activity Restriction  Other position/activity restrictions: hx CVA with forgetfulness and peripheral eye site loss    Subjective       Subjective: Patient sitting near edge of bed finishing breakfast, RN approved visit and is awaiting MRI and EEG later this date. Patient unable to recall events leading to hospitalization and questionable historian.     Pain: denied/10:     Vitals: Vitals not assessed per clinical judgement, see nursing flowsheet    Social/Functional History:  Lives With: Spouse  Type of Home: House  Home Layout: Two level  Home Access: Stairs to enter without rails  Entrance Stairs - Number of Steps: 3   Bathroom Shower/Tub: Walk-in shower  Bathroom Toilet: Standard  Bathroom Equipment: Grab bars in shower       ADL Assistance: [address] Independent  Ambulation Assistance: Independent  Transfer Assistance: Independent    Active : No  Patient's  Info: spouse     Additional Comments: Per patient independent PTA, however known hx of memory loss from past CVA    VISION: does have peripheral vision loss from past CVA    HEARING:  WNL    COGNITION: Slow Processing, Decreased Recall, Decreased Insight, Impaired Memory, Decreased Problem Solving, Decreased Safety Awareness, Difficulty Following Commands, and Impulsive    RANGE OF MOTION:  Right Upper Extremity: Impaired - 25 degrees shoulder abduction and flexion, elbow wrist and digits WFL  Left Upper Extremity:  WNL    STRENGTH:  Right Upper Extremity: Impaired - 2-/5  Left
Pt was anointed   12/22/23 1921   Encounter Summary   Encounter Overview/Reason  Initial Encounter   Service Provided For: Patient   Referral/Consult From: [address] Family members   Last Encounter  12/22/23  (Anointed)   Complexity of Encounter Low   Spiritual/Emotional needs   Type Spiritual Support   Rituals, Rites and Sacraments   Type Anointing   Assessment/Intervention/Outcome   Assessment Hopeful   Intervention Empowerment
BROWN Prakash, Clinical Documentation Integrity, Revenue   Cycle, [facility]  Options provided:  -- Acute Respiratory Failure as evidenced by, Please document evidence.   -- Acute Respiratory Failure ruled out after study  -- Other - I will add my own diagnosis  -- Disagree - Not applicable / Not valid  -- Disagree - Clinically unable to determine / Unknown  -- Refer to Clinical Documentation Reviewer    PROVIDER RESPONSE TEXT:    This patient is in acute respiratory failure as evidenced by clinical   manifestations    Query created by: Margarito Mendiola on 12/28/2023 7:11 AM      Electronically signed by:  Regan Oro 12/28/2023 1:36 PM

## 2024-01-02 ENCOUNTER — HOSPITAL ENCOUNTER (OUTPATIENT)
Dept: WOMENS IMAGING | Age: 69
Discharge: HOME | End: 2024-01-02
Attending: FAMILY MEDICINE
Payer: MEDICARE

## 2024-01-02 DIAGNOSIS — Z78.0 ASYMPTOMATIC POSTMENOPAUSAL STATE: ICD-10-CM

## 2024-01-02 PROCEDURE — 77080 DXA BONE DENSITY AXIAL: CPT

## 2024-01-03 ENCOUNTER — OFFICE VISIT (OUTPATIENT)
Dept: CARDIOLOGY CLINIC | Age: 69
End: 2024-01-03
Payer: MEDICARE

## 2024-01-03 VITALS
WEIGHT: 185.4 LBS | SYSTOLIC BLOOD PRESSURE: 122 MMHG | BODY MASS INDEX: 36.4 KG/M2 | HEIGHT: 60 IN | HEART RATE: 72 BPM | DIASTOLIC BLOOD PRESSURE: 70 MMHG

## 2024-01-03 DIAGNOSIS — I50.22 CHRONIC SYSTOLIC (CONGESTIVE) HEART FAILURE (HCC): Primary | ICD-10-CM

## 2024-01-03 DIAGNOSIS — I10 ESSENTIAL HYPERTENSION: ICD-10-CM

## 2024-01-03 DIAGNOSIS — E78.2 MIXED HYPERLIPIDEMIA: ICD-10-CM

## 2024-01-03 PROCEDURE — 3074F SYST BP LT 130 MM HG: CPT | Performed by: STUDENT IN AN ORGANIZED HEALTH CARE EDUCATION/TRAINING PROGRAM

## 2024-01-03 PROCEDURE — G8399 PT W/DXA RESULTS DOCUMENT: HCPCS | Performed by: STUDENT IN AN ORGANIZED HEALTH CARE EDUCATION/TRAINING PROGRAM

## 2024-01-03 PROCEDURE — 1123F ACP DISCUSS/DSCN MKR DOCD: CPT | Performed by: STUDENT IN AN ORGANIZED HEALTH CARE EDUCATION/TRAINING PROGRAM

## 2024-01-03 PROCEDURE — 3078F DIAST BP <80 MM HG: CPT | Performed by: STUDENT IN AN ORGANIZED HEALTH CARE EDUCATION/TRAINING PROGRAM

## 2024-01-03 PROCEDURE — G8484 FLU IMMUNIZE NO ADMIN: HCPCS | Performed by: STUDENT IN AN ORGANIZED HEALTH CARE EDUCATION/TRAINING PROGRAM

## 2024-01-03 PROCEDURE — 3017F COLORECTAL CA SCREEN DOC REV: CPT | Performed by: STUDENT IN AN ORGANIZED HEALTH CARE EDUCATION/TRAINING PROGRAM

## 2024-01-03 PROCEDURE — 1090F PRES/ABSN URINE INCON ASSESS: CPT | Performed by: STUDENT IN AN ORGANIZED HEALTH CARE EDUCATION/TRAINING PROGRAM

## 2024-01-03 PROCEDURE — 99214 OFFICE O/P EST MOD 30 MIN: CPT | Performed by: STUDENT IN AN ORGANIZED HEALTH CARE EDUCATION/TRAINING PROGRAM

## 2024-01-03 PROCEDURE — G8417 CALC BMI ABV UP PARAM F/U: HCPCS | Performed by: STUDENT IN AN ORGANIZED HEALTH CARE EDUCATION/TRAINING PROGRAM

## 2024-01-03 PROCEDURE — 1036F TOBACCO NON-USER: CPT | Performed by: STUDENT IN AN ORGANIZED HEALTH CARE EDUCATION/TRAINING PROGRAM

## 2024-01-03 PROCEDURE — G8427 DOCREV CUR MEDS BY ELIG CLIN: HCPCS | Performed by: STUDENT IN AN ORGANIZED HEALTH CARE EDUCATION/TRAINING PROGRAM

## 2024-01-03 PROCEDURE — 1111F DSCHRG MED/CURRENT MED MERGE: CPT | Performed by: STUDENT IN AN ORGANIZED HEALTH CARE EDUCATION/TRAINING PROGRAM

## 2024-01-03 NOTE — PROGRESS NOTES
[facility] PROFESSIONAL SERVICES  [facility] [place] [address]   [address]   [place] [address]   Dept: [phone]   Dept Fax: [phone]   Loc: [phone]      Chief Complaint   Patient presents with    Follow-up     Cardiologist:  Dr. Parikh  67 yo female presents for hfu for syncope with fall. Hx of CVA, loop recorder not followed, MR, HTN, HLD.     She has been feeling okay. No recurrent syncope. No cp, sob. Fatigue is okay. No dizzy or lightheaded. No swelling or weight gain. Reviewed findings with her and possibilities. Following up with neurology. They were concerned for seizures wh ile inpatient.     General:   No fever, no chills, no weight loss, no fatigue  Pulmonary:    No dyspnea, no wheezing  Cardiac:    Denies recent chest pain   GI:     No nausea or vomiting, no abdominal pain  Neuro:    No dizziness or light headedness  Musculoskeletal:  No recent active issues  Extremities:   No edema      Past Medical History:   Diagnosis Date    Acute arterial ischemic stroke, multifocal, anterior circulation (HCC)     Dr Dial    Anemia     Dr Jerome    Anxiety     Arthritis     Chicken pox     Chronic systolic (congestive) heart failure 07/26/2023    Dementia (HCC)     Endometrial adenocarcinoma (HCC) 01/30/2020    Hyperlipidemia     Hypertension     Dr Spring    Hypothyroidism     Measles     Memory loss     Mumps     Uterine cancer (HCC) 01/2020    Endometrioid carcinoma, Chaz       Allergies   Allergen Reactions    Donepezil      nightmares       Current Outpatient Medications   Medication Sig Dispense Refill    divalproex (DEPAKOTE SPRINKLE) 125 MG DR capsule Take 1 capsule by mouth in the morning and at bedtime 60 capsule 3    losartan-hydroCHLOROthiazide (HYZAAR) 100-25 MG per tablet Take 0.5 tablets by mouth daily 90 tablet 3    simvastatin (ZOCOR) 80 MG tablet TAKE ONE TABLET DAILY AT BEDTIME, BY MOUTH. 90 tablet 1    clopidogrel (PLAVIX) 75 MG tablet TAKE ONE TABLET

## 2024-01-13 DIAGNOSIS — R11.0 NAUSEA: ICD-10-CM

## 2024-01-13 DIAGNOSIS — K21.00 GASTROESOPHAGEAL REFLUX DISEASE WITH ESOPHAGITIS WITHOUT HEMORRHAGE: ICD-10-CM

## 2024-01-15 RX ORDER — OMEPRAZOLE 20 MG/1
CAPSULE, DELAYED RELEASE ORAL
Qty: 90 CAPSULE | Refills: 1 | Status: SHIPPED | OUTPATIENT
Start: 2024-01-15

## 2024-02-04 DIAGNOSIS — R41.3 MEMORY LOSS: ICD-10-CM

## 2024-02-04 DIAGNOSIS — I10 ESSENTIAL HYPERTENSION: ICD-10-CM

## 2024-02-05 RX ORDER — METOPROLOL SUCCINATE 25 MG/1
TABLET, EXTENDED RELEASE ORAL
Qty: 90 TABLET | Refills: 1 | Status: SHIPPED | OUTPATIENT
Start: 2024-02-05

## 2024-02-05 RX ORDER — RIVASTIGMINE 9.5 MG/24H
PATCH, EXTENDED RELEASE TRANSDERMAL
Qty: 30 PATCH | Refills: 8 | Status: SHIPPED | OUTPATIENT
Start: 2024-02-05

## 2024-02-20 DIAGNOSIS — F33.42 RECURRENT MAJOR DEPRESSIVE DISORDER, IN FULL REMISSION (HCC): ICD-10-CM

## 2024-02-21 RX ORDER — ALPRAZOLAM 0.25 MG/1
TABLET ORAL
Qty: 90 TABLET | Refills: 1 | Status: SHIPPED | OUTPATIENT
Start: 2024-02-21 | End: 2024-05-21

## 2024-03-14 RX ORDER — DIVALPROEX SODIUM 125 MG/1
CAPSULE, COATED PELLETS ORAL
Qty: 60 CAPSULE | Refills: 3 | OUTPATIENT
Start: 2024-03-14

## 2024-03-18 ENCOUNTER — TELEPHONE (OUTPATIENT)
Dept: FAMILY MEDICINE CLINIC | Age: 69
End: 2024-03-18

## 2024-03-18 DIAGNOSIS — M85.89 OSTEOPENIA OF MULTIPLE SITES: Primary | ICD-10-CM

## 2024-03-18 RX ORDER — ZOLEDRONIC ACID 5 MG/100ML
5 INJECTION, SOLUTION INTRAVENOUS ONCE
Qty: 100 ML | Refills: 0 | Status: SHIPPED | OUTPATIENT
Start: 2024-03-18 | End: 2024-03-18

## 2024-03-22 ENCOUNTER — APPOINTMENT (OUTPATIENT)
Dept: GENERAL RADIOLOGY | Age: 69
End: 2024-03-22
Payer: MEDICARE

## 2024-03-22 ENCOUNTER — APPOINTMENT (OUTPATIENT)
Dept: CT IMAGING | Age: 69
End: 2024-03-22
Payer: MEDICARE

## 2024-03-22 ENCOUNTER — HOSPITAL ENCOUNTER (OUTPATIENT)
Age: 69
Setting detail: OBSERVATION
Discharge: HOME | End: 2024-03-24
Attending: EMERGENCY MEDICINE
Payer: MEDICARE

## 2024-03-22 DIAGNOSIS — R79.89 TROPONIN LEVEL ELEVATED: ICD-10-CM

## 2024-03-22 DIAGNOSIS — R56.9 SEIZURES (HCC): ICD-10-CM

## 2024-03-22 DIAGNOSIS — R41.82 ALTERED MENTAL STATUS, UNSPECIFIED ALTERED MENTAL STATUS TYPE: Primary | ICD-10-CM

## 2024-03-22 LAB
ALBUMIN SERPL BCG-MCNC: 3.7 G/DL (ref 3.5–5.1)
ALP SERPL-CCNC: 97 U/L (ref 38–126)
ALT SERPL W/O P-5'-P-CCNC: 25 U/L (ref 11–66)
AMMONIA PLAS-MCNC: 31 UMOL/L (ref 11–60)
AMPHETAMINES UR QL SCN: NEGATIVE
ANION GAP SERPL CALC-SCNC: 18 MEQ/L (ref 8–16)
AST SERPL-CCNC: 39 U/L (ref 5–40)
BARBITURATES UR QL SCN: NEGATIVE
BASE EXCESS BLDA CALC-SCNC: 4.9 MMOL/L (ref -2–3)
BASOPHILS ABSOLUTE: 0.1 THOU/MM3 (ref 0–0.1)
BASOPHILS NFR BLD AUTO: 0.9 %
BENZODIAZ UR QL SCN: NEGATIVE
BILIRUB SERPL-MCNC: 0.3 MG/DL (ref 0.3–1.2)
BILIRUB UR QL STRIP.AUTO: NEGATIVE
BUN SERPL-MCNC: 19 MG/DL (ref 7–22)
BZE UR QL SCN: NEGATIVE
CALCIUM SERPL-MCNC: 9.3 MG/DL (ref 8.5–10.5)
CANNABINOIDS UR QL SCN: NEGATIVE
CHARACTER UR: CLEAR
CHLORIDE SERPL-SCNC: 98 MEQ/L (ref 98–111)
CO2 SERPL-SCNC: 24 MEQ/L (ref 23–33)
COHGB MFR BLDV: 0 % CO SAT
COLLECTED BY:: ABNORMAL
COLOR: YELLOW
CREAT SERPL-MCNC: 0.9 MG/DL (ref 0.4–1.2)
DEPRECATED RDW RBC AUTO: 45.2 FL (ref 35–45)
DEVICE: ABNORMAL
EOSINOPHIL NFR BLD AUTO: 2.3 %
EOSINOPHILS ABSOLUTE: 0.2 THOU/MM3 (ref 0–0.4)
ERYTHROCYTE [DISTWIDTH] IN BLOOD BY AUTOMATED COUNT: 12.1 % (ref 11.5–14.5)
ETHANOL SERPL-MCNC: < 0.01 %
FENTANYL: NEGATIVE
FIO2 ON VENT O2 ANALYZER: 21 %
FLUAV RNA RESP QL NAA+PROBE: NOT DETECTED
FLUBV RNA RESP QL NAA+PROBE: NOT DETECTED
GFR SERPL CREATININE-BSD FRML MDRD: > 60 ML/MIN/1.73M2
GLUCOSE SERPL-MCNC: 84 MG/DL (ref 70–108)
GLUCOSE UR QL STRIP.AUTO: NEGATIVE MG/DL
HCO3 BLDA-SCNC: 31 MMOL/L (ref 23–28)
HCT VFR BLD AUTO: 40 % (ref 37–47)
HGB BLD-MCNC: 13.3 GM/DL (ref 12–16)
HGB UR QL STRIP.AUTO: NEGATIVE
IMM GRANULOCYTES # BLD AUTO: 0.02 THOU/MM3 (ref 0–0.07)
IMM GRANULOCYTES NFR BLD AUTO: 0.3 %
KETONES UR QL STRIP.AUTO: NEGATIVE
LACTATE SERPL-SCNC: 1 MMOL/L (ref 0.5–2)
LIPASE SERPL-CCNC: 22.5 U/L (ref 5.6–51.3)
LYMPHOCYTES ABSOLUTE: 1.8 THOU/MM3 (ref 1–4.8)
LYMPHOCYTES NFR BLD AUTO: 25.3 %
MAGNESIUM SERPL-MCNC: 1.9 MG/DL (ref 1.6–2.4)
MCH RBC QN AUTO: 33.8 PG (ref 26–33)
MCHC RBC AUTO-ENTMCNC: 33.3 GM/DL (ref 32.2–35.5)
MCV RBC AUTO: 101.5 FL (ref 81–99)
MONOCYTES ABSOLUTE: 0.7 THOU/MM3 (ref 0.4–1.3)
MONOCYTES NFR BLD AUTO: 10.3 %
NEUTROPHILS NFR BLD AUTO: 60.9 %
NITRITE UR QL STRIP: NEGATIVE
NRBC BLD AUTO-RTO: 0 /100 WBC
OPIATES UR QL SCN: NEGATIVE
OSMOLALITY SERPL CALC.SUM OF ELEC: 280.9 MOSMOL/KG (ref 275–300)
OXYCODONE: NEGATIVE
PCO2 TEMP ADJ BLDMV: 49 MMHG (ref 41–51)
PCP UR QL SCN: NEGATIVE
PH BLDMV: 7.41 [PH] (ref 7.31–7.41)
PH UR STRIP.AUTO: 8 [PH] (ref 5–9)
PLATELET # BLD AUTO: 146 THOU/MM3 (ref 130–400)
PMV BLD AUTO: 11.1 FL (ref 9.4–12.4)
PO2 BLDMV: 47 MMHG (ref 25–40)
POTASSIUM SERPL-SCNC: 4.3 MEQ/L (ref 3.5–5.2)
PROT SERPL-MCNC: 7.1 G/DL (ref 6.1–8)
PROT UR STRIP.AUTO-MCNC: NEGATIVE MG/DL
RBC # BLD AUTO: 3.94 MILL/MM3 (ref 4.2–5.4)
SAO2 % BLDMV: 82 %
SARS-COV-2 RNA RESP QL NAA+PROBE: NOT DETECTED
SEGMENTED NEUTROPHILS ABSOLUTE COUNT: 4.3 THOU/MM3 (ref 1.8–7.7)
SODIUM SERPL-SCNC: 140 MEQ/L (ref 135–145)
SP GR UR REFRACT.AUTO: 1.02 (ref 1–1.03)
T4 FREE SERPL-MCNC: 1.43 NG/DL (ref 0.93–1.68)
TROPONIN, HIGH SENSITIVITY: 14 NG/L (ref 0–12)
TSH SERPL DL<=0.005 MIU/L-ACNC: 0.09 UIU/ML (ref 0.4–4.2)
UROBILINOGEN, URINE: 0.2 EU/DL (ref 0–1)
VALPROATE SERPL-MCNC: 17.1 UG/ML (ref 50–100)
WBC # BLD AUTO: 7 THOU/MM3 (ref 4.8–10.8)
WBC #/AREA URNS HPF: NEGATIVE /[HPF]

## 2024-03-22 PROCEDURE — 87636 SARSCOV2 & INF A&B AMP PRB: CPT

## 2024-03-22 PROCEDURE — 36415 COLL VENOUS BLD VENIPUNCTURE: CPT

## 2024-03-22 PROCEDURE — 85025 COMPLETE CBC W/AUTO DIFF WBC: CPT

## 2024-03-22 PROCEDURE — 93005 ELECTROCARDIOGRAM TRACING: CPT | Performed by: STUDENT IN AN ORGANIZED HEALTH CARE EDUCATION/TRAINING PROGRAM

## 2024-03-22 PROCEDURE — 84439 ASSAY OF FREE THYROXINE: CPT

## 2024-03-22 PROCEDURE — 80175 DRUG SCREEN QUAN LAMOTRIGINE: CPT

## 2024-03-22 PROCEDURE — 84443 ASSAY THYROID STIM HORMONE: CPT

## 2024-03-22 PROCEDURE — 83690 ASSAY OF LIPASE: CPT

## 2024-03-22 PROCEDURE — 83735 ASSAY OF MAGNESIUM: CPT

## 2024-03-22 PROCEDURE — 70450 CT HEAD/BRAIN W/O DYE: CPT

## 2024-03-22 PROCEDURE — 99285 EMERGENCY DEPT VISIT HI MDM: CPT

## 2024-03-22 PROCEDURE — 82077 ASSAY SPEC XCP UR&BREATH IA: CPT

## 2024-03-22 PROCEDURE — 80307 DRUG TEST PRSMV CHEM ANLYZR: CPT

## 2024-03-22 PROCEDURE — 6370000000 HC RX 637 (ALT 250 FOR IP)

## 2024-03-22 PROCEDURE — 83605 ASSAY OF LACTIC ACID: CPT

## 2024-03-22 PROCEDURE — 84484 ASSAY OF TROPONIN QUANT: CPT

## 2024-03-22 PROCEDURE — 99222 1ST HOSP IP/OBS MODERATE 55: CPT | Performed by: STUDENT IN AN ORGANIZED HEALTH CARE EDUCATION/TRAINING PROGRAM

## 2024-03-22 PROCEDURE — 87040 BLOOD CULTURE FOR BACTERIA: CPT

## 2024-03-22 PROCEDURE — 82375 ASSAY CARBOXYHB QUANT: CPT

## 2024-03-22 PROCEDURE — 82140 ASSAY OF AMMONIA: CPT

## 2024-03-22 PROCEDURE — 80053 COMPREHEN METABOLIC PANEL: CPT

## 2024-03-22 PROCEDURE — 80164 ASSAY DIPROPYLACETIC ACD TOT: CPT

## 2024-03-22 PROCEDURE — G0378 HOSPITAL OBSERVATION PER HR: HCPCS

## 2024-03-22 PROCEDURE — 82803 BLOOD GASES ANY COMBINATION: CPT

## 2024-03-22 PROCEDURE — 71045 X-RAY EXAM CHEST 1 VIEW: CPT

## 2024-03-22 PROCEDURE — 81003 URINALYSIS AUTO W/O SCOPE: CPT

## 2024-03-22 RX ORDER — ONDANSETRON 2 MG/ML
4 INJECTION INTRAMUSCULAR; INTRAVENOUS EVERY 6 HOURS PRN
Status: DISCONTINUED | OUTPATIENT
Start: 2024-03-22 | End: 2024-03-24 | Stop reason: HOSPADM

## 2024-03-22 RX ORDER — LORAZEPAM 2 MG/ML
4 INJECTION INTRAMUSCULAR EVERY 5 MIN PRN
Status: DISCONTINUED | OUTPATIENT
Start: 2024-03-22 | End: 2024-03-24 | Stop reason: HOSPADM

## 2024-03-22 RX ORDER — POLYETHYLENE GLYCOL 3350 17 G/17G
17 POWDER, FOR SOLUTION ORAL DAILY PRN
Status: DISCONTINUED | OUTPATIENT
Start: 2024-03-22 | End: 2024-03-24 | Stop reason: HOSPADM

## 2024-03-22 RX ORDER — MAGNESIUM SULFATE IN WATER 40 MG/ML
2000 INJECTION, SOLUTION INTRAVENOUS PRN
Status: DISCONTINUED | OUTPATIENT
Start: 2024-03-22 | End: 2024-03-24 | Stop reason: HOSPADM

## 2024-03-22 RX ORDER — SODIUM CHLORIDE 0.9 % (FLUSH) 0.9 %
5-40 SYRINGE (ML) INJECTION PRN
Status: DISCONTINUED | OUTPATIENT
Start: 2024-03-22 | End: 2024-03-24 | Stop reason: HOSPADM

## 2024-03-22 RX ORDER — POTASSIUM CHLORIDE 7.45 MG/ML
10 INJECTION INTRAVENOUS PRN
Status: DISCONTINUED | OUTPATIENT
Start: 2024-03-22 | End: 2024-03-24 | Stop reason: HOSPADM

## 2024-03-22 RX ORDER — POTASSIUM CHLORIDE 20 MEQ/1
40 TABLET, EXTENDED RELEASE ORAL PRN
Status: DISCONTINUED | OUTPATIENT
Start: 2024-03-22 | End: 2024-03-24 | Stop reason: HOSPADM

## 2024-03-22 RX ORDER — LORAZEPAM 2 MG/ML
4 INJECTION INTRAMUSCULAR PRN
Status: DISCONTINUED | OUTPATIENT
Start: 2024-03-22 | End: 2024-03-22 | Stop reason: SDUPTHER

## 2024-03-22 RX ORDER — LAMOTRIGINE 25 MG/1
75 TABLET ORAL ONCE
Status: COMPLETED | OUTPATIENT
Start: 2024-03-22 | End: 2024-03-22

## 2024-03-22 RX ORDER — ACETAMINOPHEN 325 MG/1
650 TABLET ORAL EVERY 6 HOURS PRN
Status: DISCONTINUED | OUTPATIENT
Start: 2024-03-22 | End: 2024-03-24 | Stop reason: HOSPADM

## 2024-03-22 RX ORDER — ONDANSETRON 4 MG/1
4 TABLET, ORALLY DISINTEGRATING ORAL EVERY 8 HOURS PRN
Status: DISCONTINUED | OUTPATIENT
Start: 2024-03-22 | End: 2024-03-24 | Stop reason: HOSPADM

## 2024-03-22 RX ORDER — ENOXAPARIN SODIUM 100 MG/ML
40 INJECTION SUBCUTANEOUS DAILY
Status: DISCONTINUED | OUTPATIENT
Start: 2024-03-23 | End: 2024-03-24 | Stop reason: HOSPADM

## 2024-03-22 RX ORDER — ACETAMINOPHEN 650 MG/1
650 SUPPOSITORY RECTAL EVERY 6 HOURS PRN
Status: DISCONTINUED | OUTPATIENT
Start: 2024-03-22 | End: 2024-03-24 | Stop reason: HOSPADM

## 2024-03-22 RX ORDER — SODIUM CHLORIDE 0.9 % (FLUSH) 0.9 %
5-40 SYRINGE (ML) INJECTION EVERY 12 HOURS SCHEDULED
Status: DISCONTINUED | OUTPATIENT
Start: 2024-03-22 | End: 2024-03-24 | Stop reason: HOSPADM

## 2024-03-22 RX ORDER — SODIUM CHLORIDE 9 MG/ML
INJECTION, SOLUTION INTRAVENOUS PRN
Status: DISCONTINUED | OUTPATIENT
Start: 2024-03-22 | End: 2024-03-24 | Stop reason: HOSPADM

## 2024-03-22 RX ADMIN — LAMOTRIGINE 75 MG: 25 TABLET ORAL at 21:45

## 2024-03-22 ASSESSMENT — PAIN - FUNCTIONAL ASSESSMENT
PAIN_FUNCTIONAL_ASSESSMENT: WONG-BAKER FACES
PAIN_FUNCTIONAL_ASSESSMENT: NONE - DENIES PAIN

## 2024-03-22 ASSESSMENT — PAIN SCALES - GENERAL: PAINLEVEL_OUTOF10: 0

## 2024-03-22 NOTE — ED PROVIDER NOTES
[facility] EMERGENCY DEPARTMENT  EMERGENCY DEPARTMENT ENCOUNTER          Pt Name: Mag Parrish  MRN: [id number]  Birthdate 1955  Date of evaluation: 3/22/2024  Resident Physician: Bill Morales MD EM Resident PGY-2  Attending Physician: Basim Tian DO      CHIEF COMPLAINT       Chief Complaint   Patient presents with    Fatigue    Seizures         HISTORY OF PRESENT ILLNESS    HPI  Mag Parrish is a 68 y.o. female who presents to the emergency department from home, brought in by EMS for evaluation of altered mental status, potential seizures.  Patient was reported to have had multiple seizure-like activities tonic-clonic jerking of upper lower extremities and been minimally responsive at home.  Patient brought in by EMS reported blood glucose of 120 patient minimally responsive per them.  On arrival to ED patient had GCS of 13 would open eyes to verbal response and was confused did not get the year correct but was correctly oriented to name and place.  Patient had no tonic-clonic movement on initial exam.  Somnolent but arousable.    Patient has history of seizures was seen outpatient at [facility] neurology recommended to start Lamictal.      The patient has no other acute complaints at this time.    ROS negative except as stated above.    PAST MEDICAL AND SURGICAL HISTORY     Past Medical History:   Diagnosis Date    Acute arterial ischemic stroke, multifocal, anterior circulation (HCC)     Dr Dial    Anemia     Dr Jerome    Anxiety     Arthritis     Chicken pox     Chronic systolic (congestive) heart failure 07/26/2023    Dementia (HCC)     Endometrial adenocarcinoma (HCC) 01/30/2020    Hyperlipidemia     Hypertension     Dr Spring    Hypothyroidism     Measles     Memory loss     Mumps     Uterine cancer (HCC) 01/2020    Endometrioid carcinomaChaz     Past Surgical History:   Procedure Laterality Date    ENDOMETRIAL BIOPSY  12/11/2019    HYSTERECTOMY, TOTAL ABDOMINAL (CERVIX

## 2024-03-22 NOTE — ED TRIAGE NOTES
Patient presents to ED from home via EMS for seizure like activity. Per EMS, patient has been progressively weak and \"unresponsive\" throughout the day. Family states they went to check on her earlier today and patient was on the couch, unable to get to her feet. Family states patient had \"arm jerking and straightening\" patient with history of seizures. Patient is alert to voice, VSS at this time. Per EMS blood sugar was 121 in route to hospital.

## 2024-03-23 PROBLEM — R56.9 SEIZURES (HCC): Status: ACTIVE | Noted: 2024-03-23

## 2024-03-23 LAB
ANION GAP SERPL CALC-SCNC: 14 MEQ/L (ref 8–16)
ANION GAP SERPL CALC-SCNC: 18 MEQ/L (ref 8–16)
BASOPHILS ABSOLUTE: 0.1 THOU/MM3 (ref 0–0.1)
BASOPHILS NFR BLD AUTO: 0.7 %
BUN SERPL-MCNC: 16 MG/DL (ref 7–22)
BUN SERPL-MCNC: 17 MG/DL (ref 7–22)
CALCIUM SERPL-MCNC: 9 MG/DL (ref 8.5–10.5)
CALCIUM SERPL-MCNC: 9.1 MG/DL (ref 8.5–10.5)
CHLORIDE SERPL-SCNC: 102 MEQ/L (ref 98–111)
CHLORIDE SERPL-SCNC: 103 MEQ/L (ref 98–111)
CO2 SERPL-SCNC: 22 MEQ/L (ref 23–33)
CO2 SERPL-SCNC: 25 MEQ/L (ref 23–33)
CREAT SERPL-MCNC: 1 MG/DL (ref 0.4–1.2)
CREAT SERPL-MCNC: 1 MG/DL (ref 0.4–1.2)
DEPRECATED RDW RBC AUTO: 45.8 FL (ref 35–45)
EOSINOPHIL NFR BLD AUTO: 2.5 %
EOSINOPHILS ABSOLUTE: 0.2 THOU/MM3 (ref 0–0.4)
ERYTHROCYTE [DISTWIDTH] IN BLOOD BY AUTOMATED COUNT: 12.2 % (ref 11.5–14.5)
GFR SERPL CREATININE-BSD FRML MDRD: 60 ML/MIN/1.73M2
GFR SERPL CREATININE-BSD FRML MDRD: 60 ML/MIN/1.73M2
GLUCOSE BLD STRIP.AUTO-MCNC: 89 MG/DL (ref 70–108)
GLUCOSE SERPL-MCNC: 83 MG/DL (ref 70–108)
GLUCOSE SERPL-MCNC: 92 MG/DL (ref 70–108)
HCT VFR BLD AUTO: 37.9 % (ref 37–47)
HGB BLD-MCNC: 12.5 GM/DL (ref 12–16)
IMM GRANULOCYTES # BLD AUTO: 0.03 THOU/MM3 (ref 0–0.07)
IMM GRANULOCYTES NFR BLD AUTO: 0.4 %
LYMPHOCYTES ABSOLUTE: 2.1 THOU/MM3 (ref 1–4.8)
LYMPHOCYTES NFR BLD AUTO: 27.4 %
MAGNESIUM SERPL-MCNC: 1.6 MG/DL (ref 1.6–2.4)
MCH RBC QN AUTO: 33.4 PG (ref 26–33)
MCHC RBC AUTO-ENTMCNC: 33 GM/DL (ref 32.2–35.5)
MCV RBC AUTO: 101.3 FL (ref 81–99)
MONOCYTES ABSOLUTE: 0.7 THOU/MM3 (ref 0.4–1.3)
MONOCYTES NFR BLD AUTO: 9.4 %
NEUTROPHILS NFR BLD AUTO: 59.6 %
NRBC BLD AUTO-RTO: 0 /100 WBC
PLATELET # BLD AUTO: 143 THOU/MM3 (ref 130–400)
PMV BLD AUTO: 11.1 FL (ref 9.4–12.4)
POTASSIUM SERPL-SCNC: 3.8 MEQ/L (ref 3.5–5.2)
POTASSIUM SERPL-SCNC: 4.1 MEQ/L (ref 3.5–5.2)
RBC # BLD AUTO: 3.74 MILL/MM3 (ref 4.2–5.4)
SEGMENTED NEUTROPHILS ABSOLUTE COUNT: 4.6 THOU/MM3 (ref 1.8–7.7)
SODIUM SERPL-SCNC: 142 MEQ/L (ref 135–145)
SODIUM SERPL-SCNC: 142 MEQ/L (ref 135–145)
TROPONIN, HIGH SENSITIVITY: 12 NG/L (ref 0–12)
WBC # BLD AUTO: 7.7 THOU/MM3 (ref 4.8–10.8)

## 2024-03-23 PROCEDURE — 96372 THER/PROPH/DIAG INJ SC/IM: CPT

## 2024-03-23 PROCEDURE — 83735 ASSAY OF MAGNESIUM: CPT

## 2024-03-23 PROCEDURE — 2580000003 HC RX 258: Performed by: STUDENT IN AN ORGANIZED HEALTH CARE EDUCATION/TRAINING PROGRAM

## 2024-03-23 PROCEDURE — G0378 HOSPITAL OBSERVATION PER HR: HCPCS

## 2024-03-23 PROCEDURE — 6370000000 HC RX 637 (ALT 250 FOR IP): Performed by: STUDENT IN AN ORGANIZED HEALTH CARE EDUCATION/TRAINING PROGRAM

## 2024-03-23 PROCEDURE — 2500000003 HC RX 250 WO HCPCS

## 2024-03-23 PROCEDURE — 93010 ELECTROCARDIOGRAM REPORT: CPT | Performed by: INTERNAL MEDICINE

## 2024-03-23 PROCEDURE — 80048 BASIC METABOLIC PNL TOTAL CA: CPT

## 2024-03-23 PROCEDURE — 6360000002 HC RX W HCPCS: Performed by: STUDENT IN AN ORGANIZED HEALTH CARE EDUCATION/TRAINING PROGRAM

## 2024-03-23 PROCEDURE — 6370000000 HC RX 637 (ALT 250 FOR IP)

## 2024-03-23 PROCEDURE — 85025 COMPLETE CBC W/AUTO DIFF WBC: CPT

## 2024-03-23 PROCEDURE — 99232 SBSQ HOSP IP/OBS MODERATE 35: CPT

## 2024-03-23 PROCEDURE — 82948 REAGENT STRIP/BLOOD GLUCOSE: CPT

## 2024-03-23 PROCEDURE — 36415 COLL VENOUS BLD VENIPUNCTURE: CPT

## 2024-03-23 RX ORDER — ASPIRIN 81 MG/1
81 TABLET ORAL NIGHTLY
Status: DISCONTINUED | OUTPATIENT
Start: 2024-03-23 | End: 2024-03-24 | Stop reason: HOSPADM

## 2024-03-23 RX ORDER — LOSARTAN POTASSIUM AND HYDROCHLOROTHIAZIDE 25; 100 MG/1; MG/1
0.5 TABLET ORAL DAILY
Status: DISCONTINUED | OUTPATIENT
Start: 2024-03-23 | End: 2024-03-23 | Stop reason: SDUPTHER

## 2024-03-23 RX ORDER — LAMOTRIGINE 200 MG/1
200 TABLET ORAL DAILY
Qty: 30 TABLET | Refills: 0 | Status: SHIPPED | OUTPATIENT
Start: 2024-03-24

## 2024-03-23 RX ORDER — FOLIC ACID 1 MG/1
1000 TABLET ORAL DAILY
Status: DISCONTINUED | OUTPATIENT
Start: 2024-03-23 | End: 2024-03-24 | Stop reason: HOSPADM

## 2024-03-23 RX ORDER — LOSARTAN POTASSIUM 50 MG/1
50 TABLET ORAL DAILY
Status: DISCONTINUED | OUTPATIENT
Start: 2024-03-23 | End: 2024-03-24 | Stop reason: HOSPADM

## 2024-03-23 RX ORDER — CLOPIDOGREL BISULFATE 75 MG/1
75 TABLET ORAL DAILY
Status: DISCONTINUED | OUTPATIENT
Start: 2024-03-23 | End: 2024-03-24 | Stop reason: HOSPADM

## 2024-03-23 RX ORDER — TRAZODONE HYDROCHLORIDE 100 MG/1
100 TABLET ORAL NIGHTLY
Status: DISCONTINUED | OUTPATIENT
Start: 2024-03-23 | End: 2024-03-24 | Stop reason: HOSPADM

## 2024-03-23 RX ORDER — DIVALPROEX SODIUM 125 MG/1
125 CAPSULE, COATED PELLETS ORAL 2 TIMES DAILY
Status: DISCONTINUED | OUTPATIENT
Start: 2024-03-23 | End: 2024-03-24 | Stop reason: HOSPADM

## 2024-03-23 RX ORDER — SUCRALFATE 1 G/1
1 TABLET ORAL 4 TIMES DAILY
Status: DISCONTINUED | OUTPATIENT
Start: 2024-03-23 | End: 2024-03-24 | Stop reason: HOSPADM

## 2024-03-23 RX ORDER — HYDROCHLOROTHIAZIDE 25 MG/1
12.5 TABLET ORAL DAILY
Status: DISCONTINUED | OUTPATIENT
Start: 2024-03-23 | End: 2024-03-24 | Stop reason: HOSPADM

## 2024-03-23 RX ORDER — SERTRALINE HYDROCHLORIDE 100 MG/1
200 TABLET, FILM COATED ORAL DAILY
Status: DISCONTINUED | OUTPATIENT
Start: 2024-03-23 | End: 2024-03-24 | Stop reason: HOSPADM

## 2024-03-23 RX ORDER — LANOLIN ALCOHOL/MO/W.PET/CERES
3 CREAM (GRAM) TOPICAL NIGHTLY
Status: DISCONTINUED | OUTPATIENT
Start: 2024-03-23 | End: 2024-03-24 | Stop reason: HOSPADM

## 2024-03-23 RX ORDER — RIVASTIGMINE 9.5 MG/24H
1 PATCH, EXTENDED RELEASE TRANSDERMAL DAILY
Status: DISCONTINUED | OUTPATIENT
Start: 2024-03-23 | End: 2024-03-24 | Stop reason: HOSPADM

## 2024-03-23 RX ORDER — METOPROLOL SUCCINATE 25 MG/1
25 TABLET, EXTENDED RELEASE ORAL DAILY
Status: DISCONTINUED | OUTPATIENT
Start: 2024-03-23 | End: 2024-03-24 | Stop reason: HOSPADM

## 2024-03-23 RX ORDER — PANTOPRAZOLE SODIUM 40 MG/1
40 TABLET, DELAYED RELEASE ORAL
Status: DISCONTINUED | OUTPATIENT
Start: 2024-03-23 | End: 2024-03-24 | Stop reason: HOSPADM

## 2024-03-23 RX ORDER — SODIUM CHLORIDE, SODIUM LACTATE, POTASSIUM CHLORIDE, CALCIUM CHLORIDE 600; 310; 30; 20 MG/100ML; MG/100ML; MG/100ML; MG/100ML
INJECTION, SOLUTION INTRAVENOUS CONTINUOUS
Status: DISCONTINUED | OUTPATIENT
Start: 2024-03-23 | End: 2024-03-24

## 2024-03-23 RX ORDER — LEVOTHYROXINE SODIUM 0.1 MG/1
200 TABLET ORAL DAILY
Status: DISCONTINUED | OUTPATIENT
Start: 2024-03-23 | End: 2024-03-24 | Stop reason: HOSPADM

## 2024-03-23 RX ORDER — LAMOTRIGINE 100 MG/1
200 TABLET ORAL DAILY
Status: DISCONTINUED | OUTPATIENT
Start: 2024-03-23 | End: 2024-03-24 | Stop reason: HOSPADM

## 2024-03-23 RX ADMIN — ENOXAPARIN SODIUM 40 MG: 100 INJECTION SUBCUTANEOUS at 09:23

## 2024-03-23 RX ADMIN — FOLIC ACID 1000 MCG: 1 TABLET ORAL at 09:22

## 2024-03-23 RX ADMIN — SODIUM CHLORIDE, PRESERVATIVE FREE 10 ML: 5 INJECTION INTRAVENOUS at 00:10

## 2024-03-23 RX ADMIN — SODIUM CHLORIDE, POTASSIUM CHLORIDE, SODIUM LACTATE AND CALCIUM CHLORIDE: 600; 310; 30; 20 INJECTION, SOLUTION INTRAVENOUS at 01:08

## 2024-03-23 RX ADMIN — LEVOTHYROXINE SODIUM 200 MCG: 0.1 TABLET ORAL at 06:05

## 2024-03-23 RX ADMIN — SUCRALFATE 1 G: 1 TABLET ORAL at 12:10

## 2024-03-23 RX ADMIN — SUCRALFATE 1 G: 1 TABLET ORAL at 20:33

## 2024-03-23 RX ADMIN — SUCRALFATE 1 G: 1 TABLET ORAL at 09:21

## 2024-03-23 RX ADMIN — SODIUM CHLORIDE, PRESERVATIVE FREE 10 ML: 5 INJECTION INTRAVENOUS at 20:34

## 2024-03-23 RX ADMIN — MICONAZOLE NITRATE: 2 POWDER TOPICAL at 20:33

## 2024-03-23 RX ADMIN — DIVALPROEX SODIUM 125 MG: 125 CAPSULE, COATED PELLETS ORAL at 20:33

## 2024-03-23 RX ADMIN — LOSARTAN POTASSIUM 50 MG: 50 TABLET, FILM COATED ORAL at 09:22

## 2024-03-23 RX ADMIN — TRAZODONE HYDROCHLORIDE 100 MG: 100 TABLET ORAL at 20:33

## 2024-03-23 RX ADMIN — DIVALPROEX SODIUM 125 MG: 125 CAPSULE, COATED PELLETS ORAL at 09:22

## 2024-03-23 RX ADMIN — ASPIRIN 81 MG: 81 TABLET, COATED ORAL at 20:33

## 2024-03-23 RX ADMIN — SODIUM CHLORIDE, POTASSIUM CHLORIDE, SODIUM LACTATE AND CALCIUM CHLORIDE: 600; 310; 30; 20 INJECTION, SOLUTION INTRAVENOUS at 17:05

## 2024-03-23 RX ADMIN — HYDROCHLOROTHIAZIDE 12.5 MG: 25 TABLET ORAL at 09:22

## 2024-03-23 RX ADMIN — LAMOTRIGINE 200 MG: 100 TABLET ORAL at 12:08

## 2024-03-23 RX ADMIN — CLOPIDOGREL BISULFATE 75 MG: 75 TABLET ORAL at 09:22

## 2024-03-23 RX ADMIN — SUCRALFATE 1 G: 1 TABLET ORAL at 16:31

## 2024-03-23 RX ADMIN — SERTRALINE 200 MG: 100 TABLET, FILM COATED ORAL at 09:22

## 2024-03-23 RX ADMIN — MICONAZOLE NITRATE: 2 POWDER TOPICAL at 12:08

## 2024-03-23 RX ADMIN — Medication 3 MG: at 20:33

## 2024-03-23 RX ADMIN — ACETAMINOPHEN 650 MG: 325 TABLET ORAL at 03:56

## 2024-03-23 RX ADMIN — METOPROLOL SUCCINATE 25 MG: 25 TABLET, EXTENDED RELEASE ORAL at 09:22

## 2024-03-23 RX ADMIN — DIVALPROEX SODIUM 125 MG: 125 CAPSULE, COATED PELLETS ORAL at 01:08

## 2024-03-23 RX ADMIN — PANTOPRAZOLE SODIUM 40 MG: 40 TABLET, DELAYED RELEASE ORAL at 06:05

## 2024-03-23 ASSESSMENT — PAIN DESCRIPTION - ORIENTATION
ORIENTATION: MID
ORIENTATION: MID

## 2024-03-23 ASSESSMENT — PAIN SCALES - GENERAL
PAINLEVEL_OUTOF10: 3
PAINLEVEL_OUTOF10: 8

## 2024-03-23 ASSESSMENT — PAIN DESCRIPTION - FREQUENCY: FREQUENCY: INTERMITTENT

## 2024-03-23 ASSESSMENT — PAIN DESCRIPTION - PAIN TYPE: TYPE: ACUTE PAIN

## 2024-03-23 ASSESSMENT — PAIN DESCRIPTION - LOCATION
LOCATION: HEAD
LOCATION: ABDOMEN

## 2024-03-23 ASSESSMENT — PAIN DESCRIPTION - DESCRIPTORS
DESCRIPTORS: ACHING
DESCRIPTORS: ACHING

## 2024-03-23 ASSESSMENT — PAIN DESCRIPTION - ONSET: ONSET: GRADUAL

## 2024-03-23 ASSESSMENT — PAIN - FUNCTIONAL ASSESSMENT: PAIN_FUNCTIONAL_ASSESSMENT: ACTIVITIES ARE NOT PREVENTED

## 2024-03-23 NOTE — ED NOTES
Spoke to ELVIN Zhang to approve pt transport to [facility] in stable condition.

## 2024-03-23 NOTE — DISCHARGE SUMMARY
Hospitalist Discharge Summary    Patient: Mag Parrish  YOB: 1955  MRN: [id number]   Acct: [id number]    Primary Care Physician: Mary Black MD    Admit date  3/22/2024    Discharge date:      Discharge Assessment and Plan:    Seizures: Patient witnessed by family with UE rigidity and shaking, LE rigidity.  Said occurred for 5 to 10 minutes with significant postictal state.  Has past medical history of stroke, Alzheimer's.  CT head negative for acute process.  Per family, unsure if patient is actually taking her medications as directed  Fall/seizure precautions  Ativan as needed to terminate seizure  Continue home Lamictal, per family patient increased to 200 XL.  Will initiate on increased dose (consistent with medication fill history).  Lamotrigine level pending     Dementia-Alzheimer's with late onset: Per ICU workup.  Patient is on Exelon patch.  History of Aricept in 2020 and Exelon patch in August 2023.  Patient to start taking it February 2024.  Alert and oriented x 2 on admission     Chronic HFrEF: Echo 12/2022 shows EF 40 to 50% %. Does not appear acutely decompensated.  On Hyzaar, Toprol-XL. Strict I&Os. Daily weights. Fluid/salt restriction.      Essential hypertension: Hyzaar and Toprol     HLD: Zocor     Known MR and moderate CAD: Follows with Dr. Parikh outpatient.  Continue Plavix, aspirin and metoprolol     History of CVA with residual deficits: Left occipital stroke multiple lacunar infarcts/chronic microvascular angiopathy.  Saw Dr. Velasco in 2019.  Currently follows with [facility] neurology.  No new findings of stroke on CT head or CTA head and neck.  Continue Plavix and aspirin     Hypothyroidism: Synthroid     Obesity: BMI 36.34 kg/M2       Chief Complaint on presentation: AMS, breakthrough seizure    Initial H&P / Hospital Course: Per H&P 3/22 \"aly Parrish is a 68 y.o. female with PMHx of CVA, anemia, anxiety, arthritis, varicella, HFrEF, dementia, HLD, HTN, hypothyroid,

## 2024-03-23 NOTE — PLAN OF CARE
Problem: Skin/Tissue Integrity  Goal: Absence of new skin breakdown  Description: 1.  Monitor for areas of redness and/or skin breakdown  2.  Assess vascular access sites hourly  3.  Every 4-6 hours minimum:  Change oxygen saturation probe site  4.  Every 4-6 hours:  If on nasal continuous positive airway pressure, respiratory therapy assess nares and determine need for appliance change or resting period.  Outcome: Progressing     Problem: Discharge Planning  Goal: Discharge to home or other facility with appropriate resources  Flowsheets (Taken 3/23/2024 0034)  Discharge to home or other facility with appropriate resources:   Identify barriers to discharge with patient and caregiver   Arrange for needed discharge resources and transportation as appropriate   Identify discharge learning needs (meds, wound care, etc)   Arrange for interpreters to assist at discharge as needed   Refer to discharge planning if patient needs post-hospital services based on physician order or complex needs related to functional status, cognitive ability or social support system     Problem: Safety - Adult  Goal: Free from fall injury  Flowsheets (Taken 3/23/2024 0034)  Free From Fall Injury:   Instruct family/caregiver on patient safety   Based on caregiver fall risk screen, instruct family/caregiver to ask for assistance with transferring infant if caregiver noted to have fall risk factors

## 2024-03-23 NOTE — ED NOTES
ED to inpatient nurses report      Chief Complaint:  Chief Complaint   Patient presents with    Fatigue    Seizures     Present to ED from: home    MOA:     LOC: alert and orientated to name, place, date  Mobility: Requires assistance * 1  Oxygen Baseline: RA    Current needs required: RA     Code Status:   Prior    What abnormal results were found and what did you give/do to treat them? N/A  Any procedures or intervention occur? N/A    Mental Status:  Level of Consciousness: Alert (0)    Psych Assessment:        Vitals:  Patient Vitals for the past 24 hrs:   BP Temp Temp src Pulse Resp SpO2 Height Weight   03/22/24 2130 (!) 146/67 -- -- 61 18 95 % -- --   03/22/24 2030 135/79 -- -- 65 20 96 % -- --   03/22/24 2025 -- -- -- 59 18 -- -- --   03/22/24 1925 (!) 147/71 97.9 °F (36.6 °C) Oral 70 16 95 % 1.524 m (5') 83.9 kg (185 lb)        LDAs:   Peripheral IV 03/22/24 Left Antecubital (Active)   Site Assessment Clean, dry & intact 03/22/24 2031   Line Status Blood return noted 03/22/24 2031   Line Care Cap changed 03/22/24 2031   Phlebitis Assessment No symptoms 03/22/24 2031   Infiltration Assessment 0 03/22/24 2031   Alcohol Cap Used No 03/22/24 2031   Dressing Status Clean, dry & intact 03/22/24 2031   Dressing Type Transparent 03/22/24 2031   Dressing Intervention New 03/22/24 2031       Peripheral IV 03/22/24 Left;Posterior Hand (Active)   Site Assessment Clean, dry & intact 03/22/24 2031   Line Status Blood return noted 03/22/24 2031   Line Care Cap changed 03/22/24 2031   Phlebitis Assessment No symptoms 03/22/24 2031   Infiltration Assessment 0 03/22/24 2031   Alcohol Cap Used No 03/22/24 2031   Dressing Status Clean, dry & intact 03/22/24 2031   Dressing Type Transparent 03/22/24 2031   Dressing Intervention New 03/22/24 2031       Ambulatory Status:  No data recorded    Diagnosis:  DISPOSITION Decision To Admit 03/22/2024 09:21:56 PM   Final diagnoses:   Seizures (HCC)   Altered mental status, unspecified

## 2024-03-23 NOTE — PROGRESS NOTES
Hospitalist Progress Note    Patient:  Mag Parrish    YOB: 1955  Unit/Bed:-21/021-A  Date of Admission: 3/22/2024  Code Status: Full Code      Assessment/Plan:    Seizures: Patient witnessed by family with UE rigidity and shaking, LE rigidity.  Said occurred for 5 to 10 minutes with significant postictal state.  Has past medical history of stroke, Alzheimer's. Electrolytes wnl. BG 84 on admission. UA unremarkable.  CT head negative for acute process.  Per family, unsure if patient is actually taking her medications as directed  Fall/seizure precautions  Ativan as needed to terminate seizure  Continue home Lamictal, per family patient increased to 200 XL.  Will initiate on increased dose (consistent with medication fill history).  Lamotrigine level pending     Dementia-Alzheimer's with late onset: Per ICU workup.  Patient is on Exelon patch.  History of Aricept in 2020 and Exelon patch in August 2023.  Patient to start taking it February 2024.  Alert and oriented x 2 on admission     Chronic HFrEF: Echo 12/2022 shows EF 40 to 50% %. Does not appear acutely decompensated.  On Hyzaar, Toprol-XL. Strict I&Os. Daily weights. Fluid/salt restriction.      Essential hypertension: Hyzaar and Toprol     HLD: Zocor     Known MR and moderate CAD: Follows with Dr. Parikh outpatient.  Continue Plavix, aspirin and metoprolol     History of CVA with residual deficits: Left occipital stroke multiple lacunar infarcts/chronic microvascular angiopathy.  Saw Dr. Velasco in 2019.  Currently follows with [facility] neurology.  No new findings of stroke on CT head or CTA head and neck.  Continue Plavix and aspirin     Hypothyroidism: Synthroid     Obesity: BMI 36.34 kg/M2    LDA: []CVC / []PICC / []Midline / []Nayak / []Drains / []Mediport / [x]None  Antibiotics: none  Steroids: none  Labs (still needed?): [x]Yes / []No  IVF (still needed?): [x]Yes / []No    Level of care: []Step Down / [x]Med-Surg  Bed Status:

## 2024-03-23 NOTE — ED NOTES
Pt vitals collected. Pt and pt family updated on plan iof care. Pt denies any needs at this time. Pt respirations easy and unlabored.

## 2024-03-23 NOTE — H&P
Internal Medicine Resident History and Physical          Patient: Mag Parrish  : 1955  MRN: [id number]     Acct: [id number]    PCP: Mary Black MD  Date of Admission: 3/22/2024  Date of Service: Pt seen/examined on 24  and Admitted to Observation with expected LOS less than two midnights due to medical therapy.           Assessment and Plan:  Seizure: Patient was seen by family with UE rigidity and shaking, LE rigidity.  Said occurred for 5-10 minutes. Also had significant postictal state.  Family states that she was unsure of who they were, where she was, minimally interactive.  Has PMHx of stroke, Alzheimer's.  No strokelike symptoms on exam.  CT head negative for acute process.  -Fall risk precautions  -Up with assistance  -Monitor on telemetry  -Ativan prn to terminate seizure  -Continue home Lamictal.  Per family patient to increase to 200 XL tomorrow.  Will start on Lamictal 200 XL qd  -Will order lamotrigine level  Dementia-Alzheimer's w/ late onset: Per [facility].  Follows with Vilma CIFUENTES at [facility]. Patient is on Exelon patch.  They state she has some memory issues.  Patient is A&O x 2 on exam.  She has history of Aricept in  and Exelon patch in 2023.  Patient just started taking again 2024.  On her second month.  -Continue Exelon 9.5 qd   HFrEF: Not in acute exacerbation.  Last echo  shows EF 45-50% GDMT: ARB: Hyzaar 100/25 SGLT2: None BB: Toprol-XL 25 aldosterone antagonist: None.  Patient is not fluid overloaded at this time.  -Continue Hyzaar 100/25 qd and Toprol-XL 25 qd  Essential HTN: Patient takes Hyzaar 100/25, Toprol-XL 25.   -Continued Hyzaar 100/25 and Toprol-XL 25 in the hospital.  HLD: Cholesterol panel 10/6/2023 showed cholesterol 186, HDL 73, LDL 97, triglycerides 81.  On Zocor 80 outpatient.  -Zocor is not carried on our formulary.  Suggest patient changed to atorvastatin as Zocor 80 use associated with myalgias.  Patient currently admitted

## 2024-03-23 NOTE — DISCHARGE INSTRUCTIONS
You were admitted with concerns for a seizure.  You were initiated on a increased dose of Lamictal as per the guidance of your neurologist at [facility]; it is imperative to take your medication daily.  Please follow-up with both your primary care provider and your neurologist.

## 2024-03-24 VITALS
WEIGHT: 182.98 LBS | HEART RATE: 65 BPM | SYSTOLIC BLOOD PRESSURE: 148 MMHG | OXYGEN SATURATION: 98 % | DIASTOLIC BLOOD PRESSURE: 97 MMHG | BODY MASS INDEX: 35.92 KG/M2 | RESPIRATION RATE: 20 BRPM | HEIGHT: 60 IN | TEMPERATURE: 97.4 F

## 2024-03-24 LAB
ANION GAP SERPL CALC-SCNC: 13 MEQ/L (ref 8–16)
BUN SERPL-MCNC: 17 MG/DL (ref 7–22)
CALCIUM SERPL-MCNC: 8.6 MG/DL (ref 8.5–10.5)
CHLORIDE SERPL-SCNC: 105 MEQ/L (ref 98–111)
CO2 SERPL-SCNC: 25 MEQ/L (ref 23–33)
CREAT SERPL-MCNC: 1 MG/DL (ref 0.4–1.2)
DEPRECATED RDW RBC AUTO: 46.1 FL (ref 35–45)
ERYTHROCYTE [DISTWIDTH] IN BLOOD BY AUTOMATED COUNT: 12.2 % (ref 11.5–14.5)
GFR SERPL CREATININE-BSD FRML MDRD: 60 ML/MIN/1.73M2
GLUCOSE SERPL-MCNC: 87 MG/DL (ref 70–108)
HCT VFR BLD AUTO: 38.3 % (ref 37–47)
HGB BLD-MCNC: 12.5 GM/DL (ref 12–16)
LAMOTRIGINE SERPL-MCNC: 11.4 UG/ML (ref 3–15)
LAMOTRIGINE SERPL-MCNC: 11.9 UG/ML (ref 3–15)
MCH RBC QN AUTO: 33.5 PG (ref 26–33)
MCHC RBC AUTO-ENTMCNC: 32.6 GM/DL (ref 32.2–35.5)
MCV RBC AUTO: 102.7 FL (ref 81–99)
PLATELET # BLD AUTO: 131 THOU/MM3 (ref 130–400)
PMV BLD AUTO: 11.6 FL (ref 9.4–12.4)
POTASSIUM SERPL-SCNC: 3.8 MEQ/L (ref 3.5–5.2)
RBC # BLD AUTO: 3.73 MILL/MM3 (ref 4.2–5.4)
SODIUM SERPL-SCNC: 143 MEQ/L (ref 135–145)
WBC # BLD AUTO: 7.3 THOU/MM3 (ref 4.8–10.8)

## 2024-03-24 PROCEDURE — 6360000002 HC RX W HCPCS: Performed by: STUDENT IN AN ORGANIZED HEALTH CARE EDUCATION/TRAINING PROGRAM

## 2024-03-24 PROCEDURE — 6370000000 HC RX 637 (ALT 250 FOR IP)

## 2024-03-24 PROCEDURE — 2580000003 HC RX 258: Performed by: STUDENT IN AN ORGANIZED HEALTH CARE EDUCATION/TRAINING PROGRAM

## 2024-03-24 PROCEDURE — 36415 COLL VENOUS BLD VENIPUNCTURE: CPT

## 2024-03-24 PROCEDURE — G0378 HOSPITAL OBSERVATION PER HR: HCPCS

## 2024-03-24 PROCEDURE — 85027 COMPLETE CBC AUTOMATED: CPT

## 2024-03-24 PROCEDURE — 80048 BASIC METABOLIC PNL TOTAL CA: CPT

## 2024-03-24 PROCEDURE — 6370000000 HC RX 637 (ALT 250 FOR IP): Performed by: STUDENT IN AN ORGANIZED HEALTH CARE EDUCATION/TRAINING PROGRAM

## 2024-03-24 PROCEDURE — 99239 HOSP IP/OBS DSCHRG MGMT >30: CPT

## 2024-03-24 PROCEDURE — 96372 THER/PROPH/DIAG INJ SC/IM: CPT

## 2024-03-24 RX ADMIN — LEVOTHYROXINE SODIUM 200 MCG: 0.1 TABLET ORAL at 05:07

## 2024-03-24 RX ADMIN — LAMOTRIGINE 200 MG: 100 TABLET ORAL at 08:06

## 2024-03-24 RX ADMIN — PANTOPRAZOLE SODIUM 40 MG: 40 TABLET, DELAYED RELEASE ORAL at 05:07

## 2024-03-24 RX ADMIN — CLOPIDOGREL BISULFATE 75 MG: 75 TABLET ORAL at 08:06

## 2024-03-24 RX ADMIN — SERTRALINE 200 MG: 100 TABLET, FILM COATED ORAL at 08:06

## 2024-03-24 RX ADMIN — LOSARTAN POTASSIUM 50 MG: 50 TABLET, FILM COATED ORAL at 08:06

## 2024-03-24 RX ADMIN — METOPROLOL SUCCINATE 25 MG: 25 TABLET, EXTENDED RELEASE ORAL at 08:06

## 2024-03-24 RX ADMIN — HYDROCHLOROTHIAZIDE 12.5 MG: 25 TABLET ORAL at 08:06

## 2024-03-24 RX ADMIN — ENOXAPARIN SODIUM 40 MG: 100 INJECTION SUBCUTANEOUS at 08:07

## 2024-03-24 RX ADMIN — SUCRALFATE 1 G: 1 TABLET ORAL at 08:06

## 2024-03-24 RX ADMIN — SODIUM CHLORIDE, PRESERVATIVE FREE 10 ML: 5 INJECTION INTRAVENOUS at 08:07

## 2024-03-24 RX ADMIN — DIVALPROEX SODIUM 125 MG: 125 CAPSULE, COATED PELLETS ORAL at 08:06

## 2024-03-24 RX ADMIN — SODIUM CHLORIDE, POTASSIUM CHLORIDE, SODIUM LACTATE AND CALCIUM CHLORIDE: 600; 310; 30; 20 INJECTION, SOLUTION INTRAVENOUS at 05:08

## 2024-03-24 RX ADMIN — FOLIC ACID 1000 MCG: 1 TABLET ORAL at 08:07

## 2024-03-24 RX ADMIN — MICONAZOLE NITRATE: 2 POWDER TOPICAL at 08:07

## 2024-03-24 NOTE — PROGRESS NOTES
Patient is very impulsive and is always caught getting out of bed. Re-educated patient about the use of call light and external catheter. Informed CN on duty. Ordered and place a tele-sitter for the patient.

## 2024-03-24 NOTE — PLAN OF CARE
Problem: Skin/Tissue Integrity  Goal: Absence of new skin breakdown  Description: 1.  Monitor for areas of redness and/or skin breakdown  2.  Assess vascular access sites hourly  3.  Every 4-6 hours minimum:  Change oxygen saturation probe site  4.  Every 4-6 hours:  If on nasal continuous positive airway pressure, respiratory therapy assess nares and determine need for appliance change or resting period.  Outcome: Progressing     Problem: Discharge Planning  Goal: Discharge to home or other facility with appropriate resources  3/23/2024 1334 by Katelyn Guerra RN  Outcome: Adequate for Discharge  Flowsheets (Taken 3/23/2024 0845)  Discharge to home or other facility with appropriate resources: Identify barriers to discharge with patient and caregiver     Problem: Safety - Adult  Goal: Free from fall injury  3/23/2024 1334 by Katelyn Guerra RN  Outcome: Adequate for Discharge     Problem: Skin/Tissue Integrity  Goal: Absence of new skin breakdown  Description: 1.  Monitor for areas of redness and/or skin breakdown  2.  Assess vascular access sites hourly  3.  Every 4-6 hours minimum:  Change oxygen saturation probe site  4.  Every 4-6 hours:  If on nasal continuous positive airway pressure, respiratory therapy assess nares and determine need for appliance change or resting period.  3/23/2024 1334 by Katelyn Guerra RN  Outcome: Adequate for Discharge     Problem: Chronic Conditions and Co-morbidities  Goal: Patient's chronic conditions and co-morbidity symptoms are monitored and maintained or improved  3/23/2024 1334 by Katelyn Guerra RN  Outcome: Adequate for Discharge  Flowsheets (Taken 3/23/2024 0845)  Care Plan - Patient's Chronic Conditions and Co-Morbidity Symptoms are Monitored and Maintained or Improved: Monitor and assess patient's chronic conditions and comorbid symptoms for stability, deterioration, or improvement     Problem: Pain  Goal: Verbalizes/displays adequate comfort level or baseline comfort

## 2024-03-25 ENCOUNTER — TELEPHONE (OUTPATIENT)
Dept: FAMILY MEDICINE CLINIC | Age: 69
End: 2024-03-25

## 2024-03-25 NOTE — TELEPHONE ENCOUNTER
Patient's daughter called requesting appt with PCP.  Patient was admitted to [facility] for seizures.  Anastasia states she feels family did not get a lot of answers from hospital.  Daughter denies any seizures since discharge home.  Anastasia states patient is more confused and lethargic.  Anastasia states she knows it is expected for patient to be confused with having dementia.  Anastasia states she spoke with patient's neurologist and they felt it wouldn't be a bad idea to see PCP.    Please advise where to schedule.  Daughter is requesting this week.

## 2024-03-26 ENCOUNTER — COMMUNITY CARE MANAGEMENT (OUTPATIENT)
Facility: CLINIC | Age: 69
End: 2024-03-26

## 2024-03-27 ENCOUNTER — OFFICE VISIT (OUTPATIENT)
Dept: FAMILY MEDICINE CLINIC | Age: 69
End: 2024-03-27

## 2024-03-27 VITALS
WEIGHT: 178.57 LBS | RESPIRATION RATE: 14 BRPM | TEMPERATURE: 97.9 F | SYSTOLIC BLOOD PRESSURE: 130 MMHG | HEART RATE: 70 BPM | DIASTOLIC BLOOD PRESSURE: 78 MMHG | BODY MASS INDEX: 34.88 KG/M2

## 2024-03-27 DIAGNOSIS — I10 ESSENTIAL HYPERTENSION: ICD-10-CM

## 2024-03-27 DIAGNOSIS — G47.01 INSOMNIA DUE TO MEDICAL CONDITION: ICD-10-CM

## 2024-03-27 DIAGNOSIS — F02.80 DEMENTIA IN ALZHEIMER'S DISEASE (HCC): ICD-10-CM

## 2024-03-27 DIAGNOSIS — E03.9 ACQUIRED HYPOTHYROIDISM: ICD-10-CM

## 2024-03-27 DIAGNOSIS — E78.00 PURE HYPERCHOLESTEROLEMIA: ICD-10-CM

## 2024-03-27 DIAGNOSIS — K21.00 GASTROESOPHAGEAL REFLUX DISEASE WITH ESOPHAGITIS WITHOUT HEMORRHAGE: ICD-10-CM

## 2024-03-27 DIAGNOSIS — I50.43 ACUTE ON CHRONIC COMBINED SYSTOLIC AND DIASTOLIC CONGESTIVE HEART FAILURE (HCC): ICD-10-CM

## 2024-03-27 DIAGNOSIS — F33.42 RECURRENT MAJOR DEPRESSIVE DISORDER, IN FULL REMISSION (HCC): ICD-10-CM

## 2024-03-27 DIAGNOSIS — Z86.73 HISTORY OF STROKE: ICD-10-CM

## 2024-03-27 DIAGNOSIS — R56.9 SEIZURE (HCC): Primary | ICD-10-CM

## 2024-03-27 DIAGNOSIS — G30.9 DEMENTIA IN ALZHEIMER'S DISEASE (HCC): ICD-10-CM

## 2024-03-27 DIAGNOSIS — M15.9 PRIMARY OSTEOARTHRITIS INVOLVING MULTIPLE JOINTS: ICD-10-CM

## 2024-03-27 LAB
BACTERIA BLD AEROBE CULT: NORMAL
BACTERIA BLD AEROBE CULT: NORMAL

## 2024-03-27 RX ORDER — LORAZEPAM 2 MG/ML
1 CONCENTRATE ORAL EVERY 4 HOURS PRN
Qty: 30 ML | Refills: 0 | Status: SHIPPED | OUTPATIENT
Start: 2024-03-27 | End: 2024-04-10

## 2024-03-27 RX ORDER — SIMVASTATIN 80 MG
TABLET ORAL
Qty: 90 TABLET | Refills: 3 | Status: SHIPPED | OUTPATIENT
Start: 2024-03-27

## 2024-03-27 RX ORDER — CLOPIDOGREL BISULFATE 75 MG/1
75 TABLET ORAL DAILY
Qty: 90 TABLET | Refills: 3 | Status: SHIPPED | OUTPATIENT
Start: 2024-03-27

## 2024-03-27 RX ORDER — DIVALPROEX SODIUM 125 MG/1
CAPSULE, COATED PELLETS ORAL
Qty: 60 CAPSULE | Refills: 3 | OUTPATIENT
Start: 2024-03-27

## 2024-03-27 ASSESSMENT — PATIENT HEALTH QUESTIONNAIRE - PHQ9: DEPRESSION UNABLE TO ASSESS: FUNCTIONAL CAPACITY MOTIVATION LIMITS ACCURACY

## 2024-03-27 NOTE — PROGRESS NOTES
hypertension    Acquired hypothyroidism    Gastroesophageal reflux disease with esophagitis without hemorrhage    Recurrent major depressive disorder, in full remission (HCC)    Insomnia due to medical condition    Primary osteoarthritis involving multiple joints  -     Handicap Placard MISC; by Does not apply route Dx:  dementia and arthritis, length : lifetime      Go to neurology as planned    Call or return to clinic prn if these symptoms worsen or fail to improve as anticipated.    Discussed use, benefit, and side effectsof prescribed medications.  All patient questions answered.  Pt voiced understanding. Reviewed health maintenance.  Instructed to continue current medications, diet and exercise.  Patient agreed with treatment plan. Followup as directed.     Over 50 minutes spent with patient with >50% spent in counseling and coordination of care    Electronically signed by Mary Black MD

## 2024-03-28 LAB
EKG ATRIAL RATE: 66 BPM
EKG P AXIS: 69 DEGREES
EKG P-R INTERVAL: 154 MS
EKG Q-T INTERVAL: 374 MS
EKG QRS DURATION: 84 MS
EKG QTC CALCULATION (BAZETT): 392 MS
EKG R AXIS: 33 DEGREES
EKG T AXIS: 46 DEGREES
EKG VENTRICULAR RATE: 66 BPM

## 2024-04-01 RX ORDER — SODIUM CHLORIDE 0.9 % (FLUSH) 0.9 %
5-40 SYRINGE (ML) INJECTION PRN
OUTPATIENT
Start: 2024-04-01

## 2024-04-01 RX ORDER — SODIUM CHLORIDE 9 MG/ML
5-250 INJECTION, SOLUTION INTRAVENOUS PRN
OUTPATIENT
Start: 2024-04-01

## 2024-04-01 RX ORDER — ZOLEDRONIC ACID 5 MG/100ML
5 INJECTION, SOLUTION INTRAVENOUS ONCE
OUTPATIENT
Start: 2024-04-01 | End: 2024-04-01

## 2024-04-14 PROBLEM — N18.32 STAGE 3B CHRONIC KIDNEY DISEASE (HCC): Status: ACTIVE | Noted: 2022-05-24

## 2024-04-14 PROBLEM — J96.01 ACUTE HYPOXEMIC RESPIRATORY FAILURE DUE TO COVID-19 (HCC): Status: ACTIVE | Noted: 2021-11-18

## 2024-04-15 NOTE — PROGRESS NOTES
[facility]  [facility]  [address]  [address]  [facility] [address]  Dept: [phone]  Dept Fax: [phone]  Loc: [phone]    Mag Parrish is a 68 y.o. female who presents today for:  Chief Complaint   Patient presents with    6 Month Follow-Up     HTN         HPI:     HPI    Hypertension: Patient here for follow-up of elevated blood pressure. She is not exercising and is adherent to low salt diet.  Blood pressure is well controlled at home. Cardiac symptoms none. Patient denies chest pain, dyspnea and palpitations.  Cardiovascular risk factors: dyslipidemia, hypertension, obesity (BMI >= 30 kg/m2) and sedentary lifestyle. Use of agents associated with hypertension: none. History of target organ damage: none.     Hyperlipidemia: Patient presents with hyperlipidemia.  She was tested because hypertension.  Her last labs show   Lab Results   Component Value Date    CHOL 186 10/06/2023    CHOL 183 06/23/2023    CHOL 164 04/10/2023     Lab Results   Component Value Date    TRIG 81 10/06/2023    TRIG 91 06/23/2023    TRIG 86 04/10/2023     Lab Results   Component Value Date    HDL 73 10/06/2023    HDL 73 06/23/2023    HDL 77 04/10/2023     Lab Results   Component Value Date    LDLCALC 97 10/06/2023    LDLCALC 92 06/23/2023    LDLCALC 70 04/10/2023     Lab Results   Component Value Date    VLDL 17 10/13/2018    VLDL 19 10/09/2017    VLDL 18 05/13/2017     Lab Results   Component Value Date    CHOLHDLRATIO 0.9 10/13/2018    CHOLHDLRATIO 1 10/09/2017    CHOLHDLRATIO 2.14 05/13/2017     There is not a family history of hyperlipidemia. There is not a family history of early ischemia heart disease.    Hypothyroidism: Patient presents for evaluation of thyroid function. Symptoms consist of fatigue, arthralgias. Symptoms have present for several years. The symptoms are fatigue.  The problem has been gradually worsening.  Previous thyroid studies include TSH, free

## 2024-04-16 ENCOUNTER — OFFICE VISIT (OUTPATIENT)
Dept: FAMILY MEDICINE CLINIC | Age: 69
End: 2024-04-16

## 2024-04-16 VITALS
TEMPERATURE: 97.8 F | RESPIRATION RATE: 15 BRPM | WEIGHT: 178.57 LBS | HEART RATE: 68 BPM | DIASTOLIC BLOOD PRESSURE: 70 MMHG | SYSTOLIC BLOOD PRESSURE: 128 MMHG | BODY MASS INDEX: 34.88 KG/M2

## 2024-04-16 DIAGNOSIS — M15.9 PRIMARY OSTEOARTHRITIS INVOLVING MULTIPLE JOINTS: ICD-10-CM

## 2024-04-16 DIAGNOSIS — E66.01 MORBIDLY OBESE (HCC): ICD-10-CM

## 2024-04-16 DIAGNOSIS — J96.01 ACUTE HYPOXEMIC RESPIRATORY FAILURE DUE TO COVID-19 (HCC): ICD-10-CM

## 2024-04-16 DIAGNOSIS — R29.898 WEAKNESS OF RIGHT LOWER EXTREMITY: ICD-10-CM

## 2024-04-16 DIAGNOSIS — F02.80 DEMENTIA IN ALZHEIMER'S DISEASE (HCC): ICD-10-CM

## 2024-04-16 DIAGNOSIS — E78.00 PURE HYPERCHOLESTEROLEMIA: ICD-10-CM

## 2024-04-16 DIAGNOSIS — G30.9 DEMENTIA IN ALZHEIMER'S DISEASE (HCC): ICD-10-CM

## 2024-04-16 DIAGNOSIS — D69.6 THROMBOCYTOPENIA (HCC): ICD-10-CM

## 2024-04-16 DIAGNOSIS — K21.00 GASTROESOPHAGEAL REFLUX DISEASE WITH ESOPHAGITIS WITHOUT HEMORRHAGE: ICD-10-CM

## 2024-04-16 DIAGNOSIS — R41.3 MEMORY LOSS: ICD-10-CM

## 2024-04-16 DIAGNOSIS — Z86.73 HISTORY OF STROKE: ICD-10-CM

## 2024-04-16 DIAGNOSIS — Z87.39 HISTORY OF GOUT: ICD-10-CM

## 2024-04-16 DIAGNOSIS — R29.6 FALLS FREQUENTLY: ICD-10-CM

## 2024-04-16 DIAGNOSIS — R26.81 UNSTEADY GAIT: ICD-10-CM

## 2024-04-16 DIAGNOSIS — N81.4 CYSTOCELE WITH PROLAPSE: ICD-10-CM

## 2024-04-16 DIAGNOSIS — N18.31 STAGE 3A CHRONIC KIDNEY DISEASE (HCC): ICD-10-CM

## 2024-04-16 DIAGNOSIS — I10 ESSENTIAL HYPERTENSION: Primary | ICD-10-CM

## 2024-04-16 DIAGNOSIS — E03.9 ACQUIRED HYPOTHYROIDISM: ICD-10-CM

## 2024-04-16 DIAGNOSIS — N18.32 STAGE 3B CHRONIC KIDNEY DISEASE (HCC): ICD-10-CM

## 2024-04-16 DIAGNOSIS — E87.0 HYPERNATREMIA: ICD-10-CM

## 2024-04-16 DIAGNOSIS — I69.30 CEREBRAL MULTI-INFARCT STATE: ICD-10-CM

## 2024-04-16 DIAGNOSIS — R56.9 SEIZURE (HCC): ICD-10-CM

## 2024-04-16 DIAGNOSIS — F41.9 ANXIETY: ICD-10-CM

## 2024-04-16 DIAGNOSIS — G47.01 INSOMNIA DUE TO MEDICAL CONDITION: ICD-10-CM

## 2024-04-16 DIAGNOSIS — F33.42 RECURRENT MAJOR DEPRESSIVE DISORDER, IN FULL REMISSION (HCC): ICD-10-CM

## 2024-04-16 DIAGNOSIS — C54.9 MALIGNANT NEOPLASM OF BODY OF UTERUS, UNSPECIFIED SITE (HCC): ICD-10-CM

## 2024-04-16 DIAGNOSIS — R06.09 DYSPNEA ON EXERTION: ICD-10-CM

## 2024-04-16 DIAGNOSIS — M85.89 OSTEOPENIA OF MULTIPLE SITES: ICD-10-CM

## 2024-04-16 DIAGNOSIS — M85.80 OSTEOPENIA, UNSPECIFIED LOCATION: ICD-10-CM

## 2024-04-16 DIAGNOSIS — U07.1 ACUTE HYPOXEMIC RESPIRATORY FAILURE DUE TO COVID-19 (HCC): ICD-10-CM

## 2024-04-16 DIAGNOSIS — I50.42 CHRONIC COMBINED SYSTOLIC AND DIASTOLIC CONGESTIVE HEART FAILURE (HCC): ICD-10-CM

## 2024-04-16 ASSESSMENT — PATIENT HEALTH QUESTIONNAIRE - PHQ9
9. THOUGHTS THAT YOU WOULD BE BETTER OFF DEAD, OR OF HURTING YOURSELF: NOT AT ALL
SUM OF ALL RESPONSES TO PHQ QUESTIONS 1-9: 1
SUM OF ALL RESPONSES TO PHQ9 QUESTIONS 1 & 2: 1
6. FEELING BAD ABOUT YOURSELF - OR THAT YOU ARE A FAILURE OR HAVE LET YOURSELF OR YOUR FAMILY DOWN: NOT AT ALL
SUM OF ALL RESPONSES TO PHQ QUESTIONS 1-9: 1
10. IF YOU CHECKED OFF ANY PROBLEMS, HOW DIFFICULT HAVE THESE PROBLEMS MADE IT FOR YOU TO DO YOUR WORK, TAKE CARE OF THINGS AT HOME, OR GET ALONG WITH OTHER PEOPLE: NOT DIFFICULT AT ALL
5. POOR APPETITE OR OVEREATING: NOT AT ALL
3. TROUBLE FALLING OR STAYING ASLEEP: NOT AT ALL
2. FEELING DOWN, DEPRESSED OR HOPELESS: SEVERAL DAYS
1. LITTLE INTEREST OR PLEASURE IN DOING THINGS: NOT AT ALL
8. MOVING OR SPEAKING SO SLOWLY THAT OTHER PEOPLE COULD HAVE NOTICED. OR THE OPPOSITE, BEING SO FIGETY OR RESTLESS THAT YOU HAVE BEEN MOVING AROUND A LOT MORE THAN USUAL: NOT AT ALL
SUM OF ALL RESPONSES TO PHQ QUESTIONS 1-9: 1
4. FEELING TIRED OR HAVING LITTLE ENERGY: NOT AT ALL
SUM OF ALL RESPONSES TO PHQ QUESTIONS 1-9: 1

## 2024-04-22 RX ORDER — DIVALPROEX SODIUM 125 MG/1
CAPSULE, COATED PELLETS ORAL
Qty: 60 CAPSULE | Refills: 3 | OUTPATIENT
Start: 2024-04-22

## 2024-04-23 RX ORDER — DIVALPROEX SODIUM 125 MG/1
CAPSULE, COATED PELLETS ORAL
Qty: 60 CAPSULE | Refills: 3 | Status: SHIPPED | OUTPATIENT
Start: 2024-04-23

## 2024-04-30 ENCOUNTER — TELEPHONE (OUTPATIENT)
Dept: FAMILY MEDICINE CLINIC | Age: 69
End: 2024-04-30

## 2024-04-30 NOTE — TELEPHONE ENCOUNTER
Agree with treatment plans per nurse    As long as nurse keeps an eye on it then no recheck here    Call patient

## 2024-04-30 NOTE — TELEPHONE ENCOUNTER
Attempted to contact nurses at [facility]-unable to speak with nurse.  Will try again later

## 2024-04-30 NOTE — TELEPHONE ENCOUNTER
Pt's DEXA preformed on 01/03/2024 shows that Pt has a normal bone scan - Pt is to continue calcium BID and weight bearing exercise as tolerated.

## 2024-04-30 NOTE — TELEPHONE ENCOUNTER
Tez from [facility] called regarding the message from yesterday ab out the pt falling. She does not think that the pt needs an xray. She is able to rotated and extend her arm. She has been putting a border foam dressing on the skin flap. She wanted to know if you wanted to evaluate the tear or if you just wanted her to continue using a dry dressing on it. Please advise.

## 2024-05-20 DIAGNOSIS — E03.9 ACQUIRED HYPOTHYROIDISM: ICD-10-CM

## 2024-05-21 RX ORDER — LEVOTHYROXINE SODIUM 0.2 MG/1
TABLET ORAL
Qty: 93 TABLET | Refills: 1 | Status: SHIPPED | OUTPATIENT
Start: 2024-05-21

## 2024-06-03 DIAGNOSIS — I69.30 CEREBRAL MULTI-INFARCT STATE: ICD-10-CM

## 2024-06-03 RX ORDER — FOLIC ACID 1 MG/1
TABLET ORAL
Qty: 90 TABLET | Refills: 2 | Status: SHIPPED | OUTPATIENT
Start: 2024-06-03

## 2024-06-03 NOTE — TELEPHONE ENCOUNTER
Last visit- 4/16/2024  Next visit- 10/16/2024    Requested Prescriptions     Pending Prescriptions Disp Refills    folic acid (FOLVITE) 1 MG tablet [Pharmacy Med Name: FOLIC ACID 1000MCG TABLET] 90 tablet 2     Sig: TAKE ONE TABLET DAILY, BY MOUTH.

## 2024-06-26 DIAGNOSIS — Z86.73 HISTORY OF STROKE: ICD-10-CM

## 2024-06-26 RX ORDER — CLOPIDOGREL BISULFATE 75 MG/1
75 TABLET ORAL DAILY
Qty: 90 TABLET | Refills: 1 | Status: SHIPPED | OUTPATIENT
Start: 2024-06-26

## 2024-07-15 DIAGNOSIS — R11.0 NAUSEA: ICD-10-CM

## 2024-07-15 DIAGNOSIS — K21.00 GASTROESOPHAGEAL REFLUX DISEASE WITH ESOPHAGITIS WITHOUT HEMORRHAGE: ICD-10-CM

## 2024-07-16 RX ORDER — OMEPRAZOLE 20 MG/1
CAPSULE, DELAYED RELEASE ORAL
Qty: 90 CAPSULE | Refills: 1 | Status: SHIPPED | OUTPATIENT
Start: 2024-07-16

## 2024-07-22 RX ORDER — DIVALPROEX SODIUM 125 MG/1
CAPSULE, COATED PELLETS ORAL
Qty: 180 CAPSULE | Refills: 1 | Status: SHIPPED | OUTPATIENT
Start: 2024-07-22

## 2024-08-13 DIAGNOSIS — I10 ESSENTIAL HYPERTENSION: ICD-10-CM

## 2024-08-13 DIAGNOSIS — F33.42 RECURRENT MAJOR DEPRESSIVE DISORDER, IN FULL REMISSION (HCC): ICD-10-CM

## 2024-08-13 RX ORDER — ALPRAZOLAM 0.25 MG/1
TABLET ORAL
Qty: 90 TABLET | Refills: 1 | Status: SHIPPED | OUTPATIENT
Start: 2024-08-13 | End: 2024-11-11

## 2024-08-13 RX ORDER — METOPROLOL SUCCINATE 25 MG/1
TABLET, EXTENDED RELEASE ORAL
Qty: 90 TABLET | Refills: 1 | Status: SHIPPED | OUTPATIENT
Start: 2024-08-13

## 2024-08-28 DIAGNOSIS — E03.9 ACQUIRED HYPOTHYROIDISM: ICD-10-CM

## 2024-08-28 RX ORDER — LEVOTHYROXINE SODIUM 200 UG/1
TABLET ORAL
Qty: 93 TABLET | Refills: 1 | Status: SHIPPED | OUTPATIENT
Start: 2024-08-28

## 2024-09-05 ENCOUNTER — TELEPHONE (OUTPATIENT)
Dept: FAMILY MEDICINE CLINIC | Age: 69
End: 2024-09-05

## 2024-09-06 ENCOUNTER — APPOINTMENT (OUTPATIENT)
Dept: CT IMAGING | Age: 69
End: 2024-09-06
Payer: MEDICARE

## 2024-09-06 ENCOUNTER — HOSPITAL ENCOUNTER (EMERGENCY)
Age: 69
Discharge: HOME | End: 2024-09-06
Attending: EMERGENCY MEDICINE
Payer: MEDICARE

## 2024-09-06 ENCOUNTER — APPOINTMENT (OUTPATIENT)
Dept: GENERAL RADIOLOGY | Age: 69
End: 2024-09-06
Payer: MEDICARE

## 2024-09-06 ENCOUNTER — LAB (OUTPATIENT)
Dept: LAB | Age: 69
End: 2024-09-06

## 2024-09-06 VITALS
SYSTOLIC BLOOD PRESSURE: 142 MMHG | RESPIRATION RATE: 18 BRPM | OXYGEN SATURATION: 96 % | DIASTOLIC BLOOD PRESSURE: 67 MMHG | HEART RATE: 63 BPM | TEMPERATURE: 98.1 F

## 2024-09-06 DIAGNOSIS — T07.XXXA MULTIPLE CONTUSIONS: ICD-10-CM

## 2024-09-06 DIAGNOSIS — S09.90XA CLOSED HEAD INJURY, INITIAL ENCOUNTER: Primary | ICD-10-CM

## 2024-09-06 DIAGNOSIS — S20.212A CONTUSION OF LEFT CHEST WALL, INITIAL ENCOUNTER: ICD-10-CM
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
## 2024-09-06 DIAGNOSIS — E03.9 ACQUIRED HYPOTHYROIDISM: ICD-10-CM

## 2024-09-06 DIAGNOSIS — D69.6 THROMBOCYTOPENIA (HCC): ICD-10-CM

## 2024-09-06 DIAGNOSIS — E78.00 PURE HYPERCHOLESTEROLEMIA: ICD-10-CM

## 2024-09-06 DIAGNOSIS — N30.00 ACUTE CYSTITIS WITHOUT HEMATURIA: ICD-10-CM

## 2024-09-06 LAB
ALBUMIN SERPL BCG-MCNC: 4.4 G/DL (ref 3.5–5.1)
ALBUMIN SERPL BCP-MCNC: 3.5 GM/DL (ref 3.4–5)
ALP SERPL-CCNC: 84 U/L (ref 38–126)
ALP SERPL-CCNC: 89 U/L (ref 46–116)
ALT SERPL W P-5'-P-CCNC: 39 U/L (ref 14–63)
ALT SERPL W/O P-5'-P-CCNC: 31 U/L (ref 11–66)
AMORPH SED URNS QL MICRO: ABNORMAL
ANION GAP SERPL CALC-SCNC: 12 MEQ/L (ref 8–16)
ANION GAP SERPL CALC-SCNC: 7 MEQ/L (ref 8–16)
AST SERPL W P-5'-P-CCNC: 33 U/L (ref 15–37)
AST SERPL-CCNC: 35 U/L (ref 5–40)
BACTERIA URNS QL MICRO: ABNORMAL
BILIRUB SERPL-MCNC: 0.3 MG/DL (ref 0.3–1.2)
BILIRUB SERPL-MCNC: 0.4 MG/DL (ref 0.2–1)
BILIRUB UR QL STRIP.AUTO: NEGATIVE
BUN SERPL-MCNC: 20 MG/DL (ref 7–22)
BUN SERPL-MCNC: 21 MG/DL (ref 7–18)
CALCIUM SERPL-MCNC: 9.6 MG/DL (ref 8.5–10.1)
CALCIUM SERPL-MCNC: 9.9 MG/DL (ref 8.5–10.5)
CASTS #/AREA URNS LPF: ABNORMAL /LPF
CHARACTER UR: CLEAR
CHLORIDE SERPL-SCNC: 102 MEQ/L (ref 98–111)
CHLORIDE SERPL-SCNC: 105 MEQ/L (ref 98–107)
CHOLEST SERPL-MCNC: 166 MG/DL (ref 100–199)
CO2 SERPL-SCNC: 28 MEQ/L (ref 23–33)
CO2 SERPL-SCNC: 32 MEQ/L (ref 21–32)
COLOR UR AUTO: YELLOW
CREAT SERPL-MCNC: 1.2 MG/DL (ref 0.4–1.2)
CREAT SERPL-MCNC: 1.4 MG/DL (ref 0.6–1.3)
CRYSTALS VITF MICRO: ABNORMAL
EPI CELLS #/AREA URNS HPF: ABNORMAL /HPF
GFR SERPL CREATININE-BSD FRML MDRD: 40 ML/MIN/1.73M2
GFR SERPL CREATININE-BSD FRML MDRD: 48 ML/MIN/1.73M2
GLUCOSE FASTING: 83 MG/DL (ref 70–108)
GLUCOSE SERPL-MCNC: 88 MG/DL (ref 74–106)
GLUCOSE UR QL STRIP.AUTO: NEGATIVE MG/DL
HDLC SERPL-MCNC: 59 MG/DL
HGB UR STRIP.AUTO-MCNC: NEGATIVE MG/L
KETONES UR QL STRIP.AUTO: NEGATIVE
LDLC SERPL CALC-MCNC: 63 MG/DL
LEUKOCYTE ESTERASE UR QL STRIP.AUTO: ABNORMAL
MUCOUS THREADS URNS QL MICRO: ABNORMAL
NITRITE UR QL STRIP.AUTO: NEGATIVE
PH UR STRIP.AUTO: 8.5 [PH] (ref 5–9)
POTASSIUM SERPL-SCNC: 4.1 MEQ/L (ref 3.5–5.2)
POTASSIUM SERPL-SCNC: 4.2 MEQ/L (ref 3.5–5.1)
PROT SERPL-MCNC: 7.1 G/DL (ref 6.1–8)
PROT SERPL-MCNC: 7.1 GM/DL (ref 6.4–8.2)
PROT UR STRIP.AUTO-MCNC: NEGATIVE MG/DL
RBC #/AREA URNS HPF: ABNORMAL /HPF
REFLEX TO URINE C & S: ABNORMAL
SCAN OF BLOOD SMEAR: NORMAL
SODIUM SERPL-SCNC: 142 MEQ/L (ref 135–145)
SODIUM SERPL-SCNC: 144 MEQ/L (ref 136–145)
SP GR UR STRIP.AUTO: 1.02 (ref 1–1.03)
TRIGL SERPL-MCNC: 222 MG/DL (ref 0–199)
TSH SERPL DL<=0.005 MIU/L-ACNC: 23.23 UIU/ML (ref 0.4–4.2)
UROBILINOGEN UR STRIP-ACNC: 0.2 EU/DL (ref 0–1)
WBC # UR STRIP.AUTO: ABNORMAL /HPF

## 2024-09-06 PROCEDURE — 80053 COMPREHEN METABOLIC PANEL: CPT

## 2024-09-06 PROCEDURE — 70450 CT HEAD/BRAIN W/O DYE: CPT

## 2024-09-06 PROCEDURE — 99284 EMERGENCY DEPT VISIT MOD MDM: CPT

## 2024-09-06 PROCEDURE — 81001 URINALYSIS AUTO W/SCOPE: CPT

## 2024-09-06 PROCEDURE — 85025 COMPLETE CBC W/AUTO DIFF WBC: CPT

## 2024-09-06 PROCEDURE — 71046 X-RAY EXAM CHEST 2 VIEWS: CPT

## 2024-09-06 PROCEDURE — 36415 COLL VENOUS BLD VENIPUNCTURE: CPT

## 2024-09-06 RX ORDER — NITROFURANTOIN 25; 75 MG/1; MG/1
100 CAPSULE ORAL 2 TIMES DAILY
Qty: 14 CAPSULE | Refills: 0 | Status: SHIPPED | OUTPATIENT
Start: 2024-09-06 | End: 2024-09-13

## 2024-09-06 RX ORDER — QUETIAPINE FUMARATE 25 MG/1
TABLET, FILM COATED ORAL
COMMUNITY
Start: 2024-07-03

## 2024-09-06 ASSESSMENT — PAIN - FUNCTIONAL ASSESSMENT: PAIN_FUNCTIONAL_ASSESSMENT: NONE - DENIES PAIN

## 2024-09-06 NOTE — ED NOTES
Discharge teaching and instructions for condition explained to patient.  AVS reviewed.  Patient voiced understanding regarding follow up appointments and care of self at home.  Pt discharged to home in stable condition per self with .

## 2024-09-06 NOTE — ED PROVIDER NOTES
[facility]  [address] [place] [id number]  Phone: [phone]  EMERGENCY DEPARTMENT ENCOUNTER      Pt Name: Mag Parrish  MRN: [id number]  Birthdate 1955  Date of evaluation: 9/6/2024  Provider: Gigi Barr MD    CHIEF COMPLAINT       Chief Complaint   Patient presents with    Fall         HISTORY OF PRESENT ILLNESS      Mag Parrish is a 69 y.o. female who presents to the emergency department with above noted complaint.  Patient's been doing okay.  She has some dementia.  She has some balance issues at times.  She is subsequently fallen.  She fell hurting her back ribs posteriorly.  She also hit her head.  She has bruising around her left eye.  Family is concerned could be a UTI or other problems.  She is recent seen neurology.  Patient herself cannot give history of present illness other than her falls and denies syncope but otherwise unreliable secondary to dementia        REVIEW OF SYSTEMS     Limited secondary to dementia  Review of Systems       PAST MEDICAL HISTORY     Past Medical History:   Diagnosis Date    Acute arterial ischemic stroke, multifocal, anterior circulation (HCC)     Dr Dial    Anemia     Dr Jerome    Anxiety     Arthritis     Chicken pox     Chronic systolic (congestive) heart failure 07/26/2023    Dementia (HCC)     Endometrial adenocarcinoma (HCC) 01/30/2020    Hyperlipidemia     Hypertension     Dr Spring    Hypothyroidism     Measles     Memory loss     Mumps     Uterine cancer (HCC) 01/2020    Endometrioid carcinomaChaz         SURGICAL HISTORY       Past Surgical History:   Procedure Laterality Date    ENDOMETRIAL BIOPSY  12/11/2019    HYSTERECTOMY, TOTAL ABDOMINAL (CERVIX REMOVED)  01/30/2020    total    OVARY REMOVAL  01/30/2020    total    VAGINAL PROLAPSE REPAIR  08/2023         CURRENT MEDICATIONS       Previous Medications    ALPRAZOLAM (XANAX) 0.25 MG TABLET    TAKE ONE (1) TABLET, BY MOUTH, AT BEDTIME AS NEEDED FOR  Patient is vomiting  [] Severe/dangerous mechanism of injury was identified (select one or more):  [] MVA with: patient ejection, death of another passenger, rollover, speed >40mph, airbag deployment,  or passenger on ATV or motorcycle   [] Pedestrian or bicyclist without helmet: struck my motorized vehicle, in bicycle crash  [] Fall > 3 feet or 5 stairs Head struck by high-impact object (hammer, baseball, baseball bat, heavy object such as falling brick)  [] Other: [] (ie. assault description)  [] Patient has physical signs of basilar skull fracture present (including hemotympanum, \"raccoon” eyes, CSF leakage from ear or nose, Wilkins's sign)  [] Patient suspected of taking anticoagulant medication  [] Patient has thrombocytopenia  [] Patient has coagulopathy   [] Patient has loss of consciousness and (must select one of the following):  [] Headache   [] Short term memory deficit  [] Alcohol/drug intoxication  [] Evidence of trauma above the clavicles  [] Age 60 or older   [] Post-traumatic seizure  [] Patient has post-traumatic amnesia and (must select one of the following):  [] Headache  [] Short term memory deficit  [] Alcohol/drug intoxication  [] Evidence of trauma above the clavicles  [] Age 60 or older  [] Post-traumatic seizure  [x] Patient conditions that are excluded (select all that apply):  [] Patient has ventricular shunt  [] Patient has brain tumor  [] Patient is pregnant  [] Patient has multi-system trauma    [x] Patient taking an antiplatelet medication (excluding aspirin)  [] Head CT not ordered by emergency care clinician  [] Head CT ordered for reasons other than trauma  [] Patient is 18 or older, presenting with minor blunt head trauma. Head CT (including    cosigned orders) was ordered by an emergency care clinician for trauma, no     indication specified. [DOES NOT SATISFY MIPS PERFORMANCE]       EMERGENCY DEPARTMENT COURSE and DIFFERENTIAL DIAGNOSIS/MDM:       Patient has fall injury

## 2024-09-06 NOTE — ED NOTES
Patient presents to the ED via private auto with complaints of fall.   states that the patient fell a week ago and hit her back and he is concerned about the bruising and swelling.  Patient also fell two days ago off of a bar stool, has purple bruising to the left orbital area.   states that the patient has increasing confusion from her dementia but he would also like her to be checked for a UTI.

## 2024-09-06 NOTE — DISCHARGE INSTRUCTIONS
Continue home medications.  Take Macrobid twice a day.  Monitor for infection problems or concerns.  Use Tylenol for any aches or pains.  Follow-up with primary care

## 2024-09-07 LAB
ANISOCYTOSIS BLD QL SMEAR: SLIGHT
BASOPHILS # BLD: 0.5 % (ref 0–3)
BASOPHILS ABSOLUTE: 0 THOU/MM3 (ref 0–0.1)
DEPRECATED RDW RBC AUTO: 49.1 FL (ref 35–45)
DIFFERENTIAL, AUTO: ABNORMAL
EOSINOPHILS ABSOLUTE: 0.2 THOU/MM3 (ref 0–0.5)
EOSINOPHILS RELATIVE PERCENT: 3.1 % (ref 0–4)
ERYTHROCYTE [DISTWIDTH] IN BLOOD BY AUTOMATED COUNT: 12.5 % (ref 11.5–14.5)
HCT VFR BLD AUTO: 38.7 % (ref 37–47)
HCT VFR BLD CALC: 37.2 % (ref 37–47)
HEMOGLOBIN: 12.1 GM/DL (ref 12–16)
HGB BLD-MCNC: 12.3 GM/DL (ref 12–16)
IMMATURE GRANS (ABS): 0 THOU/MM3 (ref 0–0.07)
IMMATURE GRANULOCYTES %: 0 %
LYMPHOCYTES # BLD AUTO: 27.4 % (ref 15–47)
LYMPHOCYTES ABSOLUTE: 1.5 THOU/MM3 (ref 1–4.8)
MACROCYTES BLD QL SMEAR: ABNORMAL
MCH RBC QN AUTO: 34 PG (ref 26–32)
MCH RBC QN AUTO: 34 PG (ref 26–33)
MCHC RBC AUTO-ENTMCNC: 31.8 GM/DL (ref 32.2–35.5)
MCHC RBC AUTO-ENTMCNC: 32.5 GM/DL (ref 31–35)
MCV RBC AUTO: 104.5 FL (ref 81–99)
MCV RBC AUTO: 106.9 FL (ref 81–99)
MONOCYTES: 0.5 THOU/MM3 (ref 0.3–1.3)
MONOCYTES: 8.8 % (ref 0–12)
NEUTROPHILS ABSOLUTE: 3.3 THOU/MM3 (ref 1.8–7.7)
PATHOLOGIST REVIEW: ABNORMAL
PDW BLD-RTO: 12.1 % (ref 11.5–14.9)
PLATELET # BLD AUTO: 101 THOU/MM3 (ref 130–400)
PLATELET # BLD AUTO: 97 THOU/MM3 (ref 130–400)
PLATELET BLD QL SMEAR: ABNORMAL
PMV BLD AUTO: 11.1 FL (ref 9.4–12.4)
PMV BLD AUTO: 12.2 FL (ref 9.4–12.4)
RBC # BLD AUTO: 3.62 MILL/MM3 (ref 4.2–5.4)
RBC # BLD: 3.56 MILL/MM3 (ref 4.1–5.3)
RBC MORPH BLD: ABNORMAL
SEG NEUTROPHILS: 60 % (ref 43–75)
T4 FREE SERPL-MCNC: 0.58 NG/DL (ref 0.93–1.68)
WBC # BLD AUTO: 5.5 THOU/MM3 (ref 4.8–10.8)
WBC # BLD: 5.5 THOU/MM3 (ref 4.8–10.8)

## 2024-09-09 ENCOUNTER — TELEPHONE (OUTPATIENT)
Dept: FAMILY MEDICINE CLINIC | Age: 69
End: 2024-09-09

## 2024-09-09 DIAGNOSIS — E03.9 ACQUIRED HYPOTHYROIDISM: ICD-10-CM

## 2024-09-09 RX ORDER — LEVOTHYROXINE SODIUM 200 UG/1
TABLET ORAL
Qty: 135 TABLET | Refills: 1 | Status: SHIPPED | OUTPATIENT
Start: 2024-09-09

## 2024-09-17 DIAGNOSIS — F33.42 RECURRENT MAJOR DEPRESSIVE DISORDER, IN FULL REMISSION (HCC): ICD-10-CM

## 2024-09-17 RX ORDER — SERTRALINE HYDROCHLORIDE 100 MG/1
TABLET, FILM COATED ORAL
Qty: 180 TABLET | Refills: 3 | Status: SHIPPED | OUTPATIENT
Start: 2024-09-17

## 2024-10-02 ENCOUNTER — APPOINTMENT (OUTPATIENT)
Dept: CT IMAGING | Age: 69
End: 2024-10-02
Attending: FAMILY MEDICINE
Payer: MEDICARE

## 2024-10-02 ENCOUNTER — HOSPITAL ENCOUNTER (EMERGENCY)
Age: 69
Discharge: HOME | End: 2024-10-02
Attending: FAMILY MEDICINE
Payer: MEDICARE

## 2024-10-02 VITALS
RESPIRATION RATE: 10 BRPM | SYSTOLIC BLOOD PRESSURE: 133 MMHG | TEMPERATURE: 96.9 F | OXYGEN SATURATION: 99 % | HEART RATE: 61 BPM | DIASTOLIC BLOOD PRESSURE: 63 MMHG

## 2024-10-02 DIAGNOSIS — S00.03XA HEMATOMA OF SCALP, INITIAL ENCOUNTER: ICD-10-CM

## 2024-10-02 DIAGNOSIS — S09.90XA CLOSED HEAD INJURY, INITIAL ENCOUNTER: Primary | ICD-10-CM

## 2024-10-02 DIAGNOSIS — R55 SYNCOPE AND COLLAPSE: ICD-10-CM

## 2024-10-02 LAB
ANION GAP SERPL CALC-SCNC: 6 MEQ/L (ref 8–16)
BASOPHILS # BLD: 0.8 % (ref 0–3)
BASOPHILS ABSOLUTE: 0.1 THOU/MM3 (ref 0–0.1)
BUN SERPL-MCNC: 32 MG/DL (ref 7–18)
CALCIUM SERPL-MCNC: 9.4 MG/DL (ref 8.5–10.1)
CHLORIDE SERPL-SCNC: 106 MEQ/L (ref 98–107)
CO2 SERPL-SCNC: 32 MEQ/L (ref 21–32)
CREAT SERPL-MCNC: 1.4 MG/DL (ref 0.6–1.3)
EKG ATRIAL RATE: 73 BPM
EKG P AXIS: 26 DEGREES
EKG P-R INTERVAL: 152 MS
EKG Q-T INTERVAL: 392 MS
EKG QRS DURATION: 100 MS
EKG QTC CALCULATION (BAZETT): 431 MS
EKG R AXIS: 2 DEGREES
EKG T AXIS: 39 DEGREES
EKG VENTRICULAR RATE: 73 BPM
EOSINOPHILS ABSOLUTE: 0.3 THOU/MM3 (ref 0–0.5)
EOSINOPHILS RELATIVE PERCENT: 4.9 % (ref 0–4)
GFR SERPL CREATININE-BSD FRML MDRD: 40 ML/MIN/1.73M2
GLUCOSE SERPL-MCNC: 88 MG/DL (ref 74–106)
HCT VFR BLD CALC: 37.6 % (ref 37–47)
HEMOGLOBIN: 11.8 GM/DL (ref 12–16)
IMMATURE GRANS (ABS): 0 THOU/MM3 (ref 0–0.07)
IMMATURE GRANULOCYTES %: 0 %
LYMPHOCYTES # BLD AUTO: 27.4 % (ref 15–47)
LYMPHOCYTES ABSOLUTE: 1.8 THOU/MM3 (ref 1–4.8)
MCH RBC QN AUTO: 33.2 PG (ref 26–32)
MCHC RBC AUTO-ENTMCNC: 31.4 GM/DL (ref 31–35)
MCV RBC AUTO: 105.9 FL (ref 81–99)
MONOCYTES: 0.6 THOU/MM3 (ref 0.3–1.3)
MONOCYTES: 8.6 % (ref 0–12)
NEUTROPHILS ABSOLUTE: 3.8 THOU/MM3 (ref 1.8–7.7)
PDW BLD-RTO: 12.7 % (ref 11.5–14.9)
PLATELET # BLD AUTO: 118 THOU/MM3 (ref 130–400)
PMV BLD AUTO: 11 FL (ref 9.4–12.4)
POTASSIUM SERPL-SCNC: 4 MEQ/L (ref 3.5–5.1)
RBC # BLD: 3.55 MILL/MM3 (ref 4.1–5.3)
SEG NEUTROPHILS: 58.1 % (ref 43–75)
SODIUM SERPL-SCNC: 144 MEQ/L (ref 136–145)
TROPONIN, HIGH SENSITIVITY: 5.1 PG/ML (ref 0–51.3)
WBC # BLD: 6.5 THOU/MM3 (ref 4.8–10.8)

## 2024-10-02 PROCEDURE — 85025 COMPLETE CBC W/AUTO DIFF WBC: CPT

## 2024-10-02 PROCEDURE — 93010 ELECTROCARDIOGRAM REPORT: CPT | Performed by: INTERNAL MEDICINE

## 2024-10-02 PROCEDURE — 93005 ELECTROCARDIOGRAM TRACING: CPT | Performed by: FAMILY MEDICINE

## 2024-10-02 PROCEDURE — 80048 BASIC METABOLIC PNL TOTAL CA: CPT

## 2024-10-02 PROCEDURE — 70486 CT MAXILLOFACIAL W/O DYE: CPT

## 2024-10-02 PROCEDURE — 70450 CT HEAD/BRAIN W/O DYE: CPT

## 2024-10-02 PROCEDURE — 99284 EMERGENCY DEPT VISIT MOD MDM: CPT

## 2024-10-02 PROCEDURE — 84484 ASSAY OF TROPONIN QUANT: CPT

## 2024-10-02 PROCEDURE — 2580000003 HC RX 258: Performed by: FAMILY MEDICINE

## 2024-10-02 PROCEDURE — 72125 CT NECK SPINE W/O DYE: CPT

## 2024-10-02 RX ORDER — SODIUM CHLORIDE 9 MG/ML
INJECTION, SOLUTION INTRAVENOUS CONTINUOUS
Status: DISCONTINUED | OUTPATIENT
Start: 2024-10-02 | End: 2024-10-02 | Stop reason: HOSPADM

## 2024-10-02 RX ADMIN — SODIUM CHLORIDE: 9 INJECTION, SOLUTION INTRAVENOUS at 13:13

## 2024-10-02 ASSESSMENT — PAIN - FUNCTIONAL ASSESSMENT
PAIN_FUNCTIONAL_ASSESSMENT: NONE - DENIES PAIN
PAIN_FUNCTIONAL_ASSESSMENT: NONE - DENIES PAIN

## 2024-10-02 NOTE — ED NOTES
Pt alert and and normal orientation per family. Respirations regular and easy.  Discharge instructions reviewed w/ family. States understanding. Pt discharged in satisfactory condition.

## 2024-10-02 NOTE — DISCHARGE INSTRUCTIONS
Mag Parrish,    It has been my absolute pleasure to serve you while in the emergency department at [facility] today.  Please do remember to take all medications as prescribed.  Please make sure you follow-up with your PCP within 7 days.  Please follow-up with any and all specialists as we discussed.  Please return to the ER in case of any worsening of symptoms.  I wish you a speedy recovery!    Sincerely,    Dr. Devon Reyes MD.

## 2024-10-02 NOTE — ED NOTES
Returned from CT, ice pack applied to right forehead/ eye. Pt resting w/ eyes closed, she arouses to verball stimuli and answers questions.

## 2024-10-02 NOTE — ED PROVIDER NOTES
Platelets 118 (*)     Eosinophils % 4.9 (*)     All other components within normal limits   BASIC METABOLIC PANEL - Abnormal; Notable for the following components:    BUN 32 (*)     Creatinine 1.4 (*)     All other components within normal limits   GLOMERULAR FILTRATION RATE, ESTIMATED - Abnormal; Notable for the following components:    Est, Glom Filt Rate 40 (*)     All other components within normal limits   ANION GAP - Abnormal; Notable for the following components:    Anion Gap 6.0 (*)     All other components within normal limits   TROPONIN   URINALYSIS WITH REFLEX TO CULTURE       EMERGENCY DEPARTMENT COURSE & MDM:   Vitals:    Vitals:    10/02/24 1233 10/02/24 1239 10/02/24 1305   BP: (!) 141/66 (!) 138/56 133/63   Pulse: 66 68 61   Resp: 14 18 10   Temp: 96.9 °F (36.1 °C)     TempSrc: Temporal     SpO2: (!) 85% 97% 99%       MDM    12:35 PM EDT: The patient was seen and evaluated.  ED Course as of 10/02/24 1350   Wed Oct 02, 2024   1245 Will obtain CT head cervical spine face and obtain labs including CBC BMP troponin urinalysis. [SM]   1258 Creatinine slightly elevated at 1.4 with BUN of 32. [SM]   1319 CT head, C-spine, face do not reveal any acute osseous abnormality.  [SM]   1336 Troponin is normal. [SM]   1349 Vital signs are stable. She is nontoxic appearing. She appears to be mentally back at baseline. I feel she is safe for discharge.  [SM]      ED Course User Index  [SM] Devon Reyes MD         1)  Number and Complexity of Problems  Problem List This Visit:   Chief Complaint   Patient presents with    Loss of Consciousness    Head Injury     Diagnoses Considered but Do Not Suspect:  Please see Differential Diagnosis  Pertinent Comorbid Conditions:   Please see PMH and ED course     2)  Data Reviewed  EKG was reviewed in detail. See EKG section above.  Decision Rules/Scores utilized:  See ED course.  Tests considered but not ordered and why:  See ED course.   External Documents Reviewed:

## 2024-10-02 NOTE — ED TRIAGE NOTES
Pt arrives with family and is slow to respond. Large hematoma noted above right eye. Family reports heard pt fall in bathroom. Pt unable to provide details of fall. States she doesn't know what happened. Respirations unlabored. Oxygen saturation noted to be 85% on room air. Pt placed on 2 liters oxygen and saturation increased to 94-96%. Pt follows commands.

## 2024-10-16 ENCOUNTER — OFFICE VISIT (OUTPATIENT)
Dept: FAMILY MEDICINE CLINIC | Age: 69
End: 2024-10-16

## 2024-10-16 ENCOUNTER — LAB (OUTPATIENT)
Dept: LAB | Age: 69
End: 2024-10-16

## 2024-10-16 VITALS
WEIGHT: 192.68 LBS | OXYGEN SATURATION: 97 % | HEART RATE: 67 BPM | SYSTOLIC BLOOD PRESSURE: 114 MMHG | HEIGHT: 60 IN | TEMPERATURE: 97.7 F | RESPIRATION RATE: 17 BRPM | DIASTOLIC BLOOD PRESSURE: 66 MMHG | BODY MASS INDEX: 37.83 KG/M2

## 2024-10-16 DIAGNOSIS — Z87.39 HISTORY OF GOUT: ICD-10-CM

## 2024-10-16 DIAGNOSIS — M85.80 OSTEOPENIA, UNSPECIFIED LOCATION: ICD-10-CM

## 2024-10-16 DIAGNOSIS — I50.42 CHRONIC COMBINED SYSTOLIC AND DIASTOLIC CONGESTIVE HEART FAILURE (HCC): ICD-10-CM

## 2024-10-16 DIAGNOSIS — H61.23 BILATERAL IMPACTED CERUMEN: ICD-10-CM

## 2024-10-16 DIAGNOSIS — E03.9 ACQUIRED HYPOTHYROIDISM: ICD-10-CM

## 2024-10-16 DIAGNOSIS — E66.01 MORBIDLY OBESE: ICD-10-CM

## 2024-10-16 DIAGNOSIS — M85.89 OSTEOPENIA OF MULTIPLE SITES: ICD-10-CM

## 2024-10-16 DIAGNOSIS — D69.6 THROMBOCYTOPENIA (HCC): ICD-10-CM

## 2024-10-16 DIAGNOSIS — R06.09 DYSPNEA ON EXERTION: ICD-10-CM

## 2024-10-16 DIAGNOSIS — I69.30 CEREBRAL MULTI-INFARCT STATE: ICD-10-CM

## 2024-10-16 DIAGNOSIS — C54.9 MALIGNANT NEOPLASM OF BODY OF UTERUS, UNSPECIFIED SITE (HCC): ICD-10-CM

## 2024-10-16 DIAGNOSIS — K21.00 GASTROESOPHAGEAL REFLUX DISEASE WITH ESOPHAGITIS WITHOUT HEMORRHAGE: ICD-10-CM

## 2024-10-16 DIAGNOSIS — E78.00 PURE HYPERCHOLESTEROLEMIA: ICD-10-CM

## 2024-10-16 DIAGNOSIS — N81.4 CYSTOCELE WITH PROLAPSE: ICD-10-CM

## 2024-10-16 DIAGNOSIS — F41.9 ANXIETY: ICD-10-CM

## 2024-10-16 DIAGNOSIS — I10 ESSENTIAL HYPERTENSION: ICD-10-CM

## 2024-10-16 DIAGNOSIS — R29.6 FALLS FREQUENTLY: ICD-10-CM

## 2024-10-16 DIAGNOSIS — R56.9 SEIZURE (HCC): ICD-10-CM

## 2024-10-16 DIAGNOSIS — N18.31 STAGE 3A CHRONIC KIDNEY DISEASE (HCC): ICD-10-CM

## 2024-10-16 DIAGNOSIS — Z00.00 MEDICARE ANNUAL WELLNESS VISIT, SUBSEQUENT: Primary | ICD-10-CM

## 2024-10-16 DIAGNOSIS — G30.9 DEMENTIA IN ALZHEIMER'S DISEASE (HCC): ICD-10-CM

## 2024-10-16 DIAGNOSIS — Z12.11 COLON CANCER SCREENING: ICD-10-CM

## 2024-10-16 DIAGNOSIS — F02.80 DEMENTIA IN ALZHEIMER'S DISEASE (HCC): ICD-10-CM

## 2024-10-16 DIAGNOSIS — G47.01 INSOMNIA DUE TO MEDICAL CONDITION: ICD-10-CM

## 2024-10-16 DIAGNOSIS — F33.42 RECURRENT MAJOR DEPRESSIVE DISORDER, IN FULL REMISSION (HCC): ICD-10-CM

## 2024-10-16 DIAGNOSIS — R26.81 UNSTEADY GAIT: ICD-10-CM

## 2024-10-16 DIAGNOSIS — M15.0 PRIMARY OSTEOARTHRITIS INVOLVING MULTIPLE JOINTS: ICD-10-CM

## 2024-10-16 DIAGNOSIS — E87.0 HYPERNATREMIA: ICD-10-CM

## 2024-10-16 DIAGNOSIS — Z86.73 HISTORY OF STROKE: ICD-10-CM

## 2024-10-16 LAB
ANION GAP SERPL CALC-SCNC: 11 MEQ/L (ref 8–16)
BUN SERPL-MCNC: 30 MG/DL (ref 7–22)
CALCIUM SERPL-MCNC: 9.7 MG/DL (ref 8.5–10.5)
CHLORIDE SERPL-SCNC: 102 MEQ/L (ref 98–111)
CO2 SERPL-SCNC: 28 MEQ/L (ref 23–33)
CREAT SERPL-MCNC: 1.1 MG/DL (ref 0.4–1.2)
DEPRECATED RDW RBC AUTO: 49.4 FL (ref 35–45)
ERYTHROCYTE [DISTWIDTH] IN BLOOD BY AUTOMATED COUNT: 12.5 % (ref 11.5–14.5)
GFR SERPL CREATININE-BSD FRML MDRD: 54 ML/MIN/1.73M2
GLUCOSE SERPL-MCNC: 90 MG/DL (ref 70–108)
HCT VFR BLD AUTO: 37.4 % (ref 37–47)
HGB BLD-MCNC: 12.1 GM/DL (ref 12–16)
MCH RBC QN AUTO: 34.5 PG (ref 26–33)
MCHC RBC AUTO-ENTMCNC: 32.4 GM/DL (ref 32.2–35.5)
MCV RBC AUTO: 106.6 FL (ref 81–99)
PLATELET # BLD AUTO: 134 THOU/MM3 (ref 130–400)
PMV BLD AUTO: 11.7 FL (ref 9.4–12.4)
POTASSIUM SERPL-SCNC: 4.6 MEQ/L (ref 3.5–5.2)
RBC # BLD AUTO: 3.51 MILL/MM3 (ref 4.2–5.4)
SODIUM SERPL-SCNC: 141 MEQ/L (ref 135–145)
TSH SERPL DL<=0.005 MIU/L-ACNC: 3.86 UIU/ML (ref 0.4–4.2)
WBC # BLD AUTO: 7.1 THOU/MM3 (ref 4.8–10.8)

## 2024-10-16 SDOH — ECONOMIC STABILITY: FOOD INSECURITY: WITHIN THE PAST 12 MONTHS, YOU WORRIED THAT YOUR FOOD WOULD RUN OUT BEFORE YOU GOT MONEY TO BUY MORE.: NEVER TRUE

## 2024-10-16 SDOH — ECONOMIC STABILITY: FOOD INSECURITY: WITHIN THE PAST 12 MONTHS, THE FOOD YOU BOUGHT JUST DIDN'T LAST AND YOU DIDN'T HAVE MONEY TO GET MORE.: NEVER TRUE

## 2024-10-16 SDOH — ECONOMIC STABILITY: INCOME INSECURITY: HOW HARD IS IT FOR YOU TO PAY FOR THE VERY BASICS LIKE FOOD, HOUSING, MEDICAL CARE, AND HEATING?: NOT HARD AT ALL

## 2024-10-16 ASSESSMENT — PATIENT HEALTH QUESTIONNAIRE - PHQ9
SUM OF ALL RESPONSES TO PHQ QUESTIONS 1-9: 1
2. FEELING DOWN, DEPRESSED OR HOPELESS: SEVERAL DAYS
6. FEELING BAD ABOUT YOURSELF - OR THAT YOU ARE A FAILURE OR HAVE LET YOURSELF OR YOUR FAMILY DOWN: NOT AT ALL
SUM OF ALL RESPONSES TO PHQ QUESTIONS 1-9: 1
SUM OF ALL RESPONSES TO PHQ QUESTIONS 1-9: 1
10. IF YOU CHECKED OFF ANY PROBLEMS, HOW DIFFICULT HAVE THESE PROBLEMS MADE IT FOR YOU TO DO YOUR WORK, TAKE CARE OF THINGS AT HOME, OR GET ALONG WITH OTHER PEOPLE: NOT DIFFICULT AT ALL
SUM OF ALL RESPONSES TO PHQ QUESTIONS 1-9: 1
8. MOVING OR SPEAKING SO SLOWLY THAT OTHER PEOPLE COULD HAVE NOTICED. OR THE OPPOSITE, BEING SO FIGETY OR RESTLESS THAT YOU HAVE BEEN MOVING AROUND A LOT MORE THAN USUAL: NOT AT ALL
4. FEELING TIRED OR HAVING LITTLE ENERGY: NOT AT ALL
7. TROUBLE CONCENTRATING ON THINGS, SUCH AS READING THE NEWSPAPER OR WATCHING TELEVISION: NOT AT ALL
3. TROUBLE FALLING OR STAYING ASLEEP: NOT AT ALL
5. POOR APPETITE OR OVEREATING: NOT AT ALL
9. THOUGHTS THAT YOU WOULD BE BETTER OFF DEAD, OR OF HURTING YOURSELF: NOT AT ALL

## 2024-10-16 NOTE — PROGRESS NOTES
Explained ear wax removal procedure to patient with patient verbalizing understanding. Bilateral irrigation performed with warm irrigation fluid, noting cerumen flushed from each ear successfully. Patient tolerated well. Ear canals now clear, tympanic membranes visible.   
Immunizations Administered       Name Date Dose Route    Influenza, FLUAD, (age 65 y+), IM, Trivalent PF, 0.5mL 10/16/2024 0.5 mL Intramuscular    Site: Deltoid- Left    Lot: [id number]    NDC: 37810-969-44            VIS GIVEN.  CONSENT SIGNED  PATIENT TOLERATED WELL.     Vaccine Information Sheet, \"Influenza - Inactivated\"  given to Mag Parrish, or parent/legal guardian of  Mag Parrish and verbalized understanding.    Patient responses:    Have you ever had a reaction to a flu vaccine? No  Do you have an allergy to eggs, neomycin or polymixin?  No  Do you have an allergy to Thimerosal, contact lens solution, or Merthiolate? No  Have you ever had [place] Syndrome?  No  Do you have any current illness?  No  Do you have a temperature above 100 degrees? No  Are you pregnant? No  If pregnant, permission obtained from physician? No  Do you have an active neurological disorder? No      Flu vaccine given per order. Please see immunization tab.   
someone hand to walk outside of the house.    2 falls hitting her head in the past month.     Historical note:  Here today with her  to review a recent hospital admission.  She was found at home having a seizure.  Bought to the hospital and stabilized but not going to see neurology in [place] until 4/9/24.  She knew who I am and where she is but not the date.   She denies all ROS but her  says she is sleeping a lot and more SOB with exertion.  She is very shaky in the morning but xanax helps to stop the shaking.  They are not doing PT right now but I reviewed with her  that she may benefit from home PT or short term rehab at a SNF.  She definitely needs 24 hour care in case of another seizure.  He is agreeable to  PT.  Had a long discussion about stepwise loss of memory with each episode that is not likely to recover.  With home health nursing and therapy she seems to be doing better.  She no longer requires a walker but relies on him for walking outside of the home.  She does have occasional mild headaches that is better with Tylenol.  Shortness of breath is improving as her stamina improves.  He did leave her alone in the home for about 45 minutes the other day but he reports she was sleeping after therapy.      The following acute and/or chronic problems were also addressed today:  See above    Patient's complete Health Risk Assessment and screening values have been reviewed and are found in Flowsheets. The following problems were reviewed today and where indicated follow up appointments were made and/or referrals ordered.    Positive Risk Factor Screenings with Interventions:    Fall Risk:  Do you feel unsteady or are you worried about falling? : (!) yes  2 or more falls in past year?: (!) yes  Fall with injury in past year?: (!) yes     Interventions:    Reviewed medications, home hazards, visual acuity, and co-morbidities that can increase risk for falls  Referral: Physical Therapy (PT) 
headaches.   Psychiatric/Behavioral: Negative for sleep disturbance.      :     There were no vitals filed for this visit.    Wt Readings from Last 3 Encounters:   04/16/24 81 kg (178 lb 9.2 oz)   03/27/24 81 kg (178 lb 9.2 oz)   03/24/24 83 kg (182 lb 15.7 oz)       Physical Exam  Physical Exam   Constitutional: Vital signs are normal. She appears well-developed and well-nourished. She is active.   HENT:   Head: Normocephalic and atraumatic.   Right Ear: Tympanic membrane, external ear and ear canal normal. No drainage or tenderness.   Left Ear: Tympanic membrane, external ear and ear canal normal. No drainage or tenderness.   Nose: Nose normal. No mucosal edema or rhinorrhea.   Mouth/Throat: Uvula is midline, oropharynx is clear and moist and mucous membranes are normal. Mucous membranes are not pale. Normal dentition. No posterior oropharyngeal edema or posterior oropharyngeal erythema.   Eyes: Lids are normal. Right eye exhibits no chemosis and no discharge. Left eye exhibits no chemosis and no drainage. Right conjunctiva has no hemorrhage. Left conjunctiva has no hemorrhage. Right eye exhibits normal extraocular motion. Left eye exhibits normal extraocular motion. Right pupil is round and reactive. Left pupil is round and reactive. Pupils are equal.   Cardiovascular: Normal rate, regular rhythm, S1 normal, S2 normal and normal heart sounds.  Exam reveals no gallop.    No murmur heard.  Pulmonary/Chest: Effort normal and breath sounds normal. No respiratory distress. She has no wheezes. She has no rhonchi. She has no rales.   Abdominal: Soft. Normal appearance and bowel sounds are normal. She exhibits no distension and no mass. There is no hepatosplenomegaly. No tenderness. She has no rigidity, no rebound and no guarding. No hernia.   Musculoskeletal:        Right lower leg: She exhibits no edema.        Left lower leg: She exhibits no edema.   Neurological: She is alert.         Assessment/Plan   Diagnoses and

## 2024-10-17 LAB
T3 TOTAL: 51 NG/DL (ref 80–200)
T4 SERPL-MCNC: 5.1 UG/DL (ref 4.5–11.7)

## 2024-11-08 LAB — NONINV COLON CA DNA+OCC BLD SCRN STL QL: NEGATIVE

## 2024-11-22 ENCOUNTER — HOSPITAL ENCOUNTER (OUTPATIENT)
Dept: WOMENS IMAGING | Age: 69
Discharge: HOME | End: 2024-11-22

## 2024-11-22 VITALS — WEIGHT: 192 LBS | BODY MASS INDEX: 37.69 KG/M2 | HEIGHT: 60 IN

## 2024-11-22 DIAGNOSIS — Z12.31 VISIT FOR SCREENING MAMMOGRAM: ICD-10-CM

## 2024-12-02 DIAGNOSIS — I69.30 CEREBRAL MULTI-INFARCT STATE: ICD-10-CM

## 2024-12-02 RX ORDER — FOLIC ACID 1 MG/1
TABLET ORAL
Qty: 90 TABLET | Refills: 1 | Status: SHIPPED | OUTPATIENT
Start: 2024-12-02

## 2024-12-11 ENCOUNTER — OFFICE VISIT (OUTPATIENT)
Dept: CARDIOLOGY CLINIC | Age: 69
End: 2024-12-11
Payer: MEDICARE

## 2024-12-11 ENCOUNTER — HOSPITAL ENCOUNTER (OUTPATIENT)
Dept: WOMENS IMAGING | Age: 69
Discharge: HOME | End: 2024-12-11
Payer: MEDICARE

## 2024-12-11 VITALS
DIASTOLIC BLOOD PRESSURE: 88 MMHG | SYSTOLIC BLOOD PRESSURE: 172 MMHG | HEIGHT: 60 IN | WEIGHT: 189 LBS | HEART RATE: 68 BPM | BODY MASS INDEX: 37.11 KG/M2

## 2024-12-11 DIAGNOSIS — Z12.31 VISIT FOR SCREENING MAMMOGRAM: ICD-10-CM

## 2024-12-11 DIAGNOSIS — E78.01 FAMILIAL HYPERCHOLESTEROLEMIA: ICD-10-CM

## 2024-12-11 DIAGNOSIS — I10 PRIMARY HYPERTENSION: Primary | ICD-10-CM

## 2024-12-11 PROCEDURE — G8417 CALC BMI ABV UP PARAM F/U: HCPCS | Performed by: NUCLEAR MEDICINE

## 2024-12-11 PROCEDURE — G8482 FLU IMMUNIZE ORDER/ADMIN: HCPCS | Performed by: NUCLEAR MEDICINE

## 2024-12-11 PROCEDURE — 3077F SYST BP >= 140 MM HG: CPT | Performed by: NUCLEAR MEDICINE

## 2024-12-11 PROCEDURE — G8428 CUR MEDS NOT DOCUMENT: HCPCS | Performed by: NUCLEAR MEDICINE

## 2024-12-11 PROCEDURE — 3017F COLORECTAL CA SCREEN DOC REV: CPT | Performed by: NUCLEAR MEDICINE

## 2024-12-11 PROCEDURE — 77063 BREAST TOMOSYNTHESIS BI: CPT

## 2024-12-11 PROCEDURE — 3079F DIAST BP 80-89 MM HG: CPT | Performed by: NUCLEAR MEDICINE

## 2024-12-11 PROCEDURE — 1036F TOBACCO NON-USER: CPT | Performed by: NUCLEAR MEDICINE

## 2024-12-11 PROCEDURE — 1123F ACP DISCUSS/DSCN MKR DOCD: CPT | Performed by: NUCLEAR MEDICINE

## 2024-12-11 PROCEDURE — G8399 PT W/DXA RESULTS DOCUMENT: HCPCS | Performed by: NUCLEAR MEDICINE

## 2024-12-11 PROCEDURE — 1090F PRES/ABSN URINE INCON ASSESS: CPT | Performed by: NUCLEAR MEDICINE

## 2024-12-11 PROCEDURE — 99213 OFFICE O/P EST LOW 20 MIN: CPT | Performed by: NUCLEAR MEDICINE

## 2024-12-11 NOTE — PROGRESS NOTES
[facility] [place] [facility]  [facility] CARDIOLOGY  [address]  [address]  [facility] [address]  Dept: [phone]  Dept Fax: [phone]  Loc: [phone]    Visit Date: 12/11/2024    Mag Parrish is a 69 y.o. female who presents todayfor:  Chief Complaint   Patient presents with    Follow-up    Hypertension    Hyperlipidemia   History of stroke  Admitted for MS changes  Thought to be seizure  Does have dementia   Follows with neurology at [facility]   Known risk for CAD  No chest pain   No changes in breathing  BP is stable   No dizziness  No syncope  On statins for hyperlipidemia        HPI:  HPI  Past Medical History:   Diagnosis Date    Acute arterial ischemic stroke, multifocal, anterior circulation (HCC)     Dr Dial    Anemia     Dr Jerome    Anxiety     Arthritis     Chicken pox     Chronic systolic (congestive) heart failure 07/26/2023    Dementia (HCC)     Endometrial adenocarcinoma (HCC) 01/30/2020    Hyperlipidemia     Hypertension     Dr Spring    Hypothyroidism     Measles     Memory loss     Mumps     Uterine cancer (HCC) 01/2020    Endometrioid carcinomaChaz      Past Surgical History:   Procedure Laterality Date    ENDOMETRIAL BIOPSY  12/11/2019    HYSTERECTOMY, TOTAL ABDOMINAL (CERVIX REMOVED)  01/30/2020    total    OVARY REMOVAL  01/30/2020    total    VAGINAL PROLAPSE REPAIR  08/2023     Family History   Problem Relation Age of Onset    Arthritis Mother     Other Daughter         SVT and PE     Social History     Tobacco Use    Smoking status: Never     Passive exposure: Past    Smokeless tobacco: Never   Substance Use Topics    Alcohol use: Yes     Alcohol/week: 2.0 standard drinks of alcohol     Types: 2 Glasses of wine per week      Current Outpatient Medications   Medication Sig Dispense Refill    folic acid (FOLVITE) 1 MG tablet TAKE ONE TABLET DAILY, BY MOUTH. 90 tablet 1    sertraline (ZOLOFT) 100 MG tablet TAKE TWO TABLETS DAILY, BY MOUTH. 180 tablet 3

## 2024-12-21 DIAGNOSIS — R41.3 MEMORY LOSS: ICD-10-CM

## 2024-12-23 RX ORDER — RIVASTIGMINE 9.5 MG/24H
PATCH, EXTENDED RELEASE TRANSDERMAL
Qty: 30 PATCH | Refills: 8 | Status: SHIPPED | OUTPATIENT
Start: 2024-12-23

## 2024-12-23 NOTE — TELEPHONE ENCOUNTER
Last visit- 10/16/2024  Next visit- 4/16/2025    Requested Prescriptions     Pending Prescriptions Disp Refills    rivastigmine (EXELON) 9.5 MG/24HR [Pharmacy Med Name: RIVASTIGMINE TRANSDERMAL SYSTEM 9.5MG/24 PATCH 24HR] 30 patch 8     Sig: APPLY ONE PATCH, DAILY AS DIRECTED.

## 2025-02-12 DIAGNOSIS — I10 ESSENTIAL HYPERTENSION: ICD-10-CM

## 2025-02-12 RX ORDER — METOPROLOL SUCCINATE 25 MG/1
TABLET, EXTENDED RELEASE ORAL
Qty: 90 TABLET | Refills: 1 | Status: SHIPPED | OUTPATIENT
Start: 2025-02-12

## 2025-02-12 RX ORDER — DIVALPROEX SODIUM 125 MG/1
CAPSULE, COATED PELLETS ORAL
Qty: 180 CAPSULE | Refills: 1 | Status: SHIPPED | OUTPATIENT
Start: 2025-02-12

## 2025-02-12 NOTE — TELEPHONE ENCOUNTER
Last visit- 10/16/2024  Next visit- 4/16/2025    Requested Prescriptions     Pending Prescriptions Disp Refills    metoprolol succinate (TOPROL XL) 25 MG extended release tablet [Pharmacy Med Name: METOPROLOL SUCCINATE ER 25MG ER TABLET ER 24HR] 90 tablet 1     Sig: TAKE ONE TABLET DAILY, BY MOUTH.    divalproex (DEPAKOTE SPRINKLE) 125 MG DR capsule [Pharmacy Med Name: DIVALPROEX SODIUM DR 125MG DR CSDR] 180 capsule 1     Sig: TAKE ONE CAPSULE, TWO TIMES A DAY.

## 2025-02-20 ENCOUNTER — APPOINTMENT (OUTPATIENT)
Dept: GENERAL RADIOLOGY | Age: 70
End: 2025-02-20
Payer: MEDICARE

## 2025-02-20 ENCOUNTER — HOSPITAL ENCOUNTER (EMERGENCY)
Age: 70
Discharge: HOME | End: 2025-02-20
Attending: EMERGENCY MEDICINE
Payer: MEDICARE

## 2025-02-20 ENCOUNTER — APPOINTMENT (OUTPATIENT)
Dept: CT IMAGING | Age: 70
End: 2025-02-20
Payer: MEDICARE

## 2025-02-20 VITALS
WEIGHT: 189 LBS | HEIGHT: 60 IN | OXYGEN SATURATION: 98 % | HEART RATE: 57 BPM | SYSTOLIC BLOOD PRESSURE: 126 MMHG | TEMPERATURE: 97.3 F | BODY MASS INDEX: 37.11 KG/M2 | DIASTOLIC BLOOD PRESSURE: 65 MMHG | RESPIRATION RATE: 17 BRPM

## 2025-02-20 DIAGNOSIS — R56.9 POSTICTAL STATE (HCC): ICD-10-CM

## 2025-02-20 DIAGNOSIS — R56.9 SEIZURE (HCC): Primary | ICD-10-CM

## 2025-02-20 LAB
ANION GAP SERPL CALC-SCNC: 17 MEQ/L (ref 8–16)
BASOPHILS ABSOLUTE: 0 THOU/MM3 (ref 0–0.1)
BASOPHILS NFR BLD AUTO: 0.8 %
BUN SERPL-MCNC: 28 MG/DL (ref 7–22)
CALCIUM SERPL-MCNC: 9.7 MG/DL (ref 8.2–9.6)
CHLORIDE SERPL-SCNC: 102 MEQ/L (ref 98–111)
CO2 SERPL-SCNC: 26 MEQ/L (ref 23–33)
CREAT SERPL-MCNC: 1.1 MG/DL (ref 0.4–1.2)
DEPRECATED RDW RBC AUTO: 45 FL (ref 35–45)
EKG ATRIAL RATE: 61 BPM
EKG P AXIS: 29 DEGREES
EKG P-R INTERVAL: 154 MS
EKG Q-T INTERVAL: 370 MS
EKG QRS DURATION: 88 MS
EKG QTC CALCULATION (BAZETT): 372 MS
EKG R AXIS: 21 DEGREES
EKG T AXIS: 24 DEGREES
EKG VENTRICULAR RATE: 61 BPM
EOSINOPHIL NFR BLD AUTO: 1.6 %
EOSINOPHILS ABSOLUTE: 0.1 THOU/MM3 (ref 0–0.4)
ERYTHROCYTE [DISTWIDTH] IN BLOOD BY AUTOMATED COUNT: 12.5 % (ref 11.5–14.5)
FLUAV RNA RESP QL NAA+PROBE: NOT DETECTED
FLUBV RNA RESP QL NAA+PROBE: NOT DETECTED
GFR SERPL CREATININE-BSD FRML MDRD: 53 ML/MIN/1.73M2
GLUCOSE BLD STRIP.AUTO-MCNC: 77 MG/DL (ref 70–108)
GLUCOSE SERPL-MCNC: 86 MG/DL (ref 70–108)
HCT VFR BLD AUTO: 38.9 % (ref 37–47)
HGB BLD-MCNC: 12.8 GM/DL (ref 12–16)
IMM GRANULOCYTES # BLD AUTO: 0.02 THOU/MM3 (ref 0–0.07)
IMM GRANULOCYTES NFR BLD AUTO: 0.3 %
LACTIC ACID, SEPSIS: 1.2 MMOL/L (ref 0.5–1.9)
LYMPHOCYTES ABSOLUTE: 1.4 THOU/MM3 (ref 1–4.8)
LYMPHOCYTES NFR BLD AUTO: 22.3 %
MCH RBC QN AUTO: 32.7 PG (ref 26–33)
MCHC RBC AUTO-ENTMCNC: 32.9 GM/DL (ref 32.2–35.5)
MCV RBC AUTO: 99.2 FL (ref 81–99)
MONOCYTES ABSOLUTE: 0.6 THOU/MM3 (ref 0.4–1.3)
MONOCYTES NFR BLD AUTO: 9.5 %
NEUTROPHILS ABSOLUTE: 4.1 THOU/MM3 (ref 1.8–7.7)
NEUTROPHILS NFR BLD AUTO: 65.5 %
NRBC BLD AUTO-RTO: 0 /100 WBC
OSMOLALITY SERPL CALC.SUM OF ELEC: 293.5 MOSMOL/KG (ref 275–300)
PLATELET # BLD AUTO: 113 THOU/MM3 (ref 130–400)
PMV BLD AUTO: 11.8 FL (ref 9.4–12.4)
POTASSIUM SERPL-SCNC: 4.3 MEQ/L (ref 3.5–5.2)
RBC # BLD AUTO: 3.92 MILL/MM3 (ref 4.2–5.4)
SARS-COV-2 RNA RESP QL NAA+PROBE: NOT DETECTED
SODIUM SERPL-SCNC: 145 MEQ/L (ref 135–145)
TROPONIN, HIGH SENSITIVITY: 15 NG/L (ref 0–12)
WBC # BLD AUTO: 6.2 THOU/MM3 (ref 4.8–10.8)

## 2025-02-20 PROCEDURE — 83605 ASSAY OF LACTIC ACID: CPT

## 2025-02-20 PROCEDURE — 85025 COMPLETE CBC W/AUTO DIFF WBC: CPT

## 2025-02-20 PROCEDURE — 70450 CT HEAD/BRAIN W/O DYE: CPT

## 2025-02-20 PROCEDURE — 99285 EMERGENCY DEPT VISIT HI MDM: CPT

## 2025-02-20 PROCEDURE — 71045 X-RAY EXAM CHEST 1 VIEW: CPT

## 2025-02-20 PROCEDURE — 80048 BASIC METABOLIC PNL TOTAL CA: CPT

## 2025-02-20 PROCEDURE — 84484 ASSAY OF TROPONIN QUANT: CPT

## 2025-02-20 PROCEDURE — 87636 SARSCOV2 & INF A&B AMP PRB: CPT

## 2025-02-20 PROCEDURE — 36415 COLL VENOUS BLD VENIPUNCTURE: CPT

## 2025-02-20 PROCEDURE — 93005 ELECTROCARDIOGRAM TRACING: CPT | Performed by: EMERGENCY MEDICINE

## 2025-02-20 PROCEDURE — 82948 REAGENT STRIP/BLOOD GLUCOSE: CPT

## 2025-02-20 PROCEDURE — 93010 ELECTROCARDIOGRAM REPORT: CPT | Performed by: INTERNAL MEDICINE

## 2025-02-20 RX ORDER — 0.9 % SODIUM CHLORIDE 0.9 %
1000 INTRAVENOUS SOLUTION INTRAVENOUS ONCE
Status: DISCONTINUED | OUTPATIENT
Start: 2025-02-20 | End: 2025-02-20 | Stop reason: HOSPADM

## 2025-02-20 NOTE — ED PROVIDER NOTES

## 2025-02-20 NOTE — DISCHARGE INSTRUCTIONS
Follow-up with your neurologist for further assessment and management of your seizure disorder.    Continue taking your seizure medications as prescribed.    Return to the emergency department immediately if there is any new or concerning symptom.

## 2025-02-20 NOTE — ED NOTES
Patient to ED via EMS from home after having a syncopal episode followed by altered mental status. Last known well was 10:15. Patient lives with daughter who found patient. Family reported that patient was unconscious for 15 min.   EMS patient started to wake up about 2 mins prior to arrival.   On arrival patient alert to self and place. Patient has hx of CVA and seizures

## 2025-02-20 NOTE — ED PROVIDER NOTES
[facility] [place] EMERGENCY DEPARTMENT  EMERGENCY DEPARTMENT ENCOUNTER          Pt Name: Mag Parrish  MRN: [id number]  Birthdate 1955  Date of evaluation: 2/20/2025  Physician: Mago Larios MD  Supervising Attending Physician: Bev Edwards MD       CHIEF COMPLAINT       Chief Complaint   Patient presents with    Loss of Consciousness    Altered Mental Status         HISTORY OF PRESENT ILLNESS    HPI  Mag Parrish is a 69 y.o. female who presents to the emergency department from home, brought in by EMS for evaluation of LOC    Patient with history of multiple strokes and seizure disorder presents to the ED after an episode of loss of consciousness and possible seizure at home.  Occurred while the patient was sitting on the chair postured her arms and struck her little bit that lasted for 2 minutes she was given lorazepam by her family as instructed by her neurologist.  Afterwards the patient stopped that movement but she was unresponsive EMS were called by the time she arrived to the ED she has been alert and oriented.  She has a history of mild Alzheimer's disease as well.  She has been compliant with her medications her  at home has been sick with the flu for the last several weeks.  She has not had any symptoms prior to this and was feeling at her baseline.  No fall with head injury recently.  The patient has no other acute complaints at this time.      REVIEW OF SYSTEMS   Review of Systems      PAST MEDICAL AND SURGICAL HISTORY     Past Medical History:   Diagnosis Date    Acute arterial ischemic stroke, multifocal, anterior circulation (HCC)     Dr Dial    Anemia     Dr Jerome    Anxiety     Arthritis     Chicken pox     Chronic systolic (congestive) heart failure 07/26/2023    Dementia (HCC)     Endometrial adenocarcinoma (HCC) 01/30/2020    Hyperlipidemia     Hypertension     Dr Spring    Hypothyroidism     Measles     Memory loss     Mumps     Uterine cancer (HCC)  agreement with diagnosis and treatment plan of discharge continuing her antiseizure medication and following up with her neurologist.  She will be discharged    ED COURSE   ED Medications administered this visit (None if left blank):   Medications   sodium chloride 0.9 % bolus 1,000 mL (has no administration in time range)                PROCEDURES: (None if blank)  Procedures:     CRITICAL CARE:  None    MEDICATION CHANGES     New Prescriptions    No medications on file         FINAL DISPOSITION   Shared Decision-Making was performed, disposition discussed with the patient/family and questions answered.      Outpatient follow up (If applicable):  Follow-up with your neurologist          Not applicable              FINAL DIAGNOSES:  Final diagnoses:   Seizure (HCC)   Postictal state (HCC)       Condition: condition: good  Dispo: Discharge to home  DISPOSITION Decision To Discharge 02/20/2025 02:28:18 PM   DISPOSITION CONDITION Stable           This transcription was electronically signed. It was dictated by use of voice recognition software and electronically transcribed. The transcription may contain errors not detected in proofreading.    Electronically signed by Mago Larios MD on 2/20/25 at 1:26 PM EST

## 2025-02-21 ENCOUNTER — CARE COORDINATION (OUTPATIENT)
Dept: CARE COORDINATION | Age: 70
End: 2025-02-21

## 2025-02-21 SDOH — ECONOMIC STABILITY: INCOME INSECURITY: IN THE LAST 12 MONTHS, WAS THERE A TIME WHEN YOU WERE NOT ABLE TO PAY THE MORTGAGE OR RENT ON TIME?: NO

## 2025-02-21 SDOH — ECONOMIC STABILITY: FOOD INSECURITY: WITHIN THE PAST 12 MONTHS, THE FOOD YOU BOUGHT JUST DIDN'T LAST AND YOU DIDN'T HAVE MONEY TO GET MORE.: NEVER TRUE

## 2025-02-21 SDOH — SOCIAL STABILITY: SOCIAL NETWORK: HOW OFTEN DO YOU GET TOGETHER WITH FRIENDS OR RELATIVES?: THREE TIMES A WEEK

## 2025-02-21 SDOH — SOCIAL STABILITY: SOCIAL NETWORK: HOW OFTEN DO YOU ATTEND CHURCH OR RELIGIOUS SERVICES?: MORE THAN 4 TIMES PER YEAR

## 2025-02-21 SDOH — SOCIAL STABILITY: SOCIAL NETWORK
IN A TYPICAL WEEK, HOW MANY TIMES DO YOU TALK ON THE PHONE WITH FAMILY, FRIENDS, OR NEIGHBORS?: MORE THAN THREE TIMES A WEEK

## 2025-02-21 SDOH — HEALTH STABILITY: MENTAL HEALTH
STRESS IS WHEN SOMEONE FEELS TENSE, NERVOUS, ANXIOUS, OR CAN'T SLEEP AT NIGHT BECAUSE THEIR MIND IS TROUBLED. HOW STRESSED ARE YOU?: TO SOME EXTENT

## 2025-02-21 SDOH — SOCIAL STABILITY: SOCIAL NETWORK
DO YOU BELONG TO ANY CLUBS OR ORGANIZATIONS SUCH AS CHURCH GROUPS UNIONS, FRATERNAL OR ATHLETIC GROUPS, OR SCHOOL GROUPS?: NO

## 2025-02-21 SDOH — ECONOMIC STABILITY: FOOD INSECURITY: WITHIN THE PAST 12 MONTHS, YOU WORRIED THAT YOUR FOOD WOULD RUN OUT BEFORE YOU GOT MONEY TO BUY MORE.: NEVER TRUE

## 2025-02-21 SDOH — HEALTH STABILITY: MENTAL HEALTH: HOW MANY STANDARD DRINKS CONTAINING ALCOHOL DO YOU HAVE ON A TYPICAL DAY?: PATIENT DOES NOT DRINK

## 2025-02-21 SDOH — SOCIAL STABILITY: SOCIAL NETWORK: ARE YOU MARRIED, WIDOWED, DIVORCED, SEPARATED, NEVER MARRIED, OR LIVING WITH A PARTNER?: MARRIED

## 2025-02-21 SDOH — HEALTH STABILITY: PHYSICAL HEALTH: ON AVERAGE, HOW MANY DAYS PER WEEK DO YOU ENGAGE IN MODERATE TO STRENUOUS EXERCISE (LIKE A BRISK WALK)?: 0 DAYS

## 2025-02-21 SDOH — HEALTH STABILITY: PHYSICAL HEALTH: ON AVERAGE, HOW MANY MINUTES DO YOU ENGAGE IN EXERCISE AT THIS LEVEL?: 0 MIN

## 2025-02-21 SDOH — HEALTH STABILITY: MENTAL HEALTH: HOW OFTEN DO YOU HAVE A DRINK CONTAINING ALCOHOL?: NEVER

## 2025-02-21 SDOH — SOCIAL STABILITY: SOCIAL NETWORK: HOW OFTEN DO YOU ATTENT MEETINGS OF THE CLUB OR ORGANIZATION YOU BELONG TO?: NEVER

## 2025-02-21 SDOH — ECONOMIC STABILITY: INCOME INSECURITY: HOW HARD IS IT FOR YOU TO PAY FOR THE VERY BASICS LIKE FOOD, HOUSING, MEDICAL CARE, AND HEATING?: NOT HARD AT ALL

## 2025-02-21 NOTE — CARE COORDINATION
Ambulatory Care Coordination Note     2025 2:50 PM     Patient Current Location:  Home: [address] [place] [id number]     This patient was received as a referral from [facility] .    [facility] contacted the  patient and her daughter/HIPAA contact, Pastora,  by telephone. Verified name and  with family as identifiers. Provided introduction to self, and explanation of the ACM role.    Pastora  accepted care management services at this time.          ACM: Bev Juarez RN     Challenges to be reviewed by the provider   Additional needs identified to be addressed with provider No    none         Method of communication with provider: none.    Utilization: Initial Call - Discharge Date: 25   Discharge Facility: [facility]  Reason for ED Visit: LOC and AMS  Visit Diagnosis: Seizure Activity     Number of ED visits in the last 6 months: 3      Do you have any ongoing symptoms? No  Did you call your PCP prior to going to the ED? No, did not call the PCP office.     Review of Discharge Instructions:   [x] AVS discharge instructions  [] Right Care, Right Place, Right Time document  [] Medication changes  [x] Follow up appointments  [] Referral follow up        Care Summary Note: Patient was called for Care Coordination enrollment s/p recent list referral for assistance with the management of her CHF, Hypertension, Alzheimer's Dementia, history of CVA/Seizures, healthcare needs, and in f/u to recent ED visit for c/o LOC/AMS.  Patient was not available at the time of ACM call, but ACM was able to speak with patient's daughter/HIPAA contact, Pastora.  Care Coordination program was reviewed with Pastora and initial eval information obtained.  Pastora shared patient is doing \"ok\" today and she denied any further seizure activity since she has been home.  Pastora reported patient is resting at this time, but was able to get self up and dressed independently this AM.

## 2025-02-25 ENCOUNTER — NURSE ONLY (OUTPATIENT)
Dept: FAMILY MEDICINE CLINIC | Age: 70
End: 2025-02-25

## 2025-02-25 VITALS — HEART RATE: 81 BPM | DIASTOLIC BLOOD PRESSURE: 70 MMHG | SYSTOLIC BLOOD PRESSURE: 110 MMHG

## 2025-02-25 NOTE — PROGRESS NOTES
Chief Complaint   Patient presents with    Blood Pressure Check       Vitals:    02/25/25 1407   BP: 110/70   Pulse: 81       No changes to med    Family notified

## 2025-02-27 ENCOUNTER — CARE COORDINATION (OUTPATIENT)
Dept: CARE COORDINATION | Age: 70
End: 2025-02-27

## 2025-03-06 ENCOUNTER — CARE COORDINATION (OUTPATIENT)
Dept: CARE COORDINATION | Age: 70
End: 2025-03-06

## 2025-03-06 NOTE — CARE COORDINATION
Ambulatory Care Coordination Note     3/6/2025 2:39 PM     Patient Current Location:  Home: [address] [place] [id number]     ACM contacted the  patient/family  by telephone. Verified name and  with  patient's daughter/HIPAA contact, Pastora,  as identifiers.         ACM: Bev Juarez RN     Challenges to be reviewed by the provider   Additional needs identified to be addressed with provider No    none         Method of communication with provider: none.    Utilization: Patient has not had any utilization since our last call.     Care Summary Note: Patient was called for continued Care Coordination follow up and education re: the management of her CHF, Hypertension, history of CVA/Seizures, and healthcare needs.  Patient was not available at the time of ACM call, but ACM was able to speak with patient's daughter/HIPAA contact, Pastora.  Pastora shared patient continues to improve and her  is also recovering well s/p recent hospital admission for flu.  Pastora denied any further seizure activity and reported patient's breathing remains at her baseline.  Pastora denied any c/o swelling or c/o chest pain/headaches being present.  ACM reviewed signs and symptoms they should report as well as CHF zone education with [employer].  ACM educated on the importance of early symptom recognition and follow up and need to call for an earlier visit with any new or worsening of symptoms.  Pastora acknowledged understanding.  Pastora denied any additional resource needs at this time and she was encouraged to call with any that may develop.      Offered patient enrollment in the Remote Patient Monitoring (RPM) program for in-home monitoring: Patient is not eligible for RPM program because: Patient unable to complete program .     Assessments Completed:   No changes since last call    Medications Reviewed:   Family denied any medication questions or concerns.     Advance Care Planning:   Not reviewed during this

## 2025-03-13 ENCOUNTER — CARE COORDINATION (OUTPATIENT)
Dept: CARE COORDINATION | Age: 70
End: 2025-03-13

## 2025-03-13 DIAGNOSIS — F33.42 RECURRENT MAJOR DEPRESSIVE DISORDER, IN FULL REMISSION: ICD-10-CM

## 2025-03-13 RX ORDER — ALPRAZOLAM 0.25 MG
0.25 TABLET ORAL NIGHTLY PRN
Qty: 90 TABLET | Refills: 1 | Status: SHIPPED | OUTPATIENT
Start: 2025-03-13 | End: 2025-06-11

## 2025-03-13 NOTE — CARE COORDINATION
Ambulatory Care Coordination Note     3/13/2025 1:17 PM     Patient Current Location:  [place]     ACM contacted the  patient's daughter and /HIPAA contacts  by telephone. Verified name and  with spouse/partner as identifiers.         ACM: Bev Juarez RN     Challenges to be reviewed by the provider   Additional needs identified to be addressed with provider No    none         Method of communication with provider: none.    Utilization: Patient has not had any utilization since our last call.     Care Summary Note: Patient was called for continued Care Coordination follow up and education re: the management of her CHF, Hypertension, healthcare needs, and f/u to recent history of seizures and CVA.  Patient and daughter/HIPAA contact, Pastora, were unavailable at the time of ACM call, but M was able to speak with patient's /HIPAA contact, Heaven.  Heaven reported patient continues to do well and he denied any further seizure activity.  Johnnieg shared patient's dementia symptoms remain at her recent baseline.  Heaven denied any concerns for increased/worsening SOB or swelling being present.  ACM briefly reviewed signs and symptoms they should report and the importance of early symptom recognition and follow up.  Heaven acknowledged understanding.  Upcoming appointment information was also reviewed and Heaven verbalized understanding.  Heaven denied any need for additional resources at this time and he was instructed to call with any changes.  Heaven acknowledged understanding.  Heaven denied any other questions, concerns, or needs and she was encouraged to call with any that may develop.     Offered patient enrollment in the Remote Patient Monitoring (RPM) program for in-home monitoring: Yes, but did not enroll at this time: Patient unable to complete program .     Assessments Completed:   Care Coordination Interventions    Referral from Primary Care Provider: No  Suggested Interventions and Community Resources  Fall

## 2025-03-25 ENCOUNTER — CARE COORDINATION (OUTPATIENT)
Dept: CARE COORDINATION | Age: 70
End: 2025-03-25

## 2025-03-25 NOTE — CARE COORDINATION
Ambulatory Care Coordination Note     3/25/2025 5:33 PM     Patient Current Location:  Home: [address] [place] [address]     ACM contacted the  patient's daughter/HIPAA contact, Pastora,  by telephone. Verified name and  with  daughter  as identifiers.         ACM: Bev Juarez RN     Challenges to be reviewed by the provider   Additional needs identified to be addressed with provider No    none         Method of communication with provider: none.    Utilization: Patient has not had any utilization since our last call.     Care Summary Note: Patient was called for continued Care Coordination follow up and education re: the management of her CHF, Hypertension, and healthcare needs.  Patient was not available at the time of ACM call, but ACM was able to speak with patient's daughter/HIPAA contact, Pastora.  Pastora reported patient continues to do well and she denied any further seizure activity being present.  Pastora shared patient's memory is slightly worse than her baseline s/p recent seizure but she denied any ongoing worsening or symptom concerns.  Pastora shared patient's  is following up with pharmacy re: recent insurance [employer] concerns.  Pastora was encouraged to f/u with any further questions or concerns and she acknowledged understanding.  ACM reviewed signs and symptoms they should report and reviewed CHF zone information.  Importance of early symptom recognition and follow up was also reviewed and Pastora acknowledged understanding.  Pastora shared patient and her  are doing well and family continues to assist as needed so family declines any additional resource needs at this time and she was instructed to call if there are changes.  Pastora verbalized understanding and denied any other questions, concerns, or needs and she was encouraged to call with any that may develop.      Offered patient enrollment in the Remote Patient Monitoring (RPM) program for in-home

## 2025-04-05 DIAGNOSIS — K21.00 GASTROESOPHAGEAL REFLUX DISEASE WITH ESOPHAGITIS WITHOUT HEMORRHAGE: ICD-10-CM

## 2025-04-05 DIAGNOSIS — R11.0 NAUSEA: ICD-10-CM

## 2025-04-07 RX ORDER — OMEPRAZOLE 20 MG/1
CAPSULE, DELAYED RELEASE ORAL
Qty: 90 CAPSULE | Refills: 3 | Status: SHIPPED | OUTPATIENT
Start: 2025-04-07

## 2025-04-07 NOTE — TELEPHONE ENCOUNTER
Last visit- 10/16/2024  Next visit- 4/16/2025    Requested Prescriptions     Pending Prescriptions Disp Refills    omeprazole (PRILOSEC) 20 MG delayed release capsule [Pharmacy Med Name: OMEPRAZOLE 20MG CAPSULE DR] 90 capsule 3     Sig: TAKE 1 CAPSULE ONCE DAILY IN THE MORNING BEFORE BREAKFAST, BY MOUTH.

## 2025-04-09 ENCOUNTER — CARE COORDINATION (OUTPATIENT)
Dept: CARE COORDINATION | Age: 70
End: 2025-04-09

## 2025-04-09 NOTE — CARE COORDINATION
Attempted to reach patients daughter, Pastora, for continued Care Coordination follow up and education.  Pastora was unavailable at the time of my call, and a generic voicemail message was left asking patient to return my call at [phone].

## 2025-04-15 PROBLEM — I50.43 ACUTE ON CHRONIC COMBINED SYSTOLIC AND DIASTOLIC CONGESTIVE HEART FAILURE (HCC): Status: ACTIVE | Noted: 2025-04-15

## 2025-04-15 NOTE — PROGRESS NOTES
[facility] PROFESSIONAL SERVS  [facility] MEDICINE  [address]  [address]  [facility] [address]  Dept: [phone]  Dept Fax: [phone]  Loc: [phone]    Mag Parrish is a 69 y.o. female who presents today for:  Chief Complaint   Patient presents with    6 Month Follow-Up           HPI:     HPI    Hypertension: Patient here for follow-up of elevated blood pressure. She is not exercising and is adherent to low salt diet.  Blood pressure is well controlled at home. Cardiac symptoms none. Patient denies chest pain, dyspnea and palpitations.  Cardiovascular risk factors: dyslipidemia, hypertension, obesity (BMI >= 30 kg/m2) and sedentary lifestyle. Use of agents associated with hypertension: none. History of target organ damage: none.     Hyperlipidemia: Patient presents with hyperlipidemia.  She was tested because hypertension.  Her last labs show   Lab Results   Component Value Date    CHOL 166 09/06/2024    CHOL 186 10/06/2023    CHOL 183 06/23/2023     Lab Results   Component Value Date    TRIG 222 (H) 09/06/2024    TRIG 81 10/06/2023    TRIG 91 06/23/2023     Lab Results   Component Value Date    HDL 59 09/06/2024    HDL 73 10/06/2023    HDL 73 06/23/2023     Lab Results   Component Value Date    LDL 63 09/06/2024    LDL 97 10/06/2023    LDL 92 06/23/2023     Lab Results   Component Value Date    VLDL 17 10/13/2018    VLDL 19 10/09/2017    VLDL 18 05/13/2017     Lab Results   Component Value Date    CHOLHDLRATIO 0.9 10/13/2018    CHOLHDLRATIO 1 10/09/2017    CHOLHDLRATIO 2.14 05/13/2017   There is not a family history of hyperlipidemia. There is not a family history of early ischemia heart disease.    Hypothyroidism: Patient presents for evaluation of thyroid function. Symptoms consist of fatigue, arthralgias. Symptoms have present for several years. The symptoms are fatigue.  The problem has been gradually worsening.  Previous thyroid studies include TSH, free thyroxine index

## 2025-04-16 ENCOUNTER — OFFICE VISIT (OUTPATIENT)
Dept: FAMILY MEDICINE CLINIC | Age: 70
End: 2025-04-16

## 2025-04-16 ENCOUNTER — LAB (OUTPATIENT)
Dept: LAB | Age: 70
End: 2025-04-16

## 2025-04-16 VITALS
DIASTOLIC BLOOD PRESSURE: 82 MMHG | RESPIRATION RATE: 16 BRPM | HEIGHT: 60 IN | OXYGEN SATURATION: 96 % | SYSTOLIC BLOOD PRESSURE: 146 MMHG | WEIGHT: 183.86 LBS | BODY MASS INDEX: 36.1 KG/M2 | HEART RATE: 74 BPM | TEMPERATURE: 97.2 F

## 2025-04-16 DIAGNOSIS — Z91.89 AT RISK FOR CHANGE IN MENTAL STATUS: ICD-10-CM

## 2025-04-16 DIAGNOSIS — K21.00 GASTROESOPHAGEAL REFLUX DISEASE WITH ESOPHAGITIS WITHOUT HEMORRHAGE: ICD-10-CM

## 2025-04-16 DIAGNOSIS — R06.09 DYSPNEA ON EXERTION: ICD-10-CM

## 2025-04-16 DIAGNOSIS — N18.32 STAGE 3B CHRONIC KIDNEY DISEASE (HCC): ICD-10-CM

## 2025-04-16 DIAGNOSIS — Z87.39 HISTORY OF GOUT: ICD-10-CM

## 2025-04-16 DIAGNOSIS — U07.1 ACUTE HYPOXEMIC RESPIRATORY FAILURE DUE TO COVID-19: ICD-10-CM

## 2025-04-16 DIAGNOSIS — R56.9 SEIZURE (HCC): ICD-10-CM

## 2025-04-16 DIAGNOSIS — N81.4 CYSTOCELE WITH PROLAPSE: ICD-10-CM

## 2025-04-16 DIAGNOSIS — D69.6 THROMBOCYTOPENIA: ICD-10-CM

## 2025-04-16 DIAGNOSIS — I50.43 ACUTE ON CHRONIC COMBINED SYSTOLIC AND DIASTOLIC CONGESTIVE HEART FAILURE (HCC): ICD-10-CM

## 2025-04-16 DIAGNOSIS — R26.81 UNSTEADY GAIT: ICD-10-CM

## 2025-04-16 DIAGNOSIS — E03.9 ACQUIRED HYPOTHYROIDISM: ICD-10-CM

## 2025-04-16 DIAGNOSIS — R29.6 FALLS FREQUENTLY: ICD-10-CM

## 2025-04-16 DIAGNOSIS — Z86.73 HISTORY OF STROKE: ICD-10-CM

## 2025-04-16 DIAGNOSIS — F02.80 DEMENTIA IN ALZHEIMER'S DISEASE (HCC): ICD-10-CM

## 2025-04-16 DIAGNOSIS — F41.9 ANXIETY: ICD-10-CM

## 2025-04-16 DIAGNOSIS — F33.42 RECURRENT MAJOR DEPRESSIVE DISORDER, IN FULL REMISSION: ICD-10-CM

## 2025-04-16 DIAGNOSIS — E78.00 PURE HYPERCHOLESTEROLEMIA: ICD-10-CM

## 2025-04-16 DIAGNOSIS — R40.4 TRANSIENT ALTERATION OF AWARENESS: ICD-10-CM

## 2025-04-16 DIAGNOSIS — L97.911 NONHEALING ULCER OF RIGHT LOWER EXTREMITY LIMITED TO BREAKDOWN OF SKIN: ICD-10-CM

## 2025-04-16 DIAGNOSIS — J96.01 ACUTE HYPOXEMIC RESPIRATORY FAILURE DUE TO COVID-19: ICD-10-CM

## 2025-04-16 DIAGNOSIS — E66.01 MORBIDLY OBESE: ICD-10-CM

## 2025-04-16 DIAGNOSIS — N18.31 STAGE 3A CHRONIC KIDNEY DISEASE (HCC): ICD-10-CM

## 2025-04-16 DIAGNOSIS — R82.998 LEUKOCYTES IN URINE: ICD-10-CM

## 2025-04-16 DIAGNOSIS — I50.42 CHRONIC COMBINED SYSTOLIC AND DIASTOLIC CONGESTIVE HEART FAILURE (HCC): ICD-10-CM

## 2025-04-16 DIAGNOSIS — E87.0 HYPERNATREMIA: ICD-10-CM

## 2025-04-16 DIAGNOSIS — I69.30 CEREBRAL MULTI-INFARCT STATE: ICD-10-CM

## 2025-04-16 DIAGNOSIS — C54.9 MALIGNANT NEOPLASM OF BODY OF UTERUS, UNSPECIFIED SITE (HCC): ICD-10-CM

## 2025-04-16 DIAGNOSIS — M85.89 OSTEOPENIA OF MULTIPLE SITES: ICD-10-CM

## 2025-04-16 DIAGNOSIS — M15.0 PRIMARY OSTEOARTHRITIS INVOLVING MULTIPLE JOINTS: ICD-10-CM

## 2025-04-16 DIAGNOSIS — G30.9 DEMENTIA IN ALZHEIMER'S DISEASE (HCC): ICD-10-CM

## 2025-04-16 DIAGNOSIS — I10 ESSENTIAL HYPERTENSION: Primary | ICD-10-CM

## 2025-04-16 DIAGNOSIS — G47.01 INSOMNIA DUE TO MEDICAL CONDITION: ICD-10-CM

## 2025-04-16 DIAGNOSIS — M85.80 OSTEOPENIA, UNSPECIFIED LOCATION: ICD-10-CM

## 2025-04-16 LAB
ALBUMIN SERPL BCG-MCNC: 4.1 G/DL (ref 3.4–4.9)
ALP SERPL-CCNC: 86 U/L (ref 38–126)
ALT SERPL W/O P-5'-P-CCNC: 21 U/L (ref 10–35)
ANION GAP SERPL CALC-SCNC: 12 MEQ/L (ref 8–16)
AST SERPL-CCNC: 24 U/L (ref 10–35)
BILIRUB SERPL-MCNC: 0.2 MG/DL (ref 0.3–1.2)
BILIRUBIN, POC: NEGATIVE
BLOOD URINE, POC: NEGATIVE
BUN SERPL-MCNC: 20 MG/DL (ref 8–23)
CALCIUM SERPL-MCNC: 9.6 MG/DL (ref 8.8–10.2)
CHLORIDE SERPL-SCNC: 103 MEQ/L (ref 98–111)
CK SERPL-CCNC: 64 U/L (ref 26–192)
CLARITY, POC: CLEAR
CO2 SERPL-SCNC: 27 MEQ/L (ref 22–29)
COLOR, POC: YELLOW
CREAT SERPL-MCNC: 1.1 MG/DL (ref 0.5–0.9)
DEPRECATED RDW RBC AUTO: 48.7 FL (ref 35–45)
ERYTHROCYTE [DISTWIDTH] IN BLOOD BY AUTOMATED COUNT: 13 % (ref 11.5–14.5)
GFR SERPL CREATININE-BSD FRML MDRD: 53 ML/MIN/1.73M2
GLUCOSE FASTING: 92 MG/DL (ref 74–109)
GLUCOSE URINE, POC: NEGATIVE MG/DL
HCT VFR BLD AUTO: 39.2 % (ref 37–47)
HGB BLD-MCNC: 12.7 GM/DL (ref 12–16)
KETONES, POC: NEGATIVE MG/DL
LEUKOCYTE EST, POC: NORMAL
MCH RBC QN AUTO: 32.8 PG (ref 26–33)
MCHC RBC AUTO-ENTMCNC: 32.4 GM/DL (ref 32.2–35.5)
MCV RBC AUTO: 101.3 FL (ref 81–99)
NITRITE, POC: NEGATIVE
PH, POC: 7
PLATELET # BLD AUTO: 155 THOU/MM3 (ref 130–400)
PMV BLD AUTO: 11.7 FL (ref 9.4–12.4)
POTASSIUM SERPL-SCNC: 4 MEQ/L (ref 3.5–5.2)
PROLACTIN SERPL-MCNC: 23.5 NG/ML
PROT SERPL-MCNC: 7.1 G/DL (ref 6.4–8.3)
PROTEIN, POC: NEGATIVE MG/DL
RBC # BLD AUTO: 3.87 MILL/MM3 (ref 4.2–5.4)
SODIUM SERPL-SCNC: 142 MEQ/L (ref 135–145)
SPECIFIC GRAVITY, POC: 1.02
TROPONIN, HIGH SENSITIVITY: 19 NG/L (ref 0–12)
UROBILINOGEN, POC: 0.2 MG/DL
VALPROATE SERPL-MCNC: 19 UG/ML (ref 50–100)
WBC # BLD AUTO: 8.2 THOU/MM3 (ref 4.8–10.8)

## 2025-04-16 RX ORDER — MUPIROCIN 20 MG/G
OINTMENT TOPICAL
Qty: 30 G | Refills: 1 | Status: SHIPPED | OUTPATIENT
Start: 2025-04-16 | End: 2025-04-23

## 2025-04-16 SDOH — ECONOMIC STABILITY: FOOD INSECURITY: WITHIN THE PAST 12 MONTHS, THE FOOD YOU BOUGHT JUST DIDN'T LAST AND YOU DIDN'T HAVE MONEY TO GET MORE.: NEVER TRUE

## 2025-04-16 SDOH — ECONOMIC STABILITY: FOOD INSECURITY: WITHIN THE PAST 12 MONTHS, YOU WORRIED THAT YOUR FOOD WOULD RUN OUT BEFORE YOU GOT MONEY TO BUY MORE.: NEVER TRUE

## 2025-04-16 ASSESSMENT — PATIENT HEALTH QUESTIONNAIRE - PHQ9: DEPRESSION UNABLE TO ASSESS: FUNCTIONAL CAPACITY MOTIVATION LIMITS ACCURACY

## 2025-04-17 ENCOUNTER — CARE COORDINATION (OUTPATIENT)
Dept: CARE COORDINATION | Age: 70
End: 2025-04-17

## 2025-04-17 ENCOUNTER — RESULTS FOLLOW-UP (OUTPATIENT)
Dept: FAMILY MEDICINE CLINIC | Age: 70
End: 2025-04-17

## 2025-04-17 DIAGNOSIS — I10 ESSENTIAL HYPERTENSION: ICD-10-CM

## 2025-04-17 RX ORDER — LOSARTAN POTASSIUM AND HYDROCHLOROTHIAZIDE 25; 100 MG/1; MG/1
1 TABLET ORAL DAILY
Qty: 90 TABLET | Refills: 1 | Status: SHIPPED | OUTPATIENT
Start: 2025-04-17

## 2025-04-17 NOTE — CARE COORDINATION
Recent start of Bactroban reviewed and verified with .     Advance Care Planning:   Unable to review with patient directly.      Care Planning:    Goals Addressed                   This Visit's Progress       Care Coordination     Conditions and Symptoms   Improving     I will schedule office visits, as directed by my provider.  I will keep my appointment or reschedule if I have to cancel.  I will notify my provider of any barriers to my plan of care.  I will follow my Zone Management tool to seek urgent or emergent care.  I will notify my provider of any symptoms that indicate a worsening of my condition.    Barriers: impairment:  dementia concerns, stress, and lack of education  Plan for overcoming my barriers: Continued education and monitoring of resource needs   Confidence: 8/10  Anticipated Goal Completion Date: 5/20/2025                 PCP/Specialist follow up:   Future Appointments         Provider Specialty Dept Phone    7/16/2025 1:00 PM Mary Black MD Family Medicine [phone]    10/21/2025 1:00 PM Mary Black MD Family Medicine [phone]    12/10/2025 12:15 PM Jaiden Spring MD Cardiology [phone]            Follow Up:   Plan for next ACM outreach in approximately 2 weeks to complete:  - Disease specific assessments - CHF, Hypertension, dementia, and seizure/CVA history   - Advance care planning - Unable to review with patient - Forms on chart.   - Goal progression  - Education   - RPM - Pt unable to complete program   - F/u on possible AAA3 referral -  declines at this time  - Monitor for additional needs  - Monitor for readiness to discharge from Care Coordination     Caregiver is agreeable to this plan.

## 2025-04-19 LAB
BACTERIA UR CULT: NORMAL
LAMOTRIGINE SERPL-MCNC: 8.5 UG/ML (ref 3–15)

## 2025-04-19 RX ORDER — SULFAMETHOXAZOLE AND TRIMETHOPRIM 400; 80 MG/1; MG/1
1 TABLET ORAL DAILY
Qty: 10 TABLET | Refills: 0 | Status: SHIPPED | OUTPATIENT
Start: 2025-04-19 | End: 2025-04-29

## 2025-04-20 LAB
BACTERIA SPEC AEROBE CULT: ABNORMAL
GRAM STN SPEC: ABNORMAL
ORGANISM: ABNORMAL
ORGANISM: ABNORMAL

## 2025-04-29 ENCOUNTER — CARE COORDINATION (OUTPATIENT)
Dept: CARE COORDINATION | Age: 70
End: 2025-04-29

## 2025-04-29 NOTE — CARE COORDINATION
Ambulatory Care Coordination Note     4/29/2025 3:01 PM     Patient's daughter/HIPAA contact, Pastora,  outreach attempt by this ACM today to perform care management follow up . ACM was unable to reach the  Pastora  by telephone today;   left voice message requesting a return phone call to this ACM.     ACM: Bev Juarez RN     Care Summary Note: Attempted to reach patient for continued Care Coordination follow up and education re: the management of patient's CHF, Hypertension, Dementia, history of seizures/CVA, healthcare needs and in f/u to recent cultures and to review Summer CHF Tuck In Education.  Will continue to work to f/u with patient/family in the future.     PCP/Specialist follow up:   Future Appointments         Provider Specialty Dept Phone    5/6/2025 1:15 PM SCHEDULE, [place]  Family Medicine [phone]    7/16/2025 1:00 PM Mary Black MD Family Medicine [phone]    10/21/2025 1:00 PM Mary Black MD Family Medicine [phone]    12/10/2025 12:15 PM Jaiden Spring MD Cardiology [phone]            Follow Up:   Plan for next AC outreach in approximately 1 week to complete:  - Disease specific assessments - CHF, Hypertension, dementia, and seizure/CVA history   - Advance care planning - Unable to review with patient - Forms on chart.   - Goal progression  - Education   - Review 2025 Summer CHF Education  - RPM - Pt unable to complete program   - F/u on possible AAA3 referral -  declines at this time  - Monitor for additional needs  - Monitor for readiness to discharge from Care Coordination

## 2025-05-06 ENCOUNTER — CLINICAL SUPPORT (OUTPATIENT)
Dept: FAMILY MEDICINE CLINIC | Age: 70
End: 2025-05-06

## 2025-05-06 VITALS — SYSTOLIC BLOOD PRESSURE: 136 MMHG | HEART RATE: 88 BPM | DIASTOLIC BLOOD PRESSURE: 76 MMHG

## 2025-05-06 DIAGNOSIS — I10 ESSENTIAL HYPERTENSION: Primary | ICD-10-CM

## 2025-05-06 NOTE — PROGRESS NOTES
Patient here for nurse visit BP check currently is asymptomatic with no concerns at this time    /76  P 88

## 2025-05-06 NOTE — PROGRESS NOTES
Chief Complaint   Patient presents with    Blood Pressure Check       Vitals:    05/06/25 1316   BP: 136/76   Pulse: 88     No changes to med    Call patient

## 2025-05-07 ENCOUNTER — CARE COORDINATION (OUTPATIENT)
Dept: CARE COORDINATION | Age: 70
End: 2025-05-07

## 2025-05-07 NOTE — CARE COORDINATION
Ambulatory Care Coordination Note     5/7/2025 2:48 PM     Patient and family outreach attempt by this ACM today to perform care management follow up . AC was unable to reach the  patient or family  by telephone today;   left voice message requesting a return phone call to this ACM.  [employer] message sent requesting  patient and/or family  to contact this ACM.     ACM: Bev Juarez RN     Care Summary Note: Attempted to reach patient and her family for continued Care Coordination follow up and education re: the management of her CHF, Hypertension, Dementia, and healthcare needs.  Will continue to work to f/u with patient and/or her family in the future.     PCP/Specialist follow up:   Future Appointments         Provider Specialty Dept Phone    7/16/2025 1:00 PM Mary Black MD Family Medicine [phone]    10/21/2025 1:00 PM Mary Black MD Family Medicine [phone]    12/10/2025 12:15 PM Jaiden Spring MD Cardiology [phone]            Follow Up:   Plan for next AC outreach in approximately 1 week to complete:  - Disease specific assessments - CHF, Hypertension, dementia, and seizure/CVA history   - Advance care planning - Unable to review with patient - Forms on chart.   - Goal progression  - Education   - Review 2025 [facility] CHF Education  - RPM - Pt unable to complete program   - F/u on possible AAA3 referral -  declines at this time  - Monitor for additional needs  - Monitor for readiness to discharge from Care Coordination

## 2025-05-13 ENCOUNTER — CARE COORDINATION (OUTPATIENT)
Dept: CARE COORDINATION | Age: 70
End: 2025-05-13

## 2025-05-13 NOTE — CARE COORDINATION
Ambulatory Care Coordination Note     2025 3:59 PM     Patient Current Location:  [place]     ACM contacted the  patient's daughter/HIPAA contact, Pastora,  by telephone. Verified name and  with family as identifiers.     Patient graduated from the High Risk Care Management program on 2025.  Pasotra   shared patient/family has the ability for patient/self care and follow up .  Care management goals have been completed. No further Ambulatory Care Manager follow up scheduled.      ACM: Bev Juarze RN     Challenges to be reviewed by the provider   Additional needs identified to be addressed with provider No    none         Method of communication with provider: none.    Utilization: Patient has not had any utilization since our last call.     Care Summary Note: Patient's daughter/HIPAA contact, Pastora, was called for continued Care Coordination follow up and education re: the management of patient's CHF, Hypertension, Dementia, and healthcare needs.  Pastora shared patient continues to do well and she denied any recent c/o increased/worsening SOB, swelling, headaches, dizziness, or chest pain being present.  Pastora shared patient's dementia remains at her baseline and she denied any recent seizure symptoms or concerns.  CHF zone education was reviewed with daughter, including signs and symptoms they should report, importance of routine weight monitoring, what to report/when to follow up, and the need to call for an earlier appointment with any new/worsening of symptoms.  Pastora verbalized understanding.  Low Na diet education and importance of ongoing diet adherence was also reviewed with daughter and she verbalized understanding.  CHF Tuck In education was also reviewed. Pastora denied any medication questions or concerns and reported patient continues to take medications as directed.  Daughter denied any other questions, concerns, or needs and she was encouraged to call with any that may

## 2025-06-26 DIAGNOSIS — Z86.73 HISTORY OF STROKE: ICD-10-CM

## 2025-06-26 DIAGNOSIS — E78.00 PURE HYPERCHOLESTEROLEMIA: ICD-10-CM

## 2025-06-26 RX ORDER — SIMVASTATIN 80 MG
TABLET ORAL
Qty: 90 TABLET | Refills: 1 | Status: SHIPPED | OUTPATIENT
Start: 2025-06-26

## 2025-06-26 RX ORDER — CLOPIDOGREL BISULFATE 75 MG/1
75 TABLET ORAL DAILY
Qty: 90 TABLET | Refills: 1 | Status: SHIPPED | OUTPATIENT
Start: 2025-06-26

## 2025-07-05 DIAGNOSIS — E03.9 ACQUIRED HYPOTHYROIDISM: ICD-10-CM

## 2025-07-07 RX ORDER — LEVOTHYROXINE SODIUM 200 UG/1
TABLET ORAL
Qty: 93 TABLET | Refills: 1 | Status: SHIPPED | OUTPATIENT
Start: 2025-07-07 | End: 2025-07-09 | Stop reason: SDUPTHER

## 2025-07-09 ENCOUNTER — TELEPHONE (OUTPATIENT)
Dept: FAMILY MEDICINE CLINIC | Age: 70
End: 2025-07-09

## 2025-07-09 DIAGNOSIS — E03.9 ACQUIRED HYPOTHYROIDISM: ICD-10-CM

## 2025-07-09 RX ORDER — LEVOTHYROXINE SODIUM 200 UG/1
TABLET ORAL
Qty: 135 TABLET | Refills: 1 | Status: SHIPPED | OUTPATIENT
Start: 2025-07-09

## 2025-07-09 NOTE — TELEPHONE ENCOUNTER
[facility] pharmacy called wanting confirmation of pt synthroid directions. Pt thinks she should be taking 1 &1/2 tablet everyday. Please advise, thank you.

## 2025-07-14 NOTE — PROGRESS NOTES
[facility] PROFESSIONAL SERVS  [facility] MEDICINE  [address]  [address]  [facility] [address]  Dept: [phone]  Dept Fax: [phone]  Loc: [phone]    Mag Parrish is a 70 y.o. female who presents today for:  No chief complaint on file.          HPI:     HPI    Hypertension: Patient here for follow-up of elevated blood pressure. She is not exercising and is adherent to low salt diet.  Blood pressure is well controlled at home. Cardiac symptoms none. Patient denies chest pain, dyspnea and palpitations.  Cardiovascular risk factors: dyslipidemia, hypertension, obesity (BMI >= 30 kg/m2) and sedentary lifestyle. Use of agents associated with hypertension: none. History of target organ damage: none.     Hyperlipidemia: Patient presents with hyperlipidemia.  She was tested because hypertension.  Her last labs show   Lab Results   Component Value Date    CHOL 166 09/06/2024    CHOL 186 10/06/2023    CHOL 183 06/23/2023     Lab Results   Component Value Date    TRIG 222 (H) 09/06/2024    TRIG 81 10/06/2023    TRIG 91 06/23/2023     Lab Results   Component Value Date    HDL 59 09/06/2024    HDL 73 10/06/2023    HDL 73 06/23/2023     Lab Results   Component Value Date    LDL 63 09/06/2024    LDL 97 10/06/2023    LDL 92 06/23/2023     Lab Results   Component Value Date    VLDL 17 10/13/2018    VLDL 19 10/09/2017    VLDL 18 05/13/2017     Lab Results   Component Value Date    CHOLHDLRATIO 0.9 10/13/2018    CHOLHDLRATIO 1 10/09/2017    CHOLHDLRATIO 2.14 05/13/2017   There is not a family history of hyperlipidemia. There is not a family history of early ischemia heart disease.    Hypothyroidism: Patient presents for evaluation of thyroid function. Symptoms consist of fatigue, arthralgias. Symptoms have present for several years. The symptoms are fatigue.  The problem has been gradually worsening.  Previous thyroid studies include TSH, free thyroxine index and triiodothyronine total. The

## 2025-07-16 ENCOUNTER — OFFICE VISIT (OUTPATIENT)
Dept: FAMILY MEDICINE CLINIC | Age: 70
End: 2025-07-16

## 2025-07-16 VITALS
RESPIRATION RATE: 18 BRPM | WEIGHT: 182.98 LBS | TEMPERATURE: 97 F | HEIGHT: 60 IN | BODY MASS INDEX: 35.92 KG/M2 | DIASTOLIC BLOOD PRESSURE: 84 MMHG | OXYGEN SATURATION: 98 % | HEART RATE: 68 BPM | SYSTOLIC BLOOD PRESSURE: 122 MMHG

## 2025-07-16 DIAGNOSIS — K21.00 GASTROESOPHAGEAL REFLUX DISEASE WITH ESOPHAGITIS WITHOUT HEMORRHAGE: ICD-10-CM

## 2025-07-16 DIAGNOSIS — G47.01 INSOMNIA DUE TO MEDICAL CONDITION: ICD-10-CM

## 2025-07-16 DIAGNOSIS — E03.9 ACQUIRED HYPOTHYROIDISM: ICD-10-CM

## 2025-07-16 DIAGNOSIS — M85.89 OSTEOPENIA OF MULTIPLE SITES: ICD-10-CM

## 2025-07-16 DIAGNOSIS — R29.6 FALLS FREQUENTLY: ICD-10-CM

## 2025-07-16 DIAGNOSIS — N18.32 STAGE 3B CHRONIC KIDNEY DISEASE (HCC): ICD-10-CM

## 2025-07-16 DIAGNOSIS — M85.80 OSTEOPENIA, UNSPECIFIED LOCATION: ICD-10-CM

## 2025-07-16 DIAGNOSIS — E87.0 HYPERNATREMIA: ICD-10-CM

## 2025-07-16 DIAGNOSIS — R26.81 UNSTEADY GAIT: ICD-10-CM

## 2025-07-16 DIAGNOSIS — Z87.39 HISTORY OF GOUT: ICD-10-CM

## 2025-07-16 DIAGNOSIS — I10 ESSENTIAL HYPERTENSION: ICD-10-CM

## 2025-07-16 DIAGNOSIS — R56.9 SEIZURE (HCC): ICD-10-CM

## 2025-07-16 DIAGNOSIS — F41.9 ANXIETY: ICD-10-CM

## 2025-07-16 DIAGNOSIS — N18.31 STAGE 3A CHRONIC KIDNEY DISEASE (HCC): ICD-10-CM

## 2025-07-16 DIAGNOSIS — C54.9 MALIGNANT NEOPLASM OF BODY OF UTERUS, UNSPECIFIED SITE (HCC): ICD-10-CM

## 2025-07-16 DIAGNOSIS — Z91.89 AT RISK FOR CHANGE IN MENTAL STATUS: ICD-10-CM

## 2025-07-16 DIAGNOSIS — I69.30 CEREBRAL MULTI-INFARCT STATE: ICD-10-CM

## 2025-07-16 DIAGNOSIS — F02.80 DEMENTIA IN ALZHEIMER'S DISEASE (HCC): ICD-10-CM

## 2025-07-16 DIAGNOSIS — N81.4 CYSTOCELE WITH PROLAPSE: ICD-10-CM

## 2025-07-16 DIAGNOSIS — M15.0 PRIMARY OSTEOARTHRITIS INVOLVING MULTIPLE JOINTS: ICD-10-CM

## 2025-07-16 DIAGNOSIS — E78.00 PURE HYPERCHOLESTEROLEMIA: ICD-10-CM

## 2025-07-16 DIAGNOSIS — G30.9 DEMENTIA IN ALZHEIMER'S DISEASE (HCC): ICD-10-CM

## 2025-07-16 DIAGNOSIS — I50.42 CHRONIC COMBINED SYSTOLIC AND DIASTOLIC CONGESTIVE HEART FAILURE (HCC): ICD-10-CM

## 2025-07-16 DIAGNOSIS — F33.42 RECURRENT MAJOR DEPRESSIVE DISORDER, IN FULL REMISSION: ICD-10-CM

## 2025-07-16 DIAGNOSIS — E66.01 MORBIDLY OBESE (HCC): ICD-10-CM

## 2025-07-16 DIAGNOSIS — Z00.00 MEDICARE ANNUAL WELLNESS VISIT, SUBSEQUENT: Primary | ICD-10-CM

## 2025-07-16 DIAGNOSIS — D69.6 THROMBOCYTOPENIA: ICD-10-CM

## 2025-07-16 DIAGNOSIS — Z86.73 HISTORY OF STROKE: ICD-10-CM

## 2025-07-16 DIAGNOSIS — R40.4 TRANSIENT ALTERATION OF AWARENESS: ICD-10-CM

## 2025-07-16 DIAGNOSIS — R06.09 DYSPNEA ON EXERTION: ICD-10-CM

## 2025-07-16 RX ORDER — FOLIC ACID 1 MG/1
TABLET ORAL
Qty: 90 TABLET | Refills: 1 | Status: SHIPPED | OUTPATIENT
Start: 2025-07-16

## 2025-07-16 RX ORDER — DIVALPROEX SODIUM 125 MG/1
125 CAPSULE, COATED PELLETS ORAL 2 TIMES DAILY
Qty: 180 CAPSULE | Refills: 1 | Status: SHIPPED | OUTPATIENT
Start: 2025-07-16

## 2025-07-16 RX ORDER — LEVOTHYROXINE SODIUM 300 UG/1
TABLET ORAL
Qty: 90 TABLET | Refills: 3 | Status: SHIPPED | OUTPATIENT
Start: 2025-07-16

## 2025-07-16 ASSESSMENT — PATIENT HEALTH QUESTIONNAIRE - PHQ9
1. LITTLE INTEREST OR PLEASURE IN DOING THINGS: NOT AT ALL
6. FEELING BAD ABOUT YOURSELF - OR THAT YOU ARE A FAILURE OR HAVE LET YOURSELF OR YOUR FAMILY DOWN: NOT AT ALL
SUM OF ALL RESPONSES TO PHQ QUESTIONS 1-9: 1
2. FEELING DOWN, DEPRESSED OR HOPELESS: SEVERAL DAYS
3. TROUBLE FALLING OR STAYING ASLEEP: NOT AT ALL
5. POOR APPETITE OR OVEREATING: NOT AT ALL
10. IF YOU CHECKED OFF ANY PROBLEMS, HOW DIFFICULT HAVE THESE PROBLEMS MADE IT FOR YOU TO DO YOUR WORK, TAKE CARE OF THINGS AT HOME, OR GET ALONG WITH OTHER PEOPLE: NOT DIFFICULT AT ALL
4. FEELING TIRED OR HAVING LITTLE ENERGY: NOT AT ALL
7. TROUBLE CONCENTRATING ON THINGS, SUCH AS READING THE NEWSPAPER OR WATCHING TELEVISION: NOT AT ALL
9. THOUGHTS THAT YOU WOULD BE BETTER OFF DEAD, OR OF HURTING YOURSELF: NOT AT ALL
SUM OF ALL RESPONSES TO PHQ QUESTIONS 1-9: 1
8. MOVING OR SPEAKING SO SLOWLY THAT OTHER PEOPLE COULD HAVE NOTICED. OR THE OPPOSITE, BEING SO FIGETY OR RESTLESS THAT YOU HAVE BEEN MOVING AROUND A LOT MORE THAN USUAL: NOT AT ALL

## 2025-07-16 ASSESSMENT — LIFESTYLE VARIABLES
HOW OFTEN DO YOU HAVE A DRINK CONTAINING ALCOHOL: NEVER
HOW MANY STANDARD DRINKS CONTAINING ALCOHOL DO YOU HAVE ON A TYPICAL DAY: PATIENT DOES NOT DRINK

## 2025-07-16 NOTE — PROGRESS NOTES
Medicare Annual Wellness Visit    Mag Parrish is here for Medicare AWV    Assessment & Plan   Medicare annual wellness visit, subsequent  Dementia in Alzheimer's disease (HCC)  Essential hypertension  Pure hypercholesterolemia  Malignant neoplasm of body of uterus, unspecified site (HCC)  Acquired hypothyroidism  -     levothyroxine (SYNTHROID) 300 MCG tablet; TAKE ONE  TABLET BY MOUTH ONCE DAILY, Disp-90 tablet, R-3Normal  Stage 3b chronic kidney disease (HCC)  Gastroesophageal reflux disease with esophagitis without hemorrhage  Recurrent major depressive disorder, in full remission  Cerebral multi-infarct state  -     folic acid (FOLVITE) 1 MG tablet; TAKE ONE TABLET DAILY, BY MOUTH., Disp-90 tablet, R-1Normal  History of stroke  Thrombocytopenia  Stage 3a chronic kidney disease (HCC)  Seizure ([facility])  -     divalproex (DEPAKOTE SPRINKLE) 125 MG DR capsule; Take 1 capsule by mouth 2 times daily, Disp-180 capsule, R-1Normal  Osteopenia of multiple sites  Primary osteoarthritis involving multiple joints  Insomnia due to medical condition  Morbidly obese ([facility])  Unsteady gait  Dyspnea on exertion  Anxiety  Falls frequently  History of gout  Cystocele with prolapse  Osteopenia, unspecified location  Chronic combined systolic and diastolic congestive heart failure (HCC)  Hypernatremia  At risk for change in mental status  Transient alteration of awareness    Assessment & Plan  1. Medicare wellness visit.  - Blood pressure is well-regulated at 122/84.  - Mammogram is current until 12/2025; colonoscopy is up to date; bone density test was conducted in 01/2024, next due in 01/2027.  - Due for influenza vaccine in fall 2025; needs tetanus and shingles vaccines; pneumonia vaccine is current; scheduled for COVID-19 vaccine this fall.  - Advised to maintain oral hygiene through regular brushing and flossing; lab sheet provided for next visit; thyroid function to be assessed.    2. Hypothyroidism.  - Previously on 1.5 tablets

## 2025-08-05 ENCOUNTER — APPOINTMENT (OUTPATIENT)
Dept: GENERAL RADIOLOGY | Age: 70
End: 2025-08-05
Payer: MEDICARE

## 2025-08-05 ENCOUNTER — APPOINTMENT (OUTPATIENT)
Dept: CT IMAGING | Age: 70
End: 2025-08-05
Payer: MEDICARE

## 2025-08-05 ENCOUNTER — HOSPITAL ENCOUNTER (EMERGENCY)
Age: 70
Discharge: HOME | End: 2025-08-05
Attending: STUDENT IN AN ORGANIZED HEALTH CARE EDUCATION/TRAINING PROGRAM
Payer: MEDICARE

## 2025-08-05 VITALS
RESPIRATION RATE: 19 BRPM | TEMPERATURE: 97.7 F | OXYGEN SATURATION: 95 % | HEART RATE: 73 BPM | SYSTOLIC BLOOD PRESSURE: 138 MMHG | DIASTOLIC BLOOD PRESSURE: 71 MMHG
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
## 2025-08-05 LAB
ALBUMIN SERPL BCG-MCNC: 4 G/DL (ref 3.4–4.9)
ALP SERPL-CCNC: 85 U/L (ref 38–126)
ALT SERPL W/O P-5'-P-CCNC: 18 U/L (ref 10–35)
ANION GAP SERPL CALC-SCNC: 15 MEQ/L (ref 8–16)
AST SERPL-CCNC: 27 U/L (ref 10–35)
BASOPHILS ABSOLUTE: 0.1 THOU/MM3 (ref 0–0.1)
BASOPHILS NFR BLD AUTO: 0.6 %
BILIRUB CONJ SERPL-MCNC: 0.2 MG/DL (ref 0–0.2)
BILIRUB SERPL-MCNC: 0.4 MG/DL (ref 0.3–1.2)
BILIRUB UR QL STRIP.AUTO: NEGATIVE
BUN SERPL-MCNC: 22 MG/DL (ref 8–23)
CALCIUM SERPL-MCNC: 9.9 MG/DL (ref 8.8–10.2)
CHARACTER UR: CLEAR
CHLORIDE SERPL-SCNC: 104 MEQ/L (ref 98–111)
CO2 SERPL-SCNC: 23 MEQ/L (ref 22–29)
COLOR, UA: YELLOW
CREAT SERPL-MCNC: 1.1 MG/DL (ref 0.5–0.9)
DEPRECATED RDW RBC AUTO: 44.3 FL (ref 35–45)
EKG ATRIAL RATE: 56 BPM
EKG P AXIS: 39 DEGREES
EKG P-R INTERVAL: 116 MS
EKG Q-T INTERVAL: 424 MS
EKG QRS DURATION: 102 MS
EKG QTC CALCULATION (BAZETT): 409 MS
EKG R AXIS: 40 DEGREES
EKG T AXIS: 32 DEGREES
EKG VENTRICULAR RATE: 56 BPM
EOSINOPHIL NFR BLD AUTO: 0.5 %
EOSINOPHILS ABSOLUTE: 0.1 THOU/MM3 (ref 0–0.4)
ERYTHROCYTE [DISTWIDTH] IN BLOOD BY AUTOMATED COUNT: 12.4 % (ref 11.5–14.5)
GFR SERPL CREATININE-BSD FRML MDRD: 53 ML/MIN/1.73M2
GLUCOSE BLD STRIP.AUTO-MCNC: 87 MG/DL (ref 70–108)
GLUCOSE SERPL-MCNC: 86 MG/DL (ref 74–109)
GLUCOSE UR QL STRIP.AUTO: NEGATIVE MG/DL
HCT VFR BLD AUTO: 39.3 % (ref 37–47)
HGB BLD-MCNC: 13.1 GM/DL (ref 12–16)
HGB UR QL STRIP.AUTO: NEGATIVE
IMM GRANULOCYTES # BLD AUTO: 0.05 THOU/MM3 (ref 0–0.07)
IMM GRANULOCYTES NFR BLD AUTO: 0.5 %
INR PPP: 0.93 (ref 0.85–1.13)
KETONES UR QL STRIP.AUTO: NEGATIVE
LACTIC ACID, SEPSIS: 1 MMOL/L (ref 0.5–1.9)
LACTIC ACID, SEPSIS: 2.1 MMOL/L (ref 0.5–1.9)
LYMPHOCYTES ABSOLUTE: 1.6 THOU/MM3 (ref 1–4.8)
LYMPHOCYTES NFR BLD AUTO: 15.8 %
MAGNESIUM SERPL-MCNC: 1.8 MG/DL (ref 1.6–2.6)
MCH RBC QN AUTO: 32.3 PG (ref 26–33)
MCHC RBC AUTO-ENTMCNC: 33.3 GM/DL (ref 32.2–35.5)
MCV RBC AUTO: 96.8 FL (ref 81–99)
MONOCYTES ABSOLUTE: 0.7 THOU/MM3 (ref 0.4–1.3)
MONOCYTES NFR BLD AUTO: 7.1 %
NEUTROPHILS ABSOLUTE: 7.6 THOU/MM3 (ref 1.8–7.7)
NEUTROPHILS NFR BLD AUTO: 75.5 %
NITRITE UR QL STRIP: NEGATIVE
NRBC BLD AUTO-RTO: 0 /100 WBC
OSMOLALITY SERPL CALC.SUM OF ELEC: 285.8 MOSMOL/KG (ref 275–300)
PH UR STRIP.AUTO: >= 9 [PH] (ref 5–9)
PLATELET # BLD AUTO: 157 THOU/MM3 (ref 130–400)
PMV BLD AUTO: 11.4 FL (ref 9.4–12.4)
POTASSIUM SERPL-SCNC: 4 MEQ/L (ref 3.5–5.2)
PROT SERPL-MCNC: 7.1 G/DL (ref 6.4–8.3)
PROT UR STRIP.AUTO-MCNC: NEGATIVE MG/DL
PROTHROMBIN TIME: 10.9 SECONDS (ref 10–13.5)
RBC # BLD AUTO: 4.06 MILL/MM3 (ref 4.2–5.4)
SODIUM SERPL-SCNC: 142 MEQ/L (ref 135–145)
SP GR UR REFRACT.AUTO: 1.02 (ref 1–1.03)
TROPONIN, HIGH SENSITIVITY: 38 NG/L (ref 0–12)
TROPONIN, HIGH SENSITIVITY: 39 NG/L (ref 0–12)
UROBILINOGEN, URINE: 0.2 EU/DL (ref 0–1)
WBC # BLD AUTO: 10 THOU/MM3 (ref 4.8–10.8)
WBC #/AREA URNS HPF: NEGATIVE /[HPF]

## 2025-08-05 PROCEDURE — 36415 COLL VENOUS BLD VENIPUNCTURE: CPT

## 2025-08-05 PROCEDURE — 2580000003 HC RX 258

## 2025-08-05 PROCEDURE — 80053 COMPREHEN METABOLIC PANEL: CPT

## 2025-08-05 PROCEDURE — 72125 CT NECK SPINE W/O DYE: CPT

## 2025-08-05 PROCEDURE — 71260 CT THORAX DX C+: CPT

## 2025-08-05 PROCEDURE — 76376 3D RENDER W/INTRP POSTPROCES: CPT

## 2025-08-05 PROCEDURE — 6360000004 HC RX CONTRAST MEDICATION

## 2025-08-05 PROCEDURE — 85610 PROTHROMBIN TIME: CPT

## 2025-08-05 PROCEDURE — 73030 X-RAY EXAM OF SHOULDER: CPT

## 2025-08-05 PROCEDURE — 84484 ASSAY OF TROPONIN QUANT: CPT

## 2025-08-05 PROCEDURE — 74177 CT ABD & PELVIS W/CONTRAST: CPT

## 2025-08-05 PROCEDURE — 85025 COMPLETE CBC W/AUTO DIFF WBC: CPT

## 2025-08-05 PROCEDURE — 82948 REAGENT STRIP/BLOOD GLUCOSE: CPT

## 2025-08-05 PROCEDURE — 99285 EMERGENCY DEPT VISIT HI MDM: CPT

## 2025-08-05 PROCEDURE — 93010 ELECTROCARDIOGRAM REPORT: CPT | Performed by: INTERNAL MEDICINE

## 2025-08-05 PROCEDURE — 6370000000 HC RX 637 (ALT 250 FOR IP)

## 2025-08-05 PROCEDURE — 83605 ASSAY OF LACTIC ACID: CPT

## 2025-08-05 PROCEDURE — 81003 URINALYSIS AUTO W/O SCOPE: CPT

## 2025-08-05 PROCEDURE — 70450 CT HEAD/BRAIN W/O DYE: CPT

## 2025-08-05 PROCEDURE — 93005 ELECTROCARDIOGRAM TRACING: CPT

## 2025-08-05 PROCEDURE — 82248 BILIRUBIN DIRECT: CPT

## 2025-08-05 PROCEDURE — 83735 ASSAY OF MAGNESIUM: CPT

## 2025-08-05 RX ORDER — ALPRAZOLAM 0.5 MG
0.25 TABLET ORAL ONCE
Status: DISCONTINUED | OUTPATIENT
Start: 2025-08-05 | End: 2025-08-05

## 2025-08-05 RX ORDER — QUETIAPINE FUMARATE 25 MG/1
25 TABLET, FILM COATED ORAL ONCE
Status: DISCONTINUED | OUTPATIENT
Start: 2025-08-05 | End: 2025-08-05

## 2025-08-05 RX ORDER — IOPAMIDOL 755 MG/ML
80 INJECTION, SOLUTION INTRAVASCULAR
Status: COMPLETED | OUTPATIENT
Start: 2025-08-05 | End: 2025-08-05

## 2025-08-05 RX ORDER — ACETAMINOPHEN 500 MG
1000 TABLET ORAL
Status: COMPLETED | OUTPATIENT
Start: 2025-08-05 | End: 2025-08-05

## 2025-08-05 RX ORDER — SODIUM CHLORIDE, SODIUM LACTATE, POTASSIUM CHLORIDE, AND CALCIUM CHLORIDE .6; .31; .03; .02 G/100ML; G/100ML; G/100ML; G/100ML
500 INJECTION, SOLUTION INTRAVENOUS ONCE
Status: COMPLETED | OUTPATIENT
Start: 2025-08-05 | End: 2025-08-05

## 2025-08-05 RX ADMIN — SODIUM CHLORIDE, SODIUM LACTATE, POTASSIUM CHLORIDE, AND CALCIUM CHLORIDE 500 ML: .6; .31; .03; .02 INJECTION, SOLUTION INTRAVENOUS at 17:25

## 2025-08-05 RX ADMIN — ACETAMINOPHEN 1000 MG: 500 TABLET ORAL at 15:06

## 2025-08-05 RX ADMIN — IOPAMIDOL 80 ML: 755 INJECTION, SOLUTION INTRAVENOUS at 16:04

## 2025-08-22 DIAGNOSIS — F33.42 RECURRENT MAJOR DEPRESSIVE DISORDER, IN FULL REMISSION: ICD-10-CM

## 2025-08-24 RX ORDER — ALPRAZOLAM 0.25 MG
TABLET ORAL
Qty: 90 TABLET | Refills: 1 | Status: SHIPPED | OUTPATIENT
Start: 2025-08-24 | End: 2026-02-20